# Patient Record
Sex: FEMALE | Race: WHITE | Employment: UNEMPLOYED | URBAN - METROPOLITAN AREA
[De-identification: names, ages, dates, MRNs, and addresses within clinical notes are randomized per-mention and may not be internally consistent; named-entity substitution may affect disease eponyms.]

---

## 2016-01-22 LAB — CREATININE, EXTERNAL: 0.5

## 2016-11-15 LAB — HBA1C MFR BLD HPLC: 5.7 %

## 2017-08-16 LAB
CREATININE, EXTERNAL: 0.8
LDL-C, EXTERNAL: 104.4

## 2018-01-08 ENCOUNTER — OFFICE VISIT (OUTPATIENT)
Dept: FAMILY MEDICINE CLINIC | Age: 58
End: 2018-01-08

## 2018-01-08 VITALS
WEIGHT: 263.8 LBS | HEART RATE: 61 BPM | OXYGEN SATURATION: 96 % | TEMPERATURE: 97.5 F | SYSTOLIC BLOOD PRESSURE: 122 MMHG | BODY MASS INDEX: 42.4 KG/M2 | HEIGHT: 66 IN | RESPIRATION RATE: 20 BRPM | DIASTOLIC BLOOD PRESSURE: 55 MMHG

## 2018-01-08 DIAGNOSIS — E16.2 LOW BLOOD SUGAR: ICD-10-CM

## 2018-01-08 DIAGNOSIS — Z12.39 SCREENING FOR BREAST CANCER: ICD-10-CM

## 2018-01-08 DIAGNOSIS — Z23 ENCOUNTER FOR IMMUNIZATION: ICD-10-CM

## 2018-01-08 DIAGNOSIS — Z00.00 WELL WOMAN EXAM (NO GYNECOLOGICAL EXAM): Primary | ICD-10-CM

## 2018-01-08 DIAGNOSIS — G89.29 CHRONIC NECK PAIN: ICD-10-CM

## 2018-01-08 DIAGNOSIS — Z95.0 PACEMAKER: ICD-10-CM

## 2018-01-08 DIAGNOSIS — I50.9 CHRONIC CONGESTIVE HEART FAILURE, UNSPECIFIED CONGESTIVE HEART FAILURE TYPE: ICD-10-CM

## 2018-01-08 DIAGNOSIS — M54.2 CHRONIC NECK PAIN: ICD-10-CM

## 2018-01-08 DIAGNOSIS — Z00.00 LABORATORY EXAM ORDERED AS PART OF ROUTINE GENERAL MEDICAL EXAMINATION: ICD-10-CM

## 2018-01-08 DIAGNOSIS — J02.9 SORE THROAT: ICD-10-CM

## 2018-01-08 PROBLEM — I25.10 CAD (CORONARY ARTERY DISEASE): Status: ACTIVE | Noted: 2018-01-08

## 2018-01-08 PROBLEM — I10 ESSENTIAL HYPERTENSION: Status: ACTIVE | Noted: 2018-01-08

## 2018-01-08 PROBLEM — F99 PSYCHIATRIC DISORDER: Status: ACTIVE | Noted: 2018-01-08

## 2018-01-08 RX ORDER — HYDROCODONE BITARTRATE AND ACETAMINOPHEN 7.5; 325 MG/1; MG/1
1 TABLET ORAL
Qty: 15 TAB | Refills: 0 | Status: SHIPPED | OUTPATIENT
Start: 2018-01-08 | End: 2018-01-30 | Stop reason: SDUPTHER

## 2018-01-08 NOTE — PATIENT INSTRUCTIONS
1) Healthy Weight  Body Mass Index is a noninvasive way to screen for weight and body fat. This is a mathematical calculation based on your height and weight. A healthy BMI is between 20% -24.9%. Your BMI is 39 %. There is a relationship between high BMI and various healthy problems, such as osteoarthritis, muscle pain, increased risk of cancer, diabetes, heart disease, stroke, hypertension, high cholesterol, sleep apnea, breathing problems, depression, which is why a healthy BMI is so important. In terms of weight loss, a 5-10% reduction in body weight over 3-6 months is a reasonable goal.  There would likely be improvements in risk for disease such as diabetes and heart disease, with this amount of weight loss. In order to lose weight, try reducing your daily intake of calories by decreasing portion size of food and increase exercise to help reduce weight. Eat 3-5 small meals per day instead of 3 large meals. Choose lean meats for protein source which include chicken, pork, and turkey. The recommended serving size for protein is a 2-3 oz serving (the size of your fist), and 1-1.5 oz of carbohydrate per meal (about 1 cup). Increase servings of fruits and vegetables. Limit processed carbohydrates, (i.e. most breads, crackers, pasta, chips, rice, breaded or battered food, etc). If you choose to eat carbohydrates, whole wheat, (instead of white) is a healthier option for bread, rice, and crackers. Avoid fried foods. Limit sugar and do not drink alcohol, juice, sodas or sweet teas. Drink plenty of water (at least 64 oz/day). Daily exercise will also help with weight loss and overall health. A minimum of 150 minutes a week of moderate exercise is recommended (30 minutes per day). Make exercise a routine part of your day - for example, park in spaces far away from [facility], take stairs instead of elevator, if sitting for long periods, get up from chair and walk every hour.     Recruit a friend or relative to exercise with you and keep you on schedule. It is much easier to exercise with a divina who will make sure you work out each day! [employer] is a eight week mixed martial arts (MMA) based workout. It has 5 main workout DVDs, each one is 45 minutes consisting of a 10 minute warm-up, five 5 minute workouts and a 6 minute stretching/cool down. It is easy to follow, with options to modify each move and you only need hand weights and some space to do the work out. Meal planning smart phone applications like G[employer]! can help plan healthy meals. Get 7-8 hours of sleep each night. You may wish to consider seeing the nutritionist at [facility] ([phone]), [facility] ([phone]) or [facility] ([phone]). For reliable dietary information, go to www. [email or web address].    Free smart phone application to help manage weight loss: MyFitnessPal = tracks food intake, exercise and weight. Daily nutritional summary. Meal        2) An upper respiratory infection (URI) is an infection of the throat and nose. It is also known as \"the common cold\". 90% of these infections are caused by a virus. Viral infections do not respond to antibiotic treatment, only bacteria are killed by antibiotics. Therefore, supportive treatment is recommended to help with symptoms. Symptoms usually subside after 7-10 days (or longer if you smoke). If symptoms worsen or persist after 14 days, or if you have fever over 101.3, fever for 5 days or more, wheezing, or shortness of breath, please contact the office. To prevent URIs, wash hands frequently (epecially before and after eating - use hand  if you are in a public place.) Eat a healthy diet, get regular exercise and sufficient sleep. Reduce your exposure to cigarette smoke. If you need to cough or sneeze, use the crook of your arm to cover your mouth.       Supportive Care    1) Alternate 400mg Ibuprofen and 650mg Tylenol every 4 hours as needed for sinus pain and discomfort. Make sure to take Ibuprofen with food as it can cause stomach upset. Do not exceed 3000 mg Tylenol per day. 2) Take a daily antihistamine to reduce symptoms such as sneezing, runny nose and itchy eyes. You can buy these over the counter (OTC) (no prescription needed) such as Claritin, Allegra or Zyrtec. Take this daily as it takes 3-4 weeks for the full therapeutic effect to take place. Follow directions on package. If symptoms of sneezing, coughing and runny nose are severe, you can try taking Benadryl at night before bed. However, Benadryl can cause drowsiness, so please use caution. Elderly people should not use Benadryl due to side effects and increase risk of falling. 3) OTC Robitussin or Delsym for cough relief. Follow directions on package. Do not exceed maximum dose. May cause drowsiness. If you have high blood pressure or kidney problems, avoid cough medicines that contain decongestants -- such as pseudoephedrine, ephedrine, phenylephrine, naphazoline and oxymetazoline. 4) Use an OTC decongestant nasal spray such as Flonase, Nasonex, or Rhinocort. These contain a steroid and will help reduce congestion. You can spray 2x in each nostril two times a day. Use the opposite hand of the nostril you are spraying and look down at your toes when you administer the nasal spray. This ensures the spray is applied to the nasal tissue properly. If you use Afrin for nasal congestion, DO NOT use for more than 5 days (due to rebound congestion)     Saline nasal spray can be used daily and will help restore moisture to dry nasal passages, wash away allergens and can help prevent inflammation of the mucous membranes. 5) Mucinex (guaifenesin) helps thin mucus and may make it easier to clear it from head, throat and lungs. 6) Increase your fluid consumption, especially water.  Eat a well-balanced diet and avoid dairy and sugar as these can increase or thicken congestion. 7) Warm salt water gargle can help alleviate sore throat (1 teaspoon in 8 oz warm water)    8) Honey is a natural cough suppressor - can take a teaspoon of honey for cough or mix with hot tea. Local honey can help inoculate against local pollen that causes allergic reactions. (It has to be local honey from our area. You can usually find local honey at farmers' markets.)     9) Humidify air, especially in bedroom at night, as it may help with nasal and throat dryness. Breathing in steam from a shower may help loosen mucus and soothe an irritated throat. 10) Get plenty of rest!    11) Emer[employer] C or [place] - vitamin supplements containing high doses of vitamin C, Vitamin B12 and B6 that can is marketed to help prevent or stop colds (although this has not been confirmed by scientific research)     12) If you were given an antibiotic, throw away your old toothbrush and invest in a new one after you have been on the antibiotic for 24 hours. You can get reinfected by using a contaminated one! If symptoms persist for more than 10 days or if you have a fever above 102, please call the office. Complications of URI include an infection of the skin around the eye and other infections. Watch for signs of fever, chills, body aches or any areas of red, warm, swollen and tender skin. Call immediately if you notice these signs. 3) Referrals to Dr. Swetha Nino and Dr. Sanjiv Sanchez  Please make an appt to see cardiology this month, as you have sx of dizziness, fatigue and palpitations    4) Labs  The following blood work was ordered today. Once the tests are completed, you will receive a letter, email or phone call with the results. If you have not heard from us within 10-14 days, please call the office for the results.     Complete Blood Count (CBC) helps evaluate your overall health, including hemoglobin and red blood cells that carry oxygen, white blood cells that fight infection and platelets that help with clotting. Complete Metabolic Panel (CMP) assesses electrolytes, kidney and liver function.  (A Basic Metabolic Panel (BMP) does not include liver function.)  Electrolytes include sodium, potassium, calcium, and chloride. These are necessary for cell function and an imbalance can cause serious problems. BUN and creatinine are markers of kidney function. ALT and AST are markers of liver function. Hemoglobin A1c is a 3 month average of your blood sugar and used as a marker of your diabetes control. A normal value is less than 5.7%. Total Cholesterol is the total number of cholesterol particles in your blood, which includes triglycerides, HDL and LDL. A small amount of cholesterol is needed for the body's cells, hormones, and metabolism. Goal is less than 200. Triglycerides are the short term fats in your blood which are used for energy. Goal is less than 150. High Density Lipids (HDL) is the \"good\" cholesterol in your blood. HDL helps remove bad cholesterol from your blood. Goal is more than 40. Low Density Lipids (LDL) is the \"bad\" cholesterol in your blood. LDL can stick to your arteries, raising the risk for heart attack and stroke. Goal is less than 150    Urinalysis - this is a test of your urine to check your overall health. It is recommended as a part of a routine check up and screens for a variety of disorders, such as urinary tract infections, kidney disease and diabetes. Urine Analysis for Microalbumin/creatinine ratio is a marker of the amount of protein in your urine. Certain health conditions, such as diabetes and hypertension, can injury kidney function.  A normal value is less than 30.      4) SHINGRIX is a vaccine indicated for prevention of herpes zoster (shingles) in adults aged 48 years and older and is the preferred vaccine for preventing shingles and related complications    The Center for Disease Control and Prevention recommended Shingrix for the following:  - healthy adults aged 48 years and older to prevent shingles and related complications  - adults who previously received the current shingles vaccine (Zostavax®) to prevent shingles and related complications    Two doses (0.5 mL each) are needed for full efficacy. The vaccines are administered intramuscularly, with the second one administered 2-6 months after the initial vaccine. 5) Will give you a prescription for Norco #15 to carry you over to appt with Dr. Lucero Butts. Cannot refill this as you need to have a controlled substance agreement with your PCP, Dr. Lucero Butts, to get chronic opioid prescriptions. 6) Prevnar 13 today. Pneumovax 23 in a year      Vaccine Information Statement     Pneumococcal Conjugate Vaccine (PCV13): What You Need to Know    Many Vaccine Information Statements are available in Irish and other languages. See www.immunize.org/vis. Hojas de información Sobre Vacunas están disponibles en español y en muchos otros idiomas. Visite www.immunize.org/vis. 1. Why get vaccinated? Vaccination can protect both children and adults from pneumococcal disease. Pneumococcal disease is caused by bacteria that can spread from person to person through close contact. It can cause ear infections, and it can also lead to more serious infections of the:   Lungs (pneumonia),   Blood (bacteremia), and   Covering of the brain and spinal cord (meningitis). Pneumococcal pneumonia is most common among adults. Pneumococcal meningitis can cause deafness and brain damage, and it kills about 1 child in 10 who get it. Anyone can get pneumococcal disease, but children under 3years of age and adults 72 years and older, people with certain medical conditions, and cigarette smokers are at the highest risk.      Before there was a vaccine, the [facility] saw:   more than 700 cases of meningitis,   about 13,000 blood infections,   about 5 million ear infections, and   about 200 deaths  in children under 5 each year from pneumococcal disease. Since vaccine became available, severe pneumococcal disease in these children has fallen by 88%. About 18,000 older adults die of pneumococcal disease each year in the [place]. Treatment of pneumococcal infections with penicillin and other drugs is not as effective as it used to be, because some strains of the disease have become resistant to these drugs. This makes prevention of the disease, through vaccination, even more important. 2. PCV13 vaccine    Pneumococcal conjugate vaccine (called PCV13) protects against 13 types of pneumococcal bacteria. PCV13 is routinely given to children at 2, 4, 6, and 1515 months of age. It is also recommended for children and adults 3to 59years of age with certain health conditions, and for all adults 72years of age and older. Your doctor can give you details. 3. Some people should not get this vaccine    Anyone who has ever had a life-threatening allergic reaction to a dose of this vaccine, to an earlier pneumococcal vaccine called PCV7, or to any vaccine containing diphtheria toxoid (for example, DTaP), should not get PCV13. Anyone with a severe allergy to any component of PCV13 should not get the vaccine. Tell your doctor if the person being vaccinated has any severe allergies. If the person scheduled for vaccination is not feeling well, your healthcare provider might decide to reschedule the shot on another day. 4. Risks of a vaccine reaction    With any medicine, including vaccines, there is a chance of reactions. These are usually mild and go away on their own, but serious reactions are also possible. Problems reported following PCV13 varied by age and dose in the series.  The most common problems reported among children were:    About half became drowsy after the shot, had a temporary loss of appetite, or had redness or tenderness where the shot was given.   About 1 out of 3 had swelling where the shot was given.  About 1 out of 3 had a mild fever, and about 1 in 20 had a fever over 102.2°F.   Up to about 8 out of 10 became fussy or irritable. Adults have reported pain, redness, and swelling where the shot was given; also mild fever, fatigue, headache, chills, or muscle pain. [place]  children who get PCV13 along with inactivated flu vaccine at the same time may be at increased risk for seizures caused by fever. Ask your doctor for more information. Problems that could happen after any vaccine:     People sometimes faint after a medical procedure, including vaccination. Sitting or lying down for about 15 minutes can help prevent fainting, and injuries caused by a fall. Tell your doctor if you feel dizzy, or have vision changes or ringing in the ears.  Some older children and adults get severe pain in the shoulder and have difficulty moving the arm where a shot was given. This happens very rarely.  Any medication can cause a severe allergic reaction. Such reactions from a vaccine are very rare, estimated at about 1 in a million doses, and would happen within a few minutes to a few hours after the vaccination. As with any medicine, there is a very small chance of a vaccine causing a serious injury or death. The safety of vaccines is always being monitored. For more information, visit: www.cdc.gov/vaccinesafety/     5. What if there is a serious reaction? What should I look for?  Look for anything that concerns you, such as signs of a severe allergic reaction, very high fever, or unusual behavior. Signs of a severe allergic reaction can include hives, swelling of the face and throat, difficulty breathing, a fast heartbeat, dizziness, and weakness - usually within a few minutes to a few hours after the vaccination. What should I do?      If you think it is a severe allergic reaction or other emergency that cant wait, call 9-1-1 or get the person to the nearest hospital. Otherwise, call your doctor. Reactions should be reported to the Vaccine Adverse Event Reporting System (VAERS). Your doctor should file this report, or you can do it yourself through the VAERS web site at www.vaers. [facility].gov, or by calling 6-955.880.9208. VAERS does not give medical advice. 6. The National Vaccine Injury Compensation Program    The [facility] Vaccine Injury Compensation Program (VICP) is a federal program that was created to compensate people who may have been injured by certain vaccines. Persons who believe they may have been injured by a vaccine can learn about the program and about filing a claim by calling 5-839.128.5971 or visiting the [employer] website at www.[facility].gov/vaccinecompensation. There is a time limit to file a claim for compensation. 7. How can I learn more?  Ask your healthcare provider. He or she can give you the vaccine package insert or suggest other sources of information.  Call your local or state health department.  Contact the Centers for Disease Control and Prevention (CDC):  - Call 8-939.826.6170 (1-800-CDC-INFO) or  - Visit CDCs website at www.cdc.gov/vaccines    Vaccine Information Statement   PCV13 Vaccine   11/5/2015   41 [address] [id number]    Department of Health and Human Services  Centers for Disease Control and Prevention    Office Use Only    KEE Mota   Medrol dose pack - check your sugars as pred will spike BS  Medrol Dose Pack  Oral: Tapered-dosage schedule (eg, dose-pack containing 21 x 4 mg tablets):    Day 1: 24 mg on day 1 administered as 8 mg (2 tablets) before breakfast, 4 mg (1 tablet) after lunch, 4 mg (1 tablet) after supper, and 8 mg (2 tablets) at bedtime OR 24 mg (6 tablets) as a single dose or divided into 2 or 3 doses upon initiation (regardless of time of day)    Day 2: 20 mg on day 2 administered as 4 mg (1 tablet) before breakfast, 4 mg (1 tablet) after lunch, 4 mg (1 tablet) after supper, and 8 mg (2 tablets) at bedtime    Day 3: 16 mg on day 3 administered as 4 mg (1 tablet) before breakfast, 4 mg (1 tablet) after lunch, 4 mg (1 tablet) after supper, and 4 mg (1 tablet) at bedtime    Day 4: 12 mg on day 4 administered as 4 mg (1 tablet) before breakfast, 4 mg (1 tablet) after lunch, and 4 mg (1 tablet) at bedtime    Day 5: 8 mg on day 5 administered as 4 mg (1 tablet) before breakfast and 4 mg (1 tablet) at bedtime    Day 6: 4 mg on day 6 administered as 4 mg (1 tablet) before breakfast    Apply cream daily to ankle - only thin coat and no more than 7 days as it will thin skin

## 2018-01-08 NOTE — PROGRESS NOTES
S: Enrike Gibson is a 61 y.o. female who presents for CPE    Assessment/Plan:    1. Well woman exam (no gynecological exam)  -discussed healthy diet and increasing daily exercise but only after consult with cards re: sx  -labs today: CBC, CMP, urinalysis, A1c, lipid  -Prevnar 13 today; declined flu  -referral for mammogram  -pt had colonoscopy 2010; (dad w/hx of colon cancer); pt to find out when recall and if overdue, will put in referral, otherwise due 2020    2. CHF, packemaker, HTN  -pt experieincing SOB, fatigue and LE edema  -advised to make appt with cardiology now and not wait until scheduled appt in March  -referral to Dr. Tala Coon for cardiology and Dr. Alisia Manzano for pacemaker check    3. Chronic neck pain  -pt on chronic pain meds; explained she can only have Controlled Substance agreement with PCP who will be sole prescriber of controlled substances; pt verbalizes understanding, but will be short 3 days of pills between end of script and seeing Dr. Cristel Carlton. - refilled norco #15 to allow pt to establish with Dr. Cristel Carlton    4.  Sore Throat  -strep = negative  -instructions for supportive therapy given; advised if still sx by 1/15/18, advise and will call in abx     RTC to see Dr. Cristel Carlton     HPI:  Pacemaker and needs referral to get checked - Dr. Hu Manriquez - needs battery replaced   Dr. Alisia Manzano - monitors warfarin and INR - checked q2wks with nurses and sees doc 1/month - appt in another week    Scratchy throat - 3 days  +HA, congestion, sinus pressure - discussed supportive therapy    2 weeks ago - Dropped some pans on her heat and broke tooth, bruised shoulder and bump on head      BS will drop - checked on 's meter and it was 45;  has a dog that will detect low blood sugar     Will get dizzy occasionally   Will gain weight - 8 lbs in one day - usually carries in abdomen, not ankles  Dx with HF 2012 - took off 27lbs fluid     Depression - meds: zoloft 100mg + remeron 15mg   Daughter killed in 2016 - had a seizure and crashed; has 4 kids and wasn't allowed to see them d/t daughter in midst of   when she was killed  Grandkids live in [place] with father    Social history:   Nutrition: (son has celiac so no wheat in household)  Breakfast: carbmaster yogurt, granola, coffee; [employer] cheerios, scrambled eggs  Lunch: skips  Dinner: early around 4:30 -5p - chicken, beef, pork, rice, potatoe, veggie, salad  Drinks: diet gingerale, water 6-8oz/day  Physical: treadmill, exercise bike - hasn't down exercise due to fatigue  Social: live with razia and son (24yo) and daughter-in-law  Occupation: none  Tobacco: none  Drug: none  Alcohol: none  Sexually Active: yes, no issues with sex, has had full hysterectomy     No LMP recorded. Patient is not currently having periods (Reason: Menopause). Hysterectomy 2011    Review of Systems:  - Constitutional Symptoms: no fevers/chills  - Eyes: no blurry vision or double vision; has cardiomyopathy   - Cardiovascular: no chest pain + palpitations - been in [facility]  - Respiratory: no cough,  +shortness of breath  - Gastrointestinal: no dysphagia or abdominal pain  - Musculoskeletal: no joint pains or weakness  - Integumentary: no rashes  - : no vaginal discharge, itching, dyspareunia, or recent changes in cycle. - Neurological: no numbness, tingling, or headaches  - Psychiatric: + depression or anxiety    PHQ over the last two weeks 1/8/2018   Little interest or pleasure in doing things Not at all   Feeling down, depressed or hopeless Not at all   Total Score PHQ 2 0       I reviewed the following:  Past Medical History:   Diagnosis Date    CAD (coronary artery disease)     Heart failure ([facility] [address]     Hypertension     Ill-defined condition     chronic pain    Psychiatric disorder     depression       Current Outpatient Prescriptions   Medication Sig Dispense Refill    sacubitril-valsartan (ENTRESTO) 49 mg/51 mg tablet Take 1 Tab by mouth two (2) times a day.       HYDROcodone-acetaminophen (NORCO) 7.5-325 mg per tablet Take 1 Tab by mouth every eight (8) hours as needed for Pain. Max Daily Amount: 3 Tabs. 15 Tab 0    pravastatin (PRAVACHOL) 80 mg tablet Take 80 mg by mouth nightly.  sertraline (ZOLOFT) 100 mg tablet Take 100 mg by mouth daily.  methocarbamol (ROBAXIN) 500 mg tablet Take  by mouth three (3) times daily.  potassium chloride SR (KLOR-CON 10) 10 mEq tablet Take 20 mEq by mouth.  raNITIdine hcl 150 mg capsule Take 150 mg by mouth two (2) times a day.  spironolactone (ALDACTONE) 25 mg tablet Take 25 mg by mouth daily.  mirtazapine (REMERON) 15 mg tablet Take 15 mg by mouth nightly.  carvedilol (COREG) 3.125 mg tablet Take 3.125 mg by mouth two (2) times daily (with meals).  furosemide (LASIX) 40 mg tablet Take 40 mg by mouth two (2) times a day.  zolpidem (AMBIEN) 10 mg tablet Take 10 mg by mouth nightly as needed for Sleep. Allergies   Allergen Reactions    Neosporin [Neomycin-Bacitracin-Polymyxin] Rash        Health Maintenance:  Mammogram: 2010   PAP: 2010 - no cervix  DEXA: never  Colonoscopy: 2010 - will find out recall (Dad had colon cancer)   Eye exam: annually  ACP: has will bring them to next visit    Tdap: unknown  Flu: declines   Shingles: discussed  Pneumo 23: discussed  Prevnar 13: today    O: VS:   Visit Vitals    /55 (BP 1 Location: Right arm, BP Patient Position: Sitting)    Pulse 61    Temp 97.5 °F (36.4 °C) (Oral)    Resp 20    Ht 5' 6\" (1.676 m)    Wt 263 lb 12.8 oz (119.7 kg)    SpO2 96%    BMI 42.58 kg/m2       GENERAL: Leandro Jolley is in no acute distress. Non-toxic. Well nourished. Well developed. Appropriately groomed. HEAD:  Normocephalic. Atraumatic. Non tender sinuses x 4. EYE: PERRLA. EOMs intact. Sclera anicteric without injection. No drainage or discharge. EARS: Hearing intact bilaterally. External ear canals normal without evidence of blood or swelling. Bilateral TM's intact, pearly grey with landmarks visible. No erythema or effusion. NOSE: Patent. Nasal turbinates pink. No erythema. No discharge. MOUTH: mucous membranes pink and moist. Posterior pharynx normal with cobblestone appearance. Erythema, no white exudate or obstruction. Tonsils +3  NECK: supple. Midline trachea. No carotid bruits noted bilaterally. RESP: Breath sounds are symmetrical bilaterally. Unlabored without SOB. Speaking in full sentences. Clear to auscultation bilaterally anteriorly and posteriorly. No wheezes. No rales or rhonchi. CV: normal rate. Regular rhythm. S1, S2 audible. No murmur noted. No rubs, clicks or gallops noted. ABDOMEN: Limited by body hiatus. Soft and nondistended. No tenderness on palpation. No rebound, rigidity or guarding. Bowel sounds normal x 4 quadrants. BACK: No visible deformities or curvature. Pain on palpation of the right lower back. No CVA tenderness. : deferred  NEURO:  awake, alert and oriented to person, place, and time and event. Cranial nerves II through XII intact. Clear speech. Muscle strength is +5/5 x 4 extremities. Sensation is intact to light touch bilaterally. Steady gait. MUSC:  Intact x 4 extremities. Full ROM x 4 extremities. No pain with movement. HEME/LYMPH: peripheral pulses palpable 2+ x 4 extremities. No cervical adenopathy noted. Bilateral lower extremity nonpitting edema is noted; right = +1, left = +2    SKIN: Skin is warm and dry. Turgor is normal. No petechiae, no purpura, no rash. No cyanosis. PSYCH: appropriate behavior, dress and thought processes. Good eye contact. Clear and coherent speech. Pt became tearful when talking about  daughter  ______________________________________________________________________  Patient education was done. Advised on nutrition, physical activity, weight management, tobacco, alcohol and safety.   Counseling included discussion of diagnosis, differentials, treatment options, prescribed treatment, warning signs and follow up. Medication risks/benefits,interactions and alternatives discussed with patient.      Patient verbalized understanding and agreed to plan of care. Patient was given an after visit summary which included current diagnoses, medications and vital signs.     Follow up with Dr. Omari Gil, new PCP

## 2018-01-08 NOTE — MR AVS SNAPSHOT
Visit Information Date & Time Provider Department Dept. Phone Encounter #  
 1/8/2018  1:20 PM Nilo Caicedo NP [facility] [place] [facility] [phone] [id number] Your Appointments 1/30/2018 10:20 AM  
New Patient with Stefani Crandall. Rebecca Adrian 28 ([place]) Appt Note: NP/Est Pcp  
 [address] 
[address] [facility] [address]  
[phone]  
  
   
 [address] [facility] [address] [facility] [address] [facility] Upcoming Health Maintenance Date Due Hepatitis C Screening 1960 DTaP/Tdap/Td series (1 - Tdap) 2/20/1980 PAP AKA CERVICAL CYTOLOGY 2/20/1980 BREAST CANCER SCRN MAMMOGRAM 2/20/2009 FOBT Q 1 YEAR AGE 50-75 2/20/2009 Influenza Age 5 to Adult 8/1/2017 Allergies as of 1/8/2018  Review Complete On: 1/8/2018 By: Nilo Caicedo NP Severity Noted Reaction Type Reactions Neosporin [Neomycin-bacitracin-polymyxin]  12/31/2016    Rash Current Immunizations  Never Reviewed Name Date Pneumococcal Conjugate (PCV-13)  Incomplete Not reviewed this visit You Were Diagnosed With   
  
 Codes Comments Well woman exam (no gynecological exam)    -  Primary ICD-10-CM: Z00.00 ICD-9-CM: V70.0 Laboratory exam ordered as part of routine general medical examination     ICD-10-CM: Z00.00 ICD-9-CM: V72.62 Pacemaker     ICD-10-CM: Z95.0 ICD-9-CM: V45.01 Chronic congestive heart failure, unspecified congestive heart failure type ([facility] [address]     ICD-10-CM: I50.9 ICD-9-CM: 428.0 Encounter for immunization     ICD-10-CM: C32 ICD-9-CM: V03.89 Low blood sugar     ICD-10-CM: E16.2 ICD-9-CM: 451. 2 Chronic neck pain     ICD-10-CM: M54.2, G89.29 ICD-9-CM: 723.1, 338.29 Vitals BP Pulse Temp Resp Height(growth percentile) Weight(growth percentile)  122/55 (BP 1 Location: Right arm, BP Patient Position: Sitting) 61 97.5 °F (36.4 °C) (Oral) 20 5' 6\" (1.676 m) 263 lb 12.8 oz (119.7 kg) SpO2 BMI OB Status Smoking Status 96% 42.58 kg/m2 Menopause Never Smoker BMI and BSA Data Body Mass Index Body Surface Area 42.58 kg/m 2 2.36 m 2 Preferred Pharmacy Pharmacy Name Phone [address] [place] [address] [phone] Your Updated Medication List  
  
   
This list is accurate as of: 1/8/18  3:00 PM.  Always use your most recent med list.  
  
  
  
  
 carvedilol 3.125 mg tablet Commonly known as:  Delfino Si Take 3.125 mg by mouth two (2) times daily (with meals). ENTRESTO 49-51 mg Tab tablet Generic drug:  sacubitril-valsartan Take 1 Tab by mouth two (2) times a day. furosemide 40 mg tablet Commonly known as:  LASIX Take 40 mg by mouth two (2) times a day. HYDROcodone-acetaminophen 7.5-325 mg per tablet Commonly known as:  Sunni Gut Take 1 Tab by mouth every eight (8) hours as needed for Pain. Max Daily Amount: 3 Tabs. losartan 100 mg tablet Commonly known as:  COZAAR Take 100 mg by mouth daily. methocarbamol 500 mg tablet Commonly known as:  ROBAXIN Take  by mouth three (3) times daily. mirtazapine 15 mg tablet Commonly known as:  Crawford Ruder Take 15 mg by mouth nightly. potassium chloride SR 10 mEq tablet Commonly known as:  KLOR-CON 10 Take 20 mEq by mouth.  
  
 pravastatin 80 mg tablet Commonly known as:  PRAVACHOL Take 80 mg by mouth nightly. raNITIdine hcl 150 mg capsule Take 150 mg by mouth two (2) times a day. SAVAYSA 60 mg Tab tablet Generic drug:  edoxaban Take 60 mg by mouth daily. sertraline 100 mg tablet Commonly known as:  ZOLOFT Take 100 mg by mouth daily. spironolactone 25 mg tablet Commonly known as:  ALDACTONE Take 25 mg by mouth daily. torsemide 20 mg tablet Commonly known as:  DEMADEX Take 20 mg by mouth daily. zolpidem 10 mg tablet Commonly known as:  AMBIEN Take 10 mg by mouth nightly as needed for Sleep. Prescriptions Printed Refills HYDROcodone-acetaminophen (NORCO) 7.5-325 mg per tablet 0 Sig: Take 1 Tab by mouth every eight (8) hours as needed for Pain. Max Daily Amount: 3 Tabs. Class: Print Route: Oral  
  
We Performed the Following CBC WITH AUTOMATED DIFF [17768 CPT(R)] HEMOGLOBIN A1C WITH EAG [42859 CPT(R)] LIPID PANEL [67671 CPT(R)] METABOLIC PANEL, COMPREHENSIVE [48212 CPT(R)] MICROALBUMIN, UR, RAND W/ MICROALBUMIN/CREA RATIO [id number] CPT(R)] PNEUMOCOCCAL CONJ VACCINE 13 VALENT IM [id number] CPT(R)] [place] IMMUNIZ ADMIN,1 SINGLE/COMB VAC/TOXOID [id number] CPT(R)] REFERRAL TO CARDIOLOGY [SVT06 Custom] REFERRAL TO CARDIOLOGY [THI72 Custom] UA/M W/RFLX CULTURE, ROUTINE [[id number] Custom] Referral Information Referral ID Referred By Referred To  
  
 [id number] [facility] [address] [place], [address] Visits Status Start Date End Date 1 New Request 1/8/18 1/8/19 If your referral has a status of pending review or denied, additional information will be sent to support the outcome of this decision. Referral ID Referred By Referred To  
 [id number]  
   [place] [address] [place], [address] Visits Status Start Date End Date 1 New Request 1/8/18 1/8/19 If your referral has a status of pending review or denied, additional information will be sent to support the outcome of this decision. Patient Instructions 1) Healthy Weight Body Mass Index is a noninvasive way to screen for weight and body fat. This is a mathematical calculation based on your height and weight. A healthy BMI is between 20% -24.9%. Your BMI is 39 %.   There is a relationship between high BMI and various healthy problems, such as osteoarthritis, muscle pain, increased risk of cancer, diabetes, heart disease, stroke, hypertension, high cholesterol, sleep apnea, breathing problems, depression, which is why a healthy BMI is so important. In terms of weight loss, a 5-10% reduction in body weight over 3-6 months is a reasonable goal.  There would likely be improvements in risk for disease such as diabetes and heart disease, with this amount of weight loss. In order to lose weight, try reducing your daily intake of calories by decreasing portion size of food and increase exercise to help reduce weight. Eat 3-5 small meals per day instead of 3 large meals. Choose lean meats for protein source which include chicken, pork, and turkey. The recommended serving size for protein is a 2-3 oz serving (the size of your fist), and 1-1.5 oz of carbohydrate per meal (about 1 cup). Increase servings of fruits and vegetables. Limit processed carbohydrates, (i.e. most breads, crackers, pasta, chips, rice, breaded or battered food, etc). If you choose to eat carbohydrates, whole wheat, (instead of white) is a healthier option for bread, rice, and crackers. Avoid fried foods. Limit sugar and do not drink alcohol, juice, sodas or sweet teas. Drink plenty of water (at least 64 oz/day). Daily exercise will also help with weight loss and overall health. A minimum of 150 minutes a week of moderate exercise is recommended (30 minutes per day). Make exercise a routine part of your day - for example, park in spaces far away from [facility], take stairs instead of elevator, if sitting for long periods, get up from chair and walk every hour. Recruit a friend or relative to exercise with you and keep you on schedule. It is much easier to exercise with a divina who will make sure you work out each day! [employer] is a eight week mixed martial arts (MMA) based workout.   It has 5 main workout DVDs, each one is 45 minutes consisting of a 10 minute warm-up, five 5 minute workouts and a 6 minute stretching/cool down. It is easy to follow, with options to modify each move and you only need hand weights and some space to do the work out. Meal planning smart phone applications like invino can help plan healthy meals. Get 7-8 hours of sleep each night. You may wish to consider seeing the nutritionist at [facility] ([phone]), [facility] ([phone]) or [facility] ([phone]). For reliable dietary information, go to www. EATRIGHT.org. 
 
Free smart phone application to help manage weight loss: MyFitnessPal = tracks food intake, exercise and weight. Daily nutritional summary. Meal  2) An upper respiratory infection (URI) is an infection of the throat and nose. It is also known as \"the common cold\". 90% of these infections are caused by a virus. Viral infections do not respond to antibiotic treatment, only bacteria are killed by antibiotics. Therefore, supportive treatment is recommended to help with symptoms. Symptoms usually subside after 7-10 days (or longer if you smoke). If symptoms worsen or persist after 14 days, or if you have fever over 101.3, fever for 5 days or more, wheezing, or shortness of breath, please contact the office. To prevent URIs, wash hands frequently (epecially before and after eating - use hand  if you are in a public place.) Eat a healthy diet, get regular exercise and sufficient sleep. Reduce your exposure to cigarette smoke. If you need to cough or sneeze, use the crook of your arm to cover your mouth. Supportive Care 1) Alternate 400mg Ibuprofen and 650mg Tylenol every 4 hours as needed for sinus pain and discomfort. Make sure to take Ibuprofen with food as it can cause stomach upset. Do not exceed 3000 mg Tylenol per day. 2) Take a daily antihistamine to reduce symptoms such as sneezing, runny nose and itchy eyes. You can buy these over the counter (OTC) (no prescription needed) such as Claritin, Allegra or Zyrtec. Take this daily as it takes 3-4 weeks for the full therapeutic effect to take place. Follow directions on package. If symptoms of sneezing, coughing and runny nose are severe, you can try taking Benadryl at night before bed. However, Benadryl can cause drowsiness, so please use caution. Elderly people should not use Benadryl due to side effects and increase risk of falling. 3) OTC Robitussin or Delsym for cough relief. Follow directions on package. Do not exceed maximum dose. May cause drowsiness. If you have high blood pressure or kidney problems, avoid cough medicines that contain decongestants  such as pseudoephedrine, ephedrine, phenylephrine, naphazoline and oxymetazoline. 4) Use an OTC decongestant nasal spray such as Flonase, Nasonex, or Rhinocort. These contain a steroid and will help reduce congestion. You can spray 2x in each nostril two times a day. Use the opposite hand of the nostril you are spraying and look down at your toes when you administer the nasal spray. This ensures the spray is applied to the nasal tissue properly. If you use Afrin for nasal congestion, DO NOT use for more than 5 days (due to rebound congestion) Saline nasal spray can be used daily and will help restore moisture to dry nasal passages, wash away allergens and can help prevent inflammation of the mucous membranes. 5) Mucinex (guaifenesin) helps thin mucus and may make it easier to clear it from head, throat and lungs. 6) Increase your fluid consumption, especially water. Eat a well-balanced diet and avoid dairy and sugar as these can increase or thicken congestion. 7) Warm salt water gargle can help alleviate sore throat (1 teaspoon in 8 oz warm water) 8) Honey is a natural cough suppressor - can take a teaspoon of honey for cough or mix with hot tea. Local honey can help inoculate against local pollen that causes allergic reactions. (It has to be local honey from our area. You can usually find local honey at farmers' markets.) 9) Humidify air, especially in bedroom at night, as it may help with nasal and throat dryness. Breathing in steam from a shower may help loosen mucus and soothe an irritated throat. 10) Get plenty of rest! 11) Emergen C or [place] - vitamin supplements containing high doses of vitamin C, Vitamin B12 and B6 that can is marketed to help prevent or stop colds (although this has not been confirmed by scientific research) 12) If you were given an antibiotic, throw away your old toothbrush and invest in a new one after you have been on the antibiotic for 24 hours. You can get reinfected by using a contaminated one! If symptoms persist for more than 10 days or if you have a fever above 102, please call the office. Complications of URI include an infection of the skin around the eye and other infections. Watch for signs of fever, chills, body aches or any areas of red, warm, swollen and tender skin. Call immediately if you notice these signs. 3) referrals to Dr. Nickolas Miller and Dr. Juares Escobar Please make an appt to see cardiology this month, as you have sx of dizziness, fatigue and palpitations 4) Labs The following blood work was ordered today. Once the tests are completed, you will receive a letter, email or phone call with the results. If you have not heard from us within 10-14 days, please call the office for the results. Complete Blood Count (CBC) helps evaluate your overall health, including hemoglobin and red blood cells that carry oxygen, white blood cells that fight infection and platelets that help with clotting.    
 
Complete Metabolic Panel (CMP) assesses electrolytes, kidney and liver function.  (A Basic Metabolic Panel (BMP) does not include liver function.) Electrolytes include sodium, potassium, calcium, and chloride. These are necessary for cell function and an imbalance can cause serious problems. BUN and creatinine are markers of kidney function. ALT and AST are markers of liver function. Hemoglobin A1c is a 3 month average of your blood sugar and used as a marker of your diabetes control. A normal value is less than 5.7%. Total Cholesterol is the total number of cholesterol particles in your blood, which includes triglycerides, HDL and LDL. A small amount of cholesterol is needed for the body's cells, hormones, and metabolism. Goal is less than 200. Triglycerides are the short term fats in your blood which are used for energy. Goal is less than 150. High Density Lipids (HDL) is the \"good\" cholesterol in your blood. HDL helps remove bad cholesterol from your blood. Goal is more than 40. Low Density Lipids (LDL) is the \"bad\" cholesterol in your blood. LDL can stick to your arteries, raising the risk for heart attack and stroke. Goal is less than 150 Urinalysis - this is a test of your urine to check your overall health. It is recommended as a part of a routine check up and screens for a variety of disorders, such as urinary tract infections, kidney disease and diabetes. Urine Analysis for Microalbumin/creatinine ratio is a marker of the amount of protein in your urine. Certain health conditions, such as diabetes and hypertension, can injury kidney function. A normal value is less than 30.   
 
4) SHINGRIX is a vaccine indicated for prevention of herpes zoster (shingles) in adults aged 48 years and older and is the preferred vaccine for preventing shingles and related complications The Center for Disease Control and Prevention recommended Shingrix for the following: 
- healthy adults aged 48 years and older to prevent shingles and related complications - adults who previously received the current shingles vaccine (Zostavax®) to prevent shingles and related complications Two doses (0.5 mL each) are needed for full efficacy. The vaccines are administered intramuscularly, with the second one administered 2-6 months after the initial vaccine. 5) Will give you a prescription for Norco #15 to carry you over to appt with Dr. Christiane Forrest. Cannot refill this as you need to have aq controlled substance agreement with your PCP, Dr. Christiane Forrest to get chronic opioid prescriptions. 6) Prevnar 13 today. Pneumovax 23 in a year Vaccine Information Statement Pneumococcal Conjugate Vaccine (PCV13): What You Need to Know Many Vaccine Information Statements are available in Citizen of [place] and other languages. See www.immunize.org/vis. Hojas de información Sobre Vacunas están disponibles en español y en muchos otros idiomas. Visite www.immunize.org/vis. 1. Why get vaccinated? Vaccination can protect both children and adults from pneumococcal disease. Pneumococcal disease is caused by bacteria that can spread from person to person through close contact. It can cause ear infections, and it can also lead to more serious infections of the: 
 Lungs (pneumonia),  Blood (bacteremia), and 
 Covering of the brain and spinal cord (meningitis). Pneumococcal pneumonia is most common among adults. Pneumococcal meningitis can cause deafness and brain damage, and it kills about 1 child in 10 who get it. Anyone can get pneumococcal disease, but children under 3years of age and adults 72 years and older, people with certain medical conditions, and cigarette smokers are at the highest risk. Before there was a vaccine, the [facility] saw: 
 more than 700 cases of meningitis, 
 about 13,000 blood infections, 
 about 5 million ear infections, and 
 about 200 deaths 
in children under 5 each year from pneumococcal disease.  Since vaccine became available, severe pneumococcal disease in these children has fallen by 88%. About 18,000 older adults die of pneumococcal disease each year in the [place]. Treatment of pneumococcal infections with penicillin and other drugs is not as effective as it used to be, because some strains of the disease have become resistant to these drugs. This makes prevention of the disease, through vaccination, even more important. 2. PCV13 vaccine Pneumococcal conjugate vaccine (called PCV13) protects against 13 types of pneumococcal bacteria. PCV13 is routinely given to children at 2, 4, 6, and 1515 months of age. It is also recommended for children and adults 3to 59years of age with certain health conditions, and for all adults 72years of age and older. Your doctor can give you details. 3. Some people should not get this vaccine Anyone who has ever had a life-threatening allergic reaction to a dose of this vaccine, to an earlier pneumococcal vaccine called PCV7, or to any vaccine containing diphtheria toxoid (for example, DTaP), should not get PCV13. Anyone with a severe allergy to any component of PCV13 should not get the vaccine. Tell your doctor if the person being vaccinated has any severe allergies. If the person scheduled for vaccination is not feeling well, your healthcare provider might decide to reschedule the shot on another day. 4. Risks of a vaccine reaction With any medicine, including vaccines, there is a chance of reactions. These are usually mild and go away on their own, but serious reactions are also possible. Problems reported following PCV13 varied by age and dose in the series. The most common problems reported among children were:  About half became drowsy after the shot, had a temporary loss of appetite, or had redness or tenderness where the shot was given.  About 1 out of 3 had swelling where the shot was given.  About 1 out of 3 had a mild fever, and about 1 in 20 had a fever over 102.2°F. 
 Up to about 8 out of 10 became fussy or irritable. Adults have reported pain, redness, and swelling where the shot was given; also mild fever, fatigue, headache, chills, or muscle pain. Juliann Blanco children who get PCV13 along with inactivated flu vaccine at the same time may be at increased risk for seizures caused by fever. Ask your doctor for more information. Problems that could happen after any vaccine:  People sometimes faint after a medical procedure, including vaccination. Sitting or lying down for about 15 minutes can help prevent fainting, and injuries caused by a fall. Tell your doctor if you feel dizzy, or have vision changes or ringing in the ears.  Some older children and adults get severe pain in the shoulder and have difficulty moving the arm where a shot was given. This happens very rarely.  Any medication can cause a severe allergic reaction. Such reactions from a vaccine are very rare, estimated at about 1 in a million doses, and would happen within a few minutes to a few hours after the vaccination. As with any medicine, there is a very small chance of a vaccine causing a serious injury or death. The safety of vaccines is always being monitored. For more information, visit: www.cdc.gov/vaccinesafety/  
 
5. What if there is a serious reaction? What should I look for?  Look for anything that concerns you, such as signs of a severe allergic reaction, very high fever, or unusual behavior. Signs of a severe allergic reaction can include hives, swelling of the face and throat, difficulty breathing, a fast heartbeat, dizziness, and weakness  usually within a few minutes to a few hours after the vaccination. What should I do?  
 
 If you think it is a severe allergic reaction or other emergency that cant wait, call 9-1-1 or get the person to the nearest hospital. Otherwise, call your doctor. Reactions should be reported to the Vaccine Adverse Event Reporting System (VAERS). Your doctor should file this report, or you can do it yourself through the VAERS web site at www.vaers. hhs.gov, or by calling 3-701.250.8944. VAERS does not give medical advice. 6. The National Vaccine Injury Compensation Program 
 
The [facility] Vaccine Injury Compensation Program (VICP) is a federal program that was created to compensate people who may have been injured by certain vaccines. Persons who believe they may have been injured by a vaccine can learn about the program and about filing a claim by calling 9-698.633.4594 or visiting the 1900 [employer] website at www.[facility].gov/vaccinecompensation. There is a time limit to file a claim for compensation. 7. How can I learn more?  Ask your healthcare provider. He or she can give you the vaccine package insert or suggest other sources of information.  Call your local or state health department.  Contact the Centers for Disease Control and Prevention (CDC): 
- Call 0-149.463.7859 (1-800-CDC-INFO) or 
- Visit CDCs website at www.cdc.gov/vaccines Vaccine Information Statement PCV13 Vaccine 11/5/2015  
[address] DINOL[address]a ArriagaAide Rikki [id number] [facility] [place] Centers for Disease Control and Prevention Office Use Only Jess Narayanan Medrol dose pack - check your sugars as pred will spike BS Medrol Dose Pack Oral: Tapered-dosage schedule (eg, dose-pack containing 21 x 4 mg tablets): 
 
Day 1: 24 mg on day 1 administered as 8 mg (2 tablets) before breakfast, 4 mg (1 tablet) after lunch, 4 mg (1 tablet) after supper, and 8 mg (2 tablets) at bedtime OR 24 mg (6 tablets) as a single dose or divided into 2 or 3 doses upon initiation (regardless of time of day) Day 2: 20 mg on day 2 administered as 4 mg (1 tablet) before breakfast, 4 mg (1 tablet) after lunch, 4 mg (1 tablet) after supper, and 8 mg (2 tablets) at bedtime Day 3: 16 mg on day 3 administered as 4 mg (1 tablet) before breakfast, 4 mg (1 tablet) after lunch, 4 mg (1 tablet) after supper, and 4 mg (1 tablet) at bedtime Day 4: 12 mg on day 4 administered as 4 mg (1 tablet) before breakfast, 4 mg (1 tablet) after lunch, and 4 mg (1 tablet) at bedtime Day 5: 8 mg on day 5 administered as 4 mg (1 tablet) before breakfast and 4 mg (1 tablet) at bedtime Day 6: 4 mg on day 6 administered as 4 mg (1 tablet) before breakfast 
 
Apply cream daily to ankle - only thin coat and no more than 7 days as it will thin skin Introducing hospitals & [facility] SERVICES! [facility] introduces [employer]ginx patient portal. Now you can access parts of your medical record, email your doctor's office, and request medication refills online. 1. In your internet browser, go to [email or web address] 2. Click on the First Time User? Click Here link in the Sign In box. You will see the New Member Sign Up page. 3. Enter your Nginx Access Code exactly as it appears below. You will not need to use this code after youve completed the sign-up process. If you do not sign up before the expiration date, you must request a new code. · Nginx Access Code: [email or web address] Expires: 4/8/2018  2:45 PM 
 
4. Enter the last four digits of your Social Security Number (xxxx) and Date of Birth (mm/dd/yyyy) as indicated and click Submit. You will be taken to the next sign-up page. 5. Create a [employer] Begunt ID. This will be your Nginx login ID and cannot be changed, so think of one that is secure and easy to remember. 6. Create a Nginx password. You can change your password at any time. 7. Enter your Password Reset Question and Answer. This can be used at a later time if you forget your password. 8. Enter your e-mail address. You will receive e-mail notification when new information is available in [address] 9. Click Sign Up. You can now view and download portions of your medical record. 10. Click the Download Summary menu link to download a portable copy of your medical information. If you have questions, please visit the Frequently Asked Questions section of the [employer] website. Remember, [employer] is NOT to be used for urgent needs. For medical emergencies, dial 911. Now available from your iPhone and Android! Please provide this summary of care documentation to your next provider. Your primary care clinician is listed as Yenny Lucero. If you have any questions after today's visit, please call [phone].

## 2018-01-08 NOTE — Clinical Note
I gave her #15 norco bc she would run out 3 days before seeing you. Told her it was a one time deal and she would have controlled substance agreement with you.

## 2018-01-09 NOTE — ACP (ADVANCE CARE PLANNING)
Patient states she has already executed an [address] [place] [address]. Patient will bring copy of ACP at next office visit so it can be scanned into system.

## 2018-01-15 ENCOUNTER — HOSPITAL ENCOUNTER (OUTPATIENT)
Dept: MAMMOGRAPHY | Age: 58
Discharge: HOME | End: 2018-01-15
Attending: NURSE PRACTITIONER
Payer: MEDICARE

## 2018-01-15 DIAGNOSIS — Z12.39 SCREENING FOR BREAST CANCER: ICD-10-CM

## 2018-01-15 PROCEDURE — 77067 SCR MAMMO BI INCL CAD: CPT

## 2018-01-19 LAB
ALBUMIN SERPL-MCNC: 4.2 G/DL (ref 3.5–5.5)
ALBUMIN/CREAT UR: 5.7 MG/G CREAT (ref 0–30)
ALBUMIN/GLOB SERPL: 1.3 {RATIO} (ref 1.2–2.2)
ALP SERPL-CCNC: 85 IU/L (ref 39–117)
ALT SERPL-CCNC: 13 IU/L (ref 0–32)
APPEARANCE UR: CLEAR
AST SERPL-CCNC: 19 IU/L (ref 0–40)
BACTERIA #/AREA URNS HPF: NORMAL /[HPF]
BASOPHILS # BLD AUTO: 0 X10E3/UL (ref 0–0.2)
BASOPHILS NFR BLD AUTO: 0 %
BILIRUB SERPL-MCNC: 0.3 MG/DL (ref 0–1.2)
BILIRUB UR QL STRIP: NEGATIVE
BUN SERPL-MCNC: 18 MG/DL (ref 6–24)
BUN/CREAT SERPL: 20 (ref 9–23)
CALCIUM SERPL-MCNC: 9.5 MG/DL (ref 8.7–10.2)
CASTS URNS QL MICRO: NORMAL /LPF
CHLORIDE SERPL-SCNC: 101 MMOL/L (ref 96–106)
CHOLEST SERPL-MCNC: 179 MG/DL (ref 100–199)
CO2 SERPL-SCNC: 26 MMOL/L (ref 18–29)
COLOR UR: YELLOW
CREAT SERPL-MCNC: 0.92 MG/DL (ref 0.57–1)
CREAT UR-MCNC: 102.2 MG/DL
EOSINOPHIL # BLD AUTO: 0.2 X10E3/UL (ref 0–0.4)
EOSINOPHIL NFR BLD AUTO: 3 %
EPI CELLS #/AREA URNS HPF: NORMAL /HPF
ERYTHROCYTE [DISTWIDTH] IN BLOOD BY AUTOMATED COUNT: 13.8 % (ref 12.3–15.4)
EST. AVERAGE GLUCOSE BLD GHB EST-MCNC: 137 MG/DL
GLOBULIN SER CALC-MCNC: 3.2 G/DL (ref 1.5–4.5)
GLUCOSE SERPL-MCNC: 132 MG/DL (ref 65–99)
GLUCOSE UR QL: NEGATIVE
HBA1C MFR BLD: 6.4 % (ref 4.8–5.6)
HCT VFR BLD AUTO: 36 % (ref 34–46.6)
HDLC SERPL-MCNC: 42 MG/DL
HGB BLD-MCNC: 11.7 G/DL (ref 11.1–15.9)
HGB UR QL STRIP: ABNORMAL
IMM GRANULOCYTES # BLD: 0 X10E3/UL (ref 0–0.1)
IMM GRANULOCYTES NFR BLD: 0 %
INTERPRETATION, 910389: NORMAL
KETONES UR QL STRIP: NEGATIVE
LDLC SERPL CALC-MCNC: 101 MG/DL (ref 0–99)
LEUKOCYTE ESTERASE UR QL STRIP: NEGATIVE
LYMPHOCYTES # BLD AUTO: 1.9 X10E3/UL (ref 0.7–3.1)
LYMPHOCYTES NFR BLD AUTO: 30 %
MCH RBC QN AUTO: 29.3 PG (ref 26.6–33)
MCHC RBC AUTO-ENTMCNC: 32.5 G/DL (ref 31.5–35.7)
MCV RBC AUTO: 90 FL (ref 79–97)
MICRO URNS: ABNORMAL
MICROALBUMIN UR-MCNC: 5.8 UG/ML
MONOCYTES # BLD AUTO: 0.5 X10E3/UL (ref 0.1–0.9)
MONOCYTES NFR BLD AUTO: 7 %
NEUTROPHILS # BLD AUTO: 3.8 X10E3/UL (ref 1.4–7)
NEUTROPHILS NFR BLD AUTO: 60 %
NITRITE UR QL STRIP: NEGATIVE
PH UR STRIP: 6.5 [PH] (ref 5–7.5)
PLATELET # BLD AUTO: 202 X10E3/UL (ref 150–379)
POTASSIUM SERPL-SCNC: 4.6 MMOL/L (ref 3.5–5.2)
PROT SERPL-MCNC: 7.4 G/DL (ref 6–8.5)
PROT UR QL STRIP: NEGATIVE
RBC # BLD AUTO: 4 X10E6/UL (ref 3.77–5.28)
RBC #/AREA URNS HPF: NORMAL /HPF
SODIUM SERPL-SCNC: 142 MMOL/L (ref 134–144)
SP GR UR: 1.02 (ref 1–1.03)
TRIGL SERPL-MCNC: 179 MG/DL (ref 0–149)
URINALYSIS REFLEX, 377202: ABNORMAL
UROBILINOGEN UR STRIP-MCNC: 0.2 MG/DL (ref 0.2–1)
VLDLC SERPL CALC-MCNC: 36 MG/DL (ref 5–40)
WBC # BLD AUTO: 6.3 X10E3/UL (ref 3.4–10.8)
WBC #/AREA URNS HPF: NORMAL /HPF

## 2018-01-30 ENCOUNTER — OFFICE VISIT (OUTPATIENT)
Dept: FAMILY MEDICINE CLINIC | Age: 58
End: 2018-01-30

## 2018-01-30 VITALS
DIASTOLIC BLOOD PRESSURE: 55 MMHG | OXYGEN SATURATION: 95 % | HEART RATE: 64 BPM | RESPIRATION RATE: 18 BRPM | HEIGHT: 66 IN | SYSTOLIC BLOOD PRESSURE: 121 MMHG | TEMPERATURE: 98.5 F | WEIGHT: 265.5 LBS | BODY MASS INDEX: 42.67 KG/M2

## 2018-01-30 DIAGNOSIS — R10.9 FLANK PAIN: Primary | ICD-10-CM

## 2018-01-30 DIAGNOSIS — Z79.01 CHRONIC ANTICOAGULATION: ICD-10-CM

## 2018-01-30 DIAGNOSIS — Z95.0 PACEMAKER: ICD-10-CM

## 2018-01-30 DIAGNOSIS — M51.34 DDD (DEGENERATIVE DISC DISEASE), THORACIC: ICD-10-CM

## 2018-01-30 DIAGNOSIS — Z87.42 HISTORY OF ABNORMAL CERVICAL PAP SMEAR: ICD-10-CM

## 2018-01-30 DIAGNOSIS — M50.30 DDD (DEGENERATIVE DISC DISEASE), CERVICAL: ICD-10-CM

## 2018-01-30 DIAGNOSIS — I10 ESSENTIAL HYPERTENSION: ICD-10-CM

## 2018-01-30 DIAGNOSIS — I50.9 CHRONIC CONGESTIVE HEART FAILURE, UNSPECIFIED CONGESTIVE HEART FAILURE TYPE: ICD-10-CM

## 2018-01-30 DIAGNOSIS — I48.91 ATRIAL FIBRILLATION, UNSPECIFIED TYPE (HCC): ICD-10-CM

## 2018-01-30 DIAGNOSIS — G47.00 INSOMNIA, UNSPECIFIED TYPE: ICD-10-CM

## 2018-01-30 DIAGNOSIS — F33.9 RECURRENT MAJOR DEPRESSIVE DISORDER, REMISSION STATUS UNSPECIFIED (HCC): ICD-10-CM

## 2018-01-30 DIAGNOSIS — Z79.891 LONG TERM PRESCRIPTION OPIATE USE: ICD-10-CM

## 2018-01-30 PROBLEM — E66.01 OBESITY, MORBID (HCC): Status: ACTIVE | Noted: 2018-01-30

## 2018-01-30 PROBLEM — F99 PSYCHIATRIC DISORDER: Status: RESOLVED | Noted: 2018-01-08 | Resolved: 2018-01-30

## 2018-01-30 LAB
BILIRUB UR QL STRIP: NEGATIVE
GLUCOSE UR-MCNC: NEGATIVE MG/DL
KETONES P FAST UR STRIP-MCNC: NEGATIVE MG/DL
PH UR STRIP: 6 [PH] (ref 4.6–8)
PROT UR QL STRIP: NEGATIVE
SP GR UR STRIP: 1.01 (ref 1–1.03)
UA UROBILINOGEN AMB POC: NORMAL (ref 0.2–1)
URINALYSIS CLARITY POC: CLEAR
URINALYSIS COLOR POC: NORMAL
URINE BLOOD POC: NORMAL
URINE LEUKOCYTES POC: NORMAL
URINE NITRITES POC: NEGATIVE

## 2018-01-30 RX ORDER — WARFARIN 4 MG/1
4 TABLET ORAL DAILY
COMMUNITY
End: 2019-01-01

## 2018-01-30 RX ORDER — HYDROCODONE BITARTRATE AND ACETAMINOPHEN 7.5; 325 MG/1; MG/1
1 TABLET ORAL
Qty: 90 TAB | Refills: 0 | Status: ON HOLD | OUTPATIENT
Start: 2018-03-06 | End: 2018-03-29

## 2018-01-30 RX ORDER — ZOLPIDEM TARTRATE 10 MG/1
5 TABLET ORAL
Qty: 1 TAB | Refills: 0
Start: 2018-01-30 | End: 2018-02-19 | Stop reason: ALTCHOICE

## 2018-01-30 RX ORDER — FUROSEMIDE 80 MG/1
80 TABLET ORAL 2 TIMES DAILY
Status: ON HOLD | COMMUNITY
End: 2019-01-01 | Stop reason: SDUPTHER

## 2018-01-30 RX ORDER — HYDROCODONE BITARTRATE AND ACETAMINOPHEN 7.5; 325 MG/1; MG/1
1 TABLET ORAL
Qty: 90 TAB | Refills: 0 | Status: SHIPPED | OUTPATIENT
Start: 2018-02-04 | End: 2018-04-23 | Stop reason: SDUPTHER

## 2018-01-30 RX ORDER — POTASSIUM CHLORIDE 20 MEQ/1
20 TABLET, EXTENDED RELEASE ORAL DAILY
COMMUNITY

## 2018-01-30 RX ORDER — HYDROCODONE BITARTRATE AND ACETAMINOPHEN 7.5; 325 MG/1; MG/1
1 TABLET ORAL
Qty: 90 TAB | Refills: 0 | Status: ON HOLD | OUTPATIENT
Start: 2018-04-05 | End: 2018-03-29

## 2018-01-30 NOTE — PROGRESS NOTES
Chief Complaint   Patient presents with    Establish Care    Side Pain     rightside  and on for 2 months     Sherita Mota  Identified pt with two pt identifiers(name and ). Chief Complaint   Patient presents with    Establish Care    Side Pain     rightside  and on for 2 months       1. Have you been to the ER, urgent care clinic since your last visit? No  Hospitalized since your last visit? NO    2. Have you seen or consulted any other health care providers outside of the [address] [place] [address] since your last visit? Include any pap smears or colon screening. NO    Today's provider has been notified of reason for visit, vitals and flowsheets obtained on patients. Patient received paperwork for advance directive during previous visit but has not completed at this time     Reviewed record In preparation for visit, huddled with provider and have obtained necessary documentation      Health Maintenance Due   Topic    Hepatitis C Screening     Pneumococcal 19-64 Medium Risk (1 of 1 - PPSV23)    DTaP/Tdap/Td series (1 - Tdap)    PAP AKA CERVICAL CYTOLOGY     FOBT Q 1 YEAR AGE 50-75        Wt Readings from Last 3 Encounters:   18 265 lb 8 oz (120.4 kg)   18 263 lb 12.8 oz (119.7 kg)   16 243 lb 11.2 oz (110.5 kg)     Temp Readings from Last 3 Encounters:   18 98.5 °F (36.9 °C) (Oral)   18 97.5 °F (36.4 °C) (Oral)   16 98.8 °F (37.1 °C)     BP Readings from Last 3 Encounters:   18 121/55   18 122/55   16 190/81     Pulse Readings from Last 3 Encounters:   18 64   18 61   16 72     Vitals:    18 1006   BP: 121/55   Pulse: 64   Resp: 18   Temp: 98.5 °F (36.9 °C)   TempSrc: Oral   SpO2: 95%   Weight: 265 lb 8 oz (120.4 kg)   Height: 5' 6\" (1.676 m)   PainSc:   5         Learning Assessment:  :     No flowsheet data found.     Depression Screening:  :     PHQ over the last two weeks 2018   Little interest or pleasure in doing things Not at all   Feeling down, depressed or hopeless Not at all   Total Score PHQ 2 0       Fall Risk Assessment:  :     No flowsheet data found. Abuse Screening:  :     No flowsheet data found. ADL Screening:  :     No flowsheet data found. Medication reconciliation up to date and corrected with patient at this time.

## 2018-01-30 NOTE — PATIENT INSTRUCTIONS
Roberta Mate TODAY, please go to:   Sign treatment aggreement   Release of records     CHECK OUT     If you received a referral, Show the       Please schedule the following appointments at [facility]:  · Chronic pain follow up with Dr. Karla Figueroa in late April 2018      Today's Plan:    - will wait for culture results  - you will likely need CT scan  - if symptoms worsen, seek medical attention again     Remember to bring your pill bottles and remaining pills for any controlled substances to your appointments with Dr. Karla Figueroa. If you do not bring the bottle, you may not have your medication refilled. This is the clinic policy. Narcotics: Potential side effects of narcotic medication include but are not limited to suppression of respiratory drive, constipation, sleep apnea, and endocrine dysfunction, light-headedness, sedation, GI upset, euphoria, dysphoria, constipation, urinary retention, hepatotoxicity, impacts operating certain machinery or engaging in certain activities or employment, and potential to develop tolerance, dependence, addiction and death. MED/day between 100mg and 120 mg has 9 fold increases in overdose risk with 12% being fatal.  At this dose you will be given Naloxone which is a medication that can be used in preventing overdose deaths. This medication is to be used as directed. Increasing dose of opioid medications may not improve function and response to medical treatment due to possible induced abnormal pain sensitivity including hyperalgesia and allodynia. Remember to bring your pill bottles and remaining pills for any controlled substances to your appointments with Dr. Karla Figueroa. If you do not bring the bottle, you may not have your medication refilled. This is the clinic policy.      Hypnotics / Sedatives:  Potential side effects from hypnotic or sedative medication include but are not limited to complex sleep-related behaviors such as sleep-driving, headache, GI upset, dry mouth, anaphylaxis, angioedema, ataxia, paradoxical excitement, blurred vision, abnormal behavior, amnesia, unpleasant taste, lethargy, and somnolence. May potentiate CNS depression with alcohol or medications that also cause CNS depression. Impacts operating certain machinery or engaging in certain activities or employment, and potential to develop tolerance, dependence, addiction and death.

## 2018-01-30 NOTE — PROGRESS NOTES
Roshni Gant [address] [facility] Visit   01/30/2018  Patient ID: John Todd is a 61 y.o. female. Assessment/Plan:    Diagnoses and all orders for this visit:    1. Flank pain  uti vs stone. F/u culture. If culture negative, get CT. Reviewed return precautions  -     URINALYSIS W/ RFLX MICROSCOPIC  -     CULTURE, URINE  -     AMB POC URINALYSIS DIP STICK AUTO W/O MICRO    2. Atrial fibrillation, unspecified type ([address]  3. Chronic anticoagulation  INR monitored by Dr. Corrie Kulkarni. Has pacemaker    4. Essential hypertension  Well controlled     5. Chronic congestive heart failure, unspecified congestive heart failure type ([address]  Continue with Dr. Author Grady. euvolemic on exam today     6. Pacemaker  mx by cardiology    7. Recurrent major depressive disorder, remission status unspecified (HCC)  Continue sertraline    8. Insomnia, unspecified type  No new script given today. Sig updated to reflect recommended 5mg dose. -     zolpidem (AMBIEN) 10 mg tablet; Take 0.5 Tabs by mouth nightly as needed for Sleep. Max Daily Amount: 5 mg. 9. DDD (degenerative disc disease), thoracic  10. DDD (degenerative disc disease), cervical  Pain is  adequately controlled with current plan  · Pain is located in and due to:  · Neck and Thoracic back d/t DDD  · Patient's plan currently includes:  · Norco, tylenol, robaxin, heat  · Functional improvements that justify chronic opioid medications: yes  · Past Ineffective Therapies: nsaids, tylenol alone  · Next consider: spine specialists for injections    · Treatment agreement on file: Completed today   ·  appropriate and last checked on: 1/30/2018  · Urine Drug Screen: due - order placed. · Aberrant behaviors: None   · Pill count is consistent with her prescription: n/a  · Concomitant use of a benzodiazepine: no  · MME/day: 22.5    · Prior records: have been requested  PHQ -9 done: known depression  · Risk assessment done?  Date 1/30/2018       -     COMPLIANCE DRUG SCREEN/PRESCRIPTION MONITORING  -     HYDROcodone-acetaminophen (NORCO) 7.5-325 mg per tablet; Take 1 Tab by mouth every eight (8) hours as needed for Pain. Max Daily Amount: 3 Tabs. -     HYDROcodone-acetaminophen (NORCO) 7.5-325 mg per tablet; Take 1 Tab by mouth every eight (8) hours as needed for Pain. Max Daily Amount: 3 Tabs. -     HYDROcodone-acetaminophen (NORCO) 7.5-325 mg per tablet; Take 1 Tab by mouth every eight (8) hours as needed for Pain. Max Daily Amount: 3 Tabs. -     COMPLIANCE DRUG SCREEN/PRESCRIPTION MONITORING  -     HYDROcodone-acetaminophen (NORCO) 7.5-325 mg per tablet; Take 1 Tab by mouth every eight (8) hours as needed for Pain. Max Daily Amount: 3 Tabs. -     HYDROcodone-acetaminophen (NORCO) 7.5-325 mg per tablet; Take 1 Tab by mouth every eight (8) hours as needed for Pain. Max Daily Amount: 3 Tabs. -     HYDROcodone-acetaminophen (NORCO) 7.5-325 mg per tablet; Take 1 Tab by mouth every eight (8) hours as needed for Pain. Max Daily Amount: 3 Tabs. 11. Long term prescription opiate use  -     COMPLIANCE DRUG SCREEN/PRESCRIPTION MONITORING    12. History of abnormal cervical Pap smear  Request prior records to clarify unclear history. Counselled pt on Patient health concerns and plans. Patient was offered a choice/choices in the treatment plan today. Reviewed return precautions as appropriate. Patient expresses understanding of the plan and agrees with recommendations. See patient instructions for more. Next visit: will be due for another utox      Patient Instructions   . TODAY, please go to:   Sign treatment aggreement   Release of records     CHECK OUT     If you received a referral, Show the       Please schedule the following appointments at [address] [place] [address]:  · Chronic pain follow up with Dr. Omari Gil in late April 2018      Today's Plan:    - will wait for culture results  - you will likely need CT scan  - if symptoms worsen, seek medical attention again Remember to bring your pill bottles and remaining pills for any controlled substances to your appointments with Dr. Dameon Chau. If you do not bring the bottle, you may not have your medication refilled. This is the clinic policy. Narcotics: Potential side effects of narcotic medication include but are not limited to suppression of respiratory drive, constipation, sleep apnea, and endocrine dysfunction, light-headedness, sedation, GI upset, euphoria, dysphoria, constipation, urinary retention, hepatotoxicity, impacts operating certain machinery or engaging in certain activities or employment, and potential to develop tolerance, dependence, addiction and death. MED/day between 100mg and 120 mg has 9 fold increases in overdose risk with 12% being fatal.  At this dose you will be given Naloxone which is a medication that can be used in preventing overdose deaths. This medication is to be used as directed. Increasing dose of opioid medications may not improve function and response to medical treatment due to possible induced abnormal pain sensitivity including hyperalgesia and allodynia. Remember to bring your pill bottles and remaining pills for any controlled substances to your appointments with Dr. Dameon Chau. If you do not bring the bottle, you may not have your medication refilled. This is the clinic policy. Hypnotics / Sedatives:  Potential side effects from hypnotic or sedative medication include but are not limited to complex sleep-related behaviors such as sleep-driving, headache, GI upset, dry mouth, anaphylaxis, angioedema, ataxia, paradoxical excitement, blurred vision, abnormal behavior, amnesia, unpleasant taste, lethargy, and somnolence. May potentiate CNS depression with alcohol or medications that also cause CNS depression. Impacts operating certain machinery or engaging in certain activities or employment, and potential to develop tolerance, dependence, addiction and death. Subjective:   HPI:  Gaudencio Moore is a 61 y.o. female being seen for:   Chief Complaint   Patient presents with    Establish Care    Side Pain     rightside  and on for 2 months       Establish Care  Past medical history, surgical history, social history, family history, medications, allergies reviewed and updated. Prior PCP: NAE Weaver, then had interim care with Dr. Markell Logan, [facility]    · ?history of cervical cancer, unclear history (see below)  Used to take lunesta and that worked. Changed to [employer] because of formulary coverage. Has been on 10mg ambien now for a few years now  See chronic pain below     Flank pain  ·  right side, intermittent  · X2 months  · Radiates around side and into groin  · Feels like bad cramp  _____________________     Chronic pain  · Pain located in: thoracic back, neck, left shoulder  · Started: around 2008  · Character: hard to stand straight because of pain, constant, ebbs and flows  · Better with: norco, reclining in bed (can't lay flat), sitting, rotating shoulders to \"crack my back\"  · Worse with: prolonged sitting or standing, lifting anything, vaccuming, trying to move box  · Other symptoms: sometimes numbness and tingling around radial wrists (not sure if wrist or back related)  · Functional impairment: if not taking medication. . can't walk far, can't prepare meals, can't do laundry (still hard with medication), can't bend over, can't sleep, can't eat (pain causes nausea when severe), can't do stationary bike at all (can do some when taking medication), can't care for animals, harder to bathe herself, hard to get dressed    History of pain treatment:  · Taking Benzodiazepine or other high risk medication: takes medication for sleep, currently ambien   · Currently taking:   · norco 7/5/325mg TID prn, usually takes all doses, sometimes only needs two.    · Occasionally additional tylenol instead of third norco  · A topical OTC cream to help  · Robaxin  · Heat   · Medications previously tried and did not work:   · Tylenol alone  · Ibuprofen 800mg 3-4 times a day  · Aleve  · norco with 5mg hydrocodone about 5-7 years ago  · Side effects:   · Denies constipation, itching, or nausea  · Does need to eat with it to keep stomach calm  · somedays norco can make her tired if she has not slept well or is very fatigued  · Non-medication treatments tried, that did not work? · n/a  · Has not tried: lyrica, gabapentin, effexor, cymbalta, other narcotics. Has not seen specialist, that she can recall   · Prior imaging- Xrays  · Other providers:   · Prior PCPs  · Testing for disability      Personal or family history of psychiatric, addiction, or substance abuse: only personal h/o depression and step brother with issues after [place]        Review of Systems   Constitutional: Negative for fever. Gastrointestinal: Negative for nausea and vomiting. Genitourinary: Positive for flank pain and urgency (X a few weeks, but not sure if b/c diuretic or not). Negative for dysuria, frequency and hematuria. Otherwise as noted in HPI  ? Objective:     Visit Vitals    /55 (BP 1 Location: Right arm, BP Patient Position: Sitting)    Pulse 64    Temp 98.5 °F (36.9 °C) (Oral)    Resp 18    Ht 5' 6\" (1.676 m)    Wt 265 lb 8 oz (120.4 kg)    SpO2 95%    BMI 42.85 kg/m2     Wt Readings from Last 3 Encounters:   01/30/18 265 lb 8 oz (120.4 kg)   01/08/18 263 lb 12.8 oz (119.7 kg)   12/31/16 243 lb 11.2 oz (110.5 kg)     BP Readings from Last 3 Encounters:   01/30/18 121/55   01/08/18 122/55   12/31/16 190/81     PHQ over the last two weeks 1/30/2018   Little interest or pleasure in doing things Not at all   Feeling down, depressed or hopeless Not at all   Total Score PHQ 2 0         Physical Exam   Constitutional: She appears well-developed and well-nourished. No distress. Cardiovascular: Normal rate, regular rhythm and normal heart sounds.   Exam reveals no gallop and no friction rub. No murmur heard. Pulmonary/Chest: Effort normal. No accessory muscle usage. No respiratory distress. She has no decreased breath sounds. She has no wheezes. She has no rhonchi. She has no rales. Abdominal: Soft. Normal appearance and bowel sounds are normal. She exhibits no distension, no fluid wave and no mass. There is no hepatosplenomegaly. There is tenderness (right abdominal ttp ). There is no rigidity, no rebound and no guarding. No CVAT   Musculoskeletal: She exhibits no edema. Midline C spine ttp>T spine ttp. Neurological: She is alert. Nl strength, sensation and DTR in b/l UE   Psychiatric: She has a normal mood and affect. Her behavior is normal.       Allergies   Allergen Reactions    Augmentin [Amoxicillin-Pot Clavulanate] Nausea and Vomiting    Neosporin [Neomycin-Bacitracin-Polymyxin] Rash     Prior to Admission medications    Medication Sig Start Date End Date Taking? Authorizing Provider   furosemide (LASIX) 80 mg tablet Take 80 mg by mouth two (2) times a day. Yes Historical Provider   potassium chloride (K-DUR, KLOR-CON) 20 mEq tablet Take 20 mEq by mouth daily. Yes Historical Provider   warfarin (COUMADIN) 4 mg tablet Take 4 mg by mouth daily. Yes Historical Provider   HYDROcodone-acetaminophen (NORCO) 7.5-325 mg per tablet Take 1 Tab by mouth every eight (8) hours as needed for Pain. Max Daily Amount: 3 Tabs. 2/4/18  Yes Lennox Goel MD   zolpidem (AMBIEN) 10 mg tablet Take 0.5 Tabs by mouth nightly as needed for Sleep. Max Daily Amount: 5 mg. 1/30/18  Yes Olive Cheng. Stephen Goel MD   HYDROcodone-acetaminophen [facility]) 7.5-325 mg per tablet Take 1 Tab by mouth every eight (8) hours as needed for Pain. Max Daily Amount: 3 Tabs. 3/6/18  Yes Lennox Goel MD   HYDROcodone-acetaminophen [facility]) 7.5-325 mg per tablet Take 1 Tab by mouth every eight (8) hours as needed for Pain. Max Daily Amount: 3 Tabs. 4/5/18  Yes Lennox Goel MD sacubitril-valsartan (ENTRESTO) 49 mg/51 mg tablet Take 1 Tab by mouth two (2) times a day. Yes Historical Provider   pravastatin (PRAVACHOL) 80 mg tablet Take 80 mg by mouth nightly. Yes Peggy Lindsay MD   sertraline (ZOLOFT) 100 mg tablet Take 100 mg by mouth daily. Yes Peggy Lindsay MD   methocarbamol (ROBAXIN) 500 mg tablet Take  by mouth three (3) times daily. Yes Peggy Lindsay MD   raNITIdine hcl 150 mg capsule Take 150 mg by mouth two (2) times a day. Yes Peggy Lindsay MD   spironolactone (ALDACTONE) 25 mg tablet Take 25 mg by mouth daily. Yes Peggy Lindsay MD   mirtazapine (REMERON) 15 mg tablet Take 15 mg by mouth nightly. Yes Peggy Lindsay MD   carvedilol (COREG) 3.125 mg tablet Take 3.125 mg by mouth two (2) times daily (with meals). Yes Peggy Lindsay MD     Patient Active Problem List   Diagnosis Code    CHF (congestive heart failure) ([facility]) I50.9    CAD (coronary artery disease) I25.10    Essential hypertension I10    Obesity, morbid ([facility] E66.01    Pacemaker Z95.0    Atrial fibrillation ([facility] I48.91    Depression F32.9    Insomnia G47.00    Chronic anticoagulation Z79.01    DDD (degenerative disc disease), cervical M50.30    DDD (degenerative disc disease), thoracic M51.34    Long term prescription opiate use Z79.891       . 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 Lives with , son, dtr in law.         dtr  in [address]         Past Medical History:   Diagnosis Date    Atrial fibrillation ([address]     Dr. Miguel Coffey    CAD (coronary artery disease)     Chronic anticoagulation     was on NOAC before. ?had clot on noac. now on warfarin. Dr. Trudy Velazquez monitors INR    DDD (degenerative disc disease), cervical 2018    DDD (degenerative disc disease), thoracic 2018    Depression     no h/o hospitalization    Essential hypertension 2018    Heart failure ([address]     Insomnia     Kidney infarction ([address] 2017    d/t clot per pt. recalls being on blood thinner.  Lymphedema of left arm     developed after infection of pacemaker site    Obesity, morbid ([address] 2018    Pacemaker     and defib. Dr. Alexandro Mejia Stomach flu 2015    hospital admit X2       Past Surgical History:   Procedure Laterality Date    HX BREAST BIOPSY Right 2010    Benign    HX CHOLECYSTECTOMY  1984    HX HEART CATHETERIZATION  2006    d/t abnl stress test, per pat, test was normal    HX HEART CATHETERIZATION  ~,     per pt normal.     HX HYSTERECTOMY      d/t heavy bleeding. ?endometriosis. +fibroid. per pt, no cancer.  HX PACEMAKER  , ,     first  (infected), replacement  (lead not placed correctly), 2nd replacement - pacemaker, defibrillator       OB History      Para Term  AB Living    2 2 2   1    SAB TAB Ectopic Molar Multiple Live Births         2        Obstetric Comments    S/p hyst for benign reasons per pt. H/o abnl pap: per pt, yes- recalls having cryosurgery for \"cancerous condition\" then another doctor said she did not. This was around .      [facility] [address] [place], [place] told her she had cancer            Family History   Problem Relation Age of Onset    Breast Cancer Maternal Aunt      late 35s    Uterine Cancer Maternal Aunt     Heart Disease Mother      sudden cardiac arrest    Heart Disease Father     Coronary Artery Disease Father     Colon Cancer Father      76s    Diabetes Father     COPD Sister    [facility] Cancer Brother 43     pancreatic    Uterine Cancer Maternal Grandmother     Other Maternal Grandfather      mva    Heart Disease Paternal Grandmother     Heart Disease Paternal Grandfather     Heart Disease Other     Diabetes Other     Other Sister      sudden cardiac arrest    Heart Attack Sister     No Known Problems Sister     Heart Disease Brother     Kidney Disease Brother     Other Brother      mva    No Known Problems Brother     Heart Attack Paternal Uncle 36    Other Paternal Aunt      aneurysm    Celiac Disease Son     Seizures Daughter     Other Daughter 22     MVA

## 2018-01-30 NOTE — PROGRESS NOTES
Pt was told to bring in her bottles for refills of Hydrocodone- Acetaminophen 7.5/325mg. Patient verbalized understanding. No questions voiced.

## 2018-01-30 NOTE — MR AVS SNAPSHOT
[address] [place] [address] [place] 
[address] [place] [id number] 
[phone] Patient: Ritika Vaughan MRN: [id number]:[id number] Visit Information Date & Time Provider Department Dept. Phone Encounter #  
 1/30/2018 10:20 AM [facility] AT [facility], MD [employer] [phone] [id number] Upcoming Health Maintenance Date Due Hepatitis C Screening 1960 Pneumococcal 19-64 Medium Risk (1 of 1 - PPSV23) 2/20/1978 DTaP/Tdap/Td series (1 - Tdap) 2/20/1980 PAP AKA CERVICAL CYTOLOGY 2/20/1980 FOBT Q 1 YEAR AGE 50-75 2/20/2009 BREAST CANCER SCRN MAMMOGRAM 1/15/2020 Allergies as of 1/30/2018  Review Complete On: 1/30/2018 By: Kunal Rubio. [facility], MD  
  
 Severity Noted Reaction Type Reactions Augmentin [Amoxicillin-pot Clavulanate]  01/30/2018    Nausea and Vomiting Neosporin [Neomycin-bacitracin-polymyxin]  12/31/2016    Rash Current Immunizations  Reviewed on 1/8/2018 Name Date Pneumococcal Conjugate (PCV-13) 1/8/2018 Not reviewed this visit You Were Diagnosed With   
  
 Codes Comments Flank pain    -  Primary ICD-10-CM: R10.9 ICD-9-CM: 789.09 Atrial fibrillation, unspecified type ([facility]     ICD-10-CM: I48.91 
ICD-9-CM: 427.31 Chronic anticoagulation     ICD-10-CM: Z79.01 
ICD-9-CM: V58.61 Essential hypertension     ICD-10-CM: I10 
ICD-9-CM: 401.9 Chronic congestive heart failure, unspecified congestive heart failure type ([facility]     ICD-10-CM: I50.9 ICD-9-CM: 428.0 Pacemaker     ICD-10-CM: Z95.0 ICD-9-CM: V45.01 Recurrent major depressive disorder, remission status unspecified ([facility]     ICD-10-CM: F33.9 ICD-9-CM: 296.30 Insomnia, unspecified type     ICD-10-CM: G47.00 ICD-9-CM: 780.52   
 DDD (degenerative disc disease), thoracic     ICD-10-CM: M51.34 
ICD-9-CM: 722.51   
 DDD (degenerative disc disease), cervical     ICD-10-CM: M50.30 ICD-9-CM: 722.4 Long term prescription opiate use     ICD-10-CM: G32.953 ICD-9-CM: V58.69 Vitals BP Pulse Temp Resp Height(growth percentile) Weight(growth percentile) 121/55 (BP 1 Location: Right arm, BP Patient Position: Sitting) 64 98.5 °F (36.9 °C) (Oral) 18 5' 6\" (1.676 m) 265 lb 8 oz (120.4 kg) SpO2 BMI OB Status Smoking Status 95% 42.85 kg/m2 Hysterectomy Never Smoker BMI and BSA Data Body Mass Index Body Surface Area  
 42.85 kg/m 2 2.37 m 2 Preferred Pharmacy Pharmacy Name Phone [address] [phone] Your Updated Medication List  
  
   
This list is accurate as of: 1/30/18 11:40 AM.  Always use your most recent med list.  
  
  
  
  
 carvedilol 3.125 mg tablet Commonly known as:  Bita Aguilar Take 3.125 mg by mouth two (2) times daily (with meals). ENTRESTO 49-51 mg Tab tablet Generic drug:  sacubitril-valsartan Take 1 Tab by mouth two (2) times a day. * HYDROcodone-acetaminophen 7.5-325 mg per tablet Commonly known as:  Dyann Nando Take 1 Tab by mouth every eight (8) hours as needed for Pain. Max Daily Amount: 3 Tabs. Start taking on:  2/4/2018  
  
 * HYDROcodone-acetaminophen 7.5-325 mg per tablet Commonly known as:  Dyann Nando Take 1 Tab by mouth every eight (8) hours as needed for Pain. Max Daily Amount: 3 Tabs. Start taking on:  3/6/2018  
  
 * HYDROcodone-acetaminophen 7.5-325 mg per tablet Commonly known as:  Dyann Alma Take 1 Tab by mouth every eight (8) hours as needed for Pain. Max Daily Amount: 3 Tabs. Start taking on:  4/5/2018 LASIX 80 mg tablet Generic drug:  furosemide Take 80 mg by mouth two (2) times a day. methocarbamol 500 mg tablet Commonly known as:  ROBAXIN Take  by mouth three (3) times daily. mirtazapine 15 mg tablet Commonly known as:  Hazeline [employer] Take 15 mg by mouth nightly. potassium chloride 20 mEq tablet Commonly known as:  K-DUR, KLOR-CON Take 20 mEq by mouth daily. pravastatin 80 mg tablet Commonly known as:  PRAVACHOL Take 80 mg by mouth nightly. raNITIdine hcl 150 mg capsule Take 150 mg by mouth two (2) times a day. sertraline 100 mg tablet Commonly known as:  ZOLOFT Take 100 mg by mouth daily. spironolactone 25 mg tablet Commonly known as:  ALDACTONE Take 25 mg by mouth daily. warfarin 4 mg tablet Commonly known as:  COUMADIN Take 4 mg by mouth daily. zolpidem 10 mg tablet Commonly known as:  AMBIEN Take 0.5 Tabs by mouth nightly as needed for Sleep. Max Daily Amount: 5 mg.  
  
 * Notice: This list has 3 medication(s) that are the same as other medications prescribed for you. Read the directions carefully, and ask your doctor or other care provider to review them with you. Prescriptions Printed Refills HYDROcodone-acetaminophen (NORCO) 7.5-325 mg per tablet 0 Starting on: 2/4/2018 Sig: Take 1 Tab by mouth every eight (8) hours as needed for Pain. Max Daily Amount: 3 Tabs. Class: Print Route: Oral  
 HYDROcodone-acetaminophen (NORCO) 7.5-325 mg per tablet 0 Starting on: 3/6/2018 Sig: Take 1 Tab by mouth every eight (8) hours as needed for Pain. Max Daily Amount: 3 Tabs. Class: Print Route: Oral  
 HYDROcodone-acetaminophen (NORCO) 7.5-325 mg per tablet 0 Starting on: 4/5/2018 Sig: Take 1 Tab by mouth every eight (8) hours as needed for Pain. Max Daily Amount: 3 Tabs. Class: Print Route: Oral  
  
We Performed the Following AMB POC URINALYSIS DIP STICK AUTO W/O MICRO [73648 CPT(R)] [address] MONITORING [[id number] Custom] CULTURE, URINE [id number] CPT(R)] URINALYSIS W/ RFLX MICROSCOPIC [88702 CPT(R)] Patient Instructions Muna Yoo TODAY, please go to: 
 Sign treatment aggreement Release of records  CHECK OUT  
 If you received a referral, Show the  Please schedule the following appointments at [facility] OUT: 
· Chronic pain follow up with Dr. Goran Michael in late April 2018 Today's Plan: 
 
- will wait for culture results 
- you will likely need CT scan 
- if symptoms worsen, seek medical attention again Remember to bring your pill bottles and remaining pills for any controlled substances to your appointments with Dr. Goran Michael. If you do not bring the bottle, you may not have your medication refilled. This is the clinic policy. Narcotics: Potential side effects of narcotic medication include but are not limited to suppression of respiratory drive, constipation, sleep apnea, and endocrine dysfunction, light-headedness, sedation, GI upset, euphoria, dysphoria, constipation, urinary retention, hepatotoxicity, impacts operating certain machinery or engaging in certain activities or employment, and potential to develop tolerance, dependence, addiction and death. MED/day between 100mg and 120 mg has 9 fold increases in overdose risk with 12% being fatal.  At this dose you will be given Naloxone which is a medication that can be used in preventing overdose deaths. This medication is to be used as directed. Increasing dose of opioid medications may not improve function and response to medical treatment due to possible induced abnormal pain sensitivity including hyperalgesia and allodynia. Remember to bring your pill bottles and remaining pills for any controlled substances to your appointments with Dr. Goran Michael. If you do not bring the bottle, you may not have your medication refilled. This is the clinic policy.   
 
Hypnotics / Sedatives:  Potential side effects from hypnotic or sedative medication include but are not limited to complex sleep-related behaviors such as sleep-driving, headache, GI upset, dry mouth, anaphylaxis, angioedema, ataxia, paradoxical excitement, blurred vision, abnormal behavior, amnesia, unpleasant taste, lethargy, and somnolence. May potentiate CNS depression with alcohol or medications that also cause CNS depression. Impacts operating certain machinery or engaging in certain activities or employment, and potential to develop tolerance, dependence, addiction and death. Introducing [facility]! Dear Michelle Ambrosio: Thank you for requesting a [employer] account. Our records indicate that you already have an active [employer] account. You can access your account anytime at [email or web address] Did you know that you can access your hospital and ER discharge instructions at any time in Xunlei? You can also review all of your test results from your hospital stay or ER visit. Additional Information If you have questions, please visit the Frequently Asked Questions section of the Xun[employer] website at [email or web address] Remember, [employer]lei is NOT to be used for urgent needs. For medical emergencies, dial 911. Now available from your iPhone and Android! Please provide this summary of care documentation to your next provider. Your primary care clinician is listed as Rodrigo Sales. If you have any questions after today's visit, please call [phone].

## 2018-01-31 LAB
APPEARANCE UR: CLEAR
BACTERIA #/AREA URNS HPF: NORMAL /[HPF]
BACTERIA UR CULT: NORMAL
BILIRUB UR QL STRIP: NEGATIVE
CASTS URNS QL MICRO: NORMAL /LPF
COLOR UR: YELLOW
EPI CELLS #/AREA URNS HPF: NORMAL /HPF
GLUCOSE UR QL: NEGATIVE
HGB UR QL STRIP: NEGATIVE
KETONES UR QL STRIP: NEGATIVE
LEUKOCYTE ESTERASE UR QL STRIP: ABNORMAL
MICRO URNS: ABNORMAL
NITRITE UR QL STRIP: NEGATIVE
PH UR STRIP: 6 [PH] (ref 5–7.5)
PROT UR QL STRIP: NEGATIVE
RBC #/AREA URNS HPF: NORMAL /HPF
SP GR UR: 1.01 (ref 1–1.03)
UROBILINOGEN UR STRIP-MCNC: 0.2 MG/DL (ref 0.2–1)
WBC #/AREA URNS HPF: NORMAL /HPF

## 2018-02-03 LAB — DRUGS UR: NORMAL

## 2018-02-08 ENCOUNTER — HOSPITAL ENCOUNTER (OUTPATIENT)
Dept: CT IMAGING | Age: 58
Discharge: HOME | End: 2018-02-08
Payer: MEDICARE

## 2018-02-08 DIAGNOSIS — R10.9 FLANK PAIN: ICD-10-CM

## 2018-02-08 PROCEDURE — 74176 CT ABD & PELVIS W/O CONTRAST: CPT

## 2018-02-16 PROBLEM — R91.1 INCIDENTAL PULMONARY NODULE: Status: ACTIVE | Noted: 2018-02-16

## 2018-02-19 ENCOUNTER — OFFICE VISIT (OUTPATIENT)
Dept: FAMILY MEDICINE CLINIC | Age: 58
End: 2018-02-19

## 2018-02-19 VITALS
BODY MASS INDEX: 41.62 KG/M2 | SYSTOLIC BLOOD PRESSURE: 106 MMHG | TEMPERATURE: 97.3 F | HEART RATE: 64 BPM | WEIGHT: 259 LBS | RESPIRATION RATE: 18 BRPM | OXYGEN SATURATION: 94 % | HEIGHT: 66 IN | DIASTOLIC BLOOD PRESSURE: 46 MMHG

## 2018-02-19 DIAGNOSIS — R31.29 MICROSCOPIC HEMATURIA: ICD-10-CM

## 2018-02-19 DIAGNOSIS — R10.30 LOWER ABDOMINAL PAIN: Primary | ICD-10-CM

## 2018-02-19 DIAGNOSIS — M51.34 DDD (DEGENERATIVE DISC DISEASE), THORACIC: ICD-10-CM

## 2018-02-19 DIAGNOSIS — Z79.891 LONG TERM PRESCRIPTION OPIATE USE: ICD-10-CM

## 2018-02-19 DIAGNOSIS — M50.30 DDD (DEGENERATIVE DISC DISEASE), CERVICAL: ICD-10-CM

## 2018-02-19 DIAGNOSIS — G47.00 INSOMNIA, UNSPECIFIED TYPE: ICD-10-CM

## 2018-02-19 DIAGNOSIS — R91.1 INCIDENTAL PULMONARY NODULE: ICD-10-CM

## 2018-02-19 LAB
BILIRUB UR QL STRIP: NEGATIVE
GLUCOSE UR-MCNC: NEGATIVE MG/DL
KETONES P FAST UR STRIP-MCNC: NEGATIVE MG/DL
PH UR STRIP: 7 [PH] (ref 4.6–8)
PROT UR QL STRIP: NEGATIVE
SP GR UR STRIP: 1.01 (ref 1–1.03)
UA UROBILINOGEN AMB POC: NORMAL (ref 0.2–1)
URINALYSIS CLARITY POC: CLEAR
URINALYSIS COLOR POC: YELLOW
URINE BLOOD POC: NORMAL
URINE LEUKOCYTES POC: NEGATIVE
URINE NITRITES POC: NEGATIVE

## 2018-02-19 RX ORDER — METHOCARBAMOL 500 MG/1
500 TABLET, FILM COATED ORAL 3 TIMES DAILY
Qty: 90 TAB | Refills: 1 | Status: SHIPPED | OUTPATIENT
Start: 2018-02-19 | End: 2018-04-12 | Stop reason: SDUPTHER

## 2018-02-19 RX ORDER — ZOLPIDEM TARTRATE 10 MG/1
5 TABLET ORAL
Qty: 1 TAB | Refills: 0 | Status: CANCELLED | OUTPATIENT
Start: 2018-02-19

## 2018-02-19 RX ORDER — ESZOPICLONE 3 MG/1
3 TABLET, FILM COATED ORAL
Qty: 30 TAB | Refills: 5 | Status: SHIPPED | OUTPATIENT
Start: 2018-02-19

## 2018-02-19 NOTE — PROGRESS NOTES
Billy Gains [address] [place] [address] Visit   02/19/2018  Patient ID: Adolfo Fraser is a 61 y.o. female. Assessment/Plan:    Diagnoses and all orders for this visit:    1. Lower abdominal pain  Pain is pelvic, flank. Ddx: abdominal wall strain vs  less likely hip related. No UTI or stone on recent imaging  -     REFERRAL TO UROLOGY    2. Insomnia, unspecified type  Change medication. If a PA is needed, will temporarily trial trazodone.   -     eszopiclone (LUNESTA) 3 mg tablet; Take 1 Tab by mouth nightly as needed for Sleep. Max Daily Amount: 3 mg.    3. Incidental pulmonary nodule- 2mm RLL- repeat CT due in Feb 2019  Remote 5-10 py h/o smoking    4. Microscopic hematuria  -     REFERRAL TO UROLOGY    5. DDD (degenerative disc disease), thoracic  -     methocarbamol (ROBAXIN) 500 mg tablet; Take 1 Tab by mouth three (3) times daily. 6. DDD (degenerative disc disease), cervical  -     methocarbamol (ROBAXIN) 500 mg tablet; Take 1 Tab by mouth three (3) times daily.  reviewed and appropriate today. Utox today. Counselled pt on Patient health concerns and plans. Patient was offered a choice/choices in the treatment plan today. Reviewed return precautions as appropriate. Patient expresses understanding of the plan and agrees with recommendations. See patient instructions for more. Patient Instructions   . TODAY, please go to:   CHECK OUT     If you received a referral, Show the       Please schedule the following appointments at [address] [place] [address]:  · Chronic pain follow up with Dr. Darleen Saini in late April 2018, bring pill bottles      Today's Plan:    - see urology  - change to lunesta    Remember to bring your pill bottles and remaining pills for any controlled substances to your appointments with Dr. Darleen Saini. If you do not bring the bottle, you may not have your medication refilled. This is the clinic policy.      Narcotics: Potential side effects of narcotic medication include but are not limited to suppression of respiratory drive, constipation, sleep apnea, and endocrine dysfunction, light-headedness, sedation, GI upset, euphoria, dysphoria, constipation, urinary retention, hepatotoxicity, impacts operating certain machinery or engaging in certain activities or employment, and potential to develop tolerance, dependence, addiction and death. MED/day between 100mg and 120 mg has 9 fold increases in overdose risk with 12% being fatal.  At this dose you will be given Naloxone which is a medication that can be used in preventing overdose deaths. This medication is to be used as directed. Increasing dose of opioid medications may not improve function and response to medical treatment due to possible induced abnormal pain sensitivity including hyperalgesia and allodynia. Remember to bring your pill bottles and remaining pills for any controlled substances to your appointments with Dr. Ana Laureano. If you do not bring the bottle, you may not have your medication refilled. This is the clinic policy. Hypnotics / Sedatives:  Potential side effects from hypnotic or sedative medication include but are not limited to complex sleep-related behaviors such as sleep-driving, headache, GI upset, dry mouth, anaphylaxis, angioedema, ataxia, paradoxical excitement, blurred vision, abnormal behavior, amnesia, unpleasant taste, lethargy, and somnolence. May potentiate CNS depression with alcohol or medications that also cause CNS depression. Impacts operating certain machinery or engaging in certain activities or employment, and potential to develop tolerance, dependence, addiction and death. Subjective:   HPI:  Aurora Agudelo is a 61 y.o. female being seen for:   Chief Complaint   Patient presents with    Abdominal Pain     F/U     Medication Refill       · Has been taking ambien 10mg.    · 5mg in the past was not helpful  · Has also used lunesta in the past with success, liked lunesta   · On remeron for mood. · Needs refill of robaxin and sleep medicine today    · CT showed incidental pulm nodule 2mm  RLL   · H/o smoking about 5 years. 16-22yo. · Flank pain is intermittent. , lower right side, into groin. Palpation sometimes helps. Denies bowel problem. Review of Systems  Otherwise as noted in HPI  ? Objective:     Visit Vitals    /46 (BP 1 Location: Right arm, BP Patient Position: Sitting)    Pulse 64    Temp 97.3 °F (36.3 °C) (Oral)    Resp 18    Ht 5' 6\" (1.676 m)    Wt 259 lb (117.5 kg)    SpO2 94%    BMI 41.8 kg/m2     Wt Readings from Last 3 Encounters:   02/19/18 259 lb (117.5 kg)   01/30/18 265 lb 8 oz (120.4 kg)   01/08/18 263 lb 12.8 oz (119.7 kg)     BP Readings from Last 3 Encounters:   02/19/18 106/46   01/30/18 121/55   01/08/18 122/55     PHQ over the last two weeks 1/30/2018   Little interest or pleasure in doing things Not at all   Feeling down, depressed or hopeless Not at all   Total Score PHQ 2 0         Physical Exam   Constitutional: She appears well-developed and well-nourished. No distress. Pulmonary/Chest: Effort normal. No respiratory distress. Neurological: She is alert. Psychiatric: She has a normal mood and affect. Her behavior is normal.   .Right Hip Exam     Tenderness   None    Range of Motion   Normal right hip ROM          Allergies   Allergen Reactions    Augmentin [Amoxicillin-Pot Clavulanate] Nausea and Vomiting    Neosporin [Neomycin-Bacitracin-Polymyxin] Rash     Prior to Admission medications    Medication Sig Start Date End Date Taking? Authorizing Provider   methocarbamol (ROBAXIN) 500 mg tablet Take 1 Tab by mouth three (3) times daily. 2/19/18  Yes Carolyn Figueroa MD   eszopiclone (LUNESTA) 3 mg tablet Take 1 Tab by mouth nightly as needed for Sleep. Max Daily Amount: 3 mg. 2/19/18  Yes Bette Colbert. Karla Figueroa MD   furosemide (LASIX) 80 mg tablet Take 80 mg by mouth two (2) times a day.    Yes Historical Provider   potassium chloride (K-DUR, KLOR-CON) 20 mEq tablet Take 20 mEq by mouth daily. Yes Historical Provider   warfarin (COUMADIN) 4 mg tablet Take 4 mg by mouth daily. Yes Historical Provider   HYDROcodone-acetaminophen (NORCO) 7.5-325 mg per tablet Take 1 Tab by mouth every eight (8) hours as needed for Pain. Max Daily Amount: 3 Tabs. 2/4/18  Yes Neyda Hernandez MD   HYDROcodone-acetaminophen [facility]) 7.5-325 mg per tablet Take 1 Tab by mouth every eight (8) hours as needed for Pain. Max Daily Amount: 3 Tabs. 3/6/18  Yes Neyda Hernandez MD   HYDROcodone-acetaminophen [facility]) 7.5-325 mg per tablet Take 1 Tab by mouth every eight (8) hours as needed for Pain. Max Daily Amount: 3 Tabs. 4/5/18  Yes Neyda Hernandez MD   sacubitril-valsartan (ENTRESTO) 49 mg/51 mg tablet Take 1 Tab by mouth two (2) times a day. Yes Historical Provider   pravastatin (PRAVACHOL) 80 mg tablet Take 80 mg by mouth nightly. Yes Pgegy Lindsay MD   sertraline (ZOLOFT) 100 mg tablet Take 100 mg by mouth daily. Yes Peggy Lindsay MD   raNITIdine hcl 150 mg capsule Take 150 mg by mouth two (2) times a day. Yes Peggy Lindsay MD   spironolactone (ALDACTONE) 25 mg tablet Take 25 mg by mouth daily. Yes Peggy Lindsay MD   mirtazapine (REMERON) 15 mg tablet Take 15 mg by mouth nightly. Yes Peggy Lindsay MD   carvedilol (COREG) 3.125 mg tablet Take 3.125 mg by mouth two (2) times daily (with meals).    Yes Peggy Lindsay MD     Patient Active Problem List   Diagnosis Code    CHF (congestive heart failure) (HCC) I50.9    CAD (coronary artery disease) I25.10    Essential hypertension I10    Obesity, morbid ([facility] E66.01    Pacemaker Z95.0    Atrial fibrillation ([facility] I48.91    Depression F32.9    Insomnia G47.00    Chronic anticoagulation Z79.01    DDD (degenerative disc disease), cervical M50.30    DDD (degenerative disc disease), thoracic M51.34    Long term prescription opiate use Z79.891    Incidental pulmonary nodule- 2mm RLL- repeat CT due in Feb 2019 R91.1

## 2018-02-19 NOTE — MR AVS SNAPSHOT
[facility] 
 
 
 [address] 
[address] [facility] [address] 
[phone] Patient: Loly Quiñones MRN: [id number]:[id number] Visit Information Date & Time Provider Department Dept. Phone Encounter #  
 2/19/2018  2:00 PM Ritesh Carlson. Arsen Olvera MD [facility] [phone] [id number] Your Appointments 4/23/2018  1:20 PM  
ROUTINE CARE with Rebecca Mack 28 [facility] Appt Note: chronic pain f/u  
 [address] 
[address] [place] [address]  
[phone]  
  
   
 [address] [facility] [address] [facility] Upcoming Health Maintenance Date Due Hepatitis C Screening 1960 Pneumococcal 19-64 Medium Risk (1 of 1 - PPSV23) 2/20/1978 DTaP/Tdap/Td series (1 - Tdap) 2/20/1980 PAP AKA CERVICAL CYTOLOGY 2/20/1980 FOBT Q 1 YEAR AGE 50-75 2/20/2009 BREAST CANCER SCRN MAMMOGRAM 1/15/2020 Allergies as of 2/19/2018  Review Complete On: 2/19/2018 By: Hafsa Katz LPN Severity Noted Reaction Type Reactions Augmentin [Amoxicillin-pot Clavulanate]  01/30/2018    Nausea and Vomiting Neosporin [Neomycin-bacitracin-polymyxin]  12/31/2016    Rash Current Immunizations  Reviewed on 1/8/2018 Name Date Pneumococcal Conjugate (PCV-13) 1/8/2018 Not reviewed this visit You Were Diagnosed With   
  
 Codes Comments Lower abdominal pain    -  Primary ICD-10-CM: R10.30 ICD-9-CM: 789.09 Insomnia, unspecified type     ICD-10-CM: G47.00 ICD-9-CM: 780.52 Incidental pulmonary nodule     ICD-10-CM: R91.1 ICD-9-CM: 793.11 Microscopic hematuria     ICD-10-CM: R31.29 ICD-9-CM: 599.72   
 DDD (degenerative disc disease), thoracic     ICD-10-CM: M51.34 
ICD-9-CM: 722.51   
 DDD (degenerative disc disease), cervical     ICD-10-CM: M50.30 ICD-9-CM: 722.4 Vitals BP Pulse Temp Resp Height(growth percentile) Weight(growth percentile) 106/46 (BP 1 Location: Right arm, BP Patient Position: Sitting) 64 97.3 °F (36.3 °C) (Oral) 18 5' 6\" (1.676 m) 259 lb (117.5 kg) SpO2 BMI OB Status Smoking Status 94% 41.8 kg/m2 Hysterectomy Never Smoker BMI and BSA Data Body Mass Index Body Surface Area  
 41.8 kg/m 2 2.34 m 2 Preferred Pharmacy Pharmacy Name Phone [address] [place] [address] [phone] Your Updated Medication List  
  
   
This list is accurate as of: 2/19/18  3:00 PM.  Always use your most recent med list.  
  
  
  
  
 carvedilol 3.125 mg tablet Commonly known as:  Megan Valdez Take 3.125 mg by mouth two (2) times daily (with meals). ENTRESTO 49-51 mg Tab tablet Generic drug:  sacubitril-valsartan Take 1 Tab by mouth two (2) times a day. eszopiclone 3 mg tablet Commonly known as:  Andres Walter Take 1 Tab by mouth nightly as needed for Sleep. Max Daily Amount: 3 mg.  
  
 * HYDROcodone-acetaminophen 7.5-325 mg per tablet Commonly known as:  Clemetine Davina Take 1 Tab by mouth every eight (8) hours as needed for Pain. Max Daily Amount: 3 Tabs. * HYDROcodone-acetaminophen 7.5-325 mg per tablet Commonly known as:  Clemetine Davina Take 1 Tab by mouth every eight (8) hours as needed for Pain. Max Daily Amount: 3 Tabs. Start taking on:  3/6/2018  
  
 * HYDROcodone-acetaminophen 7.5-325 mg per tablet Commonly known as:  Clemetine Blanchester Take 1 Tab by mouth every eight (8) hours as needed for Pain. Max Daily Amount: 3 Tabs. Start taking on:  4/5/2018 LASIX 80 mg tablet Generic drug:  furosemide Take 80 mg by mouth two (2) times a day. methocarbamol 500 mg tablet Commonly known as:  ROBAXIN Take 1 Tab by mouth three (3) times daily. mirtazapine 15 mg tablet Commonly known as:  Philip Drown Take 15 mg by mouth nightly. potassium chloride 20 mEq tablet Commonly known as:  K-DUR, KLOR-CON Take 20 mEq by mouth daily. pravastatin 80 mg tablet Commonly known as:  PRAVACHOL Take 80 mg by mouth nightly. raNITIdine hcl 150 mg capsule Take 150 mg by mouth two (2) times a day. sertraline 100 mg tablet Commonly known as:  ZOLOFT Take 100 mg by mouth daily. spironolactone 25 mg tablet Commonly known as:  ALDACTONE Take 25 mg by mouth daily. warfarin 4 mg tablet Commonly known as:  COUMADIN Take 4 mg by mouth daily. * Notice: This list has 3 medication(s) that are the same as other medications prescribed for you. Read the directions carefully, and ask your doctor or other care provider to review them with you. Prescriptions Printed Refills  
 eszopiclone (LUNESTA) 3 mg tablet 5 Sig: Take 1 Tab by mouth nightly as needed for Sleep. Max Daily Amount: 3 mg. Class: Print Route: Oral  
  
Prescriptions Sent to Pharmacy Refills  
 methocarbamol (ROBAXIN) 500 mg tablet 1 Sig: Take 1 Tab by mouth three (3) times daily. Class: Normal  
 Pharmacy: [facility] [address], [place] Ph #: [phone] Route: Oral  
  
We Performed the Following REFERRAL TO UROLOGY [UCS095 Custom] Comments:  
 Please evaluate patient for microscopic hematuria and right lower abdominal pain Referral Information Referral ID Referred By Referred To  
  
 [id number] MD Trista   
   [address] [place] [address], [address] [place] [address] Pkwy Phone: [phone] Fax: [phone] Visits Status Start Date End Date 1 New Request 2/19/18 2/19/19 If your referral has a status of pending review or denied, additional information will be sent to support the outcome of this decision. Patient Instructions Elda Joseph TODAY, please go to: CHECK OUT If you received a referral, Show the  Please schedule the following appointments at [facility] OUT: 
· Chronic pain follow up with Dr. Eun Faye in late April 2018, bring pill bottles Today's Plan: 
 
- see urology 
- change to [facility] Remember to bring your pill bottles and remaining pills for any controlled substances to your appointments with Dr. Eun Faye. If you do not bring the bottle, you may not have your medication refilled. This is the clinic policy. Narcotics: Potential side effects of narcotic medication include but are not limited to suppression of respiratory drive, constipation, sleep apnea, and endocrine dysfunction, light-headedness, sedation, GI upset, euphoria, dysphoria, constipation, urinary retention, hepatotoxicity, impacts operating certain machinery or engaging in certain activities or employment, and potential to develop tolerance, dependence, addiction and death. MED/day between 100mg and 120 mg has 9 fold increases in overdose risk with 12% being fatal.  At this dose you will be given Naloxone which is a medication that can be used in preventing overdose deaths. This medication is to be used as directed. Increasing dose of opioid medications may not improve function and response to medical treatment due to possible induced abnormal pain sensitivity including hyperalgesia and allodynia. Remember to bring your pill bottles and remaining pills for any controlled substances to your appointments with Dr. Eun Faye. If you do not bring the bottle, you may not have your medication refilled. This is the clinic policy. Hypnotics / Sedatives:  Potential side effects from hypnotic or sedative medication include but are not limited to complex sleep-related behaviors such as sleep-driving, headache, GI upset, dry mouth, anaphylaxis, angioedema, ataxia, paradoxical excitement, blurred vision, abnormal behavior, amnesia, unpleasant taste, lethargy, and somnolence.   May potentiate CNS depression with alcohol or medications that also cause CNS depression. Impacts operating certain machinery or engaging in certain activities or employment, and potential to develop tolerance, dependence, addiction and death. Introducing [facility]! Dear Real Search: Thank you for requesting a [employer] account. Our records indicate that you already have an active Cybera account. You can access your account anytime at [email or web address] Did you know that you can access your hospital and ER discharge instructions at any time in Cybera? You can also review all of your test results from your hospital stay or ER visit. Additional Information If you have questions, please visit the Frequently Asked Questions section of the Cybera website at [email or web address] Remember, Cybera is NOT to be used for urgent needs. For medical emergencies, dial 911. Now available from your iPhone and Android! Please provide this summary of care documentation to your next provider. Your primary care clinician is listed as Dakota Marquis. If you have any questions after today's visit, please call [phone].

## 2018-02-19 NOTE — PROGRESS NOTES
Chief Complaint   Patient presents with    Abdominal Pain     F/U     Medication Refill     1. Have you been to the ER, urgent care clinic since your last visit? Hospitalized since your last visit? No     2. Have you seen or consulted any other health care providers outside of the [address] [place] [address] since your last visit? Include any pap smears or colon screening. No     The patient was counseled on the dangers of tobacco use, and was advised never to start . Reviewed strategies to maximize success, including never to start. Sherita Mota  Identified pt with two pt identifiers(name and ). Chief Complaint   Patient presents with    Abdominal Pain     F/U     Medication Refill       1. Have you been to the ER, urgent care clinic since your last visit? Hospitalized since your last visit? NO    2. Have you seen or consulted any other health care providers outside of the [address] [place] [address] since your last visit? Include any pap smears or colon screening. NO    Today's provider has been notified of reason for visit, vitals and flowsheets obtained on patients. Patient received paperwork for advance directive during previous visit but has not completed at this time     Reviewed record In preparation for visit, huddled with provider and have obtained necessary documentation      Health Maintenance Due   Topic    Hepatitis C Screening Discussed with patient today and advised to follow up.  Pneumococcal 19-64 Medium Risk (1 of 1 - PPSV23) Discussed with patient today and advised to follow up.  DTaP/Tdap/Td series (1 - Tdap) Discussed with patient today and advised to follow up.  PAP AKA CERVICAL CYTOLOGY Discussed with patient today and advised to follow up.  FOBT Q 1 YEAR AGE 50-75 Discussed with patient today and advised to follow up.           Wt Readings from Last 3 Encounters:   18 265 lb 8 oz (120.4 kg)   18 263 lb 12.8 oz (119.7 kg)   16 243 lb 11.2 oz (110.5 kg)     Temp Readings from Last 3 Encounters:   18 98.5 °F (36.9 °C) (Oral)   18 97.5 °F (36.4 °C) (Oral)   16 98.8 °F (37.1 °C)     BP Readings from Last 3 Encounters:   18 121/55   18 122/55   16 190/81     Pulse Readings from Last 3 Encounters:   18 64   18 61   16 72     There were no vitals filed for this visit. Learning Assessment:  :     No flowsheet data found. Depression Screening:  :     PHQ over the last two weeks 2018   Little interest or pleasure in doing things Not at all   Feeling down, depressed or hopeless Not at all   Total Score PHQ 2 0       Fall Risk Assessment:  :     No flowsheet data found. Abuse Screening:  :     No flowsheet data found. ADL Screening:  :     No flowsheet data found. ACP is not on file, advised to return. Medication reconciliation up to date and corrected with patient at this time. [employer]  Nurse Note for Controlled Substance Refill  Chief Complaint   Patient presents with    Abdominal Pain     F/U     Medication Refill      Patient requests refill of: Ambien     There were no vitals taken for this visit.     · Diagnosis: Insomnia   · Last drug screen date: 2018  · Urine provided today: N/A   · Treatment agreement/Informed Consent date: 2018  ·  reviewed by provider: 2018   · MME per day:   · Provider for today's encounter : Dr. Lora Mckeon     · Serina Saldivar Date: 2018  Medication name: Ambien   Pill strength: 10 mg   How medication is supposed to be taken: Take one tablet po once daily at bedtime as needed for insomnia   How medication is being taken: every night   Date prescription filled: 2018  Prescribing Provider: Dr. Maria Fernanda Leal   # of pills prescribed: 30  # of pills remainin   # pills taking per day: every night   Last dose of medication taken, per patient: 2018  Pain scale and location of pain: 0  Counted in front of patient. Bottle with contents returned to patient. [facility]  reviewed by provider. Discussed pill count with provider. Per provider, refill medication granted. Follow up as advised. Pt was made aware of provider's decision, and to return AS DIRECTED  for an office visit, if one is not already scheduled at this time. Controlled Substance Refill sheet signed by patient and provider. Provided with naloxone prescription, if taking benzodiazepine, prior overdose, substance abuse, or >= 120 MME per day. Continuation of treatment with controlled substance is justified by patient's functional improvements. Patient will benefit from continued prescribing. Diagnoses and all orders for this visit:    1. Insomnia, unspecified type  -     zolpidem (AMBIEN) 10 mg tablet; Take 0.5 Tabs by mouth nightly as needed for Sleep. Max Daily Amount: 5 mg. Other orders  -     methocarbamol (ROBAXIN) 500 mg tablet; Take 1 Tab by mouth three (3) times daily. Future Appointments  Date Time Provider Mariel Becerra   4/23/2018 1:20 PM Eri Guzman.  Lucero Butts MD [facility]

## 2018-02-19 NOTE — PATIENT INSTRUCTIONS
[place] TODAY, please go to:   CHECK OUT     If you received a referral, Show the       Please schedule the following appointments at [address] [place] [address]:  · Chronic pain follow up with Dr. Alberto Suh in late April 2018, bring pill bottles      Today's Plan:    - see urology  - change to lunesta    Remember to bring your pill bottles and remaining pills for any controlled substances to your appointments with Dr. Alberto Suh. If you do not bring the bottle, you may not have your medication refilled. This is the clinic policy. Narcotics: Potential side effects of narcotic medication include but are not limited to suppression of respiratory drive, constipation, sleep apnea, and endocrine dysfunction, light-headedness, sedation, GI upset, euphoria, dysphoria, constipation, urinary retention, hepatotoxicity, impacts operating certain machinery or engaging in certain activities or employment, and potential to develop tolerance, dependence, addiction and death. MED/day between 100mg and 120 mg has 9 fold increases in overdose risk with 12% being fatal.  At this dose you will be given Naloxone which is a medication that can be used in preventing overdose deaths. This medication is to be used as directed. Increasing dose of opioid medications may not improve function and response to medical treatment due to possible induced abnormal pain sensitivity including hyperalgesia and allodynia. Remember to bring your pill bottles and remaining pills for any controlled substances to your appointments with Dr. Alberto Suh. If you do not bring the bottle, you may not have your medication refilled. This is the clinic policy.      Hypnotics / Sedatives:  Potential side effects from hypnotic or sedative medication include but are not limited to complex sleep-related behaviors such as sleep-driving, headache, GI upset, dry mouth, anaphylaxis, angioedema, ataxia, paradoxical excitement, blurred vision, abnormal behavior, amnesia, unpleasant taste, lethargy, and somnolence. May potentiate CNS depression with alcohol or medications that also cause CNS depression. Impacts operating certain machinery or engaging in certain activities or employment, and potential to develop tolerance, dependence, addiction and death.

## 2018-02-26 LAB — DRUGS UR: NORMAL

## 2018-03-29 ENCOUNTER — ANESTHESIA (OUTPATIENT)
Dept: CARDIAC CATH/INVASIVE PROCEDURES | Age: 58
End: 2018-03-29
Payer: MEDICARE

## 2018-03-29 ENCOUNTER — HOSPITAL ENCOUNTER (OUTPATIENT)
Dept: NON INVASIVE DIAGNOSTICS | Age: 58
Discharge: HOME | End: 2018-03-29
Attending: INTERNAL MEDICINE | Admitting: INTERNAL MEDICINE
Payer: MEDICARE

## 2018-03-29 ENCOUNTER — ANESTHESIA EVENT (OUTPATIENT)
Dept: CARDIAC CATH/INVASIVE PROCEDURES | Age: 58
End: 2018-03-29
Payer: MEDICARE

## 2018-03-29 VITALS
OXYGEN SATURATION: 94 % | DIASTOLIC BLOOD PRESSURE: 104 MMHG | WEIGHT: 260 LBS | RESPIRATION RATE: 16 BRPM | BODY MASS INDEX: 43.32 KG/M2 | HEART RATE: 67 BPM | HEIGHT: 65 IN | TEMPERATURE: 98.4 F | SYSTOLIC BLOOD PRESSURE: 138 MMHG

## 2018-03-29 LAB — INR BLD: 1.6 (ref 0.9–1.2)

## 2018-03-29 PROCEDURE — 85610 PROTHROMBIN TIME: CPT

## 2018-03-29 PROCEDURE — 77030010507 HC ADH SKN DERMBND J&J -B

## 2018-03-29 PROCEDURE — 77030031139 HC SUT VCRL2 J&J -A

## 2018-03-29 PROCEDURE — 74011250636 HC RX REV CODE- 250/636: Performed by: INTERNAL MEDICINE

## 2018-03-29 PROCEDURE — 77030018729 HC ELECTRD DEFIB PAD CARD -B

## 2018-03-29 PROCEDURE — C1882 AICD, OTHER THAN SING/DUAL: HCPCS

## 2018-03-29 PROCEDURE — 77030002996 HC SUT SLK J&J -A

## 2018-03-29 PROCEDURE — 77030011640 HC PAD GRND REM COVD -A

## 2018-03-29 PROCEDURE — 77030030806 HC PTCH ENSIT NAVX STJU -G

## 2018-03-29 PROCEDURE — 74011250636 HC RX REV CODE- 250/636

## 2018-03-29 PROCEDURE — 74011000250 HC RX REV CODE- 250: Performed by: INTERNAL MEDICINE

## 2018-03-29 PROCEDURE — 77030028698 HC BLD TISS PLSM MEDT -D

## 2018-03-29 PROCEDURE — 76060000033 HC ANESTHESIA 1 TO 1.5 HR

## 2018-03-29 PROCEDURE — 33264 RMVL & RPLCMT DFB GEN MLT LD: CPT

## 2018-03-29 PROCEDURE — 77030018836 HC SOL IRR NACL ICUM -A

## 2018-03-29 RX ORDER — FENTANYL CITRATE 50 UG/ML
INJECTION, SOLUTION INTRAMUSCULAR; INTRAVENOUS
Status: COMPLETED
Start: 2018-03-29 | End: 2018-03-29

## 2018-03-29 RX ORDER — HEPARIN SODIUM 200 [USP'U]/100ML
500 INJECTION, SOLUTION INTRAVENOUS ONCE
Status: COMPLETED | OUTPATIENT
Start: 2018-03-29 | End: 2018-03-29

## 2018-03-29 RX ORDER — LIDOCAINE HYDROCHLORIDE AND EPINEPHRINE 10; 10 MG/ML; UG/ML
INJECTION, SOLUTION INFILTRATION; PERINEURAL
Status: DISCONTINUED
Start: 2018-03-29 | End: 2018-03-29 | Stop reason: ALTCHOICE

## 2018-03-29 RX ORDER — SODIUM CHLORIDE 9 MG/ML
INJECTION, SOLUTION INTRAVENOUS
Status: DISCONTINUED | OUTPATIENT
Start: 2018-03-29 | End: 2018-03-29 | Stop reason: HOSPADM

## 2018-03-29 RX ORDER — HEPARIN SODIUM 200 [USP'U]/100ML
INJECTION, SOLUTION INTRAVENOUS
Status: DISCONTINUED
Start: 2018-03-29 | End: 2018-03-29 | Stop reason: ALTCHOICE

## 2018-03-29 RX ORDER — PROPOFOL 10 MG/ML
INJECTION, EMULSION INTRAVENOUS
Status: DISCONTINUED | OUTPATIENT
Start: 2018-03-29 | End: 2018-03-29 | Stop reason: HOSPADM

## 2018-03-29 RX ORDER — MIDAZOLAM HYDROCHLORIDE 1 MG/ML
INJECTION, SOLUTION INTRAMUSCULAR; INTRAVENOUS AS NEEDED
Status: DISCONTINUED | OUTPATIENT
Start: 2018-03-29 | End: 2018-03-29 | Stop reason: HOSPADM

## 2018-03-29 RX ORDER — MIDAZOLAM HYDROCHLORIDE 1 MG/ML
INJECTION, SOLUTION INTRAMUSCULAR; INTRAVENOUS
Status: COMPLETED
Start: 2018-03-29 | End: 2018-03-29

## 2018-03-29 RX ORDER — FENTANYL CITRATE 50 UG/ML
12.5-5 INJECTION, SOLUTION INTRAMUSCULAR; INTRAVENOUS
Status: DISCONTINUED | OUTPATIENT
Start: 2018-03-29 | End: 2018-03-29 | Stop reason: ALTCHOICE

## 2018-03-29 RX ORDER — FENTANYL CITRATE 50 UG/ML
INJECTION, SOLUTION INTRAMUSCULAR; INTRAVENOUS AS NEEDED
Status: DISCONTINUED | OUTPATIENT
Start: 2018-03-29 | End: 2018-03-29 | Stop reason: HOSPADM

## 2018-03-29 RX ORDER — VANCOMYCIN HYDROCHLORIDE 1 G/20ML
INJECTION, POWDER, LYOPHILIZED, FOR SOLUTION INTRAVENOUS
Status: DISCONTINUED
Start: 2018-03-29 | End: 2018-03-29 | Stop reason: ALTCHOICE

## 2018-03-29 RX ORDER — SODIUM CHLORIDE 900 MG/100ML
INJECTION INTRAVENOUS
Status: DISCONTINUED
Start: 2018-03-29 | End: 2018-03-29 | Stop reason: ALTCHOICE

## 2018-03-29 RX ORDER — ONDANSETRON 2 MG/ML
INJECTION INTRAMUSCULAR; INTRAVENOUS AS NEEDED
Status: DISCONTINUED | OUTPATIENT
Start: 2018-03-29 | End: 2018-03-29 | Stop reason: HOSPADM

## 2018-03-29 RX ORDER — LIDOCAINE HYDROCHLORIDE AND EPINEPHRINE 10; 10 MG/ML; UG/ML
1-40 INJECTION, SOLUTION INFILTRATION; PERINEURAL
Status: DISCONTINUED | OUTPATIENT
Start: 2018-03-29 | End: 2018-03-29 | Stop reason: ALTCHOICE

## 2018-03-29 RX ORDER — MIDAZOLAM HYDROCHLORIDE 1 MG/ML
1-5 INJECTION, SOLUTION INTRAMUSCULAR; INTRAVENOUS
Status: DISCONTINUED | OUTPATIENT
Start: 2018-03-29 | End: 2018-03-29 | Stop reason: ALTCHOICE

## 2018-03-29 RX ORDER — CEPHALEXIN 500 MG/1
500 CAPSULE ORAL 3 TIMES DAILY
Qty: 15 CAP | Refills: 0 | Status: SHIPPED | OUTPATIENT
Start: 2018-03-29 | End: 2018-04-03

## 2018-03-29 RX ADMIN — VANCOMYCIN HYDROCHLORIDE 1000 MG: 1 INJECTION, POWDER, LYOPHILIZED, FOR SOLUTION INTRAVENOUS at 12:20

## 2018-03-29 RX ADMIN — SODIUM CHLORIDE: 9 INJECTION, SOLUTION INTRAVENOUS at 12:19

## 2018-03-29 RX ADMIN — LIDOCAINE HYDROCHLORIDE AND EPINEPHRINE 20 ML: 10; 10 INJECTION, SOLUTION INFILTRATION; PERINEURAL at 12:48

## 2018-03-29 RX ADMIN — PROPOFOL 75 MCG/KG/MIN: 10 INJECTION, EMULSION INTRAVENOUS at 12:25

## 2018-03-29 RX ADMIN — ONDANSETRON 4 MG: 2 INJECTION INTRAMUSCULAR; INTRAVENOUS at 13:17

## 2018-03-29 RX ADMIN — FENTANYL CITRATE 50 MCG: 50 INJECTION, SOLUTION INTRAMUSCULAR; INTRAVENOUS at 12:42

## 2018-03-29 RX ADMIN — VANCOMYCIN HYDROCHLORIDE 1000 MG: 1 INJECTION, POWDER, LYOPHILIZED, FOR SOLUTION INTRAVENOUS at 13:11

## 2018-03-29 RX ADMIN — FENTANYL CITRATE 50 MCG: 50 INJECTION, SOLUTION INTRAMUSCULAR; INTRAVENOUS at 12:25

## 2018-03-29 RX ADMIN — HEPARIN SODIUM 1000 UNITS: 200 INJECTION, SOLUTION INTRAVENOUS at 12:40

## 2018-03-29 RX ADMIN — MIDAZOLAM HYDROCHLORIDE 2 MG: 1 INJECTION, SOLUTION INTRAMUSCULAR; INTRAVENOUS at 12:22

## 2018-03-29 NOTE — DISCHARGE INSTRUCTIONS
DISCHARGE INSTRUCTIONS FOR PATIENTS WITH ICD'S    You had a Medtronic biventricular pacemaker-defibrillator changed out due to battery depletion with Dr. Francisco J Gonzalez on 3/29/2018. Your medication list will not be changed longterm. Remember to take a \"double dose\" of warfarin which would be 8 mg tonight 3/29 and again 8 mg on 3/30. This is since you've been off since 3/27. As of 4/1, do your normal warfarin dosing thereafter. The new prescription for cephalexin is a 5 day course and then will stop. 1. Remember to call for an appointment in 2-4 weeks [phone] to check healing and implant programming with Dr. Karson Sims nurse, Edwina Huizar. 2. Medic Alert Bracelets are available from your pharmacist to wear at all times if you choose to wear one. 3. Carry your ID card for your ICD with you at all times. This card will be given to you in the hospital or mailed to you. 4. The ICD will bulge slightly under your skin. The bulge will decrease in size over the next few weeks. Please notify the doctor's office if you notice any of the following around your ICD site:   A.  A bruise that does not go away. B.  Soreness or yellow, green, or brown drainage from the site. C. Any swelling from the site. D. If you have a fever of 100 degrees or higher that lasts for a few days. INCISION CARE       1.  Leave the skin glue over your site until it dissolves on its own, usually in a few weeks. 2.  You may shower after 3 days as long as your incision isnt submerged or directly sprayed upon until well healed. 3.  For comfort, wear loose fitting clothing. 4.  Report any signs of infection, fever, pain, swelling, redness, oozing, or heat at site especially if these symptoms increase after the first 3 to 4 days. ACTIVITY PRECAUTIONS     1. Avoid rough contact with the implant site. 2. No driving for 7 days.   3. Avoid lifting your arm over your head, carrying anything on the affected side, or lifting over 10 pounds for 30 days. For the first 2 days only bend your arm at the elbow. 4. Any extreme activity such as golf, weight lifting or exercise biking should be restricted for 60 days. 5. Do not carry objects by holding them against your implant site. 6.  No shooting rifles or any type of gun with the affected shoulder permanently. 7.  Welding and chainsaws are prohibited. SPECIAL PRECAUTIONS     1. You should avoid all strong magnetic fields, such as arc welding, large transformers, large motors. Some ICD devices will beep if it detects a strong magnet. If this occurs, move out of the area. 2.  You may not have an MRI which uses a strong magnet to take pictures unless both a cardiologist and radiologist give the [place]. 3.  Treatments or surgery that requires diathermy or electrocautery should be discussed with your doctor before scheduled. 4.  Avoid radio frequency transmitters, including radar. 5.  Advise dentist or other medical personnel you see that you have an ICD. 6.  Cell phones and microwave oven use is okay. 7.  If you plan to move or take a trip to a new area, the doctor's office will give you a name of a doctor to contact for any problems. SPECIAL INSTRUCTIONS ON SHOCKS     1. Notify your doctor for any of the following:       A. Anytime a shock is received in a 24 hour period. An office visit is not usually required for a single shock. B.  Two or more shocks in a row. If you do not feel well, call the [facility] [employer], otherwise call your doctor. This may require an office visit. C. Two or more shocks spaced apart by several hours. This may require an office visit. 2.  Keep a record of events. Include date, time, symptoms and activity at that time. ANTIBIOTIC THERAPY    During the first 8 weeks after your ICD insertion, you may need antibiotics before any dental work or certain tests or operations.   Let the dentist or doctor who is caring for you know that you have had an implanted device. You will receive a prescription for a prophylactic antibiotic called cephalexin for 5 days after your implant. This was sent electronically to in [place].     Signed:  Nikkie Escobedo MD

## 2018-03-29 NOTE — PROGRESS NOTES
Cardiac Cath Lab Recovery Arrival Note:      Radha Suarez arrived to Cardiac Cath Lab, Recovery Area. Staff introduced to patient. Patient identifiers verified with NAME and DATE OF BIRTH. Procedure verified with patient. Consent forms reviewed and signed by patient or authorized representative and verified. Allergies verified. Patient and family oriented to department. Patient and family informed of procedure and plan of care. Questions answered with review. Patient prepped for procedure, per orders from physician, prior to arrival.    Patient on cardiac monitor, non-invasive blood pressure, SPO2 monitor. On RA. Patient is A&Ox 4. Patient reports no complaints. Patient in stretcher, in low position, with side rails up, call bell within reach, patient instructed to call if assistance as needed. Patient prep in: [address], [place] [address].       Prep by: AB

## 2018-03-29 NOTE — IP AVS SNAPSHOT
[address] [place] [address] 83. 
[phone] Patient: Sav Hatch MRN: [id number] About your hospitalization You were admitted on:  March 29, 2018 You last received care in the:  [facility] CARDIAC CATH LAB You were discharged on:  March 29, 2018 Why you were hospitalized Your primary diagnosis was:  Not on File Follow-up Information Follow up With Details Comments Contact Info Slade Renteria, [address] [facility] [address] 
[phone] Your Scheduled Appointments Monday April 23, 2018  1:20 PM EDT ROUTINE CARE with Nita Rodrigues. Rebecca Ferrara 28 ([address]) [address] [facility] [address]  
[phone] Discharge Orders None A check neil indicates which time of day the medication should be taken. My Medications START taking these medications Instructions Each Dose to Equal  
 Morning Noon Evening Bedtime  
 cephALEXin 500 mg capsule Commonly known as:  Zenaida Merida Your last dose was: Your next dose is: Take 1 Cap by mouth three (3) times daily for 5 days. 500 mg CONTINUE taking these medications Instructions Each Dose to Equal  
 Morning Noon Evening Bedtime  
 carvedilol 3.125 mg tablet Commonly known as:  [place] Members Your last dose was: Your next dose is: Take 3.125 mg by mouth two (2) times daily (with meals). 3.125 mg ENTRESTO 49-51 mg Tab tablet Generic drug:  sacubitril-valsartan Your last dose was: Your next dose is: Take 1 Tab by mouth two (2) times a day. 1 Tab  
    
   
   
   
  
 eszopiclone 3 mg tablet Commonly known as:  Anali Spicer Your last dose was: Your next dose is: Take 1 Tab by mouth nightly as needed for Sleep. Max Daily Amount: 3 mg.  
 3 mg HYDROcodone-acetaminophen 7.5-325 mg per tablet Commonly known as:  Adolm Claros Your last dose was: Your next dose is: Take 1 Tab by mouth every eight (8) hours as needed for Pain. Max Daily Amount: 3 Tabs. 1 Tab LASIX 80 mg tablet Generic drug:  furosemide Your last dose was: Your next dose is: Take 80 mg by mouth two (2) times a day. 80 mg  
    
   
   
   
  
 methocarbamol 500 mg tablet Commonly known as:  ROBAXIN Your last dose was: Your next dose is: Take 1 Tab by mouth three (3) times daily. 500 mg  
    
   
   
   
  
 mirtazapine 15 mg tablet Commonly known as:  Jasmyn Mile Your last dose was: Your next dose is: Take 15 mg by mouth nightly. 15 mg  
    
   
   
   
  
 potassium chloride 20 mEq tablet Commonly known as:  K-DUR, KLOR-CON Your last dose was: Your next dose is: Take 20 mEq by mouth daily. 20 mEq  
    
   
   
   
  
 pravastatin 80 mg tablet Commonly known as:  PRAVACHOL Your last dose was: Your next dose is: Take 80 mg by mouth nightly. 80 mg  
    
   
   
   
  
 raNITIdine hcl 150 mg capsule Your last dose was: Your next dose is: Take 150 mg by mouth two (2) times a day. 150 mg  
    
   
   
   
  
 sertraline 100 mg tablet Commonly known as:  ZOLOFT Your last dose was: Your next dose is: Take 100 mg by mouth daily. 100 mg  
    
   
   
   
  
 spironolactone 25 mg tablet Commonly known as:  ALDACTONE Your last dose was: Your next dose is: Take 25 mg by mouth daily. 25 mg  
    
   
   
   
  
 warfarin 4 mg tablet Commonly known as:  COUMADIN Your last dose was: Your next dose is: Take 4 mg by mouth daily. 4 mg Where to Get Your Medications These medications were sent to [address] [place] [address]  [address], [address] [facility] Phone:  [phone]  
  cephALEXin 500 mg capsule Opioid Education Prescription Opioids: What You Need to Know: 
 
Prescription opioids can be used to help relieve moderate-to-severe pain and are often prescribed following a surgery or injury, or for certain health conditions. These medications can be an important part of treatment but also come with serious risks. Opioids are strong pain medicines. Examples include hydrocodone, oxycodone, fentanyl, and morphine. Heroin is an example of an illegal opioid. It is important to work with your health care provider to make sure you are getting the safest, most effective care. WHAT ARE THE RISKS AND SIDE EFFECTS OF OPIOID USE? Prescription opioids carry serious risks of addiction and overdose, especially with prolonged use. An opioid overdose, often marked by slow breathing, can cause sudden death. The use of prescription opioids can have a number of side effects as well, even when taken as directed. · Tolerance-meaning you might need to take more of a medication for the same pain relief · Physical dependence-meaning you have symptoms of withdrawal when the medication is stopped. Withdrawal symptoms can include nausea, sweating, chills, diarrhea, stomach cramps, and muscle aches. Withdrawal can last up to several weeks, depending on which drug you took and how long you took it. · Increased sensitivity to pain · Constipation · Nausea, vomiting, and dry mouth · Sleepiness and dizziness · Confusion · Depression · Low levels of testosterone that can result in lower sex drive, energy, and strength · Itching and sweating RISKS ARE GREATER WITH:      
 · History of drug misuse, substance use disorder, or overdose · Mental health conditions (such as depression or anxiety) · Sleep apnea · Older age (72 years or older) · Pregnancy Avoid alcohol while taking prescription opioids. Also, unless specifically advised by your health care provider, medications to avoid include: · Benzodiazepines (such as Xanax or Valium) · Muscle relaxants (such as Soma or Flexeril) · Hypnotics (such as Ambien or Lunesta) · Other prescription opioids KNOW YOUR OPTIONS Talk to your health care provider about ways to manage your pain that don't involve prescription opioids. Some of these options may actually work better and have fewer risks and side effects. Options may include: 
· Pain relievers such as acetaminophen, ibuprofen, and naproxen · Some medications that are also used for depression or seizures · Physical therapy and exercise · Counseling to help patients learn how to cope better with triggers of pain and stress. · Application of heat or cold compress · Massage therapy · Relaxation techniques Be Informed Make sure you know the name of your medication, how much and how often to take it, and its potential risks & side effects. IF YOU ARE PRESCRIBED OPIOIDS FOR PAIN: 
· Never take opioids in greater amounts or more often than prescribed. Remember the goal is not to be pain-free but to manage your pain at a tolerable level. · Follow up with your primary care provider to: · Work together to create a plan on how to manage your pain. · Talk about ways to help manage your pain that don't involve prescription opioids. · Talk about any and all concerns and side effects. · Help prevent misuse and abuse. · Never sell or share prescription opioids · Help prevent misuse and abuse. · Store prescription opioids in a secure place and out of reach of others (this may include visitors, children, friends, and family). · Safely dispose of unused/unwanted prescription opioids: Find your community drug take-back program or your pharmacy mail-back program, or flush them down the toilet, following guidance from the Food and Drug Administration (www.fda.gov/Drugs/ResourcesForYou). · Visit www.cdc.gov/drugoverdose to learn about the risks of opioid abuse and overdose. · If you believe you may be struggling with addiction, tell your health care provider and ask for guidance or call [address] [place] [address] at 3-537-120-XHZU. Discharge Instructions DISCHARGE INSTRUCTIONS FOR PATIENTS WITH ICD'S You had a Medtronic biventricular pacemaker-defibrillator changed out due to battery depletion with Dr. Jaycob Queen on 3/29/2018. Your medication list will not be changed longterm. Remember to take a \"double dose\" of warfarin which would be 8 mg tonight 3/29 and again 8 mg on 3/30. This is since you've been off since 3/27. As of 4/1, do your normal warfarin dosing thereafter. The new prescription for cephalexin is a 5 day course and then will stop. 1. Remember to call for an appointment in 2-4 weeks [phone] to check healing and implant programming with Dr. Carloz Michael nurse, Yony Kay. 2. Medic Alert Bracelets are available from your pharmacist to wear at all times if you choose to wear one. 3. Carry your ID card for your ICD with you at all times. This card will be given to you in the hospital or mailed to you. 4. The ICD will bulge slightly under your skin. The bulge will decrease in size over the next few weeks. Please notify the doctor's office if you notice any of the following around your ICD site: A.  A bruise that does not go away. B.  Soreness or yellow, green, or brown drainage from the site. C. Any swelling from the site. D. If you have a fever of 100 degrees or higher that lasts for a few days.  
 
INCISION CARE    
 
 1.  Leave the skin glue over your site until it dissolves on its own, usually in a few weeks. 2.  You may shower after 3 days as long as your incision isnt submerged or directly sprayed upon until well healed. 3.  For comfort, wear loose fitting clothing. 4.  Report any signs of infection, fever, pain, swelling, redness, oozing, or heat at site especially if these symptoms increase after the first 3 to 4 days. ACTIVITY PRECAUTIONS 1. Avoid rough contact with the implant site. 2. No driving for 7 days. 3. Avoid lifting your arm over your head, carrying anything on the affected side, or lifting over 10 pounds for 30 days. For the first 2 days only bend your arm at the elbow. 4. Any extreme activity such as golf, weight lifting or exercise biking should be restricted for 60 days. 5. Do not carry objects by holding them against your implant site. 6.  No shooting rifles or any type of gun with the affected shoulder permanently. 7.  Welding and chainsaws are prohibited. SPECIAL PRECAUTIONS 1. You should avoid all strong magnetic fields, such as arc welding, large transformers, large motors. Some ICD devices will beep if it detects a strong magnet. If this occurs, move out of the area. 2.  You may not have an MRI which uses a strong magnet to take pictures unless both a cardiologist and radiologist give the [place]. 3.  Treatments or surgery that requires diathermy or electrocautery should be discussed with your doctor before scheduled. 4.  Avoid radio frequency transmitters, including radar. 5.  Advise dentist or other medical personnel you see that you have an ICD. 6.  Cell phones and microwave oven use is okay. 7.  If you plan to move or take a trip to a new area, the doctor's office will give you a name of a doctor to contact for any problems. SPECIAL INSTRUCTIONS ON SHOCKS 1. Notify your doctor for any of the following: A.  Anytime a shock is received in a 24 hour period. An office visit is not usually required for a single shock. B.  Two or more shocks in a row. If you do not feel well, call the [facility], otherwise call your doctor. This may require an office visit. C. Two or more shocks spaced apart by several hours. This may require an office visit. 2.  Keep a record of events. Include date, time, symptoms and activity at that time. ANTIBIOTIC THERAPY During the first 8 weeks after your ICD insertion, you may need antibiotics before any dental work or certain tests or operations. Let the dentist or doctor who is caring for you know that you have had an implanted device. You will receive a prescription for a prophylactic antibiotic called cephalexin for 5 days after your implant. This was sent electronically to in [place]. Signed: 
Trey Valera MD 
 
 
 
  
  
  
Introducing [facility]! Dear Nick Branch: Thank you for requesting a [employer] account. Our records indicate that you already have an active [employer] account. You can access your account anytime at https://[employer]. [employer] Me/[employer] Effective Data Did you know that you can access your hospital and ER discharge instructions at any time in [employer]? You can also review all of your test results from your hospital stay or ER visit. Additional Information If you have questions, please visit the Frequently Asked Questions section of the [employer] website at https://[employer] Me/[employer] Effective [employer]/. Remember, [employer] is NOT to be used for urgent needs. For medical emergencies, dial 911. Now available from your iPhone and Android! Introducing Denny Camara As a [facility] patient, I wanted to make you aware of our electronic visit tool called Denny Camara. [facility] 24/7 allows you to connect within minutes with a medical provider 24 hours a day, seven days a week via a mobile device or tablet or logging into a secure website from your computer. You can access [employer] from anywhere in the [place]. A virtual visit might be right for you when you have a simple condition and feel like you just dont want to get out of bed, or cant get away from work for an appointment, when your regular [place] [facility] provider is not available (evenings, weekends or holidays), or when youre out of town and need minor care. Electronic visits cost only $49 and if the [facility] 24/7 provider determines a prescription is needed to treat your condition, one can be electronically transmitted to a nearby pharmacy*. Please take a moment to enroll today if you have not already done so. The enrollment process is free and takes just a few minutes. To enroll, please download the [facility] 24/7 laila to your tablet or phone, or visit [email or web address] to enroll on your computer. And, as an [address] [place] [address] patient with a [employer] account, the results of your visits will be scanned into your electronic medical record and your primary care provider will be able to view the scanned results. We urge you to continue to see your regular [facility] provider for your ongoing medical care. And while your primary care provider may not be the one available when you seek a [employer] virtual visit, the peace of mind you get from getting a real diagnosis real time can be priceless. For more information on [employer], view our Frequently Asked Questions (FAQs) at [email or web address]. Sincerely, 
 
Gricelda Curiel MD 
Chief Medical Officer [address] Clemencia Cevallos *:  certain medications cannot be prescribed via [place] [employer] Providers Seen During Your Hospitalization Provider Specialty Primary office phone Deandra Allen MD Cardiology [phone] Your Primary Care Physician (PCP) Primary Care Physician Office Phone Office Fax [place] [phone] You are allergic to the following Allergen Reactions Augmentin (Amoxicillin-Pot Clavulanate) Nausea and Vomiting Neosporin (Neomycin-Bacitracin-Polymyxin) Rash Recent Documentation Height Weight BMI OB Status Smoking Status 1.651 m 117.9 kg 43.27 kg/m2 Hysterectomy Never Smoker Emergency Contacts Name Discharge Info Relation Home Work Mobile [address] CAREGIVER [3] Spouse [3] [phone] Patient Belongings The following personal items are in your possession at time of discharge: 
  Dental Appliances: None         Home Medications: None   Jewelry: None  Clothing: Footwear, Pants, Shirt, Undergarments    Other Valuables: None Please provide this summary of care documentation to your next provider. Signatures-by signing, you are acknowledging that this After Visit Summary has been reviewed with you and you have received a copy. Patient Signature:  ____________________________________________________________ Date:  ____________________________________________________________  
  
Escobar Mealing Provider Signature:  ____________________________________________________________ Date:  ____________________________________________________________

## 2018-03-29 NOTE — PROGRESS NOTES
TRANSFER - IN REPORT:    Verbal report received from NP,DIGNA on Sherita Mota  being received from EP LAB for routine progression of care. Report consisted of patients Situation, Background, Assessment and Recommendations(SBAR). Information from the following report(s) SBAR, Kardex, Procedure Summary and MAR was reviewed with the receiving clinician. Opportunity for questions and clarification was provided. Assessment completed upon patients arrival to [address] [place] [address] and care assumed. Cardiac Cath Lab Recovery Arrival Note:    Carmine Webber arrived to [facility] recovery area. Patient procedure= ICD gen change without incident, no complication reported. Patient on cardiac monitor, non-invasive blood pressure, SPO2 monitor. On O2 @ 2 lpm via  NC.  IV  of vancomycin on pump at 125 ml/hr. Patient status doing well without problems. Patient is A&Ox 3. Patient reports no complaints. PROCEDURE SITE CHECK:    Procedure site:without any bleeding and no hematoma, no pain/discomfort reported at procedure site. No change in patient status. Continue to monitor patient and status. Ice pack to site.  NO SLING needed

## 2018-03-29 NOTE — PROGRESS NOTES
PROCEDURE SITE CHECK:    Procedure Site check: No bleeding, no hematoma, no pain/discomfort reported. No change in patient status. Continue to monitor patient and status.     PO Fluids provided

## 2018-03-29 NOTE — PROGRESS NOTES
Ambulate in [place] with pt to BR; gait steady. Pt denies c/o. Right anterior chest wall dressing dry and intact. DC instructions reviewed with pt and family; all verbalize understanding. SL dc'd without difficulty. Pt dc'd to home with family via car.

## 2018-03-29 NOTE — PROGRESS NOTES
EP/ End of Procedure/ TRANSFER - OUT REPORT:    Verbal report given to Bubba Celeste, Electrophysiology  on [facility] for routine progression of care       Report consisted of patients Situation, Background, Assessment and   Recommendations(SBAR). Information from the following report(s) SBAR, Kardex, Procedure Summary and MAR was reviewed with the receiving nurse. Opportunity for questions and clarification was provided.

## 2018-03-29 NOTE — H&P
[facility]  ACUTE CARE HISTORY AND PHYSICAL    Camilla Avila  MR#: [id number]  : 1960  ACCOUNT #: [de-identified]   DATE OF SERVICE: 2018    CHIEF COMPLAINT:  BiV ICD battery depletion. HISTORY:  This is a 76-year-old female with a history of a chronic nonischemic cardiomyopathy, chronic systolic congestive heart failure class II, and a remotely placed right-sided biventricular pacemaker defibrillator. The device is now with battery depletion. She is pacemaker dependent. She is here today for generator change. She has been taking a guideline-directed and optimized medical regimen for heart failure for over a year. The potential benefits, risks and alternatives to a generator change were discussed with her and there was a shared decision making tool involved in care. This was also provided to her in written form. All questions were answered for her. ALLERGIES:  MULTIPLE. MEDICATIONS:  Please see the list.  Last took warfarin 3 days ago. Her INR was 2.0 on 2018. PAST MEDICAL HISTORY:  As above. FAMILY HISTORY:  Noncontributory. SOCIAL HISTORY:  Noncontributory. REVIEW OF SYSTEMS:  Noncontributory. PHYSICAL EXAMINATION:  VITAL SIGNS:  Temperature 98.3 Fahrenheit, blood pressure 127/48, pulse 70, respirations 15, oxygen saturation 94% on room air. Weight 117.9 kilograms. GENERAL:  Nondiaphoretic, not in acute distress. CHEST:  The left chest has an incision with some volume loss below that. This is the site of the remote extraction. The right chest has two scars, one diagonal and one horizontal.  The device pocket sits below the horizontal incision. NEUROLOGIC:  Awake, appropriate, grossly nonfocal.    IMPRESSION:  1. Chronic atrial fibrillation. 2.  Complete heart block with pacemaker dependency. 3.  Medtronic biventricular pacemaker defibrillator, now with battery depletion. 4.  Chronic nonischemic dilated cardiomyopathy.   Her last ejection fraction was 45% on medical therapy and with biventricular pacing. 5.  Arthritis. 6.  History of left renal infarct in 2017.  7.  Remote ICD pocket infection, the system was extracted and then reimplanted. RECOMMENDATIONS:    1. Given the potential benefits, risks and alternatives, she agrees to proceed with Medtronic biventricular pacemaker defibrillator generator change. All questions were answered. 2.  Will resume anticoagulation today. MD EDUARDO Caraballo / Yaneth  D: 03/29/2018 14:56     T: 03/29/2018 15:47  JOB #: [id number]  CC: Isac Mariee MD  CC: Pal Cano III, DO

## 2018-03-29 NOTE — ANESTHESIA PREPROCEDURE EVALUATION
Anesthetic History   No history of anesthetic complications            Review of Systems / Medical History  Patient summary reviewed, nursing notes reviewed and pertinent labs reviewed    Pulmonary  Within defined limits                 Neuro/Psych         Psychiatric history     Cardiovascular    Hypertension      CHF  Dysrhythmias   Pacemaker and CAD    Exercise tolerance: <4 METS  Comments: EF last fall was 45% (up from the 20's per the pt)   GI/Hepatic/Renal  Within defined limits              Endo/Other        Morbid obesity and arthritis     Other Findings              Physical Exam    Airway  Mallampati: III  TM Distance: 4 - 6 cm  Neck ROM: short neck   Mouth opening: Diminished (comment)     Cardiovascular    Rhythm: regular  Rate: normal         Dental    Dentition: Caps/crowns     Pulmonary  Breath sounds clear to auscultation               Abdominal         Other Findings            Anesthetic Plan    ASA: 4  Anesthesia type: MAC            Anesthetic plan and risks discussed with: Patient and Family

## 2018-03-29 NOTE — PROCEDURES
[address]  [phone]    Patient ID:  Patient: Louise Mustafa  MRN: [id number]  Age: 60 y.o.  : 1960  Gender: female  Study Date: 3/29/2018    History: This is a female with a chronic nonischemic cardiomyopathy EF now 45% with biventricular pacing and optimized guideline-directed medical therapy > 1 year, chronic systolic congestive heart failure class 2, and complete heart block with pacemaker-dependency. She is here for a BIV-ICD generator change due to battery depletion. Procedures Performed:  1. Replace/Upgrade Existing ICD pulse generator (97296)  3. ICD testing, initial (81225-66)    The patient was brought to the EP lab in a postabsorptive state after informed consent had been previously obtained. Continuous electrocardiographic and hemodynamic monitoring was performed. Sedation was performed by the anesthesiologist who was in constant attendance throughout the procedure and by the anesthesiologist during ICD testing. Using 1% lidocaine with epinephrine, the RIGHT chest site was anesthetized. Using blunt dissection and electrocautery with the Plasma Blade, the pocket was dissected and ultimately the old ICD generator delivered from the pocket. I did exclude the scar at the skin in a shallow crescent excision, using the scalpel as well as Plasma Blade (on cut). After the leads were disconnected from the generator, they were tested and performance verified. The new pulse generator was connected to the leads and placed in the pocket after hemostasis was confirmed. Vigorous irrigation with antibiotic solution was performed. The pocket was closed using a running 2-0 Vicryl layer x1, followed by a more superficial layer of running 4-0 Vicryl in a subcuticular fashion to close the skin. Dermabond was applied. ICD testing was performed before pocket closure.   Using an induction protocol, ventricular fibrillation was successfully sensed and a single 15J biphasic shock terminated ventricular fibrillation, 78 ohm shock impedance. The pacing was inhibited to confirm that atrial fibrillation was still present and no organized atrial activity was seen. No complications. Preoperative Diagnosis: As above. Postoperative Diagnosis: As above. Procedure:  As above. Surgeon(s) and Role:  Nikkie Escobedo MD - Primary   Anesthesia:   MAC. Estimated Blood Loss:  <5 cc. Specimens: * No specimens in log *   Findings:  As below. Complications:  None. X-ray time:  0.1 minute      SETTINGS of new generator:  [employer] MRI CRT-D SureScan [id number], [id number]  RA port plugged  RV -, 0.5, 532  LV -, 1.5, 532  VVIR     2 tachycardia zones:   bpm,  bpm.    Explanted device:  Medtronic [id number], [id number], implanted 8/7/2012      Recommendations:  After successful left chest BIV-ICD generator change, routine follow-up in 2-4 weeks for wound and device check.     Signed:  Nikkie Escobedo MD

## 2018-03-29 NOTE — PROGRESS NOTES
PROCEDURE SITE CHECK:    Procedure Site check: No bleeding, no hematoma, no pain/discomfort reported. No change in patient status. Continue to monitor patient and status. No change in status    Family with patient.     Dr Sushil Cortez discussed status and plan of care with patient and family

## 2018-03-29 NOTE — PROGRESS NOTES
Patient is doing OK. Discussed warfarin doses of 8 mg tonight and 8 mg tomorrow night, then back to usual dosing.

## 2018-03-29 NOTE — ANESTHESIA POSTPROCEDURE EVALUATION
Post-Anesthesia Evaluation and Assessment    Patient: Radha Suarez MRN: [id number]  SSN: [id number]    YOB: 1960  Age: 60 y.o. Sex: female       Cardiovascular Function/Vital Signs  Visit Vitals    /70    Pulse 66    Temp 36.7 °C (98 °F)    Resp 15    Ht 5' 5\" (1.651 m)    Wt 117.9 kg (260 lb)    SpO2 98%    BMI 43.27 kg/m2       Patient is status post MAC anesthesia for * No procedures listed *. Nausea/Vomiting: None    Postoperative hydration reviewed and adequate. Pain:  Pain Scale 1: Numeric (0 - 10) (03/29/18 0908)  Pain Intensity 1: 4 (03/29/18 0908)   Managed    Neurological Status: At baseline    Mental Status and Level of Consciousness: Arousable    Pulmonary Status:   O2 Device: CO2 nasal cannula (03/29/18 1329)   Adequate oxygenation and airway patent    Complications related to anesthesia: None    Post-anesthesia assessment completed.  No concerns    Signed By: Tavon Monroe MD     March 29, 2018

## 2018-04-11 DIAGNOSIS — M51.34 DDD (DEGENERATIVE DISC DISEASE), THORACIC: ICD-10-CM

## 2018-04-11 DIAGNOSIS — M50.30 DDD (DEGENERATIVE DISC DISEASE), CERVICAL: ICD-10-CM

## 2018-04-12 RX ORDER — METHOCARBAMOL 500 MG/1
TABLET, FILM COATED ORAL
Qty: 90 TAB | Refills: 1 | Status: SHIPPED | COMMUNITY
Start: 2018-04-12 | End: 2018-06-12 | Stop reason: SDUPTHER

## 2018-04-12 NOTE — TELEPHONE ENCOUNTER
PCP: Julia Land. Gwen Preciado MD    Last appt: 2/19/2018  Future Appointments  Date Time Provider Mariel Becerra   4/23/2018 1:20 PM Julia Land.  Gwen Preciado MD [facility]       Requested Prescriptions     Pending Prescriptions Disp Refills    methocarbamol (ROBAXIN) 500 mg tablet [Pharmacy Med Name: METHOCARBAM 500MG   TAB] 90 Tab 1     Sig: TAKE 1 TABLET BY MOUTH THREE TIMES DAILY     Lab Results   Component Value Date/Time    Sodium 142 01/18/2018 09:04 AM    Potassium 4.6 01/18/2018 09:04 AM    Chloride 101 01/18/2018 09:04 AM    CO2 26 01/18/2018 09:04 AM    Glucose 132 (H) 01/18/2018 09:04 AM    BUN 18 01/18/2018 09:04 AM    Creatinine 0.92 01/18/2018 09:04 AM    BUN/Creatinine ratio 20 01/18/2018 09:04 AM    GFR est AA 79 01/18/2018 09:04 AM    GFR est non-AA 69 01/18/2018 09:04 AM    Calcium 9.5 01/18/2018 09:04 AM     Lab Results   Component Value Date/Time    Hemoglobin A1c 6.4 (H) 01/18/2018 09:04 AM     Lab Results   Component Value Date/Time    Cholesterol, total 179 01/18/2018 09:04 AM    HDL Cholesterol 42 01/18/2018 09:04 AM    LDL, calculated 101 (H) 01/18/2018 09:04 AM    VLDL, calculated 36 01/18/2018 09:04 AM    Triglyceride 179 (H) 01/18/2018 09:04 AM     No results found for: TSH, TSH2, TSH3, TSHP, TSHEXT

## 2018-04-23 ENCOUNTER — OFFICE VISIT (OUTPATIENT)
Dept: FAMILY MEDICINE CLINIC | Age: 58
End: 2018-04-23

## 2018-04-23 VITALS
DIASTOLIC BLOOD PRESSURE: 54 MMHG | SYSTOLIC BLOOD PRESSURE: 124 MMHG | TEMPERATURE: 98 F | HEART RATE: 60 BPM | WEIGHT: 269 LBS | HEIGHT: 65 IN | OXYGEN SATURATION: 94 % | RESPIRATION RATE: 18 BRPM | BODY MASS INDEX: 44.82 KG/M2

## 2018-04-23 DIAGNOSIS — I25.10 CORONARY ARTERY DISEASE WITHOUT ANGINA PECTORIS, UNSPECIFIED VESSEL OR LESION TYPE, UNSPECIFIED WHETHER NATIVE OR TRANSPLANTED HEART: ICD-10-CM

## 2018-04-23 DIAGNOSIS — M51.34 DDD (DEGENERATIVE DISC DISEASE), THORACIC: ICD-10-CM

## 2018-04-23 DIAGNOSIS — I10 ESSENTIAL HYPERTENSION: ICD-10-CM

## 2018-04-23 DIAGNOSIS — I50.9 CONGESTIVE HEART FAILURE, UNSPECIFIED HF CHRONICITY, UNSPECIFIED HEART FAILURE TYPE (HCC): ICD-10-CM

## 2018-04-23 DIAGNOSIS — M50.30 DDD (DEGENERATIVE DISC DISEASE), CERVICAL: ICD-10-CM

## 2018-04-23 DIAGNOSIS — M54.5 CHRONIC MIDLINE LOW BACK PAIN, WITH SCIATICA PRESENCE UNSPECIFIED: Primary | ICD-10-CM

## 2018-04-23 DIAGNOSIS — Z95.0 PACEMAKER: ICD-10-CM

## 2018-04-23 DIAGNOSIS — I48.91 ATRIAL FIBRILLATION, UNSPECIFIED TYPE (HCC): ICD-10-CM

## 2018-04-23 DIAGNOSIS — G89.29 CHRONIC MIDLINE LOW BACK PAIN, WITH SCIATICA PRESENCE UNSPECIFIED: Primary | ICD-10-CM

## 2018-04-23 RX ORDER — HYDROCODONE BITARTRATE AND ACETAMINOPHEN 7.5; 325 MG/1; MG/1
1 TABLET ORAL
Qty: 90 TAB | Refills: 0 | Status: SHIPPED | OUTPATIENT
Start: 2018-06-24 | End: 2019-01-01

## 2018-04-23 RX ORDER — HYDROCODONE BITARTRATE AND ACETAMINOPHEN 7.5; 325 MG/1; MG/1
TABLET ORAL
Qty: 90 TAB | Refills: 0 | Status: SHIPPED | OUTPATIENT
Start: 2018-04-23 | End: 2018-06-24

## 2018-04-23 RX ORDER — HYDROCODONE BITARTRATE AND ACETAMINOPHEN 7.5; 325 MG/1; MG/1
TABLET ORAL
Qty: 90 TAB | Refills: 0 | Status: SHIPPED | OUTPATIENT
Start: 2018-05-25

## 2018-04-23 RX ORDER — HYDROCODONE BITARTRATE AND ACETAMINOPHEN 7.5; 325 MG/1; MG/1
1 TABLET ORAL
Qty: 90 TAB | Refills: 0 | Status: SHIPPED | OUTPATIENT
Start: 2018-04-25 | End: 2018-04-23 | Stop reason: SDUPTHER

## 2018-04-23 NOTE — PATIENT INSTRUCTIONS
TODAY, please go to:     CHECK OUT - If you received a referral, Show the       Please schedule the following appointments at [address] [place] [address]:  · Lab visit, fasting, the week before our visit. · Medicare wellness visit with Dr. Marino Berry in 6 weeks     Today's Plan:    Start cardiac rehab  Continue medications  Use topical medication more and heat.

## 2018-04-23 NOTE — PROGRESS NOTES
Sherita Mota  Identified pt with two pt identifiers(name and ). Chief Complaint   Patient presents with    Medication Refill     Rm : 8 hydrocodone (Norco)    Follow-up     3mo check up       1. Have you been to the ER, urgent care clinic since your last visit? Hospitalized since your last visit? No    2. Have you seen or consulted any other health care providers outside of the [address] [place] [address] since your last visit? Include any pap smears or colon screening. No    Today's provider has been notified of reason for visit, vitals and flowsheets obtained on patients. Patient has AVW in place and   Reviewed record In preparation for visit, huddled with provider and have obtained necessary documentation      Health Maintenance Due   Topic    Hepatitis C Screening     Pneumococcal 19-64 Medium Risk (1 of 1 - PPSV23)    DTaP/Tdap/Td series (1 - Tdap)    PAP AKA CERVICAL CYTOLOGY     FOBT Q 1 YEAR AGE 50-75     MEDICARE YEARLY EXAM        Wt Readings from Last 3 Encounters:   18 269 lb (122 kg)   18 260 lb (117.9 kg)   18 259 lb (117.5 kg)     Temp Readings from Last 3 Encounters:   18 98 °F (36.7 °C) (Oral)   18 98.4 °F (36.9 °C)   18 97.3 °F (36.3 °C) (Oral)     BP Readings from Last 3 Encounters:   18 124/54   18 (!) 138/104   18 106/46     Pulse Readings from Last 3 Encounters:   18 60   18 67   18 64     Vitals:    18 1321   BP: 124/54   Pulse: 60   Resp: 18   Temp: 98 °F (36.7 °C)   TempSrc: Oral   SpO2: 94%   Weight: 269 lb (122 kg)   Height: 5' 5\" (1.651 m)   PainSc:   0 - No pain         Learning Assessment:  :     No flowsheet data found.     Depression Screening:  :     PHQ over the last two weeks 2018   Little interest or pleasure in doing things Not at all   Feeling down, depressed or hopeless Not at all   Total Score PHQ 2 0           ADL Screening:  :     ADL Assessment 2018   Feeding yourself No Help Needed   Getting from bed to chair No Help Needed   Getting dressed No Help Needed   Bathing or showering No Help Needed   Walk across the room (includes cane/walker) No Help Needed   Using the telphone No Help Needed   Taking your medications No Help Needed   Preparing meals No Help Needed   Managing money (expenses/bills) No Help Needed   Moderately strenuous housework (laundry) No Help Needed   Shopping for personal items (toiletries/medicines) No Help Needed   Shopping for groceries No Help Needed   Driving No Help Needed   Climbing a flight of stairs No Help Needed   Getting to places beyond walking distances No Help Needed     Medication reconciliation up to date and corrected with patient at this time. Patient did not bring her px bottle.   · Diagnosis: degenerative disc disease  · Last drug screen date: 02/20/18  · Urine provided today: None provided  · Treatment agreement/Informed Consent date: N/A: will sign next visit  ·  reviewed by provider: 04/23/2018   · MME per day: N/A  · Provider for today's encounter : Dr. Criss Bradley

## 2018-04-23 NOTE — PROGRESS NOTES
[address] [place] [address] Visit   04/23/2018  Patient ID: James Colbert is a 60 y.o. female. Assessment/Plan:    Diagnoses and all orders for this visit:    1. Chronic midline low back pain, with sciatica presence unspecified  -     REFERRAL TO CARDIAC REHAB [phone] Call [facility]    2. DDD (degenerative disc disease), thoracic  -     HYDROcodone-acetaminophen (NORCO) 7.5-325 mg per tablet; 1 Tab, EVERY 8 HOURS AS NEEDED FOR PAIN  -     HYDROcodone-acetaminophen (NORCO) 7.5-325 mg per tablet; Take 1 Tab by mouth every eight (8) hours as needed for Pain. Max Daily Amount: 3 Tabs. -     REFERRAL TO CARDIAC REHAB [phone] Call [facility]  -     HYDROcodone-acetaminophen (Johnson Teddy) 7.5-325 mg per tablet; Take 1 Tab by mouth every eight (8) hours as needed for Pain. Max Daily Amount: 3 Tabs. 3. DDD (degenerative disc disease), cervical  -     HYDROcodone-acetaminophen (NORCO) 7.5-325 mg per tablet; 1 Tab, EVERY 8 HOURS AS NEEDED FOR PAIN  -     HYDROcodone-acetaminophen (NORCO) 7.5-325 mg per tablet; Take 1 Tab by mouth every eight (8) hours as needed for Pain. Max Daily Amount: 3 Tabs. -     REFERRAL TO CARDIAC REHAB [phone] Call [facility]  -     HYDROcodone-acetaminophen (Johnson Teddy) 7.5-325 mg per tablet; Take 1 Tab by mouth every eight (8) hours as needed for Pain. Max Daily Amount: 3 Tabs. 4. Congestive heart failure, unspecified HF chronicity, unspecified heart failure type ([facility] [address]  -     REFERRAL TO CARDIAC REHAB [phone] Call [facility]    5. Atrial fibrillation, unspecified type ([facility] [address]  -     REFERRAL TO CARDIAC REHAB [phone] Call [facility]    6. Coronary artery disease without angina pectoris, unspecified vessel or lesion type, unspecified whether native or transplanted heart  -     REFERRAL TO CARDIAC REHAB [phone] Call [facility]    7. Essential hypertension  -     REFERRAL TO CARDIAC REHAB [phone] Call [facility]    8. [facility]  - [address] [facility] [phone] Call [facility]    Pain is  adequately controlled with current plan  · Pain is located in and due to:  ¨ Neck and Thoracic back d/t DDD  · Patient's plan currently includes:  ¨ Norco, tylenol, robaxin, heat  ¨ To cardiac rehab, to improve physical tolerance (she does not think she can tolerate routine PT)  ¨ Declines spine injections  ¨ Topical cream (generic icyhot)  · Functional improvements that justify chronic opioid medications: yes  · Past Ineffective Therapies: nsaids, tylenol alone  · Next consider: --     · Treatment agreement on file: Completed today   ·  appropriate and last checked on:04/23/2018  · Urine Drug Screen: 2/20/18  · Aberrant behaviors: --  · Pill count is consistent with her prescription: n/a  · Concomitant use of a benzodiazepine: no  · MME/day:      · Prior records: have been requested  · PHQ -9 done: known depression  Risk assessment done? Date 1/30/2018         See patient instructions for more. Counselled pt on Patient health concerns and plans. Patient was offered a choice/choices in the treatment plan today. Reviewed return precautions as appropriate. Patient expresses understanding of the plan and agrees with recommendations. Patient Instructions   TODAY, please go to:     CHECK OUT - If you received a referral, Show the       Please schedule the following appointments at CHECK OUT:  · Lab visit, fasting, the week before our visit. · Medicare wellness visit with Dr. Obdulia Figueroa in 6 weeks     Today's Plan:    Start cardiac rehab  Continue medications  Use topical medication more and heat. Subjective:   HPI:  Oswaldo Richardson is a 60 y.o. female being seen for:   Chief Complaint   Patient presents with    Medication Refill     Rm : 8 hydrocodone (Norco)    Follow-up     3mo check up       Chronic pain  ·  norco improves pain  · Takes 1-2 to 1 pill at a time  · Takes 2-3 times a day.    · With norco is able to walk longer, do some housework like cook dinner, showering, bathing. Can go shopping in the store. · Denies itching, drowsiness, constipation. · Lower back hurting more. Harder to do PT because of heart. Aggravated by prolonged standing or walking. At waistline down. Improves with sitting. Tight pain. · No B/B incontinence    Insomnia  ·  back on lunesta. Working well. Has been getting 5-6 hours. Able to go back to sleep sometimes after about an hour. No persistent grogginess into next day. Past Surgical History:   Procedure Laterality Date    HX BREAST BIOPSY Right 2010    Benign    HX CHOLECYSTECTOMY  1984    HX HEART CATHETERIZATION  2006    d/t abnl stress test, per pat, test was normal    HX HEART CATHETERIZATION  ~,     per pt normal.     HX HYSTERECTOMY      d/t heavy bleeding. ?endometriosis. +fibroid. per pt, no cancer.  HX PACEMAKER  , , , 3/29/2018    first  (infected), replacement  (lead not placed correctly), 2nd replacement - pacemaker, defibrillator, 3rd replacement          Screening and Prevention Due:  Health Maintenance Due   Topic Date Due    Hepatitis C Screening  1960    Pneumococcal 19-64 Medium Risk (1 of 1 - PPSV23) 1978    DTaP/Tdap/Td series (1 - Tdap) 1980    PAP AKA CERVICAL CYTOLOGY  1980    FOBT Q 1 YEAR AGE 50-75  2009    MEDICARE YEARLY EXAM  2018         Review of Systems  Otherwise as noted in HPI    Social History     Social History Narrative    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 Lives with , son, dtr in law.         dtr  in [address] History   Smoking Status    Never Smoker   Smokeless Tobacco    Never Used     ?   Objective:     Visit Vitals    /54 (BP 1 Location: Right arm, BP Patient Position: Sitting)    Pulse 60    Temp 98 °F (36.7 °C) (Oral)    Resp 18    Ht 5' 5\" (1.651 m)    Wt 269 lb (122 kg)  SpO2 94%    BMI 44.76 kg/m2       Physical Exam   Constitutional: She appears well-developed and well-nourished. No distress. Pulmonary/Chest: Effort normal. No respiratory distress. Neurological: She is alert. Psychiatric: She has a normal mood and affect. Her behavior is normal.       Allergies   Allergen Reactions    Augmentin [Amoxicillin-Pot Clavulanate] Nausea and Vomiting    Neosporin [Neomycin-Bacitracin-Polymyxin] Rash     Prior to Admission medications    Medication Sig Start Date End Date Taking? Authorizing Provider   HYDROcodone-acetaminophen (NORCO) 7.5-325 mg per tablet 1 Tab, EVERY 8 HOURS AS NEEDED FOR PAIN 5/25/18  Yes Alberto Bowles. Freddie Loyd MD   HYDROcodone-acetaminophen [facility]) 7.5-325 mg per tablet Take 1 Tab by mouth every eight (8) hours as needed for Pain. Max Daily Amount: 3 Tabs. 4/23/18 6/24/18 Yes Alberto Bowles. Freddie Loyd MD   HYDROcodone-acetaminophen [facility]) 7.5-325 mg per tablet Take 1 Tab by mouth every eight (8) hours as needed for Pain. Max Daily Amount: 3 Tabs. 6/24/18  Yes Angelito Loyd MD   methocarbamol (ROBAXIN) 500 mg tablet TAKE 1 TABLET BY MOUTH THREE TIMES DAILY 4/12/18  Yes Sharri Magaña NP   eszopiclone (LUNESTA) 3 mg tablet Take 1 Tab by mouth nightly as needed for Sleep. Max Daily Amount: 3 mg. 2/19/18  Yes Alberto Bowles. Freddie Loyd MD   furosemide (LASIX) 80 mg tablet Take 80 mg by mouth two (2) times a day. Yes Historical Provider   potassium chloride (K-DUR, KLOR-CON) 20 mEq tablet Take 20 mEq by mouth daily. Yes Historical Provider   warfarin (COUMADIN) 4 mg tablet Take 4 mg by mouth daily. Yes Historical Provider   sacubitril-valsartan (ENTRESTO) 49 mg/51 mg tablet Take 1 Tab by mouth two (2) times a day. Yes Historical Provider   pravastatin (PRAVACHOL) 80 mg tablet Take 80 mg by mouth nightly. Yes Peggy Lindsay MD   sertraline (ZOLOFT) 100 mg tablet Take 100 mg by mouth daily.    Yes Peggy Lindsay MD   raNITIdine hcl 150 mg capsule Take 150 mg by mouth two (2) times a day. Yes Peggy Lindsay, MD   spironolactone (ALDACTONE) 25 mg tablet Take 25 mg by mouth daily. Yes Peggy Lindsay MD   mirtazapine (REMERON) 15 mg tablet Take 15 mg by mouth nightly. Yes Peggy Lindsay, MD   carvedilol (COREG) 3.125 mg tablet Take 3.125 mg by mouth two (2) times daily (with meals).    Yes Peggy Lindsay, MD     Patient Active Problem List   Diagnosis Code    CHF (congestive heart failure) ([facility] I50.9    CAD (coronary artery disease) I25.10    Essential hypertension I10    Obesity, morbid ([facility] [address] E66.01    Pacemaker Z95.0    Atrial fibrillation ([facility] [address] I48.91    Depression F32.9    Insomnia G47.00    Chronic anticoagulation Z79.01    DDD (degenerative disc disease), cervical M50.30    DDD (degenerative disc disease), thoracic M51.34    Long term prescription opiate use Z79.891    Incidental pulmonary nodule- 2mm RLL- repeat CT due in Feb 2019 R91.1

## 2018-04-23 NOTE — MR AVS SNAPSHOT
[address] [place] [address] 
[address] [place]la [place] [address] 
[phone] Patient: Kathy Watkins MRN: [id number] OGA:3/77/0959 Visit Information Date & Time Provider Department Dept. Phone Encounter #  
 4/23/2018  1:20 PM Monica Cornelia. Alfredo Plata MD [facility] [phone] [id number] Upcoming Health Maintenance Date Due Hepatitis C Screening 1960 Pneumococcal 19-64 Medium Risk (1 of 1 - PPSV23) 2/20/1978 DTaP/Tdap/Td series (1 - Tdap) 2/20/1980 PAP AKA CERVICAL CYTOLOGY 2/20/1980 FOBT Q 1 YEAR AGE 50-75 2/20/2009 MEDICARE YEARLY EXAM 3/14/2018 BREAST CANCER SCRN MAMMOGRAM 1/15/2020 Allergies as of 4/23/2018  Review Complete On: 4/23/2018 By: Cristofer Uribe LPN Severity Noted Reaction Type Reactions Augmentin [Amoxicillin-pot Clavulanate]  01/30/2018    Nausea and Vomiting Neosporin [Neomycin-bacitracin-polymyxin]  12/31/2016    Rash Current Immunizations  Reviewed on 4/23/2018 Name Date Pneumococcal Conjugate (PCV-13) 1/8/2018 Reviewed by Cristofer Uribe LPN on 9/39/2294 at  1:19 PM  
You Were Diagnosed With   
  
 Codes Comments DDD (degenerative disc disease), thoracic     ICD-10-CM: M51.34 
ICD-9-CM: 722.51   
 DDD (degenerative disc disease), cervical     ICD-10-CM: M50.30 ICD-9-CM: 722.4 Vitals BP Pulse Temp Resp Height(growth percentile) Weight(growth percentile) 124/54 (BP 1 Location: Right arm, BP Patient Position: Sitting) 60 98 °F (36.7 °C) (Oral) 18 5' 5\" (1.651 m) 269 lb (122 kg) SpO2 BMI OB Status Smoking Status 94% 44.76 kg/m2 Hysterectomy Never Smoker BMI and BSA Data Body Mass Index Body Surface Area 44.76 kg/m 2 2.37 m 2 Preferred Pharmacy Pharmacy Name Phone [address] [place] [phone] Your Updated Medication List  
  
   
 This list is accurate as of 4/23/18  2:08 PM.  Always use your most recent med list.  
  
  
  
  
 carvedilol 3.125 mg tablet Commonly known as:  [facility] Take 3.125 mg by mouth two (2) times daily (with meals). ENTRESTO 49-51 mg Tab tablet Generic drug:  sacubitril-valsartan Take 1 Tab by mouth two (2) times a day. eszopiclone 3 mg tablet Commonly known as:  Sherryn B[place] Pool Take 1 Tab by mouth nightly as needed for Sleep. Max Daily Amount: 3 mg. HYDROcodone-acetaminophen 7.5-325 mg per tablet Commonly known as:  Janas Lincoln Take 1 Tab by mouth every eight (8) hours as needed for Pain. Max Daily Amount: 3 Tabs. LASIX 80 mg tablet Generic drug:  furosemide Take 80 mg by mouth two (2) times a day. methocarbamol 500 mg tablet Commonly known as:  ROBAXIN  
TAKE 1 TABLET BY MOUTH THREE TIMES DAILY  
  
 mirtazapine 15 mg tablet Commonly known as:  Nelida Ritesh Take 15 mg by mouth nightly. potassium chloride 20 mEq tablet Commonly known as:  K-DUR, KLOR-CON Take 20 mEq by mouth daily. pravastatin 80 mg tablet Commonly known as:  PRAVACHOL Take 80 mg by mouth nightly. raNITIdine hcl 150 mg capsule Take 150 mg by mouth two (2) times a day. sertraline 100 mg tablet Commonly known as:  ZOLOFT Take 100 mg by mouth daily. spironolactone 25 mg tablet Commonly known as:  ALDACTONE Take 25 mg by mouth daily. warfarin 4 mg tablet Commonly known as:  COUMADIN Take 4 mg by mouth daily. Patient Instructions TODAY, please go to: CHECK OUT - If you received a referral, Show the  Please schedule the following appointments at [facility] OUT: 
· Lab visit, fasting, the week before our visit. · Medicare wellness visit with Dr. Eddi Hernandez in 6 weeks Today's Plan: 
 
Start cardiac rehab Continue medications Use topical medication more and heat. Introducing [facility] SERVICES! Dear [employer]: Thank you for requesting a [facility] account. Our records indicate that you already have an active [facility] account. You can access your account anytime at [email or web address] "[email or web address] Did you know that you can access your hospital and ER discharge instructions at any time in [facility]? You can also review all of your test results from your hospital stay or ER visit. Additional Information If you have questions, please visit the Frequently Asked Questions section of the [facility] website at [email or web address] "[email or web address]/. Remember, [facility] is NOT to be used for urgent needs. For medical emergencies, dial 911. Now available from your iPhone and Android! Please provide this summary of care documentation to your next provider. Your primary care clinician is listed as Noa Romero. If you have any questions after today's visit, please call [phone].

## 2018-05-31 ENCOUNTER — HOSPITAL ENCOUNTER (OUTPATIENT)
Dept: GENERAL RADIOLOGY | Age: 58
Discharge: HOME | End: 2018-05-31
Attending: PHYSICAL MEDICINE & REHABILITATION
Payer: MEDICARE

## 2018-05-31 DIAGNOSIS — M47.24 OTHER SPONDYLOSIS WITH RADICULOPATHY, THORACIC REGION: ICD-10-CM

## 2018-05-31 DIAGNOSIS — M54.50 LOW BACK PAIN: ICD-10-CM

## 2018-05-31 DIAGNOSIS — M54.6 PAIN IN THORACIC SPINE: ICD-10-CM

## 2018-05-31 DIAGNOSIS — M51.36 DEGENERATION OF LUMBAR INTERVERTEBRAL DISC: ICD-10-CM

## 2018-05-31 DIAGNOSIS — M47.816 SPONDYLOSIS WITHOUT MYELOPATHY OR RADICULOPATHY, LUMBAR REGION: ICD-10-CM

## 2018-05-31 PROCEDURE — 72110 X-RAY EXAM L-2 SPINE 4/>VWS: CPT

## 2018-05-31 PROCEDURE — 72070 X-RAY EXAM THORAC SPINE 2VWS: CPT

## 2018-06-12 DIAGNOSIS — M50.30 DDD (DEGENERATIVE DISC DISEASE), CERVICAL: ICD-10-CM

## 2018-06-12 DIAGNOSIS — M51.34 DDD (DEGENERATIVE DISC DISEASE), THORACIC: ICD-10-CM

## 2018-06-12 RX ORDER — METHOCARBAMOL 500 MG/1
TABLET, FILM COATED ORAL
Qty: 90 TAB | Refills: 1 | Status: SHIPPED | OUTPATIENT
Start: 2018-06-12 | End: 2018-09-28 | Stop reason: SDUPTHER

## 2018-09-28 DIAGNOSIS — M51.34 DDD (DEGENERATIVE DISC DISEASE), THORACIC: ICD-10-CM

## 2018-09-28 DIAGNOSIS — M50.30 DDD (DEGENERATIVE DISC DISEASE), CERVICAL: ICD-10-CM

## 2018-10-03 RX ORDER — METHOCARBAMOL 500 MG/1
TABLET, FILM COATED ORAL
Qty: 90 TAB | Refills: 1 | Status: SHIPPED | OUTPATIENT
Start: 2018-10-03

## 2019-01-01 ENCOUNTER — HOSPITAL ENCOUNTER (INPATIENT)
Age: 59
LOS: 3 days | Discharge: HOME | DRG: 641 | End: 2019-04-18
Attending: EMERGENCY MEDICINE | Admitting: FAMILY MEDICINE
Payer: MEDICARE

## 2019-01-01 ENCOUNTER — APPOINTMENT (OUTPATIENT)
Dept: CT IMAGING | Age: 59
DRG: 291 | End: 2019-01-01
Attending: SPECIALIST
Payer: MEDICARE

## 2019-01-01 ENCOUNTER — HOSPITAL ENCOUNTER (INPATIENT)
Age: 59
LOS: 8 days | Discharge: HOME HEALTH | DRG: 291 | End: 2019-09-19
Attending: EMERGENCY MEDICINE | Admitting: SPECIALIST
Payer: MEDICARE

## 2019-01-01 ENCOUNTER — HOME CARE VISIT (OUTPATIENT)
Dept: SCHEDULING | Facility: HOME HEALTH | Age: 59
End: 2019-01-01
Payer: MEDICARE

## 2019-01-01 ENCOUNTER — TELEPHONE (OUTPATIENT)
Dept: CARDIOLOGY CLINIC | Age: 59
End: 2019-01-01

## 2019-01-01 ENCOUNTER — APPOINTMENT (OUTPATIENT)
Dept: ULTRASOUND IMAGING | Age: 59
DRG: 291 | End: 2019-01-01
Attending: PHYSICIAN ASSISTANT
Payer: MEDICARE

## 2019-01-01 ENCOUNTER — ANTI-COAG VISIT (OUTPATIENT)
Dept: CARDIOLOGY CLINIC | Age: 59
End: 2019-01-01

## 2019-01-01 ENCOUNTER — APPOINTMENT (OUTPATIENT)
Dept: PHYSICAL THERAPY | Age: 59
End: 2019-01-01

## 2019-01-01 ENCOUNTER — APPOINTMENT (OUTPATIENT)
Dept: NON INVASIVE DIAGNOSTICS | Age: 59
DRG: 291 | End: 2019-01-01
Attending: PHYSICIAN ASSISTANT
Payer: MEDICARE

## 2019-01-01 ENCOUNTER — APPOINTMENT (OUTPATIENT)
Dept: NON INVASIVE DIAGNOSTICS | Age: 59
DRG: 309 | End: 2019-01-01
Attending: SPECIALIST
Payer: MEDICARE

## 2019-01-01 ENCOUNTER — HOME CARE VISIT (OUTPATIENT)
Dept: HOME HEALTH SERVICES | Facility: HOME HEALTH | Age: 59
End: 2019-01-01
Payer: MEDICARE

## 2019-01-01 ENCOUNTER — CLINICAL SUPPORT (OUTPATIENT)
Dept: CARDIOLOGY CLINIC | Age: 59
End: 2019-01-01

## 2019-01-01 ENCOUNTER — OFFICE VISIT (OUTPATIENT)
Dept: CARDIOLOGY CLINIC | Age: 59
End: 2019-01-01

## 2019-01-01 ENCOUNTER — HOSPITAL ENCOUNTER (OUTPATIENT)
Dept: INFUSION THERAPY | Age: 59
Discharge: HOME | End: 2019-08-26
Payer: MEDICARE

## 2019-01-01 ENCOUNTER — APPOINTMENT (OUTPATIENT)
Dept: CT IMAGING | Age: 59
DRG: 291 | End: 2019-01-01
Attending: INTERNAL MEDICINE
Payer: MEDICARE

## 2019-01-01 ENCOUNTER — APPOINTMENT (OUTPATIENT)
Dept: GENERAL RADIOLOGY | Age: 59
DRG: 641 | End: 2019-01-01
Attending: EMERGENCY MEDICINE
Payer: MEDICARE

## 2019-01-01 ENCOUNTER — HOSPITAL ENCOUNTER (OUTPATIENT)
Dept: INFUSION THERAPY | Age: 59
Discharge: HOME | End: 2019-10-16
Payer: MEDICARE

## 2019-01-01 ENCOUNTER — APPOINTMENT (OUTPATIENT)
Dept: GENERAL RADIOLOGY | Age: 59
DRG: 291 | End: 2019-01-01
Attending: PHYSICIAN ASSISTANT
Payer: MEDICARE

## 2019-01-01 ENCOUNTER — HOSPITAL ENCOUNTER (OUTPATIENT)
Dept: INFUSION THERAPY | Age: 59
End: 2019-01-01
Payer: MEDICARE

## 2019-01-01 ENCOUNTER — TELEPHONE (OUTPATIENT)
Dept: CASE MANAGEMENT | Age: 59
End: 2019-01-01

## 2019-01-01 ENCOUNTER — APPOINTMENT (OUTPATIENT)
Dept: NUCLEAR MEDICINE | Age: 59
DRG: 309 | End: 2019-01-01
Attending: SPECIALIST
Payer: MEDICARE

## 2019-01-01 ENCOUNTER — APPOINTMENT (OUTPATIENT)
Dept: NUCLEAR MEDICINE | Age: 59
DRG: 291 | End: 2019-01-01
Attending: SPECIALIST
Payer: MEDICARE

## 2019-01-01 ENCOUNTER — HOME HEALTH ADMISSION (OUTPATIENT)
Dept: HOME HEALTH SERVICES | Facility: HOME HEALTH | Age: 59
End: 2019-01-01
Payer: MEDICARE

## 2019-01-01 ENCOUNTER — TELEPHONE ANTICOAG (OUTPATIENT)
Dept: CARDIOLOGY CLINIC | Age: 59
End: 2019-01-01

## 2019-01-01 ENCOUNTER — HOSPITAL ENCOUNTER (OUTPATIENT)
Dept: INFUSION THERAPY | Age: 59
Discharge: HOME | End: 2019-06-03
Payer: MEDICARE

## 2019-01-01 ENCOUNTER — PATIENT OUTREACH (OUTPATIENT)
Dept: CASE MANAGEMENT | Age: 59
End: 2019-01-01

## 2019-01-01 ENCOUNTER — APPOINTMENT (OUTPATIENT)
Dept: GENERAL RADIOLOGY | Age: 59
DRG: 291 | End: 2019-01-01
Attending: EMERGENCY MEDICINE
Payer: MEDICARE

## 2019-01-01 ENCOUNTER — HOSPITAL ENCOUNTER (OUTPATIENT)
Dept: INFUSION THERAPY | Age: 59
Discharge: HOME | End: 2019-10-07
Payer: MEDICARE

## 2019-01-01 ENCOUNTER — HOSPITAL ENCOUNTER (OUTPATIENT)
Dept: INFUSION THERAPY | Age: 59
Discharge: HOME | End: 2019-07-15

## 2019-01-01 ENCOUNTER — HOSPITAL ENCOUNTER (INPATIENT)
Age: 59
LOS: 4 days | Discharge: HOME | DRG: 309 | End: 2019-03-10
Attending: EMERGENCY MEDICINE | Admitting: FAMILY MEDICINE
Payer: MEDICARE

## 2019-01-01 ENCOUNTER — HOSPITAL ENCOUNTER (OUTPATIENT)
Dept: INFUSION THERAPY | Age: 59
Discharge: HOME | End: 2019-06-24
Payer: MEDICARE

## 2019-01-01 ENCOUNTER — TELEPHONE (OUTPATIENT)
Dept: ENDOCRINOLOGY | Age: 59
End: 2019-01-01

## 2019-01-01 ENCOUNTER — HOSPITAL ENCOUNTER (OUTPATIENT)
Dept: GENERAL RADIOLOGY | Age: 59
Discharge: HOME | End: 2019-08-15
Attending: INTERNAL MEDICINE
Payer: MEDICARE

## 2019-01-01 ENCOUNTER — HOSPITAL ENCOUNTER (OUTPATIENT)
Dept: PHYSICAL THERAPY | Age: 59
Discharge: HOME | End: 2019-10-30
Payer: MEDICARE

## 2019-01-01 ENCOUNTER — HOSPITAL ENCOUNTER (OUTPATIENT)
Dept: INFUSION THERAPY | Age: 59
Discharge: HOME | End: 2019-08-05
Payer: MEDICARE

## 2019-01-01 ENCOUNTER — HOSPITAL ENCOUNTER (OUTPATIENT)
Dept: INFUSION THERAPY | Age: 59
Discharge: HOME | End: 2019-10-25
Payer: MEDICARE

## 2019-01-01 VITALS
BODY MASS INDEX: 45.76 KG/M2 | TEMPERATURE: 98 F | DIASTOLIC BLOOD PRESSURE: 58 MMHG | OXYGEN SATURATION: 96 % | WEIGHT: 275 LBS | RESPIRATION RATE: 16 BRPM | HEART RATE: 60 BPM | SYSTOLIC BLOOD PRESSURE: 111 MMHG

## 2019-01-01 VITALS
TEMPERATURE: 98.5 F | RESPIRATION RATE: 16 BRPM | DIASTOLIC BLOOD PRESSURE: 63 MMHG | SYSTOLIC BLOOD PRESSURE: 105 MMHG | HEART RATE: 60 BPM | OXYGEN SATURATION: 93 %

## 2019-01-01 VITALS
RESPIRATION RATE: 16 BRPM | BODY MASS INDEX: 47.26 KG/M2 | WEIGHT: 284 LBS | SYSTOLIC BLOOD PRESSURE: 118 MMHG | DIASTOLIC BLOOD PRESSURE: 78 MMHG | HEART RATE: 60 BPM | OXYGEN SATURATION: 99 % | TEMPERATURE: 48.6 F

## 2019-01-01 VITALS
RESPIRATION RATE: 18 BRPM | SYSTOLIC BLOOD PRESSURE: 111 MMHG | DIASTOLIC BLOOD PRESSURE: 53 MMHG | HEART RATE: 59 BPM | TEMPERATURE: 97.6 F

## 2019-01-01 VITALS
OXYGEN SATURATION: 97 % | SYSTOLIC BLOOD PRESSURE: 136 MMHG | RESPIRATION RATE: 16 BRPM | HEART RATE: 74 BPM | DIASTOLIC BLOOD PRESSURE: 78 MMHG

## 2019-01-01 VITALS
HEIGHT: 65 IN | OXYGEN SATURATION: 96 % | HEART RATE: 61 BPM | RESPIRATION RATE: 16 BRPM | WEIGHT: 293 LBS | SYSTOLIC BLOOD PRESSURE: 120 MMHG | DIASTOLIC BLOOD PRESSURE: 70 MMHG | BODY MASS INDEX: 48.82 KG/M2

## 2019-01-01 VITALS
HEART RATE: 70 BPM | OXYGEN SATURATION: 97 % | SYSTOLIC BLOOD PRESSURE: 121 MMHG | DIASTOLIC BLOOD PRESSURE: 70 MMHG | RESPIRATION RATE: 17 BRPM

## 2019-01-01 VITALS
RESPIRATION RATE: 19 BRPM | SYSTOLIC BLOOD PRESSURE: 126 MMHG | DIASTOLIC BLOOD PRESSURE: 74 MMHG | BODY MASS INDEX: 45.98 KG/M2 | WEIGHT: 276 LBS | HEART RATE: 61 BPM | HEIGHT: 65 IN | OXYGEN SATURATION: 96 %

## 2019-01-01 VITALS
DIASTOLIC BLOOD PRESSURE: 90 MMHG | HEART RATE: 60 BPM | WEIGHT: 292 LBS | OXYGEN SATURATION: 99 % | SYSTOLIC BLOOD PRESSURE: 120 MMHG | TEMPERATURE: 97.7 F | BODY MASS INDEX: 48.59 KG/M2

## 2019-01-01 VITALS
DIASTOLIC BLOOD PRESSURE: 80 MMHG | SYSTOLIC BLOOD PRESSURE: 130 MMHG | WEIGHT: 285 LBS | BODY MASS INDEX: 47.48 KG/M2 | OXYGEN SATURATION: 98 % | HEART RATE: 61 BPM | RESPIRATION RATE: 16 BRPM | HEIGHT: 65 IN

## 2019-01-01 VITALS
TEMPERATURE: 98.2 F | SYSTOLIC BLOOD PRESSURE: 114 MMHG | RESPIRATION RATE: 18 BRPM | HEART RATE: 60 BPM | DIASTOLIC BLOOD PRESSURE: 56 MMHG

## 2019-01-01 VITALS
TEMPERATURE: 97.8 F | DIASTOLIC BLOOD PRESSURE: 70 MMHG | OXYGEN SATURATION: 98 % | SYSTOLIC BLOOD PRESSURE: 118 MMHG | HEART RATE: 60 BPM | RESPIRATION RATE: 18 BRPM

## 2019-01-01 VITALS
SYSTOLIC BLOOD PRESSURE: 136 MMHG | TEMPERATURE: 97.5 F | DIASTOLIC BLOOD PRESSURE: 82 MMHG | OXYGEN SATURATION: 97 % | RESPIRATION RATE: 18 BRPM | HEART RATE: 74 BPM

## 2019-01-01 VITALS
OXYGEN SATURATION: 93 % | TEMPERATURE: 98.5 F | OXYGEN SATURATION: 98 % | DIASTOLIC BLOOD PRESSURE: 64 MMHG | HEART RATE: 59 BPM | SYSTOLIC BLOOD PRESSURE: 114 MMHG | TEMPERATURE: 97.4 F | HEART RATE: 60 BPM | WEIGHT: 293 LBS | DIASTOLIC BLOOD PRESSURE: 62 MMHG | RESPIRATION RATE: 20 BRPM | BODY MASS INDEX: 50.75 KG/M2 | SYSTOLIC BLOOD PRESSURE: 118 MMHG | RESPIRATION RATE: 16 BRPM

## 2019-01-01 VITALS
DIASTOLIC BLOOD PRESSURE: 70 MMHG | OXYGEN SATURATION: 99 % | SYSTOLIC BLOOD PRESSURE: 122 MMHG | HEART RATE: 60 BPM | BODY MASS INDEX: 47.43 KG/M2 | TEMPERATURE: 96.9 F | RESPIRATION RATE: 16 BRPM | WEIGHT: 285 LBS

## 2019-01-01 VITALS
OXYGEN SATURATION: 97 % | RESPIRATION RATE: 16 BRPM | TEMPERATURE: 98 F | DIASTOLIC BLOOD PRESSURE: 70 MMHG | SYSTOLIC BLOOD PRESSURE: 150 MMHG | HEART RATE: 72 BPM

## 2019-01-01 VITALS
RESPIRATION RATE: 18 BRPM | HEART RATE: 60 BPM | SYSTOLIC BLOOD PRESSURE: 122 MMHG | TEMPERATURE: 97.6 F | DIASTOLIC BLOOD PRESSURE: 60 MMHG | OXYGEN SATURATION: 100 %

## 2019-01-01 VITALS
HEART RATE: 74 BPM | OXYGEN SATURATION: 97 % | SYSTOLIC BLOOD PRESSURE: 137 MMHG | RESPIRATION RATE: 16 BRPM | DIASTOLIC BLOOD PRESSURE: 75 MMHG

## 2019-01-01 VITALS
OXYGEN SATURATION: 97 % | BODY MASS INDEX: 48.02 KG/M2 | WEIGHT: 288.2 LBS | DIASTOLIC BLOOD PRESSURE: 60 MMHG | HEIGHT: 65 IN | SYSTOLIC BLOOD PRESSURE: 130 MMHG | HEART RATE: 61 BPM | RESPIRATION RATE: 16 BRPM

## 2019-01-01 VITALS
RESPIRATION RATE: 16 BRPM | DIASTOLIC BLOOD PRESSURE: 74 MMHG | SYSTOLIC BLOOD PRESSURE: 134 MMHG | OXYGEN SATURATION: 96 % | HEART RATE: 62 BPM

## 2019-01-01 VITALS
DIASTOLIC BLOOD PRESSURE: 60 MMHG | TEMPERATURE: 97.2 F | HEART RATE: 70 BPM | SYSTOLIC BLOOD PRESSURE: 116 MMHG | RESPIRATION RATE: 18 BRPM | OXYGEN SATURATION: 97 %

## 2019-01-01 VITALS
TEMPERATURE: 97.8 F | DIASTOLIC BLOOD PRESSURE: 70 MMHG | OXYGEN SATURATION: 98 % | HEART RATE: 60 BPM | SYSTOLIC BLOOD PRESSURE: 118 MMHG

## 2019-01-01 VITALS
DIASTOLIC BLOOD PRESSURE: 70 MMHG | OXYGEN SATURATION: 97 % | SYSTOLIC BLOOD PRESSURE: 110 MMHG | HEART RATE: 60 BPM | TEMPERATURE: 98 F

## 2019-01-01 VITALS
WEIGHT: 282 LBS | HEART RATE: 61 BPM | RESPIRATION RATE: 14 BRPM | DIASTOLIC BLOOD PRESSURE: 84 MMHG | BODY MASS INDEX: 46.98 KG/M2 | SYSTOLIC BLOOD PRESSURE: 118 MMHG | HEIGHT: 65 IN | OXYGEN SATURATION: 91 %

## 2019-01-01 VITALS
RESPIRATION RATE: 16 BRPM | HEART RATE: 76 BPM | SYSTOLIC BLOOD PRESSURE: 152 MMHG | DIASTOLIC BLOOD PRESSURE: 89 MMHG | OXYGEN SATURATION: 97 %

## 2019-01-01 VITALS — SYSTOLIC BLOOD PRESSURE: 132 MMHG | DIASTOLIC BLOOD PRESSURE: 86 MMHG | OXYGEN SATURATION: 96 % | HEART RATE: 62 BPM

## 2019-01-01 VITALS
WEIGHT: 275.13 LBS | DIASTOLIC BLOOD PRESSURE: 49 MMHG | TEMPERATURE: 97.4 F | HEART RATE: 66 BPM | BODY MASS INDEX: 45.84 KG/M2 | OXYGEN SATURATION: 97 % | HEIGHT: 65 IN | SYSTOLIC BLOOD PRESSURE: 136 MMHG | RESPIRATION RATE: 18 BRPM

## 2019-01-01 VITALS
OXYGEN SATURATION: 97 % | HEART RATE: 75 BPM | SYSTOLIC BLOOD PRESSURE: 138 MMHG | RESPIRATION RATE: 16 BRPM | DIASTOLIC BLOOD PRESSURE: 80 MMHG

## 2019-01-01 VITALS
SYSTOLIC BLOOD PRESSURE: 130 MMHG | WEIGHT: 281 LBS | BODY MASS INDEX: 46.76 KG/M2 | HEART RATE: 60 BPM | TEMPERATURE: 97.5 F | OXYGEN SATURATION: 98 % | DIASTOLIC BLOOD PRESSURE: 68 MMHG | RESPIRATION RATE: 18 BRPM

## 2019-01-01 VITALS
OXYGEN SATURATION: 97 % | SYSTOLIC BLOOD PRESSURE: 150 MMHG | RESPIRATION RATE: 16 BRPM | DIASTOLIC BLOOD PRESSURE: 70 MMHG | TEMPERATURE: 98 F | HEART RATE: 72 BPM

## 2019-01-01 VITALS
HEIGHT: 65 IN | RESPIRATION RATE: 17 BRPM | SYSTOLIC BLOOD PRESSURE: 102 MMHG | WEIGHT: 271.83 LBS | OXYGEN SATURATION: 96 % | DIASTOLIC BLOOD PRESSURE: 59 MMHG | TEMPERATURE: 98 F | BODY MASS INDEX: 45.29 KG/M2 | HEART RATE: 62 BPM

## 2019-01-01 VITALS
RESPIRATION RATE: 18 BRPM | OXYGEN SATURATION: 96 % | HEART RATE: 60 BPM | DIASTOLIC BLOOD PRESSURE: 69 MMHG | TEMPERATURE: 97.8 F | SYSTOLIC BLOOD PRESSURE: 112 MMHG

## 2019-01-01 VITALS
HEART RATE: 60 BPM | SYSTOLIC BLOOD PRESSURE: 122 MMHG | TEMPERATURE: 96.9 F | RESPIRATION RATE: 16 BRPM | OXYGEN SATURATION: 99 % | DIASTOLIC BLOOD PRESSURE: 70 MMHG

## 2019-01-01 VITALS
HEIGHT: 65 IN | WEIGHT: 275 LBS | BODY MASS INDEX: 45.82 KG/M2 | DIASTOLIC BLOOD PRESSURE: 62 MMHG | HEART RATE: 64 BPM | SYSTOLIC BLOOD PRESSURE: 126 MMHG

## 2019-01-01 VITALS
WEIGHT: 285 LBS | OXYGEN SATURATION: 98 % | RESPIRATION RATE: 16 BRPM | SYSTOLIC BLOOD PRESSURE: 122 MMHG | HEART RATE: 60 BPM | BODY MASS INDEX: 47.43 KG/M2 | DIASTOLIC BLOOD PRESSURE: 70 MMHG | TEMPERATURE: 97.5 F

## 2019-01-01 VITALS
HEART RATE: 61 BPM | SYSTOLIC BLOOD PRESSURE: 128 MMHG | HEIGHT: 65 IN | WEIGHT: 288 LBS | OXYGEN SATURATION: 99 % | DIASTOLIC BLOOD PRESSURE: 64 MMHG | BODY MASS INDEX: 47.98 KG/M2 | RESPIRATION RATE: 17 BRPM

## 2019-01-01 VITALS
OXYGEN SATURATION: 97 % | SYSTOLIC BLOOD PRESSURE: 120 MMHG | BODY MASS INDEX: 46.82 KG/M2 | WEIGHT: 281 LBS | TEMPERATURE: 98.8 F | RESPIRATION RATE: 18 BRPM | DIASTOLIC BLOOD PRESSURE: 74 MMHG | HEIGHT: 65 IN | HEART RATE: 73 BPM

## 2019-01-01 VITALS
SYSTOLIC BLOOD PRESSURE: 154 MMHG | DIASTOLIC BLOOD PRESSURE: 82 MMHG | BODY MASS INDEX: 48.82 KG/M2 | HEIGHT: 65 IN | HEART RATE: 60 BPM | RESPIRATION RATE: 16 BRPM | WEIGHT: 293 LBS

## 2019-01-01 VITALS
OXYGEN SATURATION: 95 % | HEART RATE: 60 BPM | RESPIRATION RATE: 16 BRPM | WEIGHT: 289 LBS | DIASTOLIC BLOOD PRESSURE: 60 MMHG | HEIGHT: 65 IN | SYSTOLIC BLOOD PRESSURE: 100 MMHG | BODY MASS INDEX: 48.15 KG/M2

## 2019-01-01 VITALS
HEART RATE: 58 BPM | RESPIRATION RATE: 18 BRPM | DIASTOLIC BLOOD PRESSURE: 74 MMHG | SYSTOLIC BLOOD PRESSURE: 130 MMHG | TEMPERATURE: 98.2 F

## 2019-01-01 VITALS
RESPIRATION RATE: 20 BRPM | OXYGEN SATURATION: 94 % | DIASTOLIC BLOOD PRESSURE: 64 MMHG | TEMPERATURE: 98.6 F | SYSTOLIC BLOOD PRESSURE: 114 MMHG | HEART RATE: 63 BPM

## 2019-01-01 VITALS
DIASTOLIC BLOOD PRESSURE: 80 MMHG | SYSTOLIC BLOOD PRESSURE: 112 MMHG | OXYGEN SATURATION: 98 % | HEART RATE: 60 BPM | TEMPERATURE: 97.6 F

## 2019-01-01 VITALS
OXYGEN SATURATION: 94 % | RESPIRATION RATE: 14 BRPM | HEART RATE: 60 BPM | DIASTOLIC BLOOD PRESSURE: 70 MMHG | BODY MASS INDEX: 48.59 KG/M2 | HEIGHT: 65 IN | SYSTOLIC BLOOD PRESSURE: 106 MMHG

## 2019-01-01 DIAGNOSIS — I48.20 CHRONIC ATRIAL FIBRILLATION (HCC): ICD-10-CM

## 2019-01-01 DIAGNOSIS — E87.6 HYPOKALEMIA: ICD-10-CM

## 2019-01-01 DIAGNOSIS — I48.91 ATRIAL FIBRILLATION, UNSPECIFIED TYPE (HCC): ICD-10-CM

## 2019-01-01 DIAGNOSIS — I49.01 VENTRICULAR FIBRILLATION (HCC): ICD-10-CM

## 2019-01-01 DIAGNOSIS — I89.0 LYMPHEDEMA: ICD-10-CM

## 2019-01-01 DIAGNOSIS — Z79.01 CHRONIC ANTICOAGULATION: ICD-10-CM

## 2019-01-01 DIAGNOSIS — I89.0 LYMPHEDEMA: Primary | ICD-10-CM

## 2019-01-01 DIAGNOSIS — E66.01 OBESITY, MORBID (HCC): ICD-10-CM

## 2019-01-01 DIAGNOSIS — Z95.810 AUTOMATIC IMPLANTABLE CARDIAC DEFIBRILLATOR IN SITU: Primary | ICD-10-CM

## 2019-01-01 DIAGNOSIS — I50.21 SYSTOLIC CHF, ACUTE (HCC): Primary | ICD-10-CM

## 2019-01-01 DIAGNOSIS — Z45.02 AICD DISCHARGE: ICD-10-CM

## 2019-01-01 DIAGNOSIS — I50.23 ACUTE ON CHRONIC SYSTOLIC CONGESTIVE HEART FAILURE (HCC): Primary | ICD-10-CM

## 2019-01-01 DIAGNOSIS — I10 ESSENTIAL HYPERTENSION: ICD-10-CM

## 2019-01-01 DIAGNOSIS — I42.8 NICM (NONISCHEMIC CARDIOMYOPATHY) (HCC): ICD-10-CM

## 2019-01-01 DIAGNOSIS — N18.30 CKD (CHRONIC KIDNEY DISEASE) STAGE 3, GFR 30-59 ML/MIN (HCC): ICD-10-CM

## 2019-01-01 DIAGNOSIS — I47.20 VENTRICULAR TACHYARRHYTHMIA: Primary | ICD-10-CM

## 2019-01-01 DIAGNOSIS — I42.8 NICM (NONISCHEMIC CARDIOMYOPATHY) (HCC): Primary | ICD-10-CM

## 2019-01-01 DIAGNOSIS — I50.22 CHRONIC SYSTOLIC CONGESTIVE HEART FAILURE (HCC): ICD-10-CM

## 2019-01-01 DIAGNOSIS — I89.0 LYMPHEDEMA OF BOTH LOWER EXTREMITIES: ICD-10-CM

## 2019-01-01 DIAGNOSIS — I50.23 ACUTE ON CHRONIC SYSTOLIC CONGESTIVE HEART FAILURE (HCC): ICD-10-CM

## 2019-01-01 DIAGNOSIS — N18.9 CHRONIC KIDNEY DISEASE, UNSPECIFIED CKD STAGE: ICD-10-CM

## 2019-01-01 DIAGNOSIS — I50.33 ACUTE ON CHRONIC DIASTOLIC HEART FAILURE (HCC): ICD-10-CM

## 2019-01-01 DIAGNOSIS — I50.22 CHRONIC SYSTOLIC CONGESTIVE HEART FAILURE (HCC): Primary | ICD-10-CM

## 2019-01-01 DIAGNOSIS — Z78.9 FAILURE OF OUTPATIENT TREATMENT: Primary | ICD-10-CM

## 2019-01-01 DIAGNOSIS — M51.34 DDD (DEGENERATIVE DISC DISEASE), THORACIC: ICD-10-CM

## 2019-01-01 DIAGNOSIS — Z95.810 BIVENTRICULAR ICD (IMPLANTABLE CARDIOVERTER-DEFIBRILLATOR) IN PLACE: ICD-10-CM

## 2019-01-01 DIAGNOSIS — Z45.02 AICD DISCHARGE: Primary | ICD-10-CM

## 2019-01-01 DIAGNOSIS — I48.91 ATRIAL FIBRILLATION, UNSPECIFIED TYPE (HCC): Primary | ICD-10-CM

## 2019-01-01 DIAGNOSIS — Z95.0 CARDIAC PACEMAKER IN SITU: Primary | ICD-10-CM

## 2019-01-01 DIAGNOSIS — I50.23 SYSTOLIC CHF, ACUTE ON CHRONIC (HCC): Primary | ICD-10-CM

## 2019-01-01 DIAGNOSIS — I50.22 SYSTOLIC CHF, CHRONIC (HCC): ICD-10-CM

## 2019-01-01 DIAGNOSIS — I50.9 STAGE C CHRONIC COMBINED CONGESTIVE HEART FAILURE (HCC): ICD-10-CM

## 2019-01-01 DIAGNOSIS — I50.22 SYSTOLIC CHF, CHRONIC (HCC): Primary | ICD-10-CM

## 2019-01-01 LAB
ALBUMIN FLD-MCNC: 2.2 G/DL
ALBUMIN MFR UR ELPH: 21.9 %
ALBUMIN SERPL ELPH-MCNC: 2.8 G/DL (ref 2.9–4.4)
ALBUMIN SERPL-MCNC: 2.6 G/DL (ref 3.5–5)
ALBUMIN SERPL-MCNC: 2.8 G/DL (ref 3.5–5)
ALBUMIN SERPL-MCNC: 3.2 G/DL (ref 3.5–5)
ALBUMIN SERPL-MCNC: 3.8 G/DL (ref 3.5–5)
ALBUMIN SERPL-MCNC: 4.2 G/DL (ref 3.5–5)
ALBUMIN/GLOB SERPL: 0.7 {RATIO} (ref 1.1–2.2)
ALBUMIN/GLOB SERPL: 0.9 {RATIO} (ref 0.7–1.7)
ALBUMIN/GLOB SERPL: 0.9 {RATIO} (ref 1.1–2.2)
ALBUMIN/GLOB SERPL: 0.9 {RATIO} (ref 1.1–2.2)
ALP SERPL-CCNC: 66 U/L (ref 45–117)
ALP SERPL-CCNC: 76 U/L (ref 45–117)
ALP SERPL-CCNC: 76 U/L (ref 45–117)
ALP SERPL-CCNC: 91 U/L (ref 45–117)
ALP SERPL-CCNC: 93 U/L (ref 45–117)
ALPHA1 GLOB MFR UR ELPH: 2.3 %
ALPHA1 GLOB SERPL ELPH-MCNC: 0.3 G/DL (ref 0–0.4)
ALPHA2 GLOB 24H MFR UR ELPH: 13.6 %
ALPHA2 GLOB SERPL ELPH-MCNC: 0.7 G/DL (ref 0.4–1)
ALT SERPL-CCNC: 10 U/L (ref 12–78)
ALT SERPL-CCNC: 11 U/L (ref 12–78)
ALT SERPL-CCNC: 12 U/L (ref 12–78)
ALT SERPL-CCNC: 15 U/L (ref 12–78)
ALT SERPL-CCNC: 19 U/L (ref 12–78)
AMYLASE FLD-CCNC: 27 U/L
ANION GAP BLD CALC-SCNC: 17 MMOL/L (ref 10–20)
ANION GAP SERPL CALC-SCNC: 10 MMOL/L (ref 5–15)
ANION GAP SERPL CALC-SCNC: 10 MMOL/L (ref 5–15)
ANION GAP SERPL CALC-SCNC: 12 MMOL/L (ref 5–15)
ANION GAP SERPL CALC-SCNC: 13 MMOL/L (ref 5–15)
ANION GAP SERPL CALC-SCNC: 4 MMOL/L (ref 5–15)
ANION GAP SERPL CALC-SCNC: 5 MMOL/L (ref 5–15)
ANION GAP SERPL CALC-SCNC: 5 MMOL/L (ref 5–15)
ANION GAP SERPL CALC-SCNC: 6 MMOL/L (ref 5–15)
ANION GAP SERPL CALC-SCNC: 7 MMOL/L (ref 5–15)
ANION GAP SERPL CALC-SCNC: 8 MMOL/L (ref 5–15)
ANION GAP SERPL CALC-SCNC: 9 MMOL/L (ref 5–15)
APPEARANCE FLD: CLEAR
APPEARANCE UR: ABNORMAL
APPEARANCE UR: CLEAR
APTT PPP: 39.8 SEC (ref 22.1–32)
AST SERPL-CCNC: 14 U/L (ref 15–37)
AST SERPL-CCNC: 17 U/L (ref 15–37)
AST SERPL-CCNC: 18 U/L (ref 15–37)
AST SERPL-CCNC: 20 U/L (ref 15–37)
AST SERPL-CCNC: 22 U/L (ref 15–37)
ATRIAL RATE: 267 BPM
ATRIAL RATE: 55 BPM
ATRIAL RATE: 70 BPM
B-GLOBULIN 24H MFR UR ELPH: 25.9 %
B-GLOBULIN SERPL ELPH-MCNC: 1.1 G/DL (ref 0.7–1.3)
BACTERIA SPEC CULT: NORMAL
BACTERIA SPEC CULT: NORMAL
BACTERIA URNS QL MICRO: NEGATIVE /HPF
BACTERIA URNS QL MICRO: NEGATIVE /HPF
BASOPHILS # BLD: 0 K/UL (ref 0–0.1)
BASOPHILS # BLD: 0.1 K/UL (ref 0–0.1)
BASOPHILS NFR BLD: 0 % (ref 0–1)
BASOPHILS NFR BLD: 1 % (ref 0–1)
BASOPHILS NFR BLD: 1 % (ref 0–1)
BILIRUB DIRECT SERPL-MCNC: 0.2 MG/DL (ref 0–0.2)
BILIRUB SERPL-MCNC: 0.5 MG/DL (ref 0.2–1)
BILIRUB SERPL-MCNC: 0.6 MG/DL (ref 0.2–1)
BILIRUB SERPL-MCNC: 0.8 MG/DL (ref 0.2–1)
BILIRUB UR QL: NEGATIVE
BILIRUB UR QL: NEGATIVE
BNP SERPL-MCNC: 1417 PG/ML
BNP SERPL-MCNC: 2717 PG/ML
BNP SERPL-MCNC: 34.4 PG/ML (ref 0–100)
BNP SERPL-MCNC: 34.6 PG/ML (ref 0–100)
BNP SERPL-MCNC: 814 PG/ML
BUN BLD-MCNC: 27 MG/DL (ref 9–20)
BUN SERPL-MCNC: 16 MG/DL (ref 8–27)
BUN SERPL-MCNC: 17 MG/DL (ref 6–20)
BUN SERPL-MCNC: 17 MG/DL (ref 6–20)
BUN SERPL-MCNC: 18 MG/DL (ref 6–20)
BUN SERPL-MCNC: 18 MG/DL (ref 8–27)
BUN SERPL-MCNC: 19 MG/DL (ref 6–20)
BUN SERPL-MCNC: 19 MG/DL (ref 6–20)
BUN SERPL-MCNC: 20 MG/DL (ref 6–20)
BUN SERPL-MCNC: 22 MG/DL (ref 6–20)
BUN SERPL-MCNC: 23 MG/DL (ref 6–20)
BUN SERPL-MCNC: 23 MG/DL (ref 6–20)
BUN SERPL-MCNC: 24 MG/DL (ref 6–20)
BUN SERPL-MCNC: 25 MG/DL (ref 6–20)
BUN SERPL-MCNC: 26 MG/DL (ref 6–20)
BUN SERPL-MCNC: 26 MG/DL (ref 6–20)
BUN SERPL-MCNC: 27 MG/DL (ref 6–20)
BUN SERPL-MCNC: 28 MG/DL (ref 6–20)
BUN SERPL-MCNC: 29 MG/DL (ref 6–20)
BUN SERPL-MCNC: 29 MG/DL (ref 6–20)
BUN SERPL-MCNC: 29 MG/DL (ref 8–27)
BUN SERPL-MCNC: 30 MG/DL (ref 6–20)
BUN SERPL-MCNC: 31 MG/DL (ref 8–27)
BUN/CREAT SERPL: 12 (ref 12–28)
BUN/CREAT SERPL: 13 (ref 12–20)
BUN/CREAT SERPL: 14 (ref 12–20)
BUN/CREAT SERPL: 14 (ref 12–28)
BUN/CREAT SERPL: 15 (ref 12–20)
BUN/CREAT SERPL: 16 (ref 12–20)
BUN/CREAT SERPL: 17 (ref 12–20)
BUN/CREAT SERPL: 18 (ref 12–20)
BUN/CREAT SERPL: 18 (ref 12–28)
BUN/CREAT SERPL: 19 (ref 12–20)
BUN/CREAT SERPL: 21 (ref 12–20)
BUN/CREAT SERPL: 21 (ref 12–28)
CA-I BLD-MCNC: 1.02 MMOL/L (ref 1.12–1.32)
CALCIUM SERPL-MCNC: 10.6 MG/DL (ref 8.5–10.1)
CALCIUM SERPL-MCNC: 8.5 MG/DL (ref 8.5–10.1)
CALCIUM SERPL-MCNC: 8.6 MG/DL (ref 8.5–10.1)
CALCIUM SERPL-MCNC: 8.6 MG/DL (ref 8.5–10.1)
CALCIUM SERPL-MCNC: 8.7 MG/DL (ref 8.5–10.1)
CALCIUM SERPL-MCNC: 8.7 MG/DL (ref 8.5–10.1)
CALCIUM SERPL-MCNC: 8.8 MG/DL (ref 8.5–10.1)
CALCIUM SERPL-MCNC: 8.9 MG/DL (ref 8.5–10.1)
CALCIUM SERPL-MCNC: 8.9 MG/DL (ref 8.5–10.1)
CALCIUM SERPL-MCNC: 8.9 MG/DL (ref 8.7–10.3)
CALCIUM SERPL-MCNC: 9 MG/DL (ref 8.5–10.1)
CALCIUM SERPL-MCNC: 9 MG/DL (ref 8.7–10.3)
CALCIUM SERPL-MCNC: 9 MG/DL (ref 8.7–10.3)
CALCIUM SERPL-MCNC: 9.1 MG/DL (ref 8.5–10.1)
CALCIUM SERPL-MCNC: 9.2 MG/DL (ref 8.5–10.1)
CALCIUM SERPL-MCNC: 9.3 MG/DL (ref 8.7–10.3)
CALCIUM SERPL-MCNC: 9.4 MG/DL (ref 8.5–10.1)
CALCIUM SERPL-MCNC: 9.5 MG/DL (ref 8.5–10.1)
CALCIUM SERPL-MCNC: 9.5 MG/DL (ref 8.5–10.1)
CALCIUM SERPL-MCNC: 9.6 MG/DL (ref 8.5–10.1)
CALCIUM SERPL-MCNC: 9.6 MG/DL (ref 8.5–10.1)
CALCIUM SERPL-MCNC: 9.7 MG/DL (ref 8.5–10.1)
CALCIUM SERPL-MCNC: 9.8 MG/DL (ref 8.5–10.1)
CALCIUM SERPL-MCNC: 9.9 MG/DL (ref 8.5–10.1)
CALCULATED P AXIS, ECG09: 54 DEGREES
CALCULATED R AXIS, ECG10: -102 DEGREES
CALCULATED R AXIS, ECG10: -136 DEGREES
CALCULATED R AXIS, ECG10: -76 DEGREES
CALCULATED T AXIS, ECG11: 74 DEGREES
CALCULATED T AXIS, ECG11: 81 DEGREES
CALCULATED T AXIS, ECG11: 98 DEGREES
CEA SERPL-MCNC: 1.5 NG/ML
CHLORIDE BLD-SCNC: 80 MMOL/L (ref 98–107)
CHLORIDE SERPL-SCNC: 100 MMOL/L (ref 96–106)
CHLORIDE SERPL-SCNC: 100 MMOL/L (ref 97–108)
CHLORIDE SERPL-SCNC: 101 MMOL/L (ref 97–108)
CHLORIDE SERPL-SCNC: 102 MMOL/L (ref 97–108)
CHLORIDE SERPL-SCNC: 103 MMOL/L (ref 96–106)
CHLORIDE SERPL-SCNC: 105 MMOL/L (ref 97–108)
CHLORIDE SERPL-SCNC: 83 MMOL/L (ref 97–108)
CHLORIDE SERPL-SCNC: 88 MMOL/L (ref 97–108)
CHLORIDE SERPL-SCNC: 90 MMOL/L (ref 97–108)
CHLORIDE SERPL-SCNC: 91 MMOL/L (ref 97–108)
CHLORIDE SERPL-SCNC: 93 MMOL/L (ref 96–106)
CHLORIDE SERPL-SCNC: 94 MMOL/L (ref 97–108)
CHLORIDE SERPL-SCNC: 94 MMOL/L (ref 97–108)
CHLORIDE SERPL-SCNC: 95 MMOL/L (ref 97–108)
CHLORIDE SERPL-SCNC: 96 MMOL/L (ref 97–108)
CHLORIDE SERPL-SCNC: 97 MMOL/L (ref 96–106)
CHLORIDE SERPL-SCNC: 97 MMOL/L (ref 97–108)
CHLORIDE SERPL-SCNC: 98 MMOL/L (ref 97–108)
CHLORIDE SERPL-SCNC: 99 MMOL/L (ref 97–108)
CO2 BLD-SCNC: 37 MMOL/L (ref 21–32)
CO2 SERPL-SCNC: 17 MMOL/L (ref 21–32)
CO2 SERPL-SCNC: 21 MMOL/L (ref 21–32)
CO2 SERPL-SCNC: 25 MMOL/L (ref 21–32)
CO2 SERPL-SCNC: 26 MMOL/L (ref 20–29)
CO2 SERPL-SCNC: 26 MMOL/L (ref 20–29)
CO2 SERPL-SCNC: 26 MMOL/L (ref 21–32)
CO2 SERPL-SCNC: 27 MMOL/L (ref 20–29)
CO2 SERPL-SCNC: 27 MMOL/L (ref 21–32)
CO2 SERPL-SCNC: 27 MMOL/L (ref 21–32)
CO2 SERPL-SCNC: 28 MMOL/L (ref 20–29)
CO2 SERPL-SCNC: 28 MMOL/L (ref 21–32)
CO2 SERPL-SCNC: 28 MMOL/L (ref 21–32)
CO2 SERPL-SCNC: 29 MMOL/L (ref 21–32)
CO2 SERPL-SCNC: 30 MMOL/L (ref 21–32)
CO2 SERPL-SCNC: 31 MMOL/L (ref 21–32)
CO2 SERPL-SCNC: 32 MMOL/L (ref 21–32)
CO2 SERPL-SCNC: 33 MMOL/L (ref 21–32)
CO2 SERPL-SCNC: 33 MMOL/L (ref 21–32)
CO2 SERPL-SCNC: 34 MMOL/L (ref 21–32)
CO2 SERPL-SCNC: 34 MMOL/L (ref 21–32)
CO2 SERPL-SCNC: 35 MMOL/L (ref 21–32)
COLOR FLD: YELLOW
COLOR UR: ABNORMAL
COLOR UR: NORMAL
COMMENT, HOLDF: NORMAL
COPPER SERPL-MCNC: 112 UG/DL (ref 72–166)
CREAT BLD-MCNC: 1.6 MG/DL (ref 0.6–1.3)
CREAT SERPL-MCNC: 1.13 MG/DL (ref 0.57–1)
CREAT SERPL-MCNC: 1.15 MG/DL (ref 0.55–1.02)
CREAT SERPL-MCNC: 1.2 MG/DL (ref 0.55–1.02)
CREAT SERPL-MCNC: 1.21 MG/DL (ref 0.55–1.02)
CREAT SERPL-MCNC: 1.28 MG/DL (ref 0.55–1.02)
CREAT SERPL-MCNC: 1.31 MG/DL (ref 0.55–1.02)
CREAT SERPL-MCNC: 1.33 MG/DL (ref 0.55–1.02)
CREAT SERPL-MCNC: 1.33 MG/DL (ref 0.55–1.02)
CREAT SERPL-MCNC: 1.35 MG/DL (ref 0.55–1.02)
CREAT SERPL-MCNC: 1.38 MG/DL (ref 0.55–1.02)
CREAT SERPL-MCNC: 1.4 MG/DL (ref 0.55–1.02)
CREAT SERPL-MCNC: 1.4 MG/DL (ref 0.55–1.02)
CREAT SERPL-MCNC: 1.41 MG/DL (ref 0.55–1.02)
CREAT SERPL-MCNC: 1.42 MG/DL (ref 0.55–1.02)
CREAT SERPL-MCNC: 1.43 MG/DL (ref 0.55–1.02)
CREAT SERPL-MCNC: 1.47 MG/DL (ref 0.55–1.02)
CREAT SERPL-MCNC: 1.48 MG/DL (ref 0.55–1.02)
CREAT SERPL-MCNC: 1.48 MG/DL (ref 0.55–1.02)
CREAT SERPL-MCNC: 1.48 MG/DL (ref 0.57–1)
CREAT SERPL-MCNC: 1.5 MG/DL (ref 0.57–1)
CREAT SERPL-MCNC: 1.57 MG/DL (ref 0.55–1.02)
CREAT SERPL-MCNC: 1.63 MG/DL (ref 0.57–1)
CREAT SERPL-MCNC: 1.64 MG/DL (ref 0.55–1.02)
CREAT SERPL-MCNC: 1.66 MG/DL (ref 0.55–1.02)
CREAT SERPL-MCNC: 1.68 MG/DL (ref 0.55–1.02)
CREAT SERPL-MCNC: 1.7 MG/DL (ref 0.55–1.02)
CREAT SERPL-MCNC: 1.71 MG/DL (ref 0.55–1.02)
CREAT SERPL-MCNC: 1.72 MG/DL (ref 0.55–1.02)
CREAT SERPL-MCNC: 1.73 MG/DL (ref 0.55–1.02)
CREAT SERPL-MCNC: 1.74 MG/DL (ref 0.55–1.02)
CREAT SERPL-MCNC: 1.9 MG/DL (ref 0.55–1.02)
DIAGNOSIS, 93000: NORMAL
DIFFERENTIAL METHOD BLD: ABNORMAL
DIFFERENTIAL METHOD BLD: NORMAL
ECHO AO ROOT DIAM: 3.36 CM
ECHO AV PEAK GRADIENT: 7.9 MMHG
ECHO AV PEAK VELOCITY: 140.52 CM/S
ECHO LA AREA 4C: 33.1 CM2
ECHO LA MAJOR AXIS: 5.14 CM
ECHO LA TO AORTIC ROOT RATIO: 1.53
ECHO LA VOL 2C: 119.2 ML (ref 22–52)
ECHO LA VOL 4C: 118.71 ML (ref 22–52)
ECHO LA VOL BP: 133.06 ML (ref 22–52)
ECHO LA VOL/BSA BIPLANE: 59.97 ML/M2 (ref 16–28)
ECHO LA VOLUME INDEX A2C: 53.73 ML/M2 (ref 16–28)
ECHO LA VOLUME INDEX A4C: 53.5 ML/M2 (ref 16–28)
ECHO LV E' LATERAL VELOCITY: 7.97 CM/S
ECHO LV E' SEPTAL VELOCITY: 8.45 CM/S
ECHO LV EDV A2C: 142.2 ML
ECHO LV EDV A4C: 123.1 ML
ECHO LV EDV BP: 136.8 ML (ref 56–104)
ECHO LV EDV INDEX A4C: 51.5 ML/M2
ECHO LV EDV INDEX BP: 57.2 ML/M2
ECHO LV EDV NDEX A2C: 59.4 ML/M2
ECHO LV EJECTION FRACTION A2C: 53 %
ECHO LV EJECTION FRACTION A4C: 39 %
ECHO LV EJECTION FRACTION BIPLANE: 48.5 % (ref 55–100)
ECHO LV ESV A2C: 66.8 ML
ECHO LV ESV A4C: 75.1 ML
ECHO LV ESV BP: 70.5 ML (ref 19–49)
ECHO LV ESV INDEX A2C: 27.9 ML/M2
ECHO LV ESV INDEX A4C: 31.4 ML/M2
ECHO LV ESV INDEX BP: 29.5 ML/M2
ECHO LV INTERNAL DIMENSION DIASTOLIC: 5.75 CM (ref 3.9–5.3)
ECHO LV INTERNAL DIMENSION SYSTOLIC: 4.83 CM
ECHO LV IVSD: 0.89 CM (ref 0.6–0.9)
ECHO LV MASS 2D: 242 G (ref 67–162)
ECHO LV MASS INDEX 2D: 109.1 G/M2 (ref 43–95)
ECHO LV POSTERIOR WALL DIASTOLIC: 0.95 CM (ref 0.6–0.9)
ECHO MV A VELOCITY: 26.74 CM/S
ECHO MV AREA PHT: 3.2 CM2
ECHO MV E DECELERATION TIME (DT): 239 MS
ECHO MV E VELOCITY: 118.95 CM/S
ECHO MV E/A RATIO: 4.45
ECHO MV E/E' LATERAL: 14.92
ECHO MV E/E' RATIO (AVERAGED): 14.5
ECHO MV E/E' SEPTAL: 14.08
ECHO MV PRESSURE HALF TIME (PHT): 69.3 MS
ECHO MV REGURGITANT RADIUS PISA: 0.48 CM
ECHO PV MAX VELOCITY: 79.08 CM/S
ECHO PV PEAK GRADIENT: 2.5 MMHG
ECHO RV INTERNAL DIMENSION: 4.73 CM
ECHO RV TAPSE: 1.66 CM (ref 1.5–2)
ECHO TV REGURGITANT MAX VELOCITY: 269.14 CM/S
ECHO TV REGURGITANT PEAK GRADIENT: 29 MMHG
EOSINOPHIL # BLD: 0.1 K/UL (ref 0–0.4)
EOSINOPHIL # BLD: 0.1 K/UL (ref 0–0.4)
EOSINOPHIL # BLD: 0.2 K/UL (ref 0–0.4)
EOSINOPHIL # BLD: 0.2 K/UL (ref 0–0.4)
EOSINOPHIL # BLD: 0.3 K/UL (ref 0–0.4)
EOSINOPHIL NFR BLD: 1 % (ref 0–7)
EOSINOPHIL NFR BLD: 2 % (ref 0–7)
EOSINOPHIL NFR BLD: 3 % (ref 0–7)
EPITH CASTS URNS QL MICRO: ABNORMAL /LPF
EPITH CASTS URNS QL MICRO: NORMAL /LPF
ERYTHROCYTE [DISTWIDTH] IN BLOOD BY AUTOMATED COUNT: 13.1 % (ref 11.5–14.5)
ERYTHROCYTE [DISTWIDTH] IN BLOOD BY AUTOMATED COUNT: 13.3 % (ref 11.5–14.5)
ERYTHROCYTE [DISTWIDTH] IN BLOOD BY AUTOMATED COUNT: 14.1 % (ref 11.5–14.5)
ERYTHROCYTE [DISTWIDTH] IN BLOOD BY AUTOMATED COUNT: 14.2 % (ref 11.5–14.5)
ERYTHROCYTE [DISTWIDTH] IN BLOOD BY AUTOMATED COUNT: 15.8 % (ref 11.5–14.5)
GAMMA GLOB 24H MFR UR ELPH: 36.4 %
GAMMA GLOB SERPL ELPH-MCNC: 1.1 G/DL (ref 0.4–1.8)
GLOBULIN SER CALC-MCNC: 3.2 G/DL (ref 2.2–3.9)
GLOBULIN SER CALC-MCNC: 4 G/DL (ref 2–4)
GLOBULIN SER CALC-MCNC: 4 G/DL (ref 2–4)
GLOBULIN SER CALC-MCNC: 4.4 G/DL (ref 2–4)
GLOBULIN SER CALC-MCNC: 4.6 G/DL (ref 2–4)
GLOBULIN SER CALC-MCNC: 4.6 G/DL (ref 2–4)
GLUCOSE BLD-MCNC: 181 MG/DL (ref 65–100)
GLUCOSE FLD-MCNC: 112 MG/DL
GLUCOSE SERPL-MCNC: 101 MG/DL (ref 65–100)
GLUCOSE SERPL-MCNC: 102 MG/DL (ref 65–100)
GLUCOSE SERPL-MCNC: 105 MG/DL (ref 65–100)
GLUCOSE SERPL-MCNC: 106 MG/DL (ref 65–100)
GLUCOSE SERPL-MCNC: 107 MG/DL (ref 65–100)
GLUCOSE SERPL-MCNC: 107 MG/DL (ref 65–100)
GLUCOSE SERPL-MCNC: 107 MG/DL (ref 65–99)
GLUCOSE SERPL-MCNC: 108 MG/DL (ref 65–100)
GLUCOSE SERPL-MCNC: 110 MG/DL (ref 65–100)
GLUCOSE SERPL-MCNC: 110 MG/DL (ref 65–100)
GLUCOSE SERPL-MCNC: 112 MG/DL (ref 65–100)
GLUCOSE SERPL-MCNC: 114 MG/DL (ref 65–100)
GLUCOSE SERPL-MCNC: 116 MG/DL (ref 65–100)
GLUCOSE SERPL-MCNC: 119 MG/DL (ref 65–100)
GLUCOSE SERPL-MCNC: 120 MG/DL (ref 65–100)
GLUCOSE SERPL-MCNC: 126 MG/DL (ref 65–99)
GLUCOSE SERPL-MCNC: 129 MG/DL (ref 65–100)
GLUCOSE SERPL-MCNC: 131 MG/DL (ref 65–100)
GLUCOSE SERPL-MCNC: 131 MG/DL (ref 65–100)
GLUCOSE SERPL-MCNC: 132 MG/DL (ref 65–100)
GLUCOSE SERPL-MCNC: 135 MG/DL (ref 65–100)
GLUCOSE SERPL-MCNC: 140 MG/DL (ref 65–100)
GLUCOSE SERPL-MCNC: 143 MG/DL (ref 65–100)
GLUCOSE SERPL-MCNC: 174 MG/DL (ref 65–100)
GLUCOSE SERPL-MCNC: 175 MG/DL (ref 65–100)
GLUCOSE SERPL-MCNC: 178 MG/DL (ref 65–100)
GLUCOSE SERPL-MCNC: 182 MG/DL (ref 65–100)
GLUCOSE SERPL-MCNC: 287 MG/DL (ref 65–100)
GLUCOSE SERPL-MCNC: 86 MG/DL (ref 65–100)
GLUCOSE SERPL-MCNC: 98 MG/DL (ref 65–99)
GLUCOSE SERPL-MCNC: 99 MG/DL (ref 65–99)
GLUCOSE UR STRIP.AUTO-MCNC: NEGATIVE MG/DL
GLUCOSE UR STRIP.AUTO-MCNC: NEGATIVE MG/DL
GRAM STN SPEC: NORMAL
GRAM STN SPEC: NORMAL
HCT VFR BLD AUTO: 34.5 % (ref 35–47)
HCT VFR BLD AUTO: 34.8 % (ref 35–47)
HCT VFR BLD AUTO: 35 % (ref 35–47)
HCT VFR BLD AUTO: 36.1 % (ref 35–47)
HCT VFR BLD AUTO: 36.9 % (ref 35–47)
HCT VFR BLD CALC: 36 % (ref 35–47)
HGB BLD-MCNC: 10.2 G/DL (ref 11.5–16)
HGB BLD-MCNC: 10.9 G/DL (ref 11.5–16)
HGB BLD-MCNC: 11.2 G/DL (ref 11.5–16)
HGB BLD-MCNC: 11.2 G/DL (ref 11.5–16)
HGB BLD-MCNC: 11.4 G/DL (ref 11.5–16)
HGB BLD-MCNC: 11.9 G/DL (ref 11.5–16)
HGB UR QL STRIP: ABNORMAL
HGB UR QL STRIP: NEGATIVE
HYALINE CASTS URNS QL MICRO: NORMAL /LPF (ref 0–5)
IMM GRANULOCYTES # BLD AUTO: 0 K/UL (ref 0–0.04)
IMM GRANULOCYTES # BLD AUTO: 0.1 K/UL (ref 0–0.04)
IMM GRANULOCYTES NFR BLD AUTO: 0 % (ref 0–0.5)
IMM GRANULOCYTES NFR BLD AUTO: 0 % (ref 0–0.5)
IMM GRANULOCYTES NFR BLD AUTO: 1 % (ref 0–0.5)
INR BLD: 1.9
INR BLD: 2.1 (ref 0.9–1.1)
INR BLD: 2.2
INR BLD: 2.2 (ref 0.9–1.1)
INR BLD: 2.6
INR BLD: 2.7
INR BLD: 2.8
INR BLD: 3.6
INR BLD: 3.8
INR BLD: 3.8
INR BLD: 3.9
INR BLD: 4.5
INR BLD: 5
INR BLD: 5.1 (ref 0.9–1.2)
INR PPP: 1.6 (ref 0.9–1.1)
INR PPP: 1.6 (ref 0.9–1.1)
INR PPP: 1.7 (ref 0.9–1.1)
INR PPP: 1.8 (ref 0.9–1.1)
INR PPP: 1.9 (ref 0.9–1.1)
INR PPP: 1.9 (ref 0.9–1.1)
INR PPP: 2 (ref 0.9–1.1)
INR PPP: 2.1 (ref 0.9–1.1)
INR PPP: 2.1 (ref 0.9–1.1)
INR PPP: 2.3 (ref 0.9–1.1)
INR PPP: 2.4 (ref 0.9–1.1)
INR PPP: 2.5 (ref 0.9–1.1)
INR PPP: 2.5 (ref 0.9–1.1)
INR PPP: 2.6 (ref 0.9–1.1)
INR PPP: 2.6 (ref 0.9–1.1)
INR PPP: 2.7 (ref 0.9–1.1)
INR PPP: 2.8 (ref 0.9–1.1)
INR PPP: 3.4 (ref 0.9–1.1)
INR PPP: 4.1 (ref 0.9–1.1)
INR, EXTERNAL: 2.1
INR, EXTERNAL: 2.2
INTERPRETATION: NORMAL
IRON SATN MFR SERPL: 13 % (ref 20–50)
IRON SERPL-MCNC: 31 UG/DL (ref 35–150)
KETONES UR QL STRIP.AUTO: NEGATIVE MG/DL
KETONES UR QL STRIP.AUTO: NEGATIVE MG/DL
LDH FLD L TO P-CCNC: 75 U/L
LEUKOCYTE ESTERASE UR QL STRIP.AUTO: ABNORMAL
LEUKOCYTE ESTERASE UR QL STRIP.AUTO: NEGATIVE
LYMPHOCYTES # BLD: 1.6 K/UL (ref 0.8–3.5)
LYMPHOCYTES # BLD: 1.7 K/UL (ref 0.8–3.5)
LYMPHOCYTES # BLD: 1.8 K/UL (ref 0.8–3.5)
LYMPHOCYTES # BLD: 2 K/UL (ref 0.8–3.5)
LYMPHOCYTES # BLD: 2.1 K/UL (ref 0.8–3.5)
LYMPHOCYTES NFR BLD: 17 % (ref 12–49)
LYMPHOCYTES NFR BLD: 20 % (ref 12–49)
LYMPHOCYTES NFR BLD: 22 % (ref 12–49)
LYMPHOCYTES NFR BLD: 22 % (ref 12–49)
LYMPHOCYTES NFR BLD: 24 % (ref 12–49)
LYMPHOCYTES NFR FLD: 44 %
M PROTEIN MFR UR ELPH: NORMAL %
M PROTEIN SERPL ELPH-MCNC: 0.3 G/DL
MAGNESIUM SERPL-MCNC: 2 MG/DL (ref 1.6–2.3)
MAGNESIUM SERPL-MCNC: 2 MG/DL (ref 1.6–2.4)
MAGNESIUM SERPL-MCNC: 2.1 MG/DL (ref 1.6–2.4)
MAGNESIUM SERPL-MCNC: 2.2 MG/DL (ref 1.6–2.4)
MAGNESIUM SERPL-MCNC: 2.2 MG/DL (ref 1.6–2.4)
MAGNESIUM SERPL-MCNC: 2.3 MG/DL (ref 1.6–2.4)
MAGNESIUM SERPL-MCNC: 2.4 MG/DL (ref 1.6–2.4)
MAGNESIUM SERPL-MCNC: 2.4 MG/DL (ref 1.6–2.4)
MAGNESIUM SERPL-MCNC: 2.5 MG/DL (ref 1.6–2.4)
MCH RBC QN AUTO: 29.1 PG (ref 26–34)
MCH RBC QN AUTO: 29.7 PG (ref 26–34)
MCH RBC QN AUTO: 29.7 PG (ref 26–34)
MCH RBC QN AUTO: 30 PG (ref 26–34)
MCH RBC QN AUTO: 30.1 PG (ref 26–34)
MCHC RBC AUTO-ENTMCNC: 31.3 G/DL (ref 30–36.5)
MCHC RBC AUTO-ENTMCNC: 31.6 G/DL (ref 30–36.5)
MCHC RBC AUTO-ENTMCNC: 32 G/DL (ref 30–36.5)
MCHC RBC AUTO-ENTMCNC: 32.2 G/DL (ref 30–36.5)
MCHC RBC AUTO-ENTMCNC: 32.5 G/DL (ref 30–36.5)
MCV RBC AUTO: 91.5 FL (ref 80–99)
MCV RBC AUTO: 92.8 FL (ref 80–99)
MCV RBC AUTO: 93.4 FL (ref 80–99)
MCV RBC AUTO: 93.8 FL (ref 80–99)
MCV RBC AUTO: 94 FL (ref 80–99)
MONOCYTES # BLD: 0.6 K/UL (ref 0–1)
MONOCYTES # BLD: 0.7 K/UL (ref 0–1)
MONOCYTES # BLD: 0.8 K/UL (ref 0–1)
MONOCYTES NFR BLD: 10 % (ref 5–13)
MONOCYTES NFR BLD: 7 % (ref 5–13)
MONOCYTES NFR BLD: 8 % (ref 5–13)
MONOCYTES NFR BLD: 9 % (ref 5–13)
MONOCYTES NFR BLD: 9 % (ref 5–13)
MONOS+MACROS NFR FLD: 16 %
NEUTROPHILS NFR FLD: 40 %
NEUTS SEG # BLD: 5.6 K/UL (ref 1.8–8)
NEUTS SEG # BLD: 5.6 K/UL (ref 1.8–8)
NEUTS SEG # BLD: 5.7 K/UL (ref 1.8–8)
NEUTS SEG # BLD: 6.4 K/UL (ref 1.8–8)
NEUTS SEG # BLD: 6.7 K/UL (ref 1.8–8)
NEUTS SEG NFR BLD: 65 % (ref 32–75)
NEUTS SEG NFR BLD: 66 % (ref 32–75)
NEUTS SEG NFR BLD: 66 % (ref 32–75)
NEUTS SEG NFR BLD: 69 % (ref 32–75)
NEUTS SEG NFR BLD: 71 % (ref 32–75)
NITRITE UR QL STRIP.AUTO: NEGATIVE
NITRITE UR QL STRIP.AUTO: NEGATIVE
NRBC # BLD: 0 K/UL (ref 0–0.01)
NRBC BLD-RTO: 0 PER 100 WBC
NUC CELL # FLD: 205 /CU MM
PH UR STRIP: 5 [PH] (ref 5–8)
PH UR STRIP: 7.5 [PH] (ref 5–8)
PHOSPHATE SERPL-MCNC: 2.6 MG/DL (ref 2.6–4.7)
PHOSPHATE SERPL-MCNC: 3.3 MG/DL (ref 2.6–4.7)
PLATELET # BLD AUTO: 163 K/UL (ref 150–400)
PLATELET # BLD AUTO: 191 K/UL (ref 150–400)
PLATELET # BLD AUTO: 195 K/UL (ref 150–400)
PLATELET # BLD AUTO: 212 K/UL (ref 150–400)
PLATELET # BLD AUTO: 231 K/UL (ref 150–400)
PLEASE NOTE:, 133800: NORMAL
PMV BLD AUTO: 10 FL (ref 8.9–12.9)
PMV BLD AUTO: 9.3 FL (ref 8.9–12.9)
PMV BLD AUTO: 9.7 FL (ref 8.9–12.9)
PMV BLD AUTO: 9.8 FL (ref 8.9–12.9)
PMV BLD AUTO: 9.9 FL (ref 8.9–12.9)
POTASSIUM BLD-SCNC: 2.2 MMOL/L (ref 3.5–5.1)
POTASSIUM SERPL-SCNC: 2.2 MMOL/L (ref 3.5–5.1)
POTASSIUM SERPL-SCNC: 2.4 MMOL/L (ref 3.5–5.1)
POTASSIUM SERPL-SCNC: 2.7 MMOL/L (ref 3.5–5.1)
POTASSIUM SERPL-SCNC: 2.8 MMOL/L (ref 3.5–5.1)
POTASSIUM SERPL-SCNC: 3.1 MMOL/L (ref 3.5–5.1)
POTASSIUM SERPL-SCNC: 3.3 MMOL/L (ref 3.5–5.1)
POTASSIUM SERPL-SCNC: 3.4 MMOL/L (ref 3.5–5.2)
POTASSIUM SERPL-SCNC: 3.5 MMOL/L (ref 3.5–5.1)
POTASSIUM SERPL-SCNC: 3.6 MMOL/L (ref 3.5–5.1)
POTASSIUM SERPL-SCNC: 3.8 MMOL/L (ref 3.5–5.1)
POTASSIUM SERPL-SCNC: 3.8 MMOL/L (ref 3.5–5.1)
POTASSIUM SERPL-SCNC: 3.8 MMOL/L (ref 3.5–5.2)
POTASSIUM SERPL-SCNC: 3.9 MMOL/L (ref 3.5–5.1)
POTASSIUM SERPL-SCNC: 3.9 MMOL/L (ref 3.5–5.1)
POTASSIUM SERPL-SCNC: 4 MMOL/L (ref 3.5–5.1)
POTASSIUM SERPL-SCNC: 4 MMOL/L (ref 3.5–5.1)
POTASSIUM SERPL-SCNC: 4.1 MMOL/L (ref 3.5–5.1)
POTASSIUM SERPL-SCNC: 4.2 MMOL/L (ref 3.5–5.1)
POTASSIUM SERPL-SCNC: 4.2 MMOL/L (ref 3.5–5.1)
POTASSIUM SERPL-SCNC: 4.3 MMOL/L (ref 3.5–5.1)
POTASSIUM SERPL-SCNC: 4.4 MMOL/L (ref 3.5–5.1)
POTASSIUM SERPL-SCNC: 4.5 MMOL/L (ref 3.5–5.2)
POTASSIUM SERPL-SCNC: 4.6 MMOL/L (ref 3.5–5.1)
POTASSIUM SERPL-SCNC: 4.6 MMOL/L (ref 3.5–5.1)
POTASSIUM SERPL-SCNC: 4.7 MMOL/L (ref 3.5–5.1)
POTASSIUM SERPL-SCNC: 4.8 MMOL/L (ref 3.5–5.1)
POTASSIUM SERPL-SCNC: 4.8 MMOL/L (ref 3.5–5.2)
PROT FLD-MCNC: 4.2 G/DL
PROT SERPL-MCNC: 6 G/DL (ref 6–8.5)
PROT SERPL-MCNC: 6.6 G/DL (ref 6.4–8.2)
PROT SERPL-MCNC: 6.8 G/DL (ref 6.4–8.2)
PROT SERPL-MCNC: 7.8 G/DL (ref 6.4–8.2)
PROT SERPL-MCNC: 8.2 G/DL (ref 6.4–8.2)
PROT SERPL-MCNC: 8.8 G/DL (ref 6.4–8.2)
PROT UR STRIP-MCNC: NEGATIVE MG/DL
PROT UR STRIP-MCNC: NEGATIVE MG/DL
PROT UR-MCNC: 8.9 MG/DL
PROTHROMBIN TIME: 15.6 SEC (ref 9–11.1)
PROTHROMBIN TIME: 16.1 SEC (ref 9–11.1)
PROTHROMBIN TIME: 16.4 SEC (ref 9–11.1)
PROTHROMBIN TIME: 17.6 SEC (ref 9–11.1)
PROTHROMBIN TIME: 18.8 SEC (ref 9–11.1)
PROTHROMBIN TIME: 18.9 SEC (ref 9–11.1)
PROTHROMBIN TIME: 19.7 SEC (ref 9–11.1)
PROTHROMBIN TIME: 20.6 SEC (ref 9–11.1)
PROTHROMBIN TIME: 20.9 SEC (ref 9–11.1)
PROTHROMBIN TIME: 21.9 SEC (ref 9–11.1)
PROTHROMBIN TIME: 22.8 SEC (ref 9–11.1)
PROTHROMBIN TIME: 23.8 SEC (ref 9–11.1)
PROTHROMBIN TIME: 24.4 SEC (ref 9–11.1)
PROTHROMBIN TIME: 25.1 SEC (ref 9–11.1)
PROTHROMBIN TIME: 25.5 SEC (ref 9–11.1)
PROTHROMBIN TIME: 26.3 SEC (ref 9–11.1)
PROTHROMBIN TIME: 27.1 SEC (ref 9–11.1)
PROTHROMBIN TIME: 32.3 SEC (ref 9–11.1)
PROTHROMBIN TIME: 38.2 SEC (ref 9–11.1)
PT POC: 24.7 SECONDS (ref 11.8–14.9)
PT POC: 26.3 SECONDS (ref 11.8–14.9)
PT POC: NORMAL
PT POC: NORMAL SECONDS
Q-T INTERVAL, ECG07: 444 MS
Q-T INTERVAL, ECG07: 498 MS
Q-T INTERVAL, ECG07: 502 MS
QRS DURATION, ECG06: 150 MS
QRS DURATION, ECG06: 206 MS
QRS DURATION, ECG06: 218 MS
QTC CALCULATION (BEZET), ECG08: 492 MS
QTC CALCULATION (BEZET), ECG08: 498 MS
QTC CALCULATION (BEZET), ECG08: 509 MS
RBC # BLD AUTO: 3.73 M/UL (ref 3.8–5.2)
RBC # BLD AUTO: 3.75 M/UL (ref 3.8–5.2)
RBC # BLD AUTO: 3.77 M/UL (ref 3.8–5.2)
RBC # BLD AUTO: 3.84 M/UL (ref 3.8–5.2)
RBC # BLD AUTO: 3.95 M/UL (ref 3.8–5.2)
RBC # FLD: >100 /CU MM
RBC #/AREA URNS HPF: ABNORMAL /HPF (ref 0–5)
RBC #/AREA URNS HPF: NORMAL /HPF (ref 0–5)
SAMPLES BEING HELD,HOLD: NORMAL
SERVICE CMNT-IMP: ABNORMAL
SERVICE CMNT-IMP: NORMAL
SERVICE CMNT-IMP: NORMAL
SODIUM BLD-SCNC: 132 MMOL/L (ref 136–145)
SODIUM SERPL-SCNC: 127 MMOL/L (ref 136–145)
SODIUM SERPL-SCNC: 128 MMOL/L (ref 136–145)
SODIUM SERPL-SCNC: 131 MMOL/L (ref 136–145)
SODIUM SERPL-SCNC: 131 MMOL/L (ref 136–145)
SODIUM SERPL-SCNC: 132 MMOL/L (ref 136–145)
SODIUM SERPL-SCNC: 133 MMOL/L (ref 136–145)
SODIUM SERPL-SCNC: 134 MMOL/L (ref 136–145)
SODIUM SERPL-SCNC: 135 MMOL/L (ref 136–145)
SODIUM SERPL-SCNC: 136 MMOL/L (ref 134–144)
SODIUM SERPL-SCNC: 136 MMOL/L (ref 136–145)
SODIUM SERPL-SCNC: 137 MMOL/L (ref 134–144)
SODIUM SERPL-SCNC: 137 MMOL/L (ref 136–145)
SODIUM SERPL-SCNC: 138 MMOL/L (ref 136–145)
SODIUM SERPL-SCNC: 138 MMOL/L (ref 136–145)
SODIUM SERPL-SCNC: 141 MMOL/L (ref 134–144)
SODIUM SERPL-SCNC: 145 MMOL/L (ref 134–144)
SP GR UR REFRACTOMETRY: 1.01 (ref 1–1.03)
SP GR UR REFRACTOMETRY: <1.005 (ref 1–1.03)
SPECIMEN SOURCE FLD: ABNORMAL
SPECIMEN SOURCE FLD: NORMAL
STRESS BASELINE HR: 60 BPM
STRESS ESTIMATED WORKLOAD: 1 METS
STRESS EXERCISE DUR MIN: NORMAL
STRESS PEAK DIAS BP: 83 MMHG
STRESS PEAK SYS BP: 171 MMHG
STRESS PERCENT HR ACHIEVED: 47 %
STRESS POST PEAK HR: 64 BPM
STRESS RATE PRESSURE PRODUCT: NORMAL BPM*MMHG
STRESS TARGET HR: 136 BPM
STRESS TARGET HR: 160 BPM
T3FREE SERPL-MCNC: 1.4 PG/ML (ref 2.2–4)
T4 FREE SERPL-MCNC: 0.9 NG/DL (ref 0.8–1.5)
T4 FREE SERPL-MCNC: 1.3 NG/DL (ref 0.8–1.5)
THERAPEUTIC RANGE,PTTT: ABNORMAL SECS (ref 58–77)
TIBC SERPL-MCNC: 241 UG/DL (ref 250–450)
TROPONIN I SERPL-MCNC: 0.05 NG/ML
TROPONIN I SERPL-MCNC: <0.05 NG/ML
TROPONIN I SERPL-MCNC: <0.05 NG/ML
TSH SERPL DL<=0.05 MIU/L-ACNC: 4.8 UIU/ML (ref 0.36–3.74)
TSH SERPL DL<=0.05 MIU/L-ACNC: 41.2 UIU/ML (ref 0.36–3.74)
TSH SERPL DL<=0.05 MIU/L-ACNC: 45.2 UIU/ML (ref 0.36–3.74)
UA: UC IF INDICATED,UAUC: NORMAL
UROBILINOGEN UR QL STRIP.AUTO: 0.2 EU/DL (ref 0.2–1)
UROBILINOGEN UR QL STRIP.AUTO: 0.2 EU/DL (ref 0.2–1)
VALID INTERNAL CONTROL?: YES
VENTRICULAR RATE, ECG03: 60 BPM
VENTRICULAR RATE, ECG03: 62 BPM
VENTRICULAR RATE, ECG03: 74 BPM
WBC # BLD AUTO: 8.4 K/UL (ref 3.6–11)
WBC # BLD AUTO: 8.5 K/UL (ref 3.6–11)
WBC # BLD AUTO: 8.6 K/UL (ref 3.6–11)
WBC # BLD AUTO: 9.3 K/UL (ref 3.6–11)
WBC # BLD AUTO: 9.5 K/UL (ref 3.6–11)
WBC URNS QL MICRO: ABNORMAL /HPF (ref 0–4)
WBC URNS QL MICRO: NORMAL /HPF (ref 0–4)

## 2019-01-01 PROCEDURE — G0157 HHC PT ASSISTANT EA 15: HCPCS

## 2019-01-01 PROCEDURE — 85025 COMPLETE CBC W/AUTO DIFF WBC: CPT

## 2019-01-01 PROCEDURE — 3331090001 HH PPS REVENUE CREDIT

## 2019-01-01 PROCEDURE — 74011000250 HC RX REV CODE- 250: Performed by: PHYSICIAN ASSISTANT

## 2019-01-01 PROCEDURE — 74011250637 HC RX REV CODE- 250/637: Performed by: PHYSICIAN ASSISTANT

## 2019-01-01 PROCEDURE — 3331090002 HH PPS REVENUE DEBIT

## 2019-01-01 PROCEDURE — 65660000000 HC RM CCU STEPDOWN

## 2019-01-01 PROCEDURE — 77030005208 HC CATH HLDR GLWC -A

## 2019-01-01 PROCEDURE — G0299 HHS/HOSPICE OF RN EA 15 MIN: HCPCS

## 2019-01-01 PROCEDURE — 400013 HH SOC

## 2019-01-01 PROCEDURE — 83735 ASSAY OF MAGNESIUM: CPT

## 2019-01-01 PROCEDURE — 84439 ASSAY OF FREE THYROXINE: CPT

## 2019-01-01 PROCEDURE — 74011250637 HC RX REV CODE- 250/637: Performed by: SPECIALIST

## 2019-01-01 PROCEDURE — 82042 OTHER SOURCE ALBUMIN QUAN EA: CPT

## 2019-01-01 PROCEDURE — 77030038269 HC DRN EXT URIN PURWCK BARD -A

## 2019-01-01 PROCEDURE — 36415 COLL VENOUS BLD VENIPUNCTURE: CPT

## 2019-01-01 PROCEDURE — 85610 PROTHROMBIN TIME: CPT

## 2019-01-01 PROCEDURE — 74011000250 HC RX REV CODE- 250: Performed by: SPECIALIST

## 2019-01-01 PROCEDURE — 96375 TX/PRO/DX INJ NEW DRUG ADDON: CPT

## 2019-01-01 PROCEDURE — 77010033678 HC OXYGEN DAILY

## 2019-01-01 PROCEDURE — 0W9G30Z DRAINAGE OF PERITONEAL CAVITY WITH DRAINAGE DEVICE, PERCUTANEOUS APPROACH: ICD-10-PCS | Performed by: RADIOLOGY

## 2019-01-01 PROCEDURE — 74176 CT ABD & PELVIS W/O CONTRAST: CPT

## 2019-01-01 PROCEDURE — 49083 ABD PARACENTESIS W/IMAGING: CPT

## 2019-01-01 PROCEDURE — 74011250637 HC RX REV CODE- 250/637: Performed by: FAMILY MEDICINE

## 2019-01-01 PROCEDURE — 99218 HC RM OBSERVATION: CPT

## 2019-01-01 PROCEDURE — G0151 HHCP-SERV OF PT,EA 15 MIN: HCPCS

## 2019-01-01 PROCEDURE — 97535 SELF CARE MNGMENT TRAINING: CPT | Performed by: OCCUPATIONAL THERAPIST

## 2019-01-01 PROCEDURE — 74011250636 HC RX REV CODE- 250/636: Performed by: INTERNAL MEDICINE

## 2019-01-01 PROCEDURE — 83540 ASSAY OF IRON: CPT

## 2019-01-01 PROCEDURE — G0300 HHS/HOSPICE OF LPN EA 15 MIN: HCPCS

## 2019-01-01 PROCEDURE — 74011250636 HC RX REV CODE- 250/636: Performed by: PHYSICIAN ASSISTANT

## 2019-01-01 PROCEDURE — 65660000001 HC RM ICU INTERMED STEPDOWN

## 2019-01-01 PROCEDURE — 96376 TX/PRO/DX INJ SAME DRUG ADON: CPT

## 2019-01-01 PROCEDURE — 51798 US URINE CAPACITY MEASURE: CPT

## 2019-01-01 PROCEDURE — 83615 LACTATE (LD) (LDH) ENZYME: CPT

## 2019-01-01 PROCEDURE — 94760 N-INVAS EAR/PLS OXIMETRY 1: CPT

## 2019-01-01 PROCEDURE — 99285 EMERGENCY DEPT VISIT HI MDM: CPT

## 2019-01-01 PROCEDURE — 80048 BASIC METABOLIC PNL TOTAL CA: CPT

## 2019-01-01 PROCEDURE — 74011250637 HC RX REV CODE- 250/637: Performed by: NURSE PRACTITIONER

## 2019-01-01 PROCEDURE — 96374 THER/PROPH/DIAG INJ IV PUSH: CPT

## 2019-01-01 PROCEDURE — 74011250637 HC RX REV CODE- 250/637: Performed by: INTERNAL MEDICINE

## 2019-01-01 PROCEDURE — 93306 TTE W/DOPPLER COMPLETE: CPT

## 2019-01-01 PROCEDURE — 71250 CT THORAX DX C-: CPT

## 2019-01-01 PROCEDURE — 84481 FREE ASSAY (FT-3): CPT

## 2019-01-01 PROCEDURE — 74011250636 HC RX REV CODE- 250/636: Performed by: SPECIALIST

## 2019-01-01 PROCEDURE — 80076 HEPATIC FUNCTION PANEL: CPT

## 2019-01-01 PROCEDURE — 89050 BODY FLUID CELL COUNT: CPT

## 2019-01-01 PROCEDURE — 77030010545

## 2019-01-01 PROCEDURE — 88305 TISSUE EXAM BY PATHOLOGIST: CPT

## 2019-01-01 PROCEDURE — 83880 ASSAY OF NATRIURETIC PEPTIDE: CPT

## 2019-01-01 PROCEDURE — 71046 X-RAY EXAM CHEST 2 VIEWS: CPT

## 2019-01-01 PROCEDURE — 82150 ASSAY OF AMYLASE: CPT

## 2019-01-01 PROCEDURE — 97530 THERAPEUTIC ACTIVITIES: CPT

## 2019-01-01 PROCEDURE — 93005 ELECTROCARDIOGRAM TRACING: CPT

## 2019-01-01 PROCEDURE — 84100 ASSAY OF PHOSPHORUS: CPT

## 2019-01-01 PROCEDURE — 81001 URINALYSIS AUTO W/SCOPE: CPT

## 2019-01-01 PROCEDURE — 77030003666 HC NDL SPINAL BD -A

## 2019-01-01 PROCEDURE — 71045 X-RAY EXAM CHEST 1 VIEW: CPT

## 2019-01-01 PROCEDURE — 94761 N-INVAS EAR/PLS OXIMETRY MLT: CPT

## 2019-01-01 PROCEDURE — 74011250637 HC RX REV CODE- 250/637: Performed by: EMERGENCY MEDICINE

## 2019-01-01 PROCEDURE — 80053 COMPREHEN METABOLIC PANEL: CPT

## 2019-01-01 PROCEDURE — 74011000258 HC RX REV CODE- 258: Performed by: SPECIALIST

## 2019-01-01 PROCEDURE — 87075 CULTR BACTERIA EXCEPT BLOOD: CPT

## 2019-01-01 PROCEDURE — 96365 THER/PROPH/DIAG IV INF INIT: CPT

## 2019-01-01 PROCEDURE — 3331090003 HH PPS REVENUE ADJ

## 2019-01-01 PROCEDURE — 85730 THROMBOPLASTIN TIME PARTIAL: CPT

## 2019-01-01 PROCEDURE — 97161 PT EVAL LOW COMPLEX 20 MIN: CPT

## 2019-01-01 PROCEDURE — 88112 CYTOPATH CELL ENHANCE TECH: CPT

## 2019-01-01 PROCEDURE — 96368 THER/DIAG CONCURRENT INF: CPT

## 2019-01-01 PROCEDURE — 77030011256 HC DRSG MEPILEX <16IN NO BORD MOLN -A

## 2019-01-01 PROCEDURE — C8923 2D TTE W OR W/O FOL W/CON,CO: HCPCS

## 2019-01-01 PROCEDURE — 85018 HEMOGLOBIN: CPT

## 2019-01-01 PROCEDURE — 84157 ASSAY OF PROTEIN OTHER: CPT

## 2019-01-01 PROCEDURE — 84484 ASSAY OF TROPONIN QUANT: CPT

## 2019-01-01 PROCEDURE — 97165 OT EVAL LOW COMPLEX 30 MIN: CPT | Performed by: OCCUPATIONAL THERAPIST

## 2019-01-01 PROCEDURE — 78452 HT MUSCLE IMAGE SPECT MULT: CPT

## 2019-01-01 PROCEDURE — 80047 BASIC METABLC PNL IONIZED CA: CPT

## 2019-01-01 PROCEDURE — 74011250636 HC RX REV CODE- 250/636: Performed by: EMERGENCY MEDICINE

## 2019-01-01 PROCEDURE — 93017 CV STRESS TEST TRACING ONLY: CPT

## 2019-01-01 PROCEDURE — 84443 ASSAY THYROID STIM HORMONE: CPT

## 2019-01-01 PROCEDURE — 97116 GAIT TRAINING THERAPY: CPT

## 2019-01-01 PROCEDURE — 77030018870 HC TY PARCNT BD -B

## 2019-01-01 PROCEDURE — 74011250636 HC RX REV CODE- 250/636: Performed by: FAMILY MEDICINE

## 2019-01-01 PROCEDURE — 84165 PROTEIN E-PHORESIS SERUM: CPT

## 2019-01-01 PROCEDURE — 87015 SPECIMEN INFECT AGNT CONCNTJ: CPT

## 2019-01-01 PROCEDURE — 77030040830 HC CATH URETH FOL MDII -A

## 2019-01-01 PROCEDURE — 87205 SMEAR GRAM STAIN: CPT

## 2019-01-01 PROCEDURE — 82525 ASSAY OF COPPER: CPT

## 2019-01-01 PROCEDURE — 97163 PT EVAL HIGH COMPLEX 45 MIN: CPT

## 2019-01-01 PROCEDURE — 82945 GLUCOSE OTHER FLUID: CPT

## 2019-01-01 PROCEDURE — 82378 CARCINOEMBRYONIC ANTIGEN: CPT

## 2019-01-01 PROCEDURE — 74011000258 HC RX REV CODE- 258: Performed by: EMERGENCY MEDICINE

## 2019-01-01 PROCEDURE — 76770 US EXAM ABDO BACK WALL COMP: CPT

## 2019-01-01 PROCEDURE — A9560 TC99M LABELED RBC: HCPCS

## 2019-01-01 PROCEDURE — 84156 ASSAY OF PROTEIN URINE: CPT

## 2019-01-01 RX ORDER — POTASSIUM CHLORIDE 14.9 MG/ML
10 INJECTION INTRAVENOUS
Status: COMPLETED | OUTPATIENT
Start: 2019-01-01 | End: 2019-01-01

## 2019-01-01 RX ORDER — MICONAZOLE NITRATE 2 %
POWDER (GRAM) TOPICAL EVERY 12 HOURS
Status: DISCONTINUED | OUTPATIENT
Start: 2019-01-01 | End: 2019-01-01 | Stop reason: HOSPADM

## 2019-01-01 RX ORDER — FACIAL-BODY WIPES
10 EACH TOPICAL DAILY PRN
Status: DISCONTINUED | OUTPATIENT
Start: 2019-01-01 | End: 2019-01-01 | Stop reason: HOSPADM

## 2019-01-01 RX ORDER — AMIODARONE HYDROCHLORIDE 200 MG/1
200 TABLET ORAL DAILY
Qty: 90 TAB | Refills: 3 | Status: ON HOLD | OUTPATIENT
Start: 2019-01-01 | End: 2019-01-01 | Stop reason: SDUPTHER

## 2019-01-01 RX ORDER — ACETAMINOPHEN 325 MG/1
650 TABLET ORAL
Status: DISCONTINUED | OUTPATIENT
Start: 2019-01-01 | End: 2019-01-01 | Stop reason: HOSPADM

## 2019-01-01 RX ORDER — WARFARIN 1 MG/1
1.5 TABLET ORAL ONCE
Status: DISCONTINUED | OUTPATIENT
Start: 2019-01-01 | End: 2019-01-01 | Stop reason: HOSPADM

## 2019-01-01 RX ORDER — SODIUM CHLORIDE 0.9 % (FLUSH) 0.9 %
5-40 SYRINGE (ML) INJECTION EVERY 8 HOURS
Status: DISCONTINUED | OUTPATIENT
Start: 2019-01-01 | End: 2019-01-01 | Stop reason: HOSPADM

## 2019-01-01 RX ORDER — WARFARIN 1 MG/1
3 TABLET ORAL ONCE
Status: COMPLETED | OUTPATIENT
Start: 2019-01-01 | End: 2019-01-01

## 2019-01-01 RX ORDER — MILRINONE LACTATE 0.2 MG/ML
0.2 INJECTION, SOLUTION INTRAVENOUS CONTINUOUS
Status: DISCONTINUED | OUTPATIENT
Start: 2019-01-01 | End: 2019-01-01

## 2019-01-01 RX ORDER — BUMETANIDE 0.25 MG/ML
1 INJECTION INTRAMUSCULAR; INTRAVENOUS ONCE
Status: COMPLETED | OUTPATIENT
Start: 2019-01-01 | End: 2019-01-01

## 2019-01-01 RX ORDER — FUROSEMIDE 40 MG/1
TABLET ORAL
Qty: 90 TAB | Refills: 3 | Status: SHIPPED | OUTPATIENT
Start: 2019-01-01

## 2019-01-01 RX ORDER — BUMETANIDE 0.25 MG/ML
2 INJECTION INTRAMUSCULAR; INTRAVENOUS EVERY 8 HOURS
Status: DISCONTINUED | OUTPATIENT
Start: 2019-01-01 | End: 2019-01-01

## 2019-01-01 RX ORDER — FUROSEMIDE 40 MG/1
120 TABLET ORAL DAILY
Qty: 30 TAB | Refills: 0 | Status: SHIPPED | OUTPATIENT
Start: 2019-01-01 | End: 2019-01-01 | Stop reason: SDUPTHER

## 2019-01-01 RX ORDER — SPIRONOLACTONE 25 MG/1
50 TABLET ORAL DAILY
Status: DISCONTINUED | OUTPATIENT
Start: 2019-01-01 | End: 2019-01-01 | Stop reason: HOSPADM

## 2019-01-01 RX ORDER — SODIUM CHLORIDE 0.9 % (FLUSH) 0.9 %
5-10 SYRINGE (ML) INJECTION AS NEEDED
Status: DISCONTINUED | OUTPATIENT
Start: 2019-01-01 | End: 2019-01-01 | Stop reason: HOSPADM

## 2019-01-01 RX ORDER — LISINOPRIL 5 MG/1
5 TABLET ORAL DAILY
Status: DISCONTINUED | OUTPATIENT
Start: 2019-01-01 | End: 2019-01-01 | Stop reason: HOSPADM

## 2019-01-01 RX ORDER — LISINOPRIL 5 MG/1
5 TABLET ORAL DAILY
Qty: 30 TAB | Refills: 0 | Status: SHIPPED | OUTPATIENT
Start: 2019-01-01 | End: 2019-01-01

## 2019-01-01 RX ORDER — PRAVASTATIN SODIUM 40 MG/1
80 TABLET ORAL
Status: DISCONTINUED | OUTPATIENT
Start: 2019-01-01 | End: 2019-01-01 | Stop reason: HOSPADM

## 2019-01-01 RX ORDER — LEVOTHYROXINE SODIUM 75 UG/1
75 TABLET ORAL
Status: DISCONTINUED | OUTPATIENT
Start: 2019-01-01 | End: 2019-01-01

## 2019-01-01 RX ORDER — MORPHINE SULFATE 2 MG/ML
2 INJECTION, SOLUTION INTRAMUSCULAR; INTRAVENOUS
Status: COMPLETED | OUTPATIENT
Start: 2019-01-01 | End: 2019-01-01

## 2019-01-01 RX ORDER — MEXILETINE HYDROCHLORIDE 150 MG/1
300 CAPSULE ORAL EVERY 8 HOURS
Qty: 90 CAP | Refills: 0 | Status: SHIPPED | OUTPATIENT
Start: 2019-01-01 | End: 2019-01-01

## 2019-01-01 RX ORDER — SERTRALINE HYDROCHLORIDE 50 MG/1
100 TABLET, FILM COATED ORAL DAILY
Status: DISCONTINUED | OUTPATIENT
Start: 2019-01-01 | End: 2019-01-01 | Stop reason: HOSPADM

## 2019-01-01 RX ORDER — LIDOCAINE HYDROCHLORIDE 10 MG/ML
5 INJECTION, SOLUTION EPIDURAL; INFILTRATION; INTRACAUDAL; PERINEURAL
Status: COMPLETED | OUTPATIENT
Start: 2019-01-01 | End: 2019-01-01

## 2019-01-01 RX ORDER — POTASSIUM CHLORIDE 750 MG/1
40 TABLET, FILM COATED, EXTENDED RELEASE ORAL 2 TIMES DAILY
Status: COMPLETED | OUTPATIENT
Start: 2019-01-01 | End: 2019-01-01

## 2019-01-01 RX ORDER — SODIUM CHLORIDE 0.9 % (FLUSH) 0.9 %
5-40 SYRINGE (ML) INJECTION AS NEEDED
Status: DISCONTINUED | OUTPATIENT
Start: 2019-01-01 | End: 2019-01-01 | Stop reason: HOSPADM

## 2019-01-01 RX ORDER — MAGNESIUM SULFATE 1 G/100ML
1 INJECTION INTRAVENOUS ONCE
Status: DISCONTINUED | OUTPATIENT
Start: 2019-01-01 | End: 2019-01-01

## 2019-01-01 RX ORDER — AMIODARONE HYDROCHLORIDE 200 MG/1
400 TABLET ORAL 2 TIMES DAILY
Status: DISCONTINUED | OUTPATIENT
Start: 2019-01-01 | End: 2019-01-01 | Stop reason: HOSPADM

## 2019-01-01 RX ORDER — WARFARIN 3 MG/1
3 TABLET ORAL DAILY
Qty: 90 TAB | Refills: 0 | Status: SHIPPED | OUTPATIENT
Start: 2019-01-01 | End: 2019-01-01 | Stop reason: SDUPTHER

## 2019-01-01 RX ORDER — BALSAM PERU/CASTOR OIL
OINTMENT (GRAM) TOPICAL EVERY 8 HOURS
Status: DISCONTINUED | OUTPATIENT
Start: 2019-01-01 | End: 2019-01-01 | Stop reason: HOSPADM

## 2019-01-01 RX ORDER — WARFARIN 2 MG/1
4 TABLET ORAL ONCE
Status: COMPLETED | OUTPATIENT
Start: 2019-01-01 | End: 2019-01-01

## 2019-01-01 RX ORDER — AMIODARONE HYDROCHLORIDE 200 MG/1
200 TABLET ORAL
Qty: 90 TAB | Refills: 3 | Status: SHIPPED | OUTPATIENT
Start: 2019-01-01

## 2019-01-01 RX ORDER — SODIUM CHLORIDE 0.9 % (FLUSH) 0.9 %
20 SYRINGE (ML) INJECTION
Status: COMPLETED | OUTPATIENT
Start: 2019-01-01 | End: 2019-01-01

## 2019-01-01 RX ORDER — ZOLPIDEM TARTRATE 5 MG/1
5 TABLET ORAL
Status: DISCONTINUED | OUTPATIENT
Start: 2019-01-01 | End: 2019-01-01 | Stop reason: HOSPADM

## 2019-01-01 RX ORDER — POTASSIUM CHLORIDE 7.45 MG/ML
10 INJECTION INTRAVENOUS ONCE
Status: COMPLETED | OUTPATIENT
Start: 2019-01-01 | End: 2019-01-01

## 2019-01-01 RX ORDER — FUROSEMIDE 80 MG/1
80 TABLET ORAL DAILY
Qty: 30 TAB | Refills: 1 | Status: SHIPPED | OUTPATIENT
Start: 2019-01-01 | End: 2019-01-01

## 2019-01-01 RX ORDER — WARFARIN 4 MG/1
4 TABLET ORAL
COMMUNITY
End: 2019-01-01 | Stop reason: SDUPTHER

## 2019-01-01 RX ORDER — WARFARIN 3 MG/1
1.5 TABLET ORAL
COMMUNITY

## 2019-01-01 RX ORDER — BUMETANIDE 0.25 MG/ML
1 INJECTION INTRAMUSCULAR; INTRAVENOUS ONCE
Status: DISCONTINUED | OUTPATIENT
Start: 2019-01-01 | End: 2019-01-01

## 2019-01-01 RX ORDER — CARVEDILOL 3.12 MG/1
3.12 TABLET ORAL 2 TIMES DAILY WITH MEALS
Status: DISCONTINUED | OUTPATIENT
Start: 2019-01-01 | End: 2019-01-01

## 2019-01-01 RX ORDER — AMIODARONE HYDROCHLORIDE 200 MG/1
200 TABLET ORAL 2 TIMES DAILY
Qty: 28 TAB | Refills: 0 | Status: ON HOLD | OUTPATIENT
Start: 2019-01-01 | End: 2019-01-01

## 2019-01-01 RX ORDER — MEXILETINE HYDROCHLORIDE 150 MG/1
150 CAPSULE ORAL EVERY 8 HOURS
Status: DISCONTINUED | OUTPATIENT
Start: 2019-01-01 | End: 2019-01-01 | Stop reason: HOSPADM

## 2019-01-01 RX ORDER — CARVEDILOL 3.12 MG/1
3.12 TABLET ORAL 2 TIMES DAILY WITH MEALS
Status: DISCONTINUED | OUTPATIENT
Start: 2019-01-01 | End: 2019-01-01 | Stop reason: HOSPADM

## 2019-01-01 RX ORDER — FAMOTIDINE 20 MG/1
20 TABLET, FILM COATED ORAL
Status: DISCONTINUED | OUTPATIENT
Start: 2019-01-01 | End: 2019-01-01

## 2019-01-01 RX ORDER — POTASSIUM CHLORIDE 7.45 MG/ML
10 INJECTION INTRAVENOUS
Status: COMPLETED | OUTPATIENT
Start: 2019-01-01 | End: 2019-01-01

## 2019-01-01 RX ORDER — FAMOTIDINE 20 MG/1
20 TABLET, FILM COATED ORAL 2 TIMES DAILY
Status: DISCONTINUED | OUTPATIENT
Start: 2019-01-01 | End: 2019-01-01 | Stop reason: HOSPADM

## 2019-01-01 RX ORDER — AMIODARONE HYDROCHLORIDE 200 MG/1
100 TABLET ORAL DAILY
Status: DISCONTINUED | OUTPATIENT
Start: 2019-01-01 | End: 2019-01-01

## 2019-01-01 RX ORDER — LISINOPRIL 10 MG/1
10 TABLET ORAL DAILY
Qty: 90 TAB | Refills: 2 | Status: SHIPPED | OUTPATIENT
Start: 2019-01-01 | End: 2019-01-01

## 2019-01-01 RX ORDER — WARFARIN 2.5 MG/1
2.5 TABLET ORAL ONCE
Status: COMPLETED | OUTPATIENT
Start: 2019-01-01 | End: 2019-01-01

## 2019-01-01 RX ORDER — ONDANSETRON 2 MG/ML
8 INJECTION INTRAMUSCULAR; INTRAVENOUS
Status: COMPLETED | OUTPATIENT
Start: 2019-01-01 | End: 2019-01-01

## 2019-01-01 RX ORDER — OXYCODONE HYDROCHLORIDE 5 MG/1
5 TABLET ORAL
Status: DISCONTINUED | OUTPATIENT
Start: 2019-01-01 | End: 2019-01-01 | Stop reason: HOSPADM

## 2019-01-01 RX ORDER — SPIRONOLACTONE 50 MG/1
50 TABLET, FILM COATED ORAL DAILY
Qty: 90 TAB | Refills: 2 | Status: SHIPPED | OUTPATIENT
Start: 2019-01-01

## 2019-01-01 RX ORDER — POTASSIUM CHLORIDE 750 MG/1
40 TABLET, FILM COATED, EXTENDED RELEASE ORAL 3 TIMES DAILY
Status: DISCONTINUED | OUTPATIENT
Start: 2019-01-01 | End: 2019-01-01

## 2019-01-01 RX ORDER — AMIODARONE HYDROCHLORIDE 200 MG/1
200 TABLET ORAL 2 TIMES DAILY
Qty: 28 TAB | Refills: 0 | Status: SHIPPED | OUTPATIENT
Start: 2019-01-01 | End: 2019-01-01

## 2019-01-01 RX ORDER — LEVOTHYROXINE SODIUM 75 UG/1
75 TABLET ORAL
Qty: 30 TAB | Refills: 5 | Status: SHIPPED | OUTPATIENT
Start: 2019-01-01 | End: 2019-01-01

## 2019-01-01 RX ORDER — LISINOPRIL 10 MG/1
10 TABLET ORAL DAILY
Qty: 30 TAB | Refills: 5 | Status: SHIPPED | OUTPATIENT
Start: 2019-01-01 | End: 2019-01-01 | Stop reason: SDUPTHER

## 2019-01-01 RX ORDER — BUMETANIDE 1 MG/1
3 TABLET ORAL DAILY
Qty: 270 TAB | Refills: 1 | Status: SHIPPED | OUTPATIENT
Start: 2019-01-01 | End: 2019-01-01 | Stop reason: SDUPTHER

## 2019-01-01 RX ORDER — FUROSEMIDE 10 MG/ML
80 INJECTION INTRAMUSCULAR; INTRAVENOUS
Status: COMPLETED | OUTPATIENT
Start: 2019-01-01 | End: 2019-01-01

## 2019-01-01 RX ORDER — METHOCARBAMOL 500 MG/1
500 TABLET, FILM COATED ORAL 3 TIMES DAILY
Status: DISCONTINUED | OUTPATIENT
Start: 2019-01-01 | End: 2019-01-01 | Stop reason: HOSPADM

## 2019-01-01 RX ORDER — WARFARIN 4 MG/1
4 TABLET ORAL
Status: COMPLETED | OUTPATIENT
Start: 2019-01-01 | End: 2019-01-01

## 2019-01-01 RX ORDER — BUMETANIDE 0.25 MG/ML
1 INJECTION INTRAMUSCULAR; INTRAVENOUS EVERY 8 HOURS
Status: DISCONTINUED | OUTPATIENT
Start: 2019-01-01 | End: 2019-01-01

## 2019-01-01 RX ORDER — MORPHINE SULFATE 2 MG/ML
4 INJECTION, SOLUTION INTRAMUSCULAR; INTRAVENOUS
Status: COMPLETED | OUTPATIENT
Start: 2019-01-01 | End: 2019-01-01

## 2019-01-01 RX ORDER — FUROSEMIDE 80 MG/1
80 TABLET ORAL 2 TIMES DAILY
Status: ON HOLD | COMMUNITY
End: 2019-01-01 | Stop reason: SDUPTHER

## 2019-01-01 RX ORDER — POTASSIUM CHLORIDE 750 MG/1
40 TABLET, FILM COATED, EXTENDED RELEASE ORAL 2 TIMES DAILY
Status: DISCONTINUED | OUTPATIENT
Start: 2019-01-01 | End: 2019-01-01

## 2019-01-01 RX ORDER — POTASSIUM CHLORIDE 7.45 MG/ML
10 INJECTION INTRAVENOUS
Status: DISPENSED | OUTPATIENT
Start: 2019-01-01 | End: 2019-01-01

## 2019-01-01 RX ORDER — METOLAZONE 5 MG/1
5 TABLET ORAL DAILY
Qty: 30 TAB | Refills: 2 | Status: ON HOLD | OUTPATIENT
Start: 2019-01-01 | End: 2019-01-01 | Stop reason: ALTCHOICE

## 2019-01-01 RX ORDER — BUMETANIDE 1 MG/1
3 TABLET ORAL DAILY
Qty: 270 TAB | Refills: 2 | Status: SHIPPED | OUTPATIENT
Start: 2019-01-01 | End: 2019-01-01

## 2019-01-01 RX ORDER — FUROSEMIDE 40 MG/1
40 TABLET ORAL 2 TIMES DAILY
Status: DISCONTINUED | OUTPATIENT
Start: 2019-01-01 | End: 2019-01-01 | Stop reason: HOSPADM

## 2019-01-01 RX ORDER — MEXILETINE HYDROCHLORIDE 150 MG/1
300 CAPSULE ORAL EVERY 8 HOURS
Status: DISCONTINUED | OUTPATIENT
Start: 2019-01-01 | End: 2019-01-01 | Stop reason: HOSPADM

## 2019-01-01 RX ORDER — POTASSIUM CHLORIDE 750 MG/1
40 TABLET, FILM COATED, EXTENDED RELEASE ORAL DAILY
Status: DISCONTINUED | OUTPATIENT
Start: 2019-01-01 | End: 2019-01-01 | Stop reason: HOSPADM

## 2019-01-01 RX ORDER — WARFARIN 3 MG/1
3 TABLET ORAL DAILY
Qty: 60 TAB | Refills: 0 | Status: SHIPPED | OUTPATIENT
Start: 2019-01-01 | End: 2019-01-01 | Stop reason: SDUPTHER

## 2019-01-01 RX ORDER — AMIODARONE HYDROCHLORIDE 200 MG/1
200 TABLET ORAL DAILY
Qty: 90 TAB | Refills: 3 | Status: SHIPPED | OUTPATIENT
Start: 2019-01-01 | End: 2019-01-01 | Stop reason: SDUPTHER

## 2019-01-01 RX ORDER — MEXILETINE HYDROCHLORIDE 150 MG/1
150 CAPSULE ORAL EVERY 8 HOURS
Qty: 90 CAP | Refills: 0 | Status: SHIPPED | OUTPATIENT
Start: 2019-01-01 | End: 2019-01-01 | Stop reason: SINTOL

## 2019-01-01 RX ORDER — WARFARIN 4 MG/1
4 TABLET ORAL ONCE
Status: DISCONTINUED | OUTPATIENT
Start: 2019-01-01 | End: 2019-01-01 | Stop reason: HOSPADM

## 2019-01-01 RX ORDER — PROCHLORPERAZINE MALEATE 5 MG
5 TABLET ORAL
Qty: 30 TAB | Refills: 3 | Status: SHIPPED | OUTPATIENT
Start: 2019-01-01 | End: 2019-01-01

## 2019-01-01 RX ORDER — CARVEDILOL 6.25 MG/1
6.25 TABLET ORAL 2 TIMES DAILY WITH MEALS
Status: DISCONTINUED | OUTPATIENT
Start: 2019-01-01 | End: 2019-01-01

## 2019-01-01 RX ORDER — POTASSIUM CHLORIDE 750 MG/1
40 TABLET, FILM COATED, EXTENDED RELEASE ORAL
Status: COMPLETED | OUTPATIENT
Start: 2019-01-01 | End: 2019-01-01

## 2019-01-01 RX ORDER — WARFARIN 3 MG/1
3 TABLET ORAL DAILY
COMMUNITY
End: 2019-01-01 | Stop reason: SDUPTHER

## 2019-01-01 RX ORDER — AMIODARONE HYDROCHLORIDE 200 MG/1
200 TABLET ORAL 2 TIMES DAILY
Status: DISCONTINUED | OUTPATIENT
Start: 2019-01-01 | End: 2019-01-01 | Stop reason: HOSPADM

## 2019-01-01 RX ORDER — AMIODARONE HYDROCHLORIDE 200 MG/1
200 TABLET ORAL DAILY
Status: DISCONTINUED | OUTPATIENT
Start: 2019-01-01 | End: 2019-01-01

## 2019-01-01 RX ORDER — LEVOTHYROXINE SODIUM 150 UG/1
150 TABLET ORAL
Qty: 30 TAB | Refills: 1 | Status: SHIPPED | OUTPATIENT
Start: 2019-01-01

## 2019-01-01 RX ORDER — AMIODARONE HYDROCHLORIDE 200 MG/1
400 TABLET ORAL 2 TIMES DAILY
Qty: 60 TAB | Refills: 1 | Status: SHIPPED | OUTPATIENT
Start: 2019-01-01 | End: 2019-01-01

## 2019-01-01 RX ORDER — POTASSIUM CHLORIDE 750 MG/1
20 TABLET, FILM COATED, EXTENDED RELEASE ORAL
Status: COMPLETED | OUTPATIENT
Start: 2019-01-01 | End: 2019-01-01

## 2019-01-01 RX ORDER — WARFARIN 2 MG/1
4 TABLET ORAL ONCE
Status: DISCONTINUED | OUTPATIENT
Start: 2019-01-01 | End: 2019-01-01 | Stop reason: HOSPADM

## 2019-01-01 RX ORDER — LEVOTHYROXINE SODIUM 100 UG/1
100 TABLET ORAL
Status: DISCONTINUED | OUTPATIENT
Start: 2019-01-01 | End: 2019-01-01

## 2019-01-01 RX ORDER — ONDANSETRON 2 MG/ML
4 INJECTION INTRAMUSCULAR; INTRAVENOUS
Status: DISCONTINUED | OUTPATIENT
Start: 2019-01-01 | End: 2019-01-01 | Stop reason: HOSPADM

## 2019-01-01 RX ORDER — AMIODARONE HYDROCHLORIDE 400 MG/1
400 TABLET ORAL 2 TIMES DAILY
Qty: 60 TAB | Refills: 1 | Status: SHIPPED | OUTPATIENT
Start: 2019-01-01 | End: 2019-01-01

## 2019-01-01 RX ORDER — ADHESIVE BANDAGE
30 BANDAGE TOPICAL DAILY PRN
Status: DISCONTINUED | OUTPATIENT
Start: 2019-01-01 | End: 2019-01-01 | Stop reason: HOSPADM

## 2019-01-01 RX ORDER — HYDROCODONE BITARTRATE AND ACETAMINOPHEN 7.5; 325 MG/1; MG/1
1 TABLET ORAL
Status: DISCONTINUED | OUTPATIENT
Start: 2019-01-01 | End: 2019-01-01 | Stop reason: HOSPADM

## 2019-01-01 RX ORDER — LEVOTHYROXINE SODIUM 150 UG/1
150 TABLET ORAL
Status: DISCONTINUED | OUTPATIENT
Start: 2019-01-01 | End: 2019-01-01 | Stop reason: HOSPADM

## 2019-01-01 RX ORDER — FUROSEMIDE 40 MG/1
40 TABLET ORAL DAILY
Status: DISCONTINUED | OUTPATIENT
Start: 2019-01-01 | End: 2019-01-01 | Stop reason: HOSPADM

## 2019-01-01 RX ORDER — BUMETANIDE 0.25 MG/ML
1 INJECTION INTRAMUSCULAR; INTRAVENOUS 2 TIMES DAILY
Status: DISCONTINUED | OUTPATIENT
Start: 2019-01-01 | End: 2019-01-01

## 2019-01-01 RX ORDER — BUMETANIDE 0.25 MG/ML
1 INJECTION INTRAMUSCULAR; INTRAVENOUS ONCE
Status: DISCONTINUED | OUTPATIENT
Start: 2019-01-01 | End: 2019-01-01 | Stop reason: HOSPADM

## 2019-01-01 RX ORDER — WARFARIN 1 MG/1
1 TABLET ORAL ONCE
Status: ACTIVE | OUTPATIENT
Start: 2019-01-01 | End: 2019-01-01

## 2019-01-01 RX ORDER — WARFARIN 3 MG/1
3 TABLET ORAL
COMMUNITY

## 2019-01-01 RX ORDER — FUROSEMIDE 40 MG/1
120 TABLET ORAL DAILY
Qty: 90 TAB | Refills: 3 | Status: SHIPPED | OUTPATIENT
Start: 2019-01-01 | End: 2019-01-01

## 2019-01-01 RX ORDER — FUROSEMIDE 10 MG/ML
40 INJECTION INTRAMUSCULAR; INTRAVENOUS EVERY 8 HOURS
Status: DISCONTINUED | OUTPATIENT
Start: 2019-01-01 | End: 2019-01-01

## 2019-01-01 RX ORDER — SPIRONOLACTONE 50 MG/1
50 TABLET, FILM COATED ORAL DAILY
Qty: 90 TAB | Refills: 3 | Status: SHIPPED | OUTPATIENT
Start: 2019-01-01 | End: 2019-01-01 | Stop reason: SDUPTHER

## 2019-01-01 RX ORDER — WARFARIN 3 MG/1
TABLET ORAL
Qty: 60 TAB | Refills: 0 | Status: SHIPPED | OUTPATIENT
Start: 2019-01-01

## 2019-01-01 RX ORDER — FUROSEMIDE 80 MG/1
40 TABLET ORAL DAILY
Qty: 30 TAB | Refills: 0 | Status: SHIPPED | OUTPATIENT
Start: 2019-01-01 | End: 2019-01-01 | Stop reason: SDUPTHER

## 2019-01-01 RX ORDER — LEVOTHYROXINE SODIUM 75 UG/1
TABLET ORAL
COMMUNITY
End: 2019-01-01 | Stop reason: SDUPTHER

## 2019-01-01 RX ORDER — SERTRALINE HYDROCHLORIDE 100 MG/1
100 TABLET, FILM COATED ORAL DAILY
COMMUNITY

## 2019-01-01 RX ORDER — SPIRONOLACTONE 25 MG/1
25 TABLET ORAL DAILY
Status: DISCONTINUED | OUTPATIENT
Start: 2019-01-01 | End: 2019-01-01 | Stop reason: HOSPADM

## 2019-01-01 RX ADMIN — HYDROCODONE BITARTRATE AND ACETAMINOPHEN 1 TABLET: 7.5; 325 TABLET ORAL at 18:58

## 2019-01-01 RX ADMIN — AMIODARONE HYDROCHLORIDE 100 MG: 200 TABLET ORAL at 09:15

## 2019-01-01 RX ADMIN — MEXILETINE HYDROCHLORIDE 300 MG: 150 CAPSULE ORAL at 23:01

## 2019-01-01 RX ADMIN — Medication 10 ML: at 22:04

## 2019-01-01 RX ADMIN — POTASSIUM CHLORIDE 40 MEQ: 750 TABLET, FILM COATED, EXTENDED RELEASE ORAL at 08:27

## 2019-01-01 RX ADMIN — FAMOTIDINE 20 MG: 20 TABLET ORAL at 08:16

## 2019-01-01 RX ADMIN — MICONAZOLE NITRATE 2 % TOPICAL POWDER: at 22:13

## 2019-01-01 RX ADMIN — Medication 10 ML: at 21:14

## 2019-01-01 RX ADMIN — Medication 10 ML: at 05:28

## 2019-01-01 RX ADMIN — BUMETANIDE 2 MG: 0.25 INJECTION INTRAMUSCULAR; INTRAVENOUS at 06:45

## 2019-01-01 RX ADMIN — MEXILETINE HYDROCHLORIDE 300 MG: 150 CAPSULE ORAL at 23:38

## 2019-01-01 RX ADMIN — FUROSEMIDE 40 MG: 10 INJECTION, SOLUTION INTRAMUSCULAR; INTRAVENOUS at 13:03

## 2019-01-01 RX ADMIN — METHOCARBAMOL TABLETS 500 MG: 500 TABLET, COATED ORAL at 21:13

## 2019-01-01 RX ADMIN — Medication 10 ML: at 17:12

## 2019-01-01 RX ADMIN — SACUBITRIL AND VALSARTAN 1 TABLET: 49; 51 TABLET, FILM COATED ORAL at 12:48

## 2019-01-01 RX ADMIN — CARVEDILOL 3.12 MG: 3.12 TABLET, FILM COATED ORAL at 16:16

## 2019-01-01 RX ADMIN — Medication 10 ML: at 21:37

## 2019-01-01 RX ADMIN — POTASSIUM CHLORIDE 40 MEQ: 750 TABLET, FILM COATED, EXTENDED RELEASE ORAL at 08:41

## 2019-01-01 RX ADMIN — SPIRONOLACTONE 50 MG: 25 TABLET ORAL at 08:29

## 2019-01-01 RX ADMIN — ACETAMINOPHEN 650 MG: 325 TABLET, FILM COATED ORAL at 05:24

## 2019-01-01 RX ADMIN — POTASSIUM CHLORIDE 40 MEQ: 750 TABLET, EXTENDED RELEASE ORAL at 21:20

## 2019-01-01 RX ADMIN — Medication 10 ML: at 21:06

## 2019-01-01 RX ADMIN — POTASSIUM CHLORIDE 20 MEQ: 750 TABLET, EXTENDED RELEASE ORAL at 03:34

## 2019-01-01 RX ADMIN — CARVEDILOL 3.12 MG: 3.12 TABLET, FILM COATED ORAL at 10:02

## 2019-01-01 RX ADMIN — POTASSIUM CHLORIDE 40 MEQ: 750 TABLET, EXTENDED RELEASE ORAL at 20:34

## 2019-01-01 RX ADMIN — LEVOTHYROXINE SODIUM 100 MCG: 100 TABLET ORAL at 07:30

## 2019-01-01 RX ADMIN — AMIODARONE HYDROCHLORIDE 200 MG: 200 TABLET ORAL at 09:07

## 2019-01-01 RX ADMIN — CARVEDILOL 3.12 MG: 3.12 TABLET, FILM COATED ORAL at 08:09

## 2019-01-01 RX ADMIN — AMIODARONE HYDROCHLORIDE 100 MG: 200 TABLET ORAL at 08:09

## 2019-01-01 RX ADMIN — WARFARIN SODIUM 2.5 MG: 2.5 TABLET ORAL at 17:49

## 2019-01-01 RX ADMIN — Medication 10 ML: at 17:17

## 2019-01-01 RX ADMIN — BUMETANIDE 1 MG: 0.25 INJECTION INTRAMUSCULAR; INTRAVENOUS at 14:58

## 2019-01-01 RX ADMIN — FAMOTIDINE 20 MG: 20 TABLET ORAL at 17:03

## 2019-01-01 RX ADMIN — POTASSIUM CHLORIDE 10 MEQ: 10 INJECTION, SOLUTION INTRAVENOUS at 04:26

## 2019-01-01 RX ADMIN — FAMOTIDINE 20 MG: 20 TABLET ORAL at 21:06

## 2019-01-01 RX ADMIN — Medication 10 ML: at 14:00

## 2019-01-01 RX ADMIN — POTASSIUM CHLORIDE 40 MEQ: 750 TABLET, FILM COATED, EXTENDED RELEASE ORAL at 09:15

## 2019-01-01 RX ADMIN — MILRINONE LACTATE IN DEXTROSE 0.2 MCG/KG/MIN: 200 INJECTION, SOLUTION INTRAVENOUS at 17:01

## 2019-01-01 RX ADMIN — METHOCARBAMOL TABLETS 500 MG: 500 TABLET, COATED ORAL at 08:41

## 2019-01-01 RX ADMIN — SPIRONOLACTONE 25 MG: 25 TABLET ORAL at 12:48

## 2019-01-01 RX ADMIN — SPIRONOLACTONE 50 MG: 25 TABLET ORAL at 08:09

## 2019-01-01 RX ADMIN — Medication 10 ML: at 05:53

## 2019-01-01 RX ADMIN — Medication 10 ML: at 15:22

## 2019-01-01 RX ADMIN — MICONAZOLE NITRATE 2 % TOPICAL POWDER: at 20:59

## 2019-01-01 RX ADMIN — HYDROCODONE BITARTRATE AND ACETAMINOPHEN 1 TABLET: 7.5; 325 TABLET ORAL at 04:28

## 2019-01-01 RX ADMIN — HYDROCODONE BITARTRATE AND ACETAMINOPHEN 1 TABLET: 7.5; 325 TABLET ORAL at 21:13

## 2019-01-01 RX ADMIN — MICONAZOLE NITRATE 2 % TOPICAL POWDER: at 22:47

## 2019-01-01 RX ADMIN — HYDROCODONE BITARTRATE AND ACETAMINOPHEN 1 TABLET: 7.5; 325 TABLET ORAL at 19:15

## 2019-01-01 RX ADMIN — HYDROCODONE BITARTRATE AND ACETAMINOPHEN 1 TABLET: 7.5; 325 TABLET ORAL at 11:40

## 2019-01-01 RX ADMIN — CASTOR OIL AND BALSAM, PERU: 788; 87 OINTMENT TOPICAL at 16:38

## 2019-01-01 RX ADMIN — HYDROCODONE BITARTRATE AND ACETAMINOPHEN 1 TABLET: 7.5; 325 TABLET ORAL at 10:40

## 2019-01-01 RX ADMIN — POTASSIUM CHLORIDE 40 MEQ: 750 TABLET, FILM COATED, EXTENDED RELEASE ORAL at 10:02

## 2019-01-01 RX ADMIN — MILRINONE LACTATE IN DEXTROSE 0.2 MCG/KG/MIN: 200 INJECTION, SOLUTION INTRAVENOUS at 16:30

## 2019-01-01 RX ADMIN — MICONAZOLE NITRATE 2 % TOPICAL POWDER: at 09:19

## 2019-01-01 RX ADMIN — AMIODARONE HYDROCHLORIDE 0.5 MG/MIN: 50 INJECTION, SOLUTION INTRAVENOUS at 02:59

## 2019-01-01 RX ADMIN — METHOCARBAMOL TABLETS 500 MG: 500 TABLET, COATED ORAL at 15:23

## 2019-01-01 RX ADMIN — Medication 10 ML: at 07:12

## 2019-01-01 RX ADMIN — Medication 10 ML: at 13:09

## 2019-01-01 RX ADMIN — Medication 10 ML: at 21:20

## 2019-01-01 RX ADMIN — METHOCARBAMOL TABLETS 500 MG: 500 TABLET, COATED ORAL at 09:25

## 2019-01-01 RX ADMIN — WARFARIN SODIUM 4 MG: 2 TABLET ORAL at 17:43

## 2019-01-01 RX ADMIN — METHOCARBAMOL TABLETS 500 MG: 500 TABLET, COATED ORAL at 16:16

## 2019-01-01 RX ADMIN — FUROSEMIDE 80 MG: 10 INJECTION, SOLUTION INTRAVENOUS at 12:32

## 2019-01-01 RX ADMIN — POTASSIUM CHLORIDE 10 MEQ: 10 INJECTION, SOLUTION INTRAVENOUS at 05:52

## 2019-01-01 RX ADMIN — PRAVASTATIN SODIUM 80 MG: 40 TABLET ORAL at 21:05

## 2019-01-01 RX ADMIN — CASTOR OIL AND BALSAM, PERU: 788; 87 OINTMENT TOPICAL at 20:55

## 2019-01-01 RX ADMIN — Medication 10 ML: at 16:37

## 2019-01-01 RX ADMIN — AMIODARONE HYDROCHLORIDE 0.5 MG/MIN: 50 INJECTION, SOLUTION INTRAVENOUS at 11:46

## 2019-01-01 RX ADMIN — FAMOTIDINE 20 MG: 20 TABLET ORAL at 16:41

## 2019-01-01 RX ADMIN — METHOCARBAMOL TABLETS 500 MG: 500 TABLET, COATED ORAL at 15:25

## 2019-01-01 RX ADMIN — BUMETANIDE 2 MG: 0.25 INJECTION INTRAMUSCULAR; INTRAVENOUS at 07:12

## 2019-01-01 RX ADMIN — SPIRONOLACTONE 25 MG: 25 TABLET ORAL at 12:55

## 2019-01-01 RX ADMIN — SACUBITRIL AND VALSARTAN 1 TABLET: 24; 26 TABLET, FILM COATED ORAL at 11:34

## 2019-01-01 RX ADMIN — MEXILETINE HYDROCHLORIDE 150 MG: 150 CAPSULE ORAL at 23:19

## 2019-01-01 RX ADMIN — WARFARIN SODIUM 3 MG: 1 TABLET ORAL at 16:41

## 2019-01-01 RX ADMIN — MICONAZOLE NITRATE 2 % TOPICAL POWDER: at 22:21

## 2019-01-01 RX ADMIN — CARVEDILOL 3.12 MG: 3.12 TABLET, FILM COATED ORAL at 17:41

## 2019-01-01 RX ADMIN — METHOCARBAMOL TABLETS 500 MG: 500 TABLET, COATED ORAL at 08:27

## 2019-01-01 RX ADMIN — HYDROCODONE BITARTRATE AND ACETAMINOPHEN 1 TABLET: 7.5; 325 TABLET ORAL at 04:41

## 2019-01-01 RX ADMIN — CASTOR OIL AND BALSAM, PERU: 788; 87 OINTMENT TOPICAL at 21:33

## 2019-01-01 RX ADMIN — Medication 10 ML: at 21:23

## 2019-01-01 RX ADMIN — POTASSIUM CHLORIDE 40 MEQ: 750 TABLET, EXTENDED RELEASE ORAL at 09:06

## 2019-01-01 RX ADMIN — Medication 10 ML: at 12:58

## 2019-01-01 RX ADMIN — Medication 10 ML: at 21:34

## 2019-01-01 RX ADMIN — Medication 10 ML: at 06:00

## 2019-01-01 RX ADMIN — MICONAZOLE NITRATE 2 % TOPICAL POWDER: at 09:25

## 2019-01-01 RX ADMIN — METHOCARBAMOL TABLETS 500 MG: 500 TABLET, COATED ORAL at 22:20

## 2019-01-01 RX ADMIN — MEXILETINE HYDROCHLORIDE 150 MG: 150 CAPSULE ORAL at 16:06

## 2019-01-01 RX ADMIN — MICONAZOLE NITRATE 2 % TOPICAL POWDER: at 21:14

## 2019-01-01 RX ADMIN — AMIODARONE HYDROCHLORIDE 200 MG: 200 TABLET ORAL at 08:27

## 2019-01-01 RX ADMIN — CASTOR OIL AND BALSAM, PERU: 788; 87 OINTMENT TOPICAL at 13:04

## 2019-01-01 RX ADMIN — MILRINONE LACTATE IN DEXTROSE 0.2 MCG/KG/MIN: 200 INJECTION, SOLUTION INTRAVENOUS at 08:26

## 2019-01-01 RX ADMIN — FUROSEMIDE 40 MG: 10 INJECTION, SOLUTION INTRAMUSCULAR; INTRAVENOUS at 21:19

## 2019-01-01 RX ADMIN — Medication 10 ML: at 15:54

## 2019-01-01 RX ADMIN — OXYCODONE HYDROCHLORIDE 5 MG: 5 TABLET ORAL at 08:16

## 2019-01-01 RX ADMIN — SACUBITRIL AND VALSARTAN 1 TABLET: 49; 51 TABLET, FILM COATED ORAL at 17:41

## 2019-01-01 RX ADMIN — FAMOTIDINE 20 MG: 20 TABLET ORAL at 10:02

## 2019-01-01 RX ADMIN — MICONAZOLE NITRATE 2 % TOPICAL POWDER: at 08:19

## 2019-01-01 RX ADMIN — ACETAMINOPHEN 650 MG: 325 TABLET, FILM COATED ORAL at 06:00

## 2019-01-01 RX ADMIN — PRAVASTATIN SODIUM 80 MG: 40 TABLET ORAL at 21:34

## 2019-01-01 RX ADMIN — HYDROCODONE BITARTRATE AND ACETAMINOPHEN 1 TABLET: 7.5; 325 TABLET ORAL at 22:47

## 2019-01-01 RX ADMIN — PRAVASTATIN SODIUM 80 MG: 40 TABLET ORAL at 21:20

## 2019-01-01 RX ADMIN — AMIODARONE HYDROCHLORIDE 200 MG: 200 TABLET ORAL at 17:41

## 2019-01-01 RX ADMIN — CARVEDILOL 3.12 MG: 3.12 TABLET, FILM COATED ORAL at 09:51

## 2019-01-01 RX ADMIN — CASTOR OIL AND BALSAM, PERU: 788; 87 OINTMENT TOPICAL at 14:00

## 2019-01-01 RX ADMIN — MILRINONE LACTATE IN DEXTROSE 0.2 MCG/KG/MIN: 200 INJECTION, SOLUTION INTRAVENOUS at 23:42

## 2019-01-01 RX ADMIN — Medication 10 ML: at 21:53

## 2019-01-01 RX ADMIN — POTASSIUM CHLORIDE 40 MEQ: 750 TABLET, FILM COATED, EXTENDED RELEASE ORAL at 12:24

## 2019-01-01 RX ADMIN — MAGNESIUM SULFATE HEPTAHYDRATE 1 G: 1 INJECTION, SOLUTION INTRAVENOUS at 03:54

## 2019-01-01 RX ADMIN — BUMETANIDE 1 MG: 0.25 INJECTION INTRAMUSCULAR; INTRAVENOUS at 15:18

## 2019-01-01 RX ADMIN — LEVOTHYROXINE SODIUM 150 MCG: 150 TABLET ORAL at 07:10

## 2019-01-01 RX ADMIN — AMIODARONE HYDROCHLORIDE 400 MG: 200 TABLET ORAL at 09:22

## 2019-01-01 RX ADMIN — SPIRONOLACTONE 50 MG: 25 TABLET ORAL at 08:16

## 2019-01-01 RX ADMIN — HYDROCODONE BITARTRATE AND ACETAMINOPHEN 1 TABLET: 7.5; 325 TABLET ORAL at 07:51

## 2019-01-01 RX ADMIN — MEXILETINE HYDROCHLORIDE 300 MG: 150 CAPSULE ORAL at 02:58

## 2019-01-01 RX ADMIN — HYDROCODONE BITARTRATE AND ACETAMINOPHEN 1 TABLET: 7.5; 325 TABLET ORAL at 00:54

## 2019-01-01 RX ADMIN — Medication 10 ML: at 08:07

## 2019-01-01 RX ADMIN — Medication 10 ML: at 16:00

## 2019-01-01 RX ADMIN — MEXILETINE HYDROCHLORIDE 300 MG: 150 CAPSULE ORAL at 17:41

## 2019-01-01 RX ADMIN — BUMETANIDE 2 MG: 0.25 INJECTION INTRAMUSCULAR; INTRAVENOUS at 14:59

## 2019-01-01 RX ADMIN — POTASSIUM CHLORIDE 10 MEQ: 200 INJECTION, SOLUTION INTRAVENOUS at 09:29

## 2019-01-01 RX ADMIN — SERTRALINE HYDROCHLORIDE 100 MG: 50 TABLET ORAL at 10:02

## 2019-01-01 RX ADMIN — PRAVASTATIN SODIUM 80 MG: 40 TABLET ORAL at 21:13

## 2019-01-01 RX ADMIN — POTASSIUM CHLORIDE 40 MEQ: 750 TABLET, FILM COATED, EXTENDED RELEASE ORAL at 08:16

## 2019-01-01 RX ADMIN — SACUBITRIL AND VALSARTAN 1 TABLET: 49; 51 TABLET, FILM COATED ORAL at 07:30

## 2019-01-01 RX ADMIN — MICONAZOLE NITRATE 2 % TOPICAL POWDER: at 08:29

## 2019-01-01 RX ADMIN — FAMOTIDINE 20 MG: 20 TABLET ORAL at 21:34

## 2019-01-01 RX ADMIN — FUROSEMIDE 40 MG: 40 TABLET ORAL at 08:42

## 2019-01-01 RX ADMIN — MEXILETINE HYDROCHLORIDE 150 MG: 150 CAPSULE ORAL at 09:06

## 2019-01-01 RX ADMIN — ACETAMINOPHEN 650 MG: 325 TABLET, FILM COATED ORAL at 05:13

## 2019-01-01 RX ADMIN — AMIODARONE HYDROCHLORIDE 200 MG: 200 TABLET ORAL at 08:16

## 2019-01-01 RX ADMIN — METHOCARBAMOL TABLETS 500 MG: 500 TABLET, COATED ORAL at 09:16

## 2019-01-01 RX ADMIN — MICONAZOLE NITRATE 2 % TOPICAL POWDER: at 13:50

## 2019-01-01 RX ADMIN — AMIODARONE HYDROCHLORIDE 400 MG: 200 TABLET ORAL at 12:49

## 2019-01-01 RX ADMIN — POTASSIUM CHLORIDE 10 MEQ: 10 INJECTION, SOLUTION INTRAVENOUS at 21:47

## 2019-01-01 RX ADMIN — METHOCARBAMOL TABLETS 500 MG: 500 TABLET, COATED ORAL at 21:46

## 2019-01-01 RX ADMIN — METHOCARBAMOL TABLETS 500 MG: 500 TABLET, COATED ORAL at 16:37

## 2019-01-01 RX ADMIN — CASTOR OIL AND BALSAM, PERU: 788; 87 OINTMENT TOPICAL at 08:13

## 2019-01-01 RX ADMIN — MILRINONE LACTATE IN DEXTROSE 0.2 MCG/KG/MIN: 200 INJECTION, SOLUTION INTRAVENOUS at 04:26

## 2019-01-01 RX ADMIN — MILRINONE LACTATE IN DEXTROSE 0.2 MCG/KG/MIN: 200 INJECTION, SOLUTION INTRAVENOUS at 19:49

## 2019-01-01 RX ADMIN — FAMOTIDINE 20 MG: 20 TABLET ORAL at 17:16

## 2019-01-01 RX ADMIN — SERTRALINE HYDROCHLORIDE 100 MG: 50 TABLET ORAL at 08:16

## 2019-01-01 RX ADMIN — METHOCARBAMOL TABLETS 500 MG: 500 TABLET, COATED ORAL at 21:05

## 2019-01-01 RX ADMIN — MORPHINE SULFATE 4 MG: 2 INJECTION, SOLUTION INTRAMUSCULAR; INTRAVENOUS at 03:37

## 2019-01-01 RX ADMIN — PRAVASTATIN SODIUM 80 MG: 40 TABLET ORAL at 21:23

## 2019-01-01 RX ADMIN — METHOCARBAMOL TABLETS 500 MG: 500 TABLET, COATED ORAL at 10:34

## 2019-01-01 RX ADMIN — Medication 10 ML: at 23:12

## 2019-01-01 RX ADMIN — CARVEDILOL 3.12 MG: 3.12 TABLET, FILM COATED ORAL at 16:04

## 2019-01-01 RX ADMIN — HYDROCODONE BITARTRATE AND ACETAMINOPHEN 1 TABLET: 7.5; 325 TABLET ORAL at 18:28

## 2019-01-01 RX ADMIN — FAMOTIDINE 20 MG: 20 TABLET ORAL at 20:06

## 2019-01-01 RX ADMIN — SERTRALINE HYDROCHLORIDE 100 MG: 50 TABLET ORAL at 10:34

## 2019-01-01 RX ADMIN — FUROSEMIDE 40 MG: 10 INJECTION, SOLUTION INTRAMUSCULAR; INTRAVENOUS at 20:14

## 2019-01-01 RX ADMIN — BUMETANIDE 1 MG: 0.25 INJECTION INTRAMUSCULAR; INTRAVENOUS at 15:01

## 2019-01-01 RX ADMIN — HYDROCODONE BITARTRATE AND ACETAMINOPHEN 1 TABLET: 7.5; 325 TABLET ORAL at 15:59

## 2019-01-01 RX ADMIN — CASTOR OIL AND BALSAM, PERU: 788; 87 OINTMENT TOPICAL at 05:31

## 2019-01-01 RX ADMIN — HYDROCODONE BITARTRATE AND ACETAMINOPHEN 1 TABLET: 7.5; 325 TABLET ORAL at 18:35

## 2019-01-01 RX ADMIN — CARVEDILOL 3.12 MG: 3.12 TABLET, FILM COATED ORAL at 18:13

## 2019-01-01 RX ADMIN — SERTRALINE HYDROCHLORIDE 100 MG: 50 TABLET ORAL at 08:27

## 2019-01-01 RX ADMIN — MEXILETINE HYDROCHLORIDE 300 MG: 150 CAPSULE ORAL at 17:00

## 2019-01-01 RX ADMIN — MEXILETINE HYDROCHLORIDE 150 MG: 150 CAPSULE ORAL at 17:43

## 2019-01-01 RX ADMIN — CASTOR OIL AND BALSAM, PERU: 788; 87 OINTMENT TOPICAL at 22:21

## 2019-01-01 RX ADMIN — CARVEDILOL 3.12 MG: 3.12 TABLET, FILM COATED ORAL at 17:01

## 2019-01-01 RX ADMIN — SERTRALINE HYDROCHLORIDE 100 MG: 50 TABLET ORAL at 09:15

## 2019-01-01 RX ADMIN — CARVEDILOL 3.12 MG: 3.12 TABLET, FILM COATED ORAL at 08:16

## 2019-01-01 RX ADMIN — ACETAMINOPHEN 650 MG: 325 TABLET, FILM COATED ORAL at 05:18

## 2019-01-01 RX ADMIN — HYDROCODONE BITARTRATE AND ACETAMINOPHEN 1 TABLET: 7.5; 325 TABLET ORAL at 11:17

## 2019-01-01 RX ADMIN — CASTOR OIL AND BALSAM, PERU: 788; 87 OINTMENT TOPICAL at 14:46

## 2019-01-01 RX ADMIN — AMIODARONE HYDROCHLORIDE 1 MG/MIN: 50 INJECTION, SOLUTION INTRAVENOUS at 03:26

## 2019-01-01 RX ADMIN — AMIODARONE HYDROCHLORIDE 100 MG: 200 TABLET ORAL at 09:50

## 2019-01-01 RX ADMIN — AMIODARONE HYDROCHLORIDE 100 MG: 200 TABLET ORAL at 09:25

## 2019-01-01 RX ADMIN — Medication 10 ML: at 22:18

## 2019-01-01 RX ADMIN — LEVOTHYROXINE SODIUM 150 MCG: 150 TABLET ORAL at 06:36

## 2019-01-01 RX ADMIN — OXYCODONE HYDROCHLORIDE 5 MG: 5 TABLET ORAL at 19:03

## 2019-01-01 RX ADMIN — SODIUM CHLORIDE 10 MG: 9 INJECTION INTRAMUSCULAR; INTRAVENOUS; SUBCUTANEOUS at 02:27

## 2019-01-01 RX ADMIN — HYDROCODONE BITARTRATE AND ACETAMINOPHEN 1 TABLET: 7.5; 325 TABLET ORAL at 13:45

## 2019-01-01 RX ADMIN — MEXILETINE HYDROCHLORIDE 150 MG: 150 CAPSULE ORAL at 23:52

## 2019-01-01 RX ADMIN — MEXILETINE HYDROCHLORIDE 300 MG: 150 CAPSULE ORAL at 08:19

## 2019-01-01 RX ADMIN — PRAVASTATIN SODIUM 80 MG: 40 TABLET ORAL at 21:19

## 2019-01-01 RX ADMIN — Medication 10 ML: at 12:16

## 2019-01-01 RX ADMIN — POTASSIUM CHLORIDE 10 MEQ: 10 INJECTION, SOLUTION INTRAVENOUS at 20:36

## 2019-01-01 RX ADMIN — SPIRONOLACTONE 50 MG: 25 TABLET ORAL at 09:25

## 2019-01-01 RX ADMIN — MICONAZOLE NITRATE 2 % TOPICAL POWDER: at 10:36

## 2019-01-01 RX ADMIN — CARVEDILOL 3.12 MG: 3.12 TABLET, FILM COATED ORAL at 16:15

## 2019-01-01 RX ADMIN — PRAVASTATIN SODIUM 80 MG: 40 TABLET ORAL at 20:59

## 2019-01-01 RX ADMIN — LEVOTHYROXINE SODIUM 150 MCG: 150 TABLET ORAL at 07:15

## 2019-01-01 RX ADMIN — AMIODARONE HYDROCHLORIDE 100 MG: 200 TABLET ORAL at 08:16

## 2019-01-01 RX ADMIN — POTASSIUM CHLORIDE 40 MEQ: 750 TABLET, EXTENDED RELEASE ORAL at 09:11

## 2019-01-01 RX ADMIN — MILRINONE LACTATE IN DEXTROSE 0.2 MCG/KG/MIN: 200 INJECTION, SOLUTION INTRAVENOUS at 12:54

## 2019-01-01 RX ADMIN — Medication 10 ML: at 15:24

## 2019-01-01 RX ADMIN — Medication 10 ML: at 14:15

## 2019-01-01 RX ADMIN — Medication 10 ML: at 22:01

## 2019-01-01 RX ADMIN — CASTOR OIL AND BALSAM, PERU: 788; 87 OINTMENT TOPICAL at 05:19

## 2019-01-01 RX ADMIN — CASTOR OIL AND BALSAM, PERU: 788; 87 OINTMENT TOPICAL at 14:59

## 2019-01-01 RX ADMIN — SODIUM CHLORIDE 10 MG: 9 INJECTION INTRAMUSCULAR; INTRAVENOUS; SUBCUTANEOUS at 18:11

## 2019-01-01 RX ADMIN — Medication 10 ML: at 12:04

## 2019-01-01 RX ADMIN — POTASSIUM CHLORIDE 40 MEQ: 750 TABLET, EXTENDED RELEASE ORAL at 15:54

## 2019-01-01 RX ADMIN — FUROSEMIDE 40 MG: 40 TABLET ORAL at 08:27

## 2019-01-01 RX ADMIN — HYDROCODONE BITARTRATE AND ACETAMINOPHEN 1 TABLET: 7.5; 325 TABLET ORAL at 07:21

## 2019-01-01 RX ADMIN — MILRINONE LACTATE IN DEXTROSE 0.2 MCG/KG/MIN: 200 INJECTION, SOLUTION INTRAVENOUS at 17:49

## 2019-01-01 RX ADMIN — CARVEDILOL 3.12 MG: 3.12 TABLET, FILM COATED ORAL at 08:29

## 2019-01-01 RX ADMIN — POTASSIUM CHLORIDE 40 MEQ: 750 TABLET, FILM COATED, EXTENDED RELEASE ORAL at 09:50

## 2019-01-01 RX ADMIN — CASTOR OIL AND BALSAM, PERU: 788; 87 OINTMENT TOPICAL at 12:03

## 2019-01-01 RX ADMIN — CASTOR OIL AND BALSAM, PERU: 788; 87 OINTMENT TOPICAL at 21:35

## 2019-01-01 RX ADMIN — METHOCARBAMOL TABLETS 500 MG: 500 TABLET, COATED ORAL at 22:12

## 2019-01-01 RX ADMIN — MICONAZOLE NITRATE 2 % TOPICAL POWDER: at 20:07

## 2019-01-01 RX ADMIN — HYDROCODONE BITARTRATE AND ACETAMINOPHEN 1 TABLET: 7.5; 325 TABLET ORAL at 01:16

## 2019-01-01 RX ADMIN — METHOCARBAMOL TABLETS 500 MG: 500 TABLET, COATED ORAL at 09:50

## 2019-01-01 RX ADMIN — OXYCODONE HYDROCHLORIDE 5 MG: 5 TABLET ORAL at 22:25

## 2019-01-01 RX ADMIN — FAMOTIDINE 20 MG: 20 TABLET ORAL at 11:34

## 2019-01-01 RX ADMIN — POTASSIUM CHLORIDE 40 MEQ: 750 TABLET, EXTENDED RELEASE ORAL at 20:06

## 2019-01-01 RX ADMIN — HYDROCODONE BITARTRATE AND ACETAMINOPHEN 1 TABLET: 7.5; 325 TABLET ORAL at 23:12

## 2019-01-01 RX ADMIN — CASTOR OIL AND BALSAM, PERU: 788; 87 OINTMENT TOPICAL at 21:14

## 2019-01-01 RX ADMIN — Medication 10 ML: at 15:57

## 2019-01-01 RX ADMIN — BUMETANIDE 2 MG: 0.25 INJECTION INTRAMUSCULAR; INTRAVENOUS at 15:25

## 2019-01-01 RX ADMIN — MEXILETINE HYDROCHLORIDE 150 MG: 150 CAPSULE ORAL at 09:11

## 2019-01-01 RX ADMIN — AMIODARONE HYDROCHLORIDE 400 MG: 200 TABLET ORAL at 17:44

## 2019-01-01 RX ADMIN — HYDROCODONE BITARTRATE AND ACETAMINOPHEN 1 TABLET: 7.5; 325 TABLET ORAL at 12:43

## 2019-01-01 RX ADMIN — WARFARIN SODIUM 3 MG: 1 TABLET ORAL at 16:37

## 2019-01-01 RX ADMIN — OXYCODONE HYDROCHLORIDE 5 MG: 5 TABLET ORAL at 12:48

## 2019-01-01 RX ADMIN — HYDROCODONE BITARTRATE AND ACETAMINOPHEN 1 TABLET: 7.5; 325 TABLET ORAL at 22:12

## 2019-01-01 RX ADMIN — POTASSIUM CHLORIDE 10 MEQ: 10 INJECTION, SOLUTION INTRAVENOUS at 06:40

## 2019-01-01 RX ADMIN — SERTRALINE HYDROCHLORIDE 100 MG: 50 TABLET ORAL at 08:29

## 2019-01-01 RX ADMIN — LEVOTHYROXINE SODIUM 150 MCG: 150 TABLET ORAL at 07:12

## 2019-01-01 RX ADMIN — MEXILETINE HYDROCHLORIDE 300 MG: 150 CAPSULE ORAL at 17:08

## 2019-01-01 RX ADMIN — FUROSEMIDE 40 MG: 40 TABLET ORAL at 11:11

## 2019-01-01 RX ADMIN — HYDROCODONE BITARTRATE AND ACETAMINOPHEN 1 TABLET: 7.5; 325 TABLET ORAL at 16:41

## 2019-01-01 RX ADMIN — METHOCARBAMOL TABLETS 500 MG: 500 TABLET, COATED ORAL at 22:01

## 2019-01-01 RX ADMIN — WARFARIN SODIUM 2.5 MG: 2.5 TABLET ORAL at 16:37

## 2019-01-01 RX ADMIN — FAMOTIDINE 20 MG: 20 TABLET ORAL at 09:22

## 2019-01-01 RX ADMIN — Medication 10 ML: at 12:03

## 2019-01-01 RX ADMIN — MEXILETINE HYDROCHLORIDE 150 MG: 150 CAPSULE ORAL at 09:31

## 2019-01-01 RX ADMIN — FUROSEMIDE 40 MG: 10 INJECTION, SOLUTION INTRAMUSCULAR; INTRAVENOUS at 05:42

## 2019-01-01 RX ADMIN — HYDROCODONE BITARTRATE AND ACETAMINOPHEN 1 TABLET: 7.5; 325 TABLET ORAL at 12:37

## 2019-01-01 RX ADMIN — POTASSIUM CHLORIDE 40 MEQ: 750 TABLET, EXTENDED RELEASE ORAL at 09:22

## 2019-01-01 RX ADMIN — Medication 10 ML: at 15:25

## 2019-01-01 RX ADMIN — HYDROCODONE BITARTRATE AND ACETAMINOPHEN 1 TABLET: 7.5; 325 TABLET ORAL at 06:07

## 2019-01-01 RX ADMIN — BUMETANIDE 2 MG: 0.25 INJECTION INTRAMUSCULAR; INTRAVENOUS at 22:48

## 2019-01-01 RX ADMIN — FAMOTIDINE 20 MG: 20 TABLET ORAL at 20:07

## 2019-01-01 RX ADMIN — AMIODARONE HYDROCHLORIDE 200 MG: 200 TABLET ORAL at 12:24

## 2019-01-01 RX ADMIN — Medication 20 ML: at 09:41

## 2019-01-01 RX ADMIN — SPIRONOLACTONE 50 MG: 25 TABLET ORAL at 08:41

## 2019-01-01 RX ADMIN — SERTRALINE HYDROCHLORIDE 100 MG: 50 TABLET ORAL at 12:49

## 2019-01-01 RX ADMIN — REGADENOSON 0.4 MG: 0.08 INJECTION, SOLUTION INTRAVENOUS at 09:41

## 2019-01-01 RX ADMIN — CARVEDILOL 3.12 MG: 3.12 TABLET, FILM COATED ORAL at 16:01

## 2019-01-01 RX ADMIN — METHOCARBAMOL TABLETS 500 MG: 500 TABLET, COATED ORAL at 17:49

## 2019-01-01 RX ADMIN — MEXILETINE HYDROCHLORIDE 150 MG: 150 CAPSULE ORAL at 16:04

## 2019-01-01 RX ADMIN — AMIODARONE HYDROCHLORIDE 200 MG: 200 TABLET ORAL at 09:28

## 2019-01-01 RX ADMIN — CARVEDILOL 3.12 MG: 3.12 TABLET, FILM COATED ORAL at 17:49

## 2019-01-01 RX ADMIN — SERTRALINE HYDROCHLORIDE 100 MG: 50 TABLET ORAL at 09:22

## 2019-01-01 RX ADMIN — FAMOTIDINE 20 MG: 20 TABLET ORAL at 21:19

## 2019-01-01 RX ADMIN — DEXTROSE MONOHYDRATE 150 MG: 5 INJECTION, SOLUTION INTRAVENOUS at 03:24

## 2019-01-01 RX ADMIN — CASTOR OIL AND BALSAM, PERU: 788; 87 OINTMENT TOPICAL at 13:50

## 2019-01-01 RX ADMIN — AMIODARONE HYDROCHLORIDE 100 MG: 200 TABLET ORAL at 08:29

## 2019-01-01 RX ADMIN — ZOLPIDEM TARTRATE 5 MG: 5 TABLET ORAL at 23:20

## 2019-01-01 RX ADMIN — POTASSIUM CHLORIDE 40 MEQ: 750 TABLET, EXTENDED RELEASE ORAL at 15:59

## 2019-01-01 RX ADMIN — MICONAZOLE NITRATE 2 % TOPICAL POWDER: at 08:42

## 2019-01-01 RX ADMIN — LISINOPRIL 5 MG: 5 TABLET ORAL at 09:07

## 2019-01-01 RX ADMIN — LEVOTHYROXINE SODIUM 150 MCG: 150 TABLET ORAL at 07:05

## 2019-01-01 RX ADMIN — HYDROCODONE BITARTRATE AND ACETAMINOPHEN 1 TABLET: 7.5; 325 TABLET ORAL at 05:24

## 2019-01-01 RX ADMIN — HYDROCODONE BITARTRATE AND ACETAMINOPHEN 1 TABLET: 7.5; 325 TABLET ORAL at 05:13

## 2019-01-01 RX ADMIN — SPIRONOLACTONE 50 MG: 25 TABLET ORAL at 09:49

## 2019-01-01 RX ADMIN — MAGNESIUM HYDROXIDE 30 ML: 400 SUSPENSION ORAL at 11:43

## 2019-01-01 RX ADMIN — OXYCODONE HYDROCHLORIDE 5 MG: 5 TABLET ORAL at 00:22

## 2019-01-01 RX ADMIN — MICONAZOLE NITRATE 2 % TOPICAL POWDER: at 21:33

## 2019-01-01 RX ADMIN — ZOLPIDEM TARTRATE 5 MG: 5 TABLET ORAL at 22:36

## 2019-01-01 RX ADMIN — METHOCARBAMOL TABLETS 500 MG: 500 TABLET, COATED ORAL at 20:59

## 2019-01-01 RX ADMIN — HYDROCODONE BITARTRATE AND ACETAMINOPHEN 1 TABLET: 7.5; 325 TABLET ORAL at 20:48

## 2019-01-01 RX ADMIN — WARFARIN SODIUM 6 MG: 2 TABLET ORAL at 16:01

## 2019-01-01 RX ADMIN — CASTOR OIL AND BALSAM, PERU: 788; 87 OINTMENT TOPICAL at 15:24

## 2019-01-01 RX ADMIN — AMIODARONE HYDROCHLORIDE 1 MG/MIN: 50 INJECTION, SOLUTION INTRAVENOUS at 04:00

## 2019-01-01 RX ADMIN — CASTOR OIL AND BALSAM, PERU: 788; 87 OINTMENT TOPICAL at 06:00

## 2019-01-01 RX ADMIN — ZOLPIDEM TARTRATE 5 MG: 5 TABLET ORAL at 21:51

## 2019-01-01 RX ADMIN — BUMETANIDE 1 MG: 0.25 INJECTION INTRAMUSCULAR; INTRAVENOUS at 23:06

## 2019-01-01 RX ADMIN — METHOCARBAMOL TABLETS 500 MG: 500 TABLET, COATED ORAL at 17:03

## 2019-01-01 RX ADMIN — FUROSEMIDE 40 MG: 10 INJECTION, SOLUTION INTRAMUSCULAR; INTRAVENOUS at 13:49

## 2019-01-01 RX ADMIN — POTASSIUM CHLORIDE 10 MEQ: 10 INJECTION, SOLUTION INTRAVENOUS at 13:00

## 2019-01-01 RX ADMIN — HYDROCODONE BITARTRATE AND ACETAMINOPHEN 1 TABLET: 7.5; 325 TABLET ORAL at 05:00

## 2019-01-01 RX ADMIN — AMIODARONE HYDROCHLORIDE 200 MG: 200 TABLET ORAL at 21:22

## 2019-01-01 RX ADMIN — HYDROCODONE BITARTRATE AND ACETAMINOPHEN 1 TABLET: 7.5; 325 TABLET ORAL at 14:46

## 2019-01-01 RX ADMIN — FUROSEMIDE 80 MG: 10 INJECTION, SOLUTION INTRAMUSCULAR; INTRAVENOUS at 17:13

## 2019-01-01 RX ADMIN — METHOCARBAMOL TABLETS 500 MG: 500 TABLET, COATED ORAL at 08:29

## 2019-01-01 RX ADMIN — FUROSEMIDE 80 MG: 10 INJECTION, SOLUTION INTRAVENOUS at 16:57

## 2019-01-01 RX ADMIN — CARVEDILOL 6.25 MG: 6.25 TABLET, FILM COATED ORAL at 16:41

## 2019-01-01 RX ADMIN — SERTRALINE HYDROCHLORIDE 100 MG: 50 TABLET ORAL at 11:34

## 2019-01-01 RX ADMIN — Medication 10 ML: at 21:12

## 2019-01-01 RX ADMIN — FAMOTIDINE 20 MG: 20 TABLET ORAL at 08:27

## 2019-01-01 RX ADMIN — AMIODARONE HYDROCHLORIDE 200 MG: 200 TABLET ORAL at 10:34

## 2019-01-01 RX ADMIN — HYDROCODONE BITARTRATE AND ACETAMINOPHEN 1 TABLET: 7.5; 325 TABLET ORAL at 21:11

## 2019-01-01 RX ADMIN — WARFARIN SODIUM 4 MG: 2 TABLET ORAL at 16:04

## 2019-01-01 RX ADMIN — MILRINONE LACTATE IN DEXTROSE 0.2 MCG/KG/MIN: 200 INJECTION, SOLUTION INTRAVENOUS at 03:12

## 2019-01-01 RX ADMIN — ACETAMINOPHEN 650 MG: 325 TABLET, FILM COATED ORAL at 07:17

## 2019-01-01 RX ADMIN — HYDROCODONE BITARTRATE AND ACETAMINOPHEN 1 TABLET: 7.5; 325 TABLET ORAL at 16:26

## 2019-01-01 RX ADMIN — METHOCARBAMOL TABLETS 500 MG: 500 TABLET, COATED ORAL at 21:34

## 2019-01-01 RX ADMIN — ACETAMINOPHEN 650 MG: 325 TABLET ORAL at 19:55

## 2019-01-01 RX ADMIN — ZOLPIDEM TARTRATE 5 MG: 5 TABLET ORAL at 23:13

## 2019-01-01 RX ADMIN — FAMOTIDINE 20 MG: 20 TABLET ORAL at 20:20

## 2019-01-01 RX ADMIN — LEVOTHYROXINE SODIUM 150 MCG: 150 TABLET ORAL at 05:30

## 2019-01-01 RX ADMIN — HYDROCODONE BITARTRATE AND ACETAMINOPHEN 1 TABLET: 7.5; 325 TABLET ORAL at 00:35

## 2019-01-01 RX ADMIN — CASTOR OIL AND BALSAM, PERU: 788; 87 OINTMENT TOPICAL at 16:16

## 2019-01-01 RX ADMIN — HYDROCODONE BITARTRATE AND ACETAMINOPHEN 1 TABLET: 7.5; 325 TABLET ORAL at 02:05

## 2019-01-01 RX ADMIN — CARVEDILOL 3.12 MG: 3.12 TABLET, FILM COATED ORAL at 16:37

## 2019-01-01 RX ADMIN — PRAVASTATIN SODIUM 80 MG: 40 TABLET ORAL at 22:01

## 2019-01-01 RX ADMIN — MICONAZOLE NITRATE 2 % TOPICAL POWDER: at 22:00

## 2019-01-01 RX ADMIN — POTASSIUM CHLORIDE 10 MEQ: 10 INJECTION, SOLUTION INTRAVENOUS at 07:11

## 2019-01-01 RX ADMIN — FAMOTIDINE 20 MG: 20 TABLET ORAL at 21:05

## 2019-01-01 RX ADMIN — OXYCODONE HYDROCHLORIDE 5 MG: 5 TABLET ORAL at 12:18

## 2019-01-01 RX ADMIN — POTASSIUM CHLORIDE 10 MEQ: 200 INJECTION, SOLUTION INTRAVENOUS at 14:30

## 2019-01-01 RX ADMIN — FUROSEMIDE 40 MG: 10 INJECTION, SOLUTION INTRAMUSCULAR; INTRAVENOUS at 05:18

## 2019-01-01 RX ADMIN — OXYCODONE HYDROCHLORIDE 5 MG: 5 TABLET ORAL at 02:59

## 2019-01-01 RX ADMIN — BUMETANIDE 2 MG: 0.25 INJECTION INTRAMUSCULAR; INTRAVENOUS at 22:36

## 2019-01-01 RX ADMIN — FUROSEMIDE 40 MG: 10 INJECTION, SOLUTION INTRAMUSCULAR; INTRAVENOUS at 22:18

## 2019-01-01 RX ADMIN — SPIRONOLACTONE 50 MG: 25 TABLET ORAL at 09:16

## 2019-01-01 RX ADMIN — HYDROCODONE BITARTRATE AND ACETAMINOPHEN 1 TABLET: 7.5; 325 TABLET ORAL at 03:12

## 2019-01-01 RX ADMIN — SPIRONOLACTONE 50 MG: 25 TABLET ORAL at 08:27

## 2019-01-01 RX ADMIN — METHOCARBAMOL TABLETS 500 MG: 500 TABLET, COATED ORAL at 16:41

## 2019-01-01 RX ADMIN — HYDROCODONE BITARTRATE AND ACETAMINOPHEN 1 TABLET: 7.5; 325 TABLET ORAL at 13:48

## 2019-01-01 RX ADMIN — Medication 10 ML: at 13:54

## 2019-01-01 RX ADMIN — MEXILETINE HYDROCHLORIDE 150 MG: 150 CAPSULE ORAL at 01:09

## 2019-01-01 RX ADMIN — CARVEDILOL 3.12 MG: 3.12 TABLET, FILM COATED ORAL at 10:34

## 2019-01-01 RX ADMIN — CASTOR OIL AND BALSAM, PERU: 788; 87 OINTMENT TOPICAL at 10:36

## 2019-01-01 RX ADMIN — HYDROCODONE BITARTRATE AND ACETAMINOPHEN 1 TABLET: 7.5; 325 TABLET ORAL at 02:48

## 2019-01-01 RX ADMIN — PRAVASTATIN SODIUM 80 MG: 40 TABLET ORAL at 22:20

## 2019-01-01 RX ADMIN — ACETAMINOPHEN 650 MG: 325 TABLET, FILM COATED ORAL at 20:59

## 2019-01-01 RX ADMIN — POTASSIUM CHLORIDE 10 MEQ: 200 INJECTION, SOLUTION INTRAVENOUS at 13:30

## 2019-01-01 RX ADMIN — OXYCODONE HYDROCHLORIDE 5 MG: 5 TABLET ORAL at 13:08

## 2019-01-01 RX ADMIN — LEVOTHYROXINE SODIUM 150 MCG: 150 TABLET ORAL at 07:03

## 2019-01-01 RX ADMIN — ACETAMINOPHEN 650 MG: 325 TABLET, FILM COATED ORAL at 21:51

## 2019-01-01 RX ADMIN — SERTRALINE HYDROCHLORIDE 100 MG: 50 TABLET ORAL at 12:24

## 2019-01-01 RX ADMIN — POTASSIUM CHLORIDE 40 MEQ: 750 TABLET, EXTENDED RELEASE ORAL at 12:49

## 2019-01-01 RX ADMIN — CASTOR OIL AND BALSAM, PERU: 788; 87 OINTMENT TOPICAL at 06:03

## 2019-01-01 RX ADMIN — POTASSIUM CHLORIDE 40 MEQ: 750 TABLET, FILM COATED, EXTENDED RELEASE ORAL at 08:09

## 2019-01-01 RX ADMIN — ACETAMINOPHEN 650 MG: 325 TABLET, FILM COATED ORAL at 17:53

## 2019-01-01 RX ADMIN — METHOCARBAMOL TABLETS 500 MG: 500 TABLET, COATED ORAL at 17:04

## 2019-01-01 RX ADMIN — POTASSIUM CHLORIDE 40 MEQ: 750 TABLET, EXTENDED RELEASE ORAL at 17:45

## 2019-01-01 RX ADMIN — SPIRONOLACTONE 25 MG: 25 TABLET ORAL at 09:22

## 2019-01-01 RX ADMIN — WARFARIN SODIUM 4 MG: 2 TABLET ORAL at 17:00

## 2019-01-01 RX ADMIN — Medication 10 ML: at 05:52

## 2019-01-01 RX ADMIN — MEXILETINE HYDROCHLORIDE 300 MG: 150 CAPSULE ORAL at 09:22

## 2019-01-01 RX ADMIN — MEXILETINE HYDROCHLORIDE 150 MG: 150 CAPSULE ORAL at 08:16

## 2019-01-01 RX ADMIN — WARFARIN SODIUM 3 MG: 1 TABLET ORAL at 17:16

## 2019-01-01 RX ADMIN — ACETAMINOPHEN 650 MG: 325 TABLET ORAL at 06:57

## 2019-01-01 RX ADMIN — LEVOTHYROXINE SODIUM 150 MCG: 150 TABLET ORAL at 06:43

## 2019-01-01 RX ADMIN — FUROSEMIDE 40 MG: 40 TABLET ORAL at 15:33

## 2019-01-01 RX ADMIN — MICONAZOLE NITRATE 2 % TOPICAL POWDER: at 21:20

## 2019-01-01 RX ADMIN — POTASSIUM CHLORIDE 10 MEQ: 10 INJECTION, SOLUTION INTRAVENOUS at 23:00

## 2019-01-01 RX ADMIN — SERTRALINE HYDROCHLORIDE 100 MG: 50 TABLET ORAL at 09:50

## 2019-01-01 RX ADMIN — AMIODARONE HYDROCHLORIDE 200 MG: 200 TABLET ORAL at 09:59

## 2019-01-01 RX ADMIN — METHOCARBAMOL TABLETS 500 MG: 500 TABLET, COATED ORAL at 17:16

## 2019-01-01 RX ADMIN — FUROSEMIDE 80 MG: 10 INJECTION, SOLUTION INTRAVENOUS at 13:41

## 2019-01-01 RX ADMIN — SPIRONOLACTONE 50 MG: 25 TABLET ORAL at 09:07

## 2019-01-01 RX ADMIN — Medication 10 ML: at 21:01

## 2019-01-01 RX ADMIN — FAMOTIDINE 20 MG: 20 TABLET ORAL at 21:13

## 2019-01-01 RX ADMIN — FUROSEMIDE 40 MG: 10 INJECTION, SOLUTION INTRAMUSCULAR; INTRAVENOUS at 05:28

## 2019-01-01 RX ADMIN — ZOLPIDEM TARTRATE 5 MG: 5 TABLET ORAL at 21:34

## 2019-01-01 RX ADMIN — Medication 10 ML: at 06:11

## 2019-01-01 RX ADMIN — FUROSEMIDE 40 MG: 10 INJECTION, SOLUTION INTRAMUSCULAR; INTRAVENOUS at 04:45

## 2019-01-01 RX ADMIN — METHOCARBAMOL TABLETS 500 MG: 500 TABLET, COATED ORAL at 08:16

## 2019-01-01 RX ADMIN — Medication 10 ML: at 14:59

## 2019-01-01 RX ADMIN — POTASSIUM CHLORIDE 10 MEQ: 10 INJECTION, SOLUTION INTRAVENOUS at 06:00

## 2019-01-01 RX ADMIN — LISINOPRIL 5 MG: 5 TABLET ORAL at 13:37

## 2019-01-01 RX ADMIN — SODIUM CHLORIDE 10 MG: 9 INJECTION INTRAMUSCULAR; INTRAVENOUS; SUBCUTANEOUS at 12:16

## 2019-01-01 RX ADMIN — FUROSEMIDE 40 MG: 10 INJECTION, SOLUTION INTRAMUSCULAR; INTRAVENOUS at 19:49

## 2019-01-01 RX ADMIN — AMIODARONE HYDROCHLORIDE 200 MG: 200 TABLET ORAL at 17:47

## 2019-01-01 RX ADMIN — CASTOR OIL AND BALSAM, PERU: 788; 87 OINTMENT TOPICAL at 06:36

## 2019-01-01 RX ADMIN — HYDROCODONE BITARTRATE AND ACETAMINOPHEN 1 TABLET: 7.5; 325 TABLET ORAL at 08:47

## 2019-01-01 RX ADMIN — CARVEDILOL 3.12 MG: 3.12 TABLET, FILM COATED ORAL at 09:22

## 2019-01-01 RX ADMIN — POTASSIUM CHLORIDE 40 MEQ: 750 TABLET, FILM COATED, EXTENDED RELEASE ORAL at 08:29

## 2019-01-01 RX ADMIN — PRAVASTATIN SODIUM 80 MG: 40 TABLET ORAL at 22:12

## 2019-01-01 RX ADMIN — Medication 10 ML: at 06:58

## 2019-01-01 RX ADMIN — POTASSIUM CHLORIDE 40 MEQ: 750 TABLET, EXTENDED RELEASE ORAL at 22:03

## 2019-01-01 RX ADMIN — SERTRALINE HYDROCHLORIDE 100 MG: 50 TABLET ORAL at 08:42

## 2019-01-01 RX ADMIN — CARVEDILOL 3.12 MG: 3.12 TABLET, FILM COATED ORAL at 09:16

## 2019-01-01 RX ADMIN — ONDANSETRON 8 MG: 2 INJECTION INTRAMUSCULAR; INTRAVENOUS at 03:35

## 2019-01-01 RX ADMIN — DEXTROSE MONOHYDRATE 150 MG: 5 INJECTION, SOLUTION INTRAVENOUS at 03:45

## 2019-01-01 RX ADMIN — SACUBITRIL AND VALSARTAN 1 TABLET: 24; 26 TABLET, FILM COATED ORAL at 11:30

## 2019-01-01 RX ADMIN — Medication 10 ML: at 06:13

## 2019-01-01 RX ADMIN — PERFLUTREN 2 ML: 6.52 INJECTION, SUSPENSION INTRAVENOUS at 14:47

## 2019-01-01 RX ADMIN — FUROSEMIDE 80 MG: 10 INJECTION, SOLUTION INTRAMUSCULAR; INTRAVENOUS at 14:11

## 2019-01-01 RX ADMIN — CARVEDILOL 3.12 MG: 3.12 TABLET, FILM COATED ORAL at 17:44

## 2019-01-01 RX ADMIN — Medication 10 ML: at 14:10

## 2019-01-01 RX ADMIN — HYDROCODONE BITARTRATE AND ACETAMINOPHEN 1 TABLET: 7.5; 325 TABLET ORAL at 11:42

## 2019-01-01 RX ADMIN — PRAVASTATIN SODIUM 80 MG: 40 TABLET ORAL at 21:11

## 2019-01-01 RX ADMIN — CARVEDILOL 6.25 MG: 6.25 TABLET, FILM COATED ORAL at 09:25

## 2019-01-01 RX ADMIN — CASTOR OIL AND BALSAM, PERU: 788; 87 OINTMENT TOPICAL at 07:05

## 2019-01-01 RX ADMIN — BUMETANIDE 2 MG: 0.25 INJECTION INTRAMUSCULAR; INTRAVENOUS at 06:00

## 2019-01-01 RX ADMIN — BUMETANIDE 1 MG: 0.25 INJECTION INTRAMUSCULAR; INTRAVENOUS at 15:40

## 2019-01-01 RX ADMIN — FUROSEMIDE 40 MG: 10 INJECTION, SOLUTION INTRAMUSCULAR; INTRAVENOUS at 11:15

## 2019-01-01 RX ADMIN — Medication 10 ML: at 05:43

## 2019-01-01 RX ADMIN — Medication 10 ML: at 06:36

## 2019-01-01 RX ADMIN — Medication 10 ML: at 07:21

## 2019-01-01 RX ADMIN — POTASSIUM CHLORIDE 10 MEQ: 10 INJECTION, SOLUTION INTRAVENOUS at 07:00

## 2019-01-01 RX ADMIN — FUROSEMIDE 40 MG: 10 INJECTION, SOLUTION INTRAMUSCULAR; INTRAVENOUS at 12:01

## 2019-01-01 RX ADMIN — CASTOR OIL AND BALSAM, PERU: 788; 87 OINTMENT TOPICAL at 15:33

## 2019-01-01 RX ADMIN — BUMETANIDE 1 MG: 0.25 INJECTION INTRAMUSCULAR; INTRAVENOUS at 07:30

## 2019-01-01 RX ADMIN — LEVOTHYROXINE SODIUM 100 MCG: 100 TABLET ORAL at 06:45

## 2019-01-01 RX ADMIN — SERTRALINE HYDROCHLORIDE 100 MG: 50 TABLET ORAL at 08:09

## 2019-01-01 RX ADMIN — BUMETANIDE 2 MG: 0.25 INJECTION INTRAMUSCULAR; INTRAVENOUS at 23:13

## 2019-01-01 RX ADMIN — AMIODARONE HYDROCHLORIDE 0.5 MG/MIN: 50 INJECTION, SOLUTION INTRAVENOUS at 06:11

## 2019-01-01 RX ADMIN — METHOCARBAMOL TABLETS 500 MG: 500 TABLET, COATED ORAL at 16:15

## 2019-01-01 RX ADMIN — MEXILETINE HYDROCHLORIDE 300 MG: 150 CAPSULE ORAL at 11:34

## 2019-01-01 RX ADMIN — Medication 5 ML: at 07:34

## 2019-01-01 RX ADMIN — FAMOTIDINE 20 MG: 20 TABLET ORAL at 09:25

## 2019-01-01 RX ADMIN — LISINOPRIL 5 MG: 5 TABLET ORAL at 08:17

## 2019-01-01 RX ADMIN — FAMOTIDINE 20 MG: 20 TABLET ORAL at 08:41

## 2019-01-01 RX ADMIN — CASTOR OIL AND BALSAM, PERU: 788; 87 OINTMENT TOPICAL at 21:19

## 2019-01-01 RX ADMIN — MORPHINE SULFATE 2 MG: 2 INJECTION, SOLUTION INTRAMUSCULAR; INTRAVENOUS at 04:53

## 2019-01-01 RX ADMIN — HYDROCODONE BITARTRATE AND ACETAMINOPHEN 1 TABLET: 7.5; 325 TABLET ORAL at 01:25

## 2019-01-01 RX ADMIN — METHOCARBAMOL TABLETS 500 MG: 500 TABLET, COATED ORAL at 08:09

## 2019-01-01 RX ADMIN — OXYCODONE HYDROCHLORIDE 5 MG: 5 TABLET ORAL at 10:02

## 2019-01-01 RX ADMIN — Medication 10 ML: at 07:05

## 2019-01-01 RX ADMIN — SACUBITRIL AND VALSARTAN 1 TABLET: 24; 26 TABLET, FILM COATED ORAL at 10:02

## 2019-01-01 RX ADMIN — FUROSEMIDE 80 MG: 10 INJECTION, SOLUTION INTRAVENOUS at 15:12

## 2019-01-01 RX ADMIN — CASTOR OIL AND BALSAM, PERU: 788; 87 OINTMENT TOPICAL at 21:52

## 2019-01-01 RX ADMIN — CARVEDILOL 3.12 MG: 3.12 TABLET, FILM COATED ORAL at 09:07

## 2019-01-01 RX ADMIN — MILRINONE LACTATE IN DEXTROSE 0.2 MCG/KG/MIN: 200 INJECTION, SOLUTION INTRAVENOUS at 20:49

## 2019-01-01 RX ADMIN — METHOCARBAMOL TABLETS 500 MG: 500 TABLET, COATED ORAL at 21:19

## 2019-01-01 RX ADMIN — WARFARIN SODIUM 4 MG: 4 TABLET ORAL at 23:19

## 2019-01-01 RX ADMIN — CASTOR OIL AND BALSAM, PERU: 788; 87 OINTMENT TOPICAL at 22:13

## 2019-01-01 RX ADMIN — SERTRALINE HYDROCHLORIDE 100 MG: 50 TABLET ORAL at 09:25

## 2019-01-01 RX ADMIN — CASTOR OIL AND BALSAM, PERU: 788; 87 OINTMENT TOPICAL at 22:00

## 2019-01-01 RX ADMIN — OXYCODONE HYDROCHLORIDE 5 MG: 5 TABLET ORAL at 17:35

## 2019-01-01 RX ADMIN — PRAVASTATIN SODIUM 80 MG: 40 TABLET ORAL at 21:46

## 2019-01-01 RX ADMIN — PRAVASTATIN SODIUM 80 MG: 40 TABLET ORAL at 05:10

## 2019-01-01 RX ADMIN — MEXILETINE HYDROCHLORIDE 300 MG: 150 CAPSULE ORAL at 10:02

## 2019-01-01 RX ADMIN — CARVEDILOL 3.12 MG: 3.12 TABLET, FILM COATED ORAL at 11:34

## 2019-01-01 RX ADMIN — Medication 10 ML: at 05:18

## 2019-01-01 RX ADMIN — MILRINONE LACTATE IN DEXTROSE 0.2 MCG/KG/MIN: 200 INJECTION, SOLUTION INTRAVENOUS at 05:56

## 2019-01-01 RX ADMIN — WARFARIN SODIUM 3 MG: 1 TABLET ORAL at 17:03

## 2019-01-01 RX ADMIN — SACUBITRIL AND VALSARTAN 1 TABLET: 24; 26 TABLET, FILM COATED ORAL at 21:06

## 2019-01-01 RX ADMIN — OXYCODONE HYDROCHLORIDE 5 MG: 5 TABLET ORAL at 20:20

## 2019-01-01 RX ADMIN — POTASSIUM CHLORIDE 40 MEQ: 750 TABLET, FILM COATED, EXTENDED RELEASE ORAL at 09:25

## 2019-01-01 RX ADMIN — POTASSIUM CHLORIDE 40 MEQ: 750 TABLET, FILM COATED, EXTENDED RELEASE ORAL at 10:34

## 2019-01-01 RX ADMIN — FUROSEMIDE 40 MG: 40 TABLET ORAL at 15:23

## 2019-01-01 RX ADMIN — LIDOCAINE HYDROCHLORIDE 5 ML: 10 INJECTION, SOLUTION EPIDURAL; INFILTRATION; INTRACAUDAL; PERINEURAL at 08:46

## 2019-01-01 RX ADMIN — PRAVASTATIN SODIUM 80 MG: 40 TABLET ORAL at 21:06

## 2019-01-01 RX ADMIN — POTASSIUM CHLORIDE 40 MEQ: 750 TABLET, EXTENDED RELEASE ORAL at 08:16

## 2019-01-01 RX ADMIN — HYDROCODONE BITARTRATE AND ACETAMINOPHEN 1 TABLET: 7.5; 325 TABLET ORAL at 12:25

## 2019-01-01 RX ADMIN — CASTOR OIL AND BALSAM, PERU: 788; 87 OINTMENT TOPICAL at 05:52

## 2019-01-01 RX ADMIN — BUMETANIDE 1 MG: 0.25 INJECTION INTRAMUSCULAR; INTRAVENOUS at 16:16

## 2019-01-01 RX ADMIN — AMIODARONE HYDROCHLORIDE 0.5 MG/MIN: 50 INJECTION, SOLUTION INTRAVENOUS at 17:09

## 2019-01-01 RX ADMIN — MICONAZOLE NITRATE 2 % TOPICAL POWDER: at 08:13

## 2019-03-06 PROBLEM — Z45.02 AICD DISCHARGE: Status: ACTIVE | Noted: 2019-01-01

## 2019-03-06 PROBLEM — E87.6 ACUTE HYPOKALEMIA: Status: ACTIVE | Noted: 2019-01-01

## 2019-03-06 NOTE — ED TRIAGE NOTES
Patient reports AICD fired today and last week.  Patient reports fatigue and weight gain over last 6-9months

## 2019-03-07 NOTE — PROGRESS NOTES
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

## 2019-03-07 NOTE — CONSULTS
[address]    Name:  Ellen Moreira  MR#:  [id number]  :  1960  ACCOUNT #:  [de-identified]  DATE OF SERVICE:  2019      HISTORY OF PRESENT ILLNESS:  [facility] She was see me in the office tomorrow as she is changing cardiologist.  I take care of her  as well. They are originally from [place]. About 10 years ago, she had some events where she felt fatigue and tiredness, got a pacemaker, was then told that she needed another type of pacemaker, and then on the third time, got a defibrillator for presumed low ejection fraction. She thinks the lowest ejection fraction that was seen was 28%. She thinks she has had two heart catheterizations that were normal.    Over the past couple of months, she has been feeling weaker and more fatigued. She has noticed that she feels a sense of increased weight gain, gaining 8 pounds a day with mild pedal edema and feels tired on walking and more and more short of breath. About a week ago, she had a firing of defibrillator. At that time her doctors added amiodarone. Tonight, she was eating dinner, collapsed and was found to have her defibrillator fired & came to the emergency room. Interrogation showed her to have had ventricular arrhythmia. Currently, she is sitting up in the bed, anxious, pale, fatigued looking, blood pressures varying from 120 to 170. Her  is at bedside. PAST MEDICAL HISTORY:  She has a history of atrial fibrillation, previously followed by Dr. Marck Montalvo. She was on a NOAC before, possibly had a clot on the NOAC and is now seeing Dr. Mayte Coto, who monitors her INR. She has a history of depression, hypertension, heart failure, incidentally pulmonary nodule, lymphedema of the left arm, morbid obesity, and pacemaker.     Other medical history includes:  Left renal infarction perhaps in 2017, pulmonary hypertension, atrial fibrillation, staph infection with initial ICD implant and explanted 12/10, reimplanted in . Left renal infarction 2017  off [place], on vitamin K and warfarin. Lower extremity Dopplers were negative. PAST SURGICAL HISTORY:  Surgical history includes a pacemaker in , , and  and then a redo in 2018 with generator change. The first pacemaker in  was infected, replacement in , lead placement was somehow needed to be changed and has defibrillator placed also. Hysterectomy in . Heart catheterization in , was normal.  Heart catheterization in  and  were normal.  Cholecystectomy in . Breast biopsy on the right in . FAMILY HISTORY:  Mother  of heart disease. Father had  of heart disease, CAD, colon cancer, diabetes. One sister has had heart attack. Multiple members of the family with heart attack and cancer. Please see family history. SOCIAL HISTORY:  , two kids. Daughter was killed in an [address] in . Former . No alcohol, tobacco, or drugs. ALLERGIES:  INCLUDE AUGMENTIN, WHICH CAUSES NAUSEA AND VOMITING; NEOSPORIN, WHICH CAUSES A RASH. REVIEW OF SYSTEMS:  An 11-system review of systems is positive as noted above, and negative otherwise. PHYSICAL EXAMINATION  VITAL SIGNS:  Temperature 97.7, pulse 60, respirations 15, blood pressure 174/75, sats 96%. GENERAL:  She is alert, obese female, in no acute distress. Appears fatigued, normocephalic, atraumatic. Anicteric sclerae. NECK:  Supple. No JVD. LUNGS:  Clear to auscultation throughout. CARDIOVASCULAR:  Shows regular rate and rhythm with 1/6 systolic murmur. ABDOMEN:  Obese, nontender. EXTREMITIES:  Show 1+ edema. NEUROLOGICAL:  Grossly afocal.  Mood is somewhat downcast.  Her affect is somewhat tensed. LABORATORY DATA:  Sodium 133, potassium 2.4, chloride 90, bicarb 34, BUN 22, creatinine 1.35, protein 8.8, white count 9.5, hemoglobin 11.9. Urinalysis is negative.   INR is 2.5, glucose is 120. TSH is 4.8. Free T4 is 1.3. MEDICATIONS PRIOR TO ADMISSION:  Include Robaxin 500 mg t.i.d., Norco, Lunesta, Lasix 80 mg b.i.d., potassium 20 mEq daily, Coumadin 4 mg daily, Entresto 49/51 mg b.i.d., Aldactone 25 mg daily, Remeron 15 mg daily, Pravachol 80 mg daily, Zoloft 100 mg daily, ranitidine 150 mg b.i.d., Coreg 3.125 mg b.i.d. The patient also confirms that she is taking amiodarone 1200 mg a day, I presume it is 400 mg t.i.d. The review of her chart indicates that she had a procedure on 03/29/2018 with a history of nonischemic cardiomyopathy, EF of 45% with biventricular pacing, class II, with complete heart block, pacer dependent for a battery generator change. IMPRESSION:  1. Ventricular fibrillation with firing of the defibrillator for the second time in a week. The patient has been started on amiodarone. I will continue to load, now do it IV, we will have  with EP in the morning. The question is whether there is a new scar. She has had three normal heart catheterizations, so it is hard to know whether a repeat cath will really be of help, perhaps a stress test may be a more appropriate starting point along with the echocardiogram.  2.  Nonischemic cardiomyopathy. Ejection fraction 28% to 45%, is on Entresto and low-dose Coreg. Elevated blood pressure, slight, but she is obviously quite upset post the defibrillator firing. We will see where she stands after this. Her heart rate is in the 60s, so I do not think she needs Corlanor. 3.  Continue goal-directed therapy with Entresto, Coreg, Aldactone. 4.  Hypokalemia. This could be the trigger. I do not know what the circumstances of prior firing were and I am not sure why she is so hypokalemic. We will repeat potassium and replete. 5.  Atrial fibrillation. Appears to be chronic. I do not see P-waves on the tracings, on the telemetry, or on the EKG.   She is V-paced at this point and remains on chronic anticoagulation. 6.  Questionable anticoagulation failure with Savaysa, had renal infarct and now put on Coumadin with INRs being managed. INR is at goal of 2.5.  7.  Hypotension, labile now. 8.  Morbid obesity.         Kirt Ruvalcaba MD      [id number]  D:  03/06/2019 23:28  T:  03/07/2019 6:30  JOB #:  [id number]

## 2019-03-07 NOTE — PROGRESS NOTES
Reason for Admission:   'Defibrillator fired\"                  RRAT Score:     13-Moderate-Yellow             Do you (patient/family) have any concerns for transition/discharge? None reported                Plan for utilizing home health:     No current needs but patient may need an [facility] for CHF. Likelihood of readmission? Moderate            Transition of Care Plan:      CM met with patient and  at to inform of CM role and to assess needs. Demographic info verified. Patient has defribriltor but does not use any other DME. Patient reportedly is independent with ADLs and self-care, alert and oriented. Patient's  available to assist with any needs. Patient last seen her PCP a week ago-seen every three month. Preferred pharmacy is [facility]. Patient received a stress/echo today; awaiting results. CM to monitor.      Ina Thornton MS

## 2019-03-07 NOTE — CONSULTS
Cardiac Electrophysiology Hospital Consultation Note     Subjective:      Janie Hadley is a 60 y.o. patient who is seen for evaluation of VT and ICD shock  There is no strip in chart this am  She has medtronic BIV ICD generator change at [facility] by Dr Luna Kuhn 3/2018  She had VT and syncope and ICD shock around 3/25 and was started on loading dose 400 mg tid of amiodarone  She never had it before  She was in bed last night, fell \"head rush\" then shocked.   She thought she also passed out around then  She has gained weight 8 lbs a day and could not afford entresto  Her mother and brother both had cardiomyopathy and sudden death  She had complete av block first and then pacemaker implant and then the last 3 devices have been ICD and BIV ICD  The left chest pacer was infected and removed  The current BIV ICD is Medtronic and right chest  Was told to have non ischemic cardiomyopathy and reportedly echo with improved LVEF in March 2018 but new echo is pending  + orthopnea    Patient Active Problem List   Diagnosis Code    CHF (congestive heart failure) ([facility] I50.9    CAD (coronary artery disease) I25.10    Essential hypertension I10    Obesity, morbid ([address] E66.01    Pacemaker Z95.0    Atrial fibrillation ([facility] I48.91    Depression F32.9    Insomnia G47.00    Chronic anticoagulation Z79.01    DDD (degenerative disc disease), cervical M50.30    DDD (degenerative disc disease), thoracic M51.34    Long term prescription opiate use Z79.891    Incidental pulmonary nodule- 2mm RLL- repeat CT due in Feb 2019 R91.1    Acute hypokalemia E87.6    AICD discharge Z45.02     Current Facility-Administered Medications   Medication Dose Route Frequency Provider Last Rate Last Dose    carvedilol (COREG) tablet 3.125 mg  3.125 mg Oral BID WITH MEALS Peyman Gagnon MD        pravastatin (PRAVACHOL) tablet 80 mg  80 mg Oral QHS Kyle Evans MD   80 mg at 03/07/19 0510    famotidine (PEPCID) tablet 20 mg  20 mg Oral BID Omi Duarte MD        sertraline (ZOLOFT) tablet 100 mg  100 mg Oral DAILY Padmini Gagnon MD        sodium chloride (NS) flush 5-40 mL  5-40 mL IntraVENous Q8H Padmini Gagnon MD        sodium chloride (NS) flush 5-40 mL  5-40 mL IntraVENous PRN Omi Duarte MD        sodium chloride (NS) flush 5-40 mL  5-40 mL IntraVENous Q8H Omi Duarte MD   Stopped at 03/07/19 0510    sodium chloride (NS) flush 5-40 mL  5-40 mL IntraVENous PRN Omi Duarte MD        saline peripheral flush soln 20 mL  20 mL InterCATHeter RAD ONCE Shmuel Jolly MD        regadenoson (LEXISCAN) injection 0.4 mg  0.4 mg IntraVENous RAD ONCE Shmuel Jolly MD        mexiletine (MEXITIL) capsule 300 mg  300 mg Oral Q8H Patti Ng MD        Warfarin - pharmacy to dose   Other Rx Dosing/Monitoring Padmini Gagnon MD        amiodarone (CORDARONE) 375 mg/250 mL D5W infusion  0.5-1 mg/min IntraVENous TITRATE Shmuel Jolly MD 40 mL/hr at 03/07/19 0326 1 mg/min at 03/07/19 0326    sacubitril-valsartan (ENTRESTO) 24-26 mg tablet 1 Tab  1 Tab Oral Q12H George Lerner MD   Stopped at 03/07/19 0300     Allergies   Allergen Reactions    Augmentin [Amoxicillin-Pot Clavulanate] Nausea and Vomiting    Neosporin [Neomycin-Bacitracin-Polymyxin] Rash     No current facility-administered medications on file prior to encounter. Current Outpatient Medications on File Prior to Encounter   Medication Sig Dispense Refill    methocarbamol (ROBAXIN) 500 mg tablet TAKE 1 TABLET BY MOUTH THREE TIMES DAILY 90 Tab 1    HYDROcodone-acetaminophen (NORCO) 7.5-325 mg per tablet 1 Tab, EVERY 8 HOURS AS NEEDED FOR PAIN 90 Tab 0    HYDROcodone-acetaminophen (NORCO) 7.5-325 mg per tablet Take 1 Tab by mouth every eight (8) hours as needed for Pain. Max Daily Amount: 3 Tabs. 90 Tab 0    eszopiclone (LUNESTA) 3 mg tablet Take 1 Tab by mouth nightly as needed for Sleep.  Max Daily Amount: 3 mg. 30 Tab 5    furosemide (LASIX) 80 mg tablet Take 80 mg by mouth two (2) times a day.  potassium chloride (K-DUR, KLOR-CON) 20 mEq tablet Take 20 mEq by mouth daily.  warfarin (COUMADIN) 4 mg tablet Take 4 mg by mouth daily.  sacubitril-valsartan (ENTRESTO) 49 mg/51 mg tablet Take 1 Tab by mouth two (2) times a day.  pravastatin (PRAVACHOL) 80 mg tablet Take 80 mg by mouth nightly.  sertraline (ZOLOFT) 100 mg tablet Take 100 mg by mouth daily.  raNITIdine hcl 150 mg capsule Take 150 mg by mouth two (2) times a day.  spironolactone (ALDACTONE) 25 mg tablet Take 25 mg by mouth daily.  mirtazapine (REMERON) 15 mg tablet Take 15 mg by mouth nightly.  carvedilol (COREG) 3.125 mg tablet Take 3.125 mg by mouth two (2) times daily (with meals). Past Medical History:   Diagnosis Date    Atrial fibrillation ([facility]     Dr. Kenny Dias    Chronic anticoagulation     was on NOAC before. ?had clot on noac. now on warfarin. Dr. Jonathan Morales monitors INR    DDD (degenerative disc disease), cervical 1/30/2018    DDD (degenerative disc disease), thoracic 1/30/2018    Depression     no h/o hospitalization    Essential hypertension 1/8/2018    Heart failure ([address]     Incidental pulmonary nodule 2mm, RLL- no follow up needed 2/16/2018    Noted on abdomen/pelvis CT Feb 2018. No smoking history. 1. No renal, ureteral, or bladder calculi. 2. No acute findings. 3. 2 mm pulmonary nodule right lower lobe. No follow-up needed if the patient is low risk. If the patient is high-risk, CT could be considered in 1 year.      Insomnia     Kidney infarction ([address] 2017    d/t clot per pt. recalls being on blood thinner.  Lymphedema of left arm     developed after infection of pacemaker site    Obesity, morbid ([address] 1/30/2018    Pacemaker     and defib.  Dr. Juhi Horton Stomach flu 2015    hospital admit X2     Past Surgical History:   Procedure Laterality Date    HX BREAST BIOPSY Right 2010    Benign    HX CHOLECYSTECTOMY  1984    HX HEART CATHETERIZATION  2006    d/t abnl stress test, per pat, test was normal    HX HEART CATHETERIZATION  ~2009, 2012    per pt normal.     HX HYSTERECTOMY  2012    d/t heavy bleeding. ?endometriosis. +fibroid. per pt, no cancer.  HX PACEMAKER  2010, 2011, 2012, 3/29/2018    first 2010 (infected), replacement 2011 (lead not placed correctly), 2nd replacement 2012- pacemaker, defibrillator, 3rd replacement 2018     Family History   Problem Relation Age of Onset    Breast Cancer Maternal Aunt         late 35s    Uterine Cancer Maternal Aunt     Heart Disease Mother         sudden cardiac arrest    Heart Disease Father     Coronary Artery Disease Father     Colon Cancer Father         76s    Diabetes Father     COPD Sister     Cancer Brother 43        pancreatic    Uterine Cancer Maternal Grandmother     Other Maternal Grandfather         mva    Heart Disease Paternal Grandmother     Heart Disease Paternal Grandfather     Heart Disease Other     Diabetes Other     Other Sister         sudden cardiac arrest    Heart Attack Sister     No Known Problems Sister     Heart Disease Brother     Kidney Disease Brother     Other Brother         mva    No Known Problems Brother     Heart Attack Paternal Uncle 36    Other Paternal Aunt         aneurysm    Celiac Disease Son     Seizures Daughter     Other Daughter 22        MVA     Social History     Tobacco Use    Smoking status: Never Smoker    Smokeless tobacco: Never Used   Substance Use Topics    Alcohol use: No        Review of Systems:   Constitutional: Negative for fever, chills, + weight gain, + malaise/fatigue. HEENT: Negative for nosebleeds, vision changes. Respiratory: Negative for cough, hemoptysis  Cardiovascular: Negative for chest pain, palpitations, + orthopnea, no claudication, + leg swelling, no syncope, and PND.    Gastrointestinal: Negative for nausea, vomiting, diarrhea, blood in stool and melena. Genitourinary: Negative for dysuria, and hematuria. Musculoskeletal: Negative for myalgias, arthralgia. Skin: Negative for rash. Heme: Does not bleed or bruise easily. Neurological: Negative for speech change and focal weakness     Objective:     Visit Vitals  /61 (BP 1 Location: Right arm, BP Patient Position: At rest)   Pulse 60   Temp 97.6 °F (36.4 °C)   Resp 16   Ht 5' 5\" (1.651 m)   Wt 262 lb 5.6 oz (119 kg)   SpO2 94%   Breastfeeding? No   BMI 43.66 kg/m²      Physical Exam:   Constitutional: well-developed and well-nourished. No respiratory distress. Head: Normocephalic and atraumatic. Eyes: Pupils are equal, round  ENT: hearing normal  Neck: supple. No JVD present. Cardiovascular: Normal rate, regular rhythm. Exam reveals no gallop and no friction rub. No murmur heard. Pulmonary/Chest: Effort normal and breath sounds normal. No wheezes. Abdominal: Soft, morbidly obese  Musculoskeletal: 1+ edema. Neurological: alert,oriented. Skin: Skin is warm and dry right sided BIV ICD pocket  Psychiatric: normal mood and affect. Behavior is normal. Judgment and thought content normal.           Assessment/Plan:   Ventricular arrhythmia with BIV ICD shock and ? Syncope then: will get Medtronic to reprint the event  BIV ICD supposed to function properly. ECG with lateral wall pacing  I added mexiletine 300 mg tid for a month and will try to turn off amiodarone IV tomorrow. She is morbidly obese so it takes more time and dose to load amiodarone. In the future hopefully she will be on amiodarone 200 mg bid and off mexiletine  Non ischemic cardiomyopathy echo is pending.   Need to take GDMT, unfortunately she could not afford and has not been taking entresto and this could have caused decompensated CHF and VT ICD shocks  AFIB with hx of embolism, coumadin INR 2.5 therapeutic    Thank you for involving me in this patient's care and please call with further concerns or questions. Daisy Huertas M.D.   Electrophysiology/Cardiology  [facility] [place]  [facility] [address], [place] [address] [place] [address]  [phone]

## 2019-03-07 NOTE — PROGRESS NOTES
Hospitalist Progress Note  Chuy Flores MD  Answering service: [phone] OR 36 from in house phone        Date of Service:  3/7/2019  NAME:  Jimmie Herrera  :  1960  MRN:  [id number]      Admission Summary:   A 25-year-old white female with past medical history of nonischemic cardiomyopathy, chronic systolic CHF, complete heart block, status post AICD, cardiac pacemaker implantation, status post AICD generator change, atrial fibrillation, long-term anticoagulation on Coumadin; coronary artery disease, degenerative disc disease, depression, essential hypertension, pulmonary nodules, chronic left arm lymphedema, obesity, presented to the Emergency Department from home with chief complaints of AICD discharge (fired) and passing out (syncope). Interval history / Subjective:    Seen for follow up Vfib s/p ICD fire at home  Anxious, and tearful today. Denies any palpitations, complaining of back pain. Assessment & Plan:     Vfib with ICD fire at home  - Cardiology following  - EP following  - Amiodarone IV started, likely transition to PO soon  - Continue telemetry monitoring. Afib - chronic, rate controlled, V paced BiV ICD in place  - Continue coumadin, INR at goal  - Pharmacy to dose    NICM - EF 28-45%  - Entresto (Was not taking this due to medication cost per Dr. Althea De La Cruz), Coreg, Aldactone   - Echo pending    Hypokalemia - repelte as needed, ? If trigger for Vfib? Elevated creatinine, ? Acute on CKD? - Continue holding lasix for now, euvolemic on exam. I and O, avoid nephrotoxins.      Code status: FULL  DVT prophylaxis: Coumadin     Care Plan discussed with: Patient/Family  Disposition: Home w/Family, ambulatory prior to admission     Hospital Problems  Date Reviewed: 3/6/2019          Codes Class Noted POA    Acute hypokalemia ICD-10-CM: E87.6  ICD-9-CM: 276.8  3/6/2019 Unknown        * (Principal) AICD discharge ICD-10-CM: Z45.02  ICD-9-CM: V71.89  3/6/2019 Unknown                Review of Systems:   All imaging and laboratory data reviewed. Vital Signs:    Last 24hrs VS reviewed since prior progress note. Most recent are:  Visit Vitals  /51 (BP 1 Location: Left arm, BP Patient Position: At rest)   Pulse 60   Temp 98.2 °F (36.8 °C)   Resp 16   Ht 5' 5\" (1.651 m)   Wt 118.8 kg (262 lb)   SpO2 96%   Breastfeeding? No   BMI 43.60 kg/m²         Intake/Output Summary (Last 24 hours) at 3/7/2019 1628  Last data filed at 3/6/2019 2240  Gross per 24 hour   Intake    Output 350 ml   Net -350 ml        Physical Examination:             Constitutional:  No acute distress, cooperative, tearful   ENT:  Oral mucous moist, oropharynx benign. Resp:  CTA bilaterally. No wheezing/rhonchi/rales. No accessory muscle use   CV:  Regular rhythm, normal rate, no murmurs, gallops, rubs, ICD in place    GI:  Soft, non distended, non tender. normoactive bowel sounds, no hepatosplenomegaly     Musculoskeletal:  trace edema, warm, 2+ pulses throughout    Neurologic:  Moves all extremities. AAOx3, CN II-XII reviewed     Psych:  Tearful, anxious        Data Review:    Imaging and laboratory data reviewed. Labs:     Recent Labs     03/07/19  0525 03/06/19  1850   WBC 8.4 9.5   HGB 11.4* 11.9   HCT 36.1 36.9    212     Recent Labs     03/07/19  1158 03/07/19  0306 03/06/19  1850    137 133*   K 3.8 3.9 2.4*   CL 96* 97 90*   CO2 30 27 34*   BUN 20 22* 22*   CREA 1.31* 1.21* 1.35*   * 116* 120*   CA 9.9 9.8 10.6*   MG 2.2  --  2.1   PHOS 2.6  --   --      Recent Labs     03/06/19  1850   SGOT 20   ALT 19   AP 91   TBILI 0.6   TP 8.8*   ALB 4.2   GLOB 4.6*     Recent Labs     03/07/19  0525 03/06/19  1850   INR 2.5* 2.5*   PTP 23.8* 24.4*   APTT  --  39.8*      No results for input(s): FE, TIBC, PSAT, FERR in the last 72 hours.    No results found for: FOL, RBCF   No results for input(s): PH, PCO2, PO2 in the last 72 hours.   Recent Labs     03/06/19  1850   TROIQ 0.05*     Lab Results   Component Value Date/Time    Cholesterol, total 179 01/18/2018 09:04 AM    HDL Cholesterol 42 01/18/2018 09:04 AM    LDL, calculated 101 (H) 01/18/2018 09:04 AM    Triglyceride 179 (H) 01/18/2018 09:04 AM     No results found for: Akhil Araujo  Lab Results   Component Value Date/Time    Color YELLOW/STRAW 03/06/2019 08:45 PM    Appearance CLEAR 03/06/2019 08:45 PM    Specific gravity 1.009 03/06/2019 08:45 PM    pH (UA) 7.5 03/06/2019 08:45 PM    Protein NEGATIVE  03/06/2019 08:45 PM    Glucose NEGATIVE  03/06/2019 08:45 PM    Ketone NEGATIVE  03/06/2019 08:45 PM    Bilirubin NEGATIVE  03/06/2019 08:45 PM    Urobilinogen 0.2 03/06/2019 08:45 PM    Nitrites NEGATIVE  03/06/2019 08:45 PM    Leukocyte Esterase NEGATIVE  03/06/2019 08:45 PM    Epithelial cells FEW 03/06/2019 08:45 PM    Bacteria NEGATIVE  03/06/2019 08:45 PM    WBC 0-4 03/06/2019 08:45 PM    RBC 0-5 03/06/2019 08:45 PM         Medications Reviewed:     Current Facility-Administered Medications   Medication Dose Route Frequency    carvedilol (COREG) tablet 3.125 mg  3.125 mg Oral BID WITH MEALS    pravastatin (PRAVACHOL) tablet 80 mg  80 mg Oral QHS    famotidine (PEPCID) tablet 20 mg  20 mg Oral BID    sertraline (ZOLOFT) tablet 100 mg  100 mg Oral DAILY    sodium chloride (NS) flush 5-40 mL  5-40 mL IntraVENous Q8H    sodium chloride (NS) flush 5-40 mL  5-40 mL IntraVENous PRN    sodium chloride (NS) flush 5-40 mL  5-40 mL IntraVENous Q8H    sodium chloride (NS) flush 5-40 mL  5-40 mL IntraVENous PRN    mexiletine (MEXITIL) capsule 300 mg  300 mg Oral Q8H    warfarin (COUMADIN) tablet 4 mg  4 mg Oral ONCE    acetaminophen (TYLENOL) tablet 650 mg  650 mg Oral Q4H PRN    oxyCODONE IR (ROXICODONE) tablet 5 mg  5 mg Oral Q4H PRN    ondansetron (ZOFRAN) injection 4 mg  4 mg IntraVENous Q8H PRN    bisacodyl (DULCOLAX) suppository 10 mg  10 mg Rectal DAILY PRN    Warfarin - pharmacy to dose   Other Rx Dosing/Monitoring    amiodarone (CORDARONE) 375 mg/250 mL D5W infusion  0.5-1 mg/min IntraVENous TITRATE    sacubitril-valsartan (ENTRESTO) 24-26 mg tablet 1 Tab  1 Tab Oral Q12H     ______________________________________________________________________  EXPECTED LENGTH OF STAY: 2d 12h  ACTUAL LENGTH OF STAY:          1                 Marty Amor MD

## 2019-03-07 NOTE — PROGRESS NOTES
Problem: Falls - Risk of  Goal: *Absence of Falls  Document Staci Fall Risk and appropriate interventions in the flowsheet. Outcome: Progressing Towards Goal  Fall Risk Interventions:  Mobility Interventions: Communicate number of staff needed for ambulation/transfer, Patient to call before getting OOB         Medication Interventions: Patient to call before getting OOB, Teach patient to arise slowly    Elimination Interventions: Call light in reach, Patient to call for help with toileting needs, Toileting schedule/hourly rounds             Problem: Pressure Injury - Risk of  Goal: *Prevention of pressure injury  Document Feliberto Scale and appropriate interventions in the flowsheet. Outcome: Progressing Towards Goal  Pressure Injury Interventions:             Activity Interventions: Increase time out of bed, Pressure redistribution bed/mattress(bed type)    Mobility Interventions: Pressure redistribution bed/mattress (bed type)    Nutrition Interventions: Document food/fluid/supplement intake, Offer support with meals,snacks and hydration

## 2019-03-07 NOTE — ED NOTES
CONSULT NOTE:  8:59 PM Loulou Young MD spoke with Dr. Marissa Yeung, Consult for Cardiology. Discussed available diagnostic tests and clinical findings. Dr. Marissa Yeung will arrange to see pt in the morning, will also have EP consult to the pt.

## 2019-03-07 NOTE — CARDIO/PULMONARY
Cardiac Rehab: Living with Heart Failure Booklet given to [facility]. Met with the pt. and her  to reinforce HF education    Educated using teach back method. Discussed diagnosis definition and assessed patient understanding. Reviewed importance of daily weight monitoring and Low Sodium diet (0966-4797 mg. Daily). [facility] does check her weight and knows her cardiac meds but never given sodium restrictions. Encouraged activity and rest periods within symptom limitations and as ordered by physician. Reviewed coreg, purpose of medication, potential side effects, compliance, and what to do if dose is missed. Discussed importance of reporting signs and symptoms of exacerbation, and when to report them to the doctor, to prevent re-hospitalization. [facility] was encouraged to keep all appointments with doctor. .     Discussed the Cardiac Rehab Program, benefits, format, and encouraged enrollment, when eligible. [place] program. Made aware of possible dietary consult at Cardiac Rehab if she is interested in the future.     Na Thompson RN

## 2019-03-07 NOTE — PROGRESS NOTES
Order came up for patient to be started on a Amio gtt. Nurse unsure of why the patient was being started on the gtt. Called Dr. John Gillespie to clarify the order. Dr. John Gillespie stated that the patient had a V-fib episode and needed the gtt. Per Dr. Stanton Larsen gave Amio bolus and started Amio gtt.

## 2019-03-07 NOTE — H&P
[address] [facility]  HISTORY AND PHYSICAL    Name:  Darwin Snyder  MR#:  [id number]  :  1960  ACCOUNT #:  [de-identified]  ADMIT DATE:  2019      CHIEF COMPLAINT:  Defibrillator fired. HISTORY OF PRESENT ILLNESS:  A 50-year-old white female with past medical history of nonischemic cardiomyopathy, chronic systolic CHF, complete heart block, status post AICD, cardiac pacemaker implantation, status post AICD generator change, atrial fibrillation, long-term anticoagulation on Coumadin; coronary artery disease, degenerative disc disease, depression, essential hypertension, pulmonary nodules, chronic left arm lymphedema, obesity, presented to the Emergency Department from home with chief complaints of AICD discharge (fired) and passing out (syncope). The patient notes that her AICD has fired twice, once last week and at approximately 5:15 p.m. She notes that when her AICD fired last week, she went to [facility], 2019, and was seen in Emergency Department and discharged home. Subsequently, she was seen by electrophysiologist/cardiologist, Dr. Anna Joseph, on 2019, for followup. She notes that she planned to switch over to a new Cardiologist, Dr. Kathy Lopes, whom she was scheduled to see tomorrow on 2019. Today; however, when her AICD discharge occurred, she was lying in her recliner. She reportedly had a syncopal episode, passed out for at least \"couple of minutes,\" regained consciousness. No reports of slurred speech, facial droop, numbness, paresthesias, focal weakness, headache, neck pain, back pain, abdominal pain, nausea, vomiting, diarrhea, melena, dysuria, hematuria, calf pain, increased leg swelling, edema (compared to baseline), fevers, chills, or rash. She notably had chest pain at the time the AICD discharge occurred; however, she is not complaining of any current chest pain.   She notes that she has chronic shortness of breath, dyspnea on exertion with limited exercise, poor activity tolerance with noted history of chronic systolic CHF. At current, she is ambulatory, but without assistance; however, she notes that she becomes very fatigue. She reportedly had a weight gain over the last 6 to 9 months. On arrival to the Emergency Department, initial recorded vital signs; blood pressure 119/63, heart rate 83, respiratory rate 18, saturation 97% on room air. [employer] technician interrogated the AICD on arrival to the Emergency Department. Labs today had show a potassium of 2.4. ED had ordered potassium chloride replacement. Request is now for the patient be admitted to the hospitalist service for continued evaluation and treatment. PAST MEDICAL HISTORY:  1. Chronic nonischemic cardiomyopathy. 2.  Chronic systolic CHF. 3.  Complete heart block, status post AICD, cardiac pacemaker implantation for atrial fibrillation, on long-term anticoagulation with Coumadin. 4.  Essential hypertension. 5.  Coronary artery disease. 6.  Degenerative disk disease. 7.  Depression. 8.  Pulmonary nodules. 9.  Obesity. 10.  Insomnia. 11.  Kidney infarction. 12.  Lymphedema of left arm. PAST SURGICAL HISTORY:  1. Right breast biopsy in 2010. 2.  Cholecystectomy in 1984. 3.  Heart catheterization 2006, 2009, 2012. 4.  Hysterectomy in 2012.  5.  Cardiac pacemaker implantation. 6.  Status post AICD implantation. 7.  Status post AICD generator change, 03/29/2018. CURRENT MEDICATIONS:  Medication list reviewed with the patient at the bedside. carvedilol (COREG) 3.125 mg tablet    --  --  Other, MD Peggy     Take 3.125 mg by mouth two (2) times daily (with meals). eszopiclone (LUNESTA) 3 mg tablet    02/19/18  -- Bonny Balderas MD    Take 1 Tab by mouth nightly as needed for Sleep. Max Daily Amount: 3 mg.    furosemide (LASIX) 80 mg tablet    --  --  Provider, Historical    Take 80 mg by mouth two (2) times a day.     HYDROcodone-acetaminophen (NORCO) 7.5-325 mg per tablet    05/25/18  -- Fili Capps MD    1 Tab, EVERY 8 HOURS AS NEEDED FOR PAIN    HYDROcodone-acetaminophen (NORCO) 7.5-325 mg per tablet    06/24/18  -- Fili Capps MD    Take 1 Tab by mouth every eight (8) hours as needed for Pain. Max Daily Amount: 3 Tabs. methocarbamol (ROBAXIN) 500 mg tablet    10/03/18  -- Jeri Scott NP    TAKE 1 TABLET BY MOUTH THREE TIMES DAILY    Notes:  Please consider 90 day supplies to promote better adherence    mirtazapine (REMERON) 15 mg tablet    --  -- Peggy Barrios MD    Take 15 mg by mouth nightly. potassium chloride (K-DUR, KLOR-CON) 20 mEq tablet    --  --  Provider, Historical    Take 20 mEq by mouth daily. pravastatin (PRAVACHOL) 80 mg tablet    --  --  Peggy Lindsay MD    Take 80 mg by mouth nightly. raNITIdine hcl 150 mg capsule    --  --  Peggy Lindsay MD    Take 150 mg by mouth two (2) times a day. sacubitril-valsartan (ENTRESTO) 49 mg/51 mg tablet    --  --  Provider, Historical    Take 1 Tab by mouth two (2) times a day. sertraline (ZOLOFT) 100 mg tablet    --  --  Peggy Lindsay MD    Take 100 mg by mouth daily. spironolactone (ALDACTONE) 25 mg tablet    --  -- Peggy Barrios MD    Take 25 mg by mouth daily. warfarin (COUMADIN) 4 mg tablet    --  --  Provider, Historical    Take 4 mg by mouth daily. ALLERGIES:  AUGMENTIN, NEOSPORIN. SOCIAL HISTORY:  Negative for smoking, alcohol, or illicit drugs. , ambulatory without assistance. FAMILY HISTORY:  Breast cancer in maternal aunt. Celiac disease, son. Seizures, daughter. Diabetes, father. Heart attack, sister, paternal uncle, brother. Heart disease - mother, father, sister, brother, paternal uncle. COPD, sister. Pancreatic cancer, brother. Uterine cancer, maternal grandmother. REVIEW OF SYSTEMS:  Pertinent positives as noted in HPI otherwise negative.     PHYSICAL EXAMINATION:  VITAL SIGNS:  Temperature 97.9 degrees Fahrenheit, blood pressure 130/51, heart rate of 60, respiratory rate of 20, O2 saturation 95% on room air. Recorded weight 267 pounds (121.4 kg), recorded height of 5 feet 5 inches tall. BMI 44.4. GENERAL:  Obese female, in no acute respiratory distress. PSYCHIATRIC:  The patient is awake, alert, oriented x3. NEUROLOGIC:  GCS is 15. Moves extremities x4 with generalized weakness. Sensation grossly intact. No slurred speech or facial droop. HEENT:  Normocephalic, atraumatic. PERRLA, EOMs intact. Sclerae anicteric. Conjunctivae clear. Nares are patent. Oropharynx is clear. Tongue is midline, nonedematous. NECK:  Supple without lymphadenopathy, JVD, or carotid bruits. No thyromegaly. LYMPHS:  Negative for cervical or supraclavicular adenopathy. LUNGS:  Clear to auscultation bilaterally. CVS/HEART:  Regular rate and rhythm. Normal S1, S2 without murmurs, rubs, or gallops. GASTROINTESTINAL:  Abdomen is obese, soft, nontender, nondistended. Normoactive bowel sounds. No rebound, guarding, or rigidity. No auscultated abdominal bruits or palpable abdominal mass. BACK:  No CVA tenderness. No step-off deformity. MUSCULOSKELETAL:  No acute palpable bony deformity. Negative calf tenderness. VASCULAR:  2+ radial, 1+ dorsalis pedis pulses without cyanosis or clubbing. Trace nonpitting edema to bilateral lower extremities. SKIN:  Warm and dry. LABORATORY DATA:  I reviewed sodium 133, potassium 3.4, chloride 90, CO2 of 34, BUN 22, creatinine 1.35, glucose 120, anion gap of 9, calcium is at 10.6, magnesium 2.1. GFR of 40. Total bilirubin 0.6, total protein 8.8, albumin is 4.2. ALT is 19, AST of 20, alkaline phosphatase is 91. Troponin I is 0.05. WBC 9.5, hemoglobin 11.9, hematocrit 36.9, platelets are 319, and neutrophils of 59%. INR 2.5, PT is 24.4, PTT is 39.8.     12-lead EKG, sinus rhythm with complete heart block and ventricular paced rhythm (biventricular paced rhythm) at 52 beats per minute. IMPRESSION AND PLAN:  1. Status post automatic implantable cardioverter-defibrillator discharge. Admit to telemetry. Placed her on monitoring. Consult with Cardiologist.  Plan for the patient to transition to electrophysiologist, Dr. Patric Woo, as a new provider per the patient. 2.  Acute hypokalemia. The Emergency Department had ordered potassium chloride replacement. Repeat potassium level, monitor closely. 3.  Dehydration with elevated BUN and creatinine. Repeat renal panel, placed on strict I's and O's. Daily weights. Of note, the patient has been on Lasix, but I am holding dosage this time as well until potassium levels are corrected. 4.  Acute chest pain - after automatic implantable cardioverter-defibrillator fired. Currently asymptomatic in this regard. Repeat troponin level. 5.  Elevated TSH. Placed add-on test for free T4 levels. 6.  Chronic systolic congestive heart failure with nonischemic cardiomyopathy. Plan is as noted above. Consult with Cardiologist.  7.  Long-term anticoagulation with Coumadin. The patient's INR level is currently in therapeutic range. Consult pharmacist for Coumadin dosing. Repeat PT/INR levels in a.m.  8.  Hyponatremia, mild. Repeat sodium level. 9.  Hypertension. Monitor blood pressure closely. Provide antihypertensive medications. 10.  Obesity, will need eventual weight loss, heart healthy diet, and lifestyle modification. 11.  Generalized weakness with debility. Placed on fall precautions. 12.  Venous thromboembolism prophylaxis. The patient is already on Coumadin. 13.  Code status:  Full code. 14.  Functional status. The patient has been ambulatory.         Jaguar Johnson MD [id number]  D:  03/06/2019 21:14  T:  03/07/2019 0:13  JOB #:  [id number]

## 2019-03-07 NOTE — PROGRESS NOTES
Pharmacist Note  Warfarin Dosing  Consult provided for this 60 y. o.female to manage warfarin for Atrial Fibrillation    INR Goal: 2 - 3    Home regimen/ tablet size: 4 mg daily    Drugs that may increase INR: None  Drugs that may decrease INR: None  Other current anticoagulants/ drugs that may increase bleeding risk: None  Risk factors: None  Daily INR ordered: YES    Recent Labs     03/07/19  0525 03/06/19  1850   HGB 11.4* 11.9   INR 2.5* 2.5*     Date               INR                  Dose  3/6  2.5  4 mg  (took PTA)   3/7  2.5  4 mg                                                                               Assessment/ Plan:  Warfarin 4 mg x 1. INR remains therapeutic. Continue current home regimen. Pharmacy will continue to monitor daily and adjust therapy as indicated. Please contact the pharmacist at  for outpatient recommendations if needed.

## 2019-03-07 NOTE — PROGRESS NOTES
3/7/2019   Margarita Damon MD  [facility]    . Yury Velásquez is a 60 y.o. female   Admit last night , see consult note  Fatigued, tired, mild edema and SOB  No cp  Discussion/Plans/Recs    1. CHF systolic AOC   nYHA 2-3  Restart entresto and coreg  2. Hypo --VF   3. VF scar and 2nd shock in one week  4, Check for ischemia  5. Stress and echo pending          Cardiac Studies/Hx:  No specialty comments available. Patient Vitals for the past 12 hrs:   Temp Pulse Resp BP SpO2   03/07/19 1526 98.2 °F (36.8 °C) 60 16 140/51 96 %   03/07/19 1135 97.7 °F (36.5 °C) 60 16 131/64 98 %   03/07/19 1022    157/61    03/07/19 0741 97.6 °F (36.4 °C) 60 16 157/61 94 %          Past Medical History:   Diagnosis Date    Atrial fibrillation ([address]     Dr. Stacy Kasper    Chronic anticoagulation     was on NOAC before. ?had clot on noac. now on warfarin. Dr. Pradeep Bunch monitors INR    DDD (degenerative disc disease), cervical 1/30/2018    DDD (degenerative disc disease), thoracic 1/30/2018    Depression     no h/o hospitalization    Essential hypertension 1/8/2018    Heart failure ([address]     Incidental pulmonary nodule 2mm, RLL- no follow up needed 2/16/2018    Noted on abdomen/pelvis CT Feb 2018. No smoking history. 1. No renal, ureteral, or bladder calculi. 2. No acute findings. 3. 2 mm pulmonary nodule right lower lobe. No follow-up needed if the patient is low risk. If the patient is high-risk, CT could be considered in 1 year.      Insomnia     Kidney infarction ([address] 2017    d/t clot per pt. recalls being on blood thinner.  Lymphedema of left arm     developed after infection of pacemaker site    Obesity, morbid ([address] 1/30/2018    Pacemaker     and defib.  Dr. Ander Webber flu 2015    hospital admit X2      ROS-pertinents  negative except as above  The pertinent portions of the medical history,physician and nursing notes, meds,vitals , labs and Ins/Outs,are reviewed in the electronic record.     Results for orders placed or performed during the hospital encounter of 03/06/19   EKG, 12 LEAD, INITIAL   Result Value Ref Range    Ventricular Rate 62 BPM    Atrial Rate 70 BPM    QRS Duration 218 ms    Q-T Interval 502 ms    QTC Calculation (Bezet) 509 ms    Calculated R Axis -136 degrees    Calculated T Axis 98 degrees    Diagnosis       Biventricular pacemaker detected  Sinus rhythm with complete heart block and Ventricular-paced rhythm  No previous ECGs available  Confirmed by Violet Magaña MD, Sean Webb ([id number]) on 3/7/2019 9:34:13 AM        Vitals:    03/07/19 0741 03/07/19 1022 03/07/19 1135 03/07/19 1526   BP: 157/61 157/61 131/64 140/51   BP 1 Location: Right arm  Right arm Left arm   BP Patient Position: At rest  At rest At rest   Pulse: 60  60 60   Resp: 16  16 16   Temp: 97.6 °F (36.4 °C)  97.7 °F (36.5 °C) 98.2 °F (36.8 °C)   SpO2: 94%  98% 96%   Weight:  262 lb (118.8 kg)     Height:  5' 5\" (1.651 m)         Objective:    Physical Exam:   Patient Vitals for the past 12 hrs:   Temp Pulse Resp BP SpO2   03/07/19 1526 98.2 °F (36.8 °C) 60 16 140/51 96 %   03/07/19 1135 97.7 °F (36.5 °C) 60 16 131/64 98 %   03/07/19 1022    157/61    03/07/19 0741 97.6 °F (36.4 °C) 60 16 157/61 94 %      General:  alert, cooperative, no distress, appears stated age   ENT, Neck:  no jvd   Chest Wall: inspection normal - no chest wall deformities or tenderness, respiratory effort normal   Lung: clear to auscultation bilaterally   Heart:  normal rate, regular rhythm, normal S1, S2, no murmurs, rubs, clicks or gallops   Abdomen: nondistended   Extremities: extremities normal, atraumatic, no cyanosis  edema 2+     Last 24hr Input/Output:    Intake/Output Summary (Last 24 hours) at 3/7/2019 9895  Last data filed at 3/7/2019 1400  Gross per 24 hour   Intake    Output 950 ml   Net -950 ml        Data Review:   Recent Results (from the past 24 hour(s))   EKG, 12 LEAD, INITIAL    Collection Time: 03/06/19 6:31 PM   Result Value Ref Range    Ventricular Rate 62 BPM    Atrial Rate 70 BPM    QRS Duration 218 ms    Q-T Interval 502 ms    QTC Calculation (Bezet) 509 ms    Calculated R Axis -136 degrees    Calculated T Axis 98 degrees    Diagnosis       Biventricular pacemaker detected  Sinus rhythm with complete heart block and Ventricular-paced rhythm  No previous ECGs available  Confirmed by David Portillo MD, Maddy Sweet ([id number]) on 3/7/2019 9:34:13 AM     CBC WITH AUTOMATED DIFF    Collection Time: 03/06/19  6:50 PM   Result Value Ref Range    WBC 9.5 3.6 - 11.0 K/uL    RBC 3.95 3.80 - 5.20 M/uL    HGB 11.9 11.5 - 16.0 g/dL    HCT 36.9 35.0 - 47.0 %    MCV 93.4 80.0 - 99.0 FL    MCH 30.1 26.0 - 34.0 PG    MCHC 32.2 30.0 - 36.5 g/dL    RDW 13.1 11.5 - 14.5 %    PLATELET 426 509 - 328 K/uL    MPV 9.8 8.9 - 12.9 FL    NRBC 0.0 0  WBC    ABSOLUTE NRBC 0.00 0.00 - 0.01 K/uL    NEUTROPHILS 69 32 - 75 %    LYMPHOCYTES 22 12 - 49 %    MONOCYTES 7 5 - 13 %    EOSINOPHILS 2 0 - 7 %    BASOPHILS 0 0 - 1 %    IMMATURE GRANULOCYTES 0 0.0 - 0.5 %    ABS. NEUTROPHILS 6.4 1.8 - 8.0 K/UL    ABS. LYMPHOCYTES 2.1 0.8 - 3.5 K/UL    ABS. MONOCYTES 0.7 0.0 - 1.0 K/UL    ABS. EOSINOPHILS 0.2 0.0 - 0.4 K/UL    ABS. BASOPHILS 0.0 0.0 - 0.1 K/UL    ABS. IMM. GRANS. 0.0 0.00 - 0.04 K/UL    DF AUTOMATED     METABOLIC PANEL, COMPREHENSIVE    Collection Time: 03/06/19  6:50 PM   Result Value Ref Range    Sodium 133 (L) 136 - 145 mmol/L    Potassium 2.4 (LL) 3.5 - 5.1 mmol/L    Chloride 90 (L) 97 - 108 mmol/L    CO2 34 (H) 21 - 32 mmol/L    Anion gap 9 5 - 15 mmol/L    Glucose 120 (H) 65 - 100 mg/dL    BUN 22 (H) 6 - 20 MG/DL    Creatinine 1.35 (H) 0.55 - 1.02 MG/DL    BUN/Creatinine ratio 16 12 - 20      GFR est AA 48 (L) >60 ml/min/1.73m2    GFR est non-AA 40 (L) >60 ml/min/1.73m2    Calcium 10.6 (H) 8.5 - 10.1 MG/DL    Bilirubin, total 0.6 0.2 - 1.0 MG/DL    ALT (SGPT) 19 12 - 78 U/L    AST (SGOT) 20 15 - 37 U/L    Alk.  phosphatase 91 45 - 117 U/L Protein, total 8.8 (H) 6.4 - 8.2 g/dL    Albumin 4.2 3.5 - 5.0 g/dL    Globulin 4.6 (H) 2.0 - 4.0 g/dL    A-G Ratio 0.9 (L) 1.1 - 2.2     TROPONIN I    Collection Time: 03/06/19  6:50 PM   Result Value Ref Range    Troponin-I, Qt. 0.05 (H) <0.05 ng/mL   SAMPLES BEING HELD    Collection Time: 03/06/19  6:50 PM   Result Value Ref Range    SAMPLES BEING HELD 1UA 1UC     COMMENT        Add-on orders for these samples will be processed based on acceptable specimen integrity and analyte stability, which may vary by analyte.    MAGNESIUM    Collection Time: 03/06/19  6:50 PM   Result Value Ref Range    Magnesium 2.1 1.6 - 2.4 mg/dL   PTT    Collection Time: 03/06/19  6:50 PM   Result Value Ref Range    aPTT 39.8 (H) 22.1 - 32.0 sec    aPTT, therapeutic range     58.0 - 77.0 SECS   PROTHROMBIN TIME + INR    Collection Time: 03/06/19  6:50 PM   Result Value Ref Range    INR 2.5 (H) 0.9 - 1.1      Prothrombin time 24.4 (H) 9.0 - 11.1 sec   TSH 3RD GENERATION    Collection Time: 03/06/19  6:50 PM   Result Value Ref Range    TSH 4.80 (H) 0.36 - 3.74 uIU/mL   T4, FREE    Collection Time: 03/06/19  6:50 PM   Result Value Ref Range    T4, Free 1.3 0.8 - 1.5 NG/DL   URINALYSIS W/ REFLEX CULTURE    Collection Time: 03/06/19  8:45 PM   Result Value Ref Range    Color YELLOW/STRAW      Appearance CLEAR CLEAR      Specific gravity 1.009 1.003 - 1.030      pH (UA) 7.5 5.0 - 8.0      Protein NEGATIVE  NEG mg/dL    Glucose NEGATIVE  NEG mg/dL    Ketone NEGATIVE  NEG mg/dL    Bilirubin NEGATIVE  NEG      Blood NEGATIVE  NEG      Urobilinogen 0.2 0.2 - 1.0 EU/dL    Nitrites NEGATIVE  NEG      Leukocyte Esterase NEGATIVE  NEG      WBC 0-4 0 - 4 /hpf    RBC 0-5 0 - 5 /hpf    Epithelial cells FEW FEW /lpf    Bacteria NEGATIVE  NEG /hpf    UA:UC IF INDICATED CULTURE NOT INDICATED BY UA RESULT CNI      Hyaline cast 0-2 0 - 5 /lpf   METABOLIC PANEL, BASIC    Collection Time: 03/07/19  3:06 AM   Result Value Ref Range    Sodium 137 136 - 145 mmol/L Potassium 3.9 3.5 - 5.1 mmol/L    Chloride 97 97 - 108 mmol/L    CO2 27 21 - 32 mmol/L    Anion gap 13 5 - 15 mmol/L    Glucose 116 (H) 65 - 100 mg/dL    BUN 22 (H) 6 - 20 MG/DL    Creatinine 1.21 (H) 0.55 - 1.02 MG/DL    BUN/Creatinine ratio 18 12 - 20      GFR est AA 55 (L) >60 ml/min/1.73m2    GFR est non-AA 45 (L) >60 ml/min/1.73m2    Calcium 9.8 8.5 - 10.1 MG/DL   PROTHROMBIN TIME + INR    Collection Time: 03/07/19  5:25 AM   Result Value Ref Range    INR 2.5 (H) 0.9 - 1.1      Prothrombin time 23.8 (H) 9.0 - 11.1 sec   CBC WITH AUTOMATED DIFF    Collection Time: 03/07/19  5:25 AM   Result Value Ref Range    WBC 8.4 3.6 - 11.0 K/uL    RBC 3.84 3.80 - 5.20 M/uL    HGB 11.4 (L) 11.5 - 16.0 g/dL    HCT 36.1 35.0 - 47.0 %    MCV 94.0 80.0 - 99.0 FL    MCH 29.7 26.0 - 34.0 PG    MCHC 31.6 30.0 - 36.5 g/dL    RDW 13.3 11.5 - 14.5 %    PLATELET 098 677 - 747 K/uL    MPV 9.7 8.9 - 12.9 FL    NRBC 0.0 0  WBC    ABSOLUTE NRBC 0.00 0.00 - 0.01 K/uL    NEUTROPHILS 66 32 - 75 %    LYMPHOCYTES 24 12 - 49 %    MONOCYTES 8 5 - 13 %    EOSINOPHILS 2 0 - 7 %    BASOPHILS 0 0 - 1 %    IMMATURE GRANULOCYTES 0 0.0 - 0.5 %    ABS. NEUTROPHILS 5.6 1.8 - 8.0 K/UL    ABS. LYMPHOCYTES 2.0 0.8 - 3.5 K/UL    ABS. MONOCYTES 0.6 0.0 - 1.0 K/UL    ABS. EOSINOPHILS 0.1 0.0 - 0.4 K/UL    ABS. BASOPHILS 0.0 0.0 - 0.1 K/UL    ABS. IMM.  GRANS. 0.0 0.00 - 0.04 K/UL    DF AUTOMATED     ECHO ADULT COMPLETE    Collection Time: 03/07/19 10:18 AM   Result Value Ref Range    LA Volume 133.06 (A) 22 - 52 mL    LV E' Lateral Velocity 7.97 cm/s    LV E' Septal Velocity 8.45 cm/s    Tapse 1.66 1.5 - 2.0 cm    Ao Root D 3.36 cm    LA Vol Index 59.97 16 - 28 ml/m2    Aortic Valve Systolic Peak Velocity 623.81 cm/s    AoV PG 7.9 mmHg    LVIDd 5.75 (A) 3.9 - 5.3 cm    LVPWd 0.95 (A) 0.6 - 0.9 cm    LVIDs 4.83 cm    IVSd 0.89 0.6 - 0.9 cm    MVA (PHT) 3.2 cm2    MV A Jude 26.74 cm/s    MV E Jude 118.95 cm/s    MV E/A 4.45     Left Atrium to Aortic Root Ratio 1.53     RVIDd 4.73 cm    LA Vol 4C 118.71 (A) 22 - 52 mL    LA Vol 2C 119.20 (A) 22 - 52 mL    LA Area 4C 33.1 cm2    LV Mass .0 (A) 67 - 162 g    LV Mass AL Index 109.1 43 - 95 g/m2    E/E' lateral 14.92     E/E' septal 14.08     E/E' ratio (averaged) 14.50     Mitral Valve E Wave Deceleration Time 239.0 ms    Mitral Valve Pressure Half-time 69.3 ms    Left Atrium Major Axis 5.14 cm    Triscuspid Valve Regurgitation Peak Gradient 29.0 mmHg    Pulmonic Valve Max Velocity 79.08 cm/s    TR Max Velocity 269.14 cm/s    PISA MR Rad 0.48 cm    LA Vol Index 53.73 16 - 28 ml/m2    LA Vol Index 53.50 16 - 28 ml/m2    PV peak gradient 2.5 mmHg   PHOSPHORUS    Collection Time: 03/07/19 11:58 AM   Result Value Ref Range    Phosphorus 2.6 2.6 - 4.7 MG/DL   METABOLIC PANEL, BASIC    Collection Time: 03/07/19 11:58 AM   Result Value Ref Range    Sodium 136 136 - 145 mmol/L    Potassium 3.8 3.5 - 5.1 mmol/L    Chloride 96 (L) 97 - 108 mmol/L    CO2 30 21 - 32 mmol/L    Anion gap 10 5 - 15 mmol/L    Glucose 175 (H) 65 - 100 mg/dL    BUN 20 6 - 20 MG/DL    Creatinine 1.31 (H) 0.55 - 1.02 MG/DL    BUN/Creatinine ratio 15 12 - 20      GFR est AA 50 (L) >60 ml/min/1.73m2    GFR est non-AA 41 (L) >60 ml/min/1.73m2    Calcium 9.9 8.5 - 10.1 MG/DL   MAGNESIUM    Collection Time: 03/07/19 11:58 AM   Result Value Ref Range    Magnesium 2.2 1.6 - 2.4 mg/dL   NUCLEAR CARDIAC STRESS TEST    Collection Time: 03/07/19 12:21 PM   Result Value Ref Range    Target  bpm    Exercise duration time 00:03:00     Stress Base Systolic  mmHg    Stress Base Diastolic BP 83 mmHg    Post peak HR 64 BPM    Baseline HR 60 BPM    Estimated workload 1.0 METS    Percent HR 47 %    Stress Rate Pressure Product 10,944 BPM*mmHg      Tim Guevara MD 3/7/2019

## 2019-03-07 NOTE — CONSULTS
Patient is seen  Full note will follow  Echo this am to reconfirm LVEF  BIV pacing without VT overnight  + family hx of cardiomyopathy and sudden death  This is her 4th device  BIV ICD replaced at [facility] 3/2018-[facility]  She was loaded on amiodarone 2/25, morbidly obese so will take time  Add mexiletine for now while continuing loading with amiodarone  CHF sx weight gain, did not take entresto due to cost of med

## 2019-03-07 NOTE — ED NOTES
2210: Patient left department with tech on cardiac monitor for transportation to inpatient bed. Patient's VS at the time of transfer were /79, 99 on RA, denies pain at this time, 97.9 orally, HR 60 paced rhythm on the monitor. Patient was alert and oriented x 4 and denies further needs at time of transfer. Patient's family went home prior to transfer to floor. TRANSFER - OUT REPORT:    Verbal report given to [facility] Jaime Cope RN(name) on Marciano Wilkinson  being transferred to [facility](unit) for routine progression of care       Report consisted of patients Situation, Background, Assessment and   Recommendations(SBAR). Information from the following report(s) SBAR, Kardex, ED Summary, [facility] and Recent Results was reviewed with the receiving nurse. Opportunity for questions and clarification was provided.

## 2019-03-08 NOTE — TELEPHONE ENCOUNTER
Called Pt. Two patient identifiers confirmed. Let Pt.  know that we have 2 weeks of Entresto.  He will be picking them up before 5 pm

## 2019-03-08 NOTE — PROGRESS NOTES
Pharmacist Note  Warfarin Dosing  Consult provided for this 60 y. o.female to manage warfarin for Atrial Fibrillation    INR Goal: 2 - 3    Home regimen/ tablet size: 4 mg daily    Drugs that may increase INR: Amiodarone  Drugs that may decrease INR: None  Other current anticoagulants/ drugs that may increase bleeding risk: None  Risk factors: None  Daily INR ordered: YES    Recent Labs     03/08/19  0254 03/07/19  0525 03/06/19  1850   HGB  --  11.4* 11.9   INR 2.7* 2.5* 2.5*     Date               INR                  Dose  3/6  2.5  4 mg  (took PTA)   3/7  2.5  4 mg  3/8  2.7  4 mg                                                                               Assessment/ Plan:  Warfarin 4 mg x 1. INR remains therapeutic. Continue current home regimen. Pharmacy will continue to monitor daily and adjust therapy as indicated. Please contact the pharmacist at  for outpatient recommendations if needed.

## 2019-03-08 NOTE — TELEPHONE ENCOUNTER
Pt  called stating Dr. Astrid Olvera left samples of Entresto with [facility] for him to  and he wants to know when he can come get them. pls call pt  at [phone].  Thanks

## 2019-03-08 NOTE — PROGRESS NOTES
Dr. Artur Brown has taken over as attending, as main pathology secondary to cardiac disease. Hospitalist will sign off, please re-consult if needed.

## 2019-03-08 NOTE — PROGRESS NOTES
[facility] Progresss Note     Subjective:      Moriah Flowers is a 60 y.o. 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  The current BIV ICD is [employer] and right chest    Echo with LVEF 40-45%  Nuclear stress test no ischemia or infarct and LVEF 47%    Tonight I met her , son and daughter in law  She walks inside the room fine no major BOWENS now      Patient Active Problem List   Diagnosis Code    CHF (congestive heart failure) ([facility] I50.9    CAD (coronary artery disease) I25.10    Essential hypertension I10    Obesity, morbid ([facility] E66.01    Pacemaker Z95.0    Atrial fibrillation ([facility] I48.91    Depression F32.9    Insomnia G47.00    Chronic anticoagulation Z79.01    DDD (degenerative disc disease), cervical M50.30    DDD (degenerative disc disease), thoracic M51.34    Long term prescription opiate use Z79.891    Incidental pulmonary nodule- 2mm RLL- repeat CT due in Feb 2019 R91.1    Acute hypokalemia E87.6    AICD discharge Z45.02     Current Facility-Administered Medications   Medication Dose Route Frequency Provider Last Rate Last Dose    spironolactone (ALDACTONE) tablet 25 mg  25 mg Oral DAILY Shmuel Jolly MD   25 mg at 03/08/19 1255    [START ON 3/9/2019] sacubitril-valsartan (ENTRESTO) 49-51 mg tablet 1 Tab  1 Tab Oral Q12H Shmuel Jolly MD        carvedilol (COREG) tablet 3.125 mg  3.125 mg Oral BID WITH MEALS Guy Gagnon MD   3.125 mg at 03/08/19 1604    pravastatin (PRAVACHOL) tablet 80 mg  80 mg Oral QHS Selma Alcantar MD   80 mg at 03/07/19 2106    famotidine (PEPCID) tablet 20 mg  20 mg Oral BID Edi Granados MD   20 mg at 03/08/19 1002    sertraline (ZOLOFT) tablet 100 mg  100 mg Oral DAILY Edi Granados MD   100 mg at 03/08/19 1002    sodium chloride (NS) flush 5-40 mL  5-40 mL IntraVENous Q8H Guy Gagnon MD   10 mL at 03/08/19 1258    sodium chloride (NS) flush 5-40 mL  5-40 mL IntraVENous PRN Edi Granados MD        sodium chloride (NS) flush 5-40 mL  5-40 mL IntraVENous Q8H Guy Gagnon MD   10 mL at 03/08/19 1400    sodium chloride (NS) flush 5-40 mL  5-40 mL IntraVENous PRN Edi Granados MD        mexiletine (MEXITIL) capsule 300 mg  300 mg Oral Caridad Corley MD   300 mg at 03/08/19 1708    acetaminophen (TYLENOL) tablet 650 mg  650 mg Oral Q4H PRN Juwan Blakely MD        oxyCODONE IR (ROXICODONE) tablet 5 mg  5 mg Oral Q4H PRN Juwan BOURNE MD   5 mg at 03/08/19 1002    ondansetron (ZOFRAN) injection 4 mg  4 mg IntraVENous Q8H PRN Juwan Blakely MD        bisacodyl (DULCOLAX) suppository 10 mg  10 mg Rectal DAILY PRN Juwan Blakely MD        Warfarin - pharmacy to dose   Other Rx Dosing/Monitoring Kelvin Gagnon MD        amiodarone (CORDARONE) 375 mg/250 mL D5W infusion  0.5-1 mg/min IntraVENous TITRATE Shmuel Jolly MD 20 mL/hr at 03/08/19 1709 0.5 mg/min at 03/08/19 1709     Allergies   Allergen Reactions    Augmentin [Amoxicillin-Pot Clavulanate] Nausea and Vomiting    Neosporin [Neomycin-Bacitracin-Polymyxin] Rash     No current facility-administered medications on file prior to encounter.       Current Outpatient Medications on File Prior to Encounter   Medication Sig Dispense Refill    methocarbamol (ROBAXIN) 500 mg tablet TAKE 1 TABLET BY MOUTH THREE TIMES DAILY 90 Tab 1    HYDROcodone-acetaminophen (NORCO) 7.5-325 mg per tablet 1 Tab, EVERY 8 HOURS AS NEEDED FOR PAIN 90 Tab 0    HYDROcodone-acetaminophen (NORCO) 7.5-325 mg per tablet Take 1 Tab by mouth every eight (8) hours as needed for Pain. Max Daily Amount: 3 Tabs. 90 Tab 0    eszopiclone (LUNESTA) 3 mg tablet Take 1 Tab by mouth nightly as needed for Sleep. Max Daily Amount: 3 mg. 30 Tab 5    furosemide (LASIX) 80 mg tablet Take 80 mg by mouth two (2) times a day.  potassium chloride (K-DUR, KLOR-CON) 20 mEq tablet Take 20 mEq by mouth daily.  warfarin (COUMADIN) 4 mg tablet Take 4 mg by mouth daily.  sacubitril-valsartan (ENTRESTO) 49 mg/51 mg tablet Take 1 Tab by mouth two (2) times a day.  pravastatin (PRAVACHOL) 80 mg tablet Take 80 mg by mouth nightly.  sertraline (ZOLOFT) 100 mg tablet Take 100 mg by mouth daily.  raNITIdine hcl 150 mg capsule Take 150 mg by mouth two (2) times a day.  spironolactone (ALDACTONE) 25 mg tablet Take 25 mg by mouth daily.  mirtazapine (REMERON) 15 mg tablet Take 15 mg by mouth nightly.  carvedilol (COREG) 3.125 mg tablet Take 3.125 mg by mouth two (2) times daily (with meals). Past Medical History:   Diagnosis Date    Atrial fibrillation ([address]     Dr. Be Stacy    Chronic anticoagulation     was on NOAC before. ?had clot on noac. now on warfarin. Dr. Richie Gonzalez monitors INR    DDD (degenerative disc disease), cervical 1/30/2018    DDD (degenerative disc disease), thoracic 1/30/2018    Depression     no h/o hospitalization    Essential hypertension 1/8/2018    Heart failure ([address]     Incidental pulmonary nodule 2mm, RLL- no follow up needed 2/16/2018    Noted on abdomen/pelvis CT Feb 2018. No smoking history. 1. No renal, ureteral, or bladder calculi. 2. No acute findings. 3. 2 mm pulmonary nodule right lower lobe. No follow-up needed if the patient is low risk. If the patient is high-risk, CT could be considered in 1 year.      Insomnia     Kidney infarction ([address] 2017    d/t clot per pt. recalls being on blood thinner.      Lymphedema of left arm     developed after infection of pacemaker site    Obesity, morbid ([facility] 1/30/2018    Pacemaker     and defib. Dr. Gonsales Born Stomach flu 2015    hospital admit X2     Past Surgical History:   Procedure Laterality Date    HX BREAST BIOPSY Right 2010    Benign    HX CHOLECYSTECTOMY  1984    HX HEART CATHETERIZATION  2006    d/t abnl stress test, per pat, test was normal    HX HEART CATHETERIZATION  ~2009, 2012    per pt normal.     HX HYSTERECTOMY  2012    d/t heavy bleeding. ?endometriosis. +fibroid. per pt, no cancer.  HX PACEMAKER  2010, 2011, 2012, 3/29/2018    first 2010 (infected), replacement 2011 (lead not placed correctly), 2nd replacement 2012- pacemaker, defibrillator, 3rd replacement 2018     Family History   Problem Relation Age of Onset    Breast Cancer Maternal Aunt         late 35s    Uterine Cancer Maternal Aunt     Heart Disease Mother         sudden cardiac arrest    Heart Disease Father     Coronary Artery Disease Father     Colon Cancer Father         76s    Diabetes Father     COPD Sister     Cancer Brother 43        pancreatic    Uterine Cancer Maternal Grandmother     Other Maternal Grandfather         mva    Heart Disease Paternal Grandmother     Heart Disease Paternal Grandfather     Heart Disease Other     Diabetes Other     Other Sister         sudden cardiac arrest    Heart Attack Sister     No Known Problems Sister     Heart Disease Brother     Kidney Disease Brother     Other Brother         mva    No Known Problems Brother     Heart Attack Paternal Uncle 36    Other Paternal Aunt         aneurysm    Celiac Disease Son     Seizures Daughter     Other Daughter 22        MVA     Social History     Tobacco Use    Smoking status: Never Smoker    Smokeless tobacco: Never Used   Substance Use Topics    Alcohol use: No        Review of Systems:   Constitutional: Negative for fever, chills, + weight gain, + malaise/fatigue. HEENT: Negative for nosebleeds, vision changes.    Respiratory: Negative for cough, hemoptysis  Cardiovascular: Negative for chest pain, palpitations, + orthopnea, no claudication, + leg swelling, no syncope, and PND. Gastrointestinal: Negative for nausea, vomiting, diarrhea, blood in stool and melena. Genitourinary: Negative for dysuria, and hematuria. Musculoskeletal: Negative for myalgias, arthralgia. Skin: Negative for rash. Heme: Does not bleed or bruise easily. Neurological: Negative for speech change and focal weakness     Objective:     Visit Vitals  /60   Pulse 60   Temp 97.6 °F (36.4 °C)   Resp 16   Ht 5' 5\" (1.651 m)   Wt 264 lb 15.9 oz (120.2 kg)   SpO2 96%   Breastfeeding? No   BMI 44.10 kg/m²      Physical Exam:   Constitutional: well-developed and well-nourished. No respiratory distress. Head: Normocephalic and atraumatic. Eyes: Pupils are equal, round  ENT: hearing normal  Neck: supple. No JVD present. Cardiovascular: Normal rate, regular rhythm. Exam reveals no gallop and no friction rub. No murmur heard. Pulmonary/Chest: Effort normal and breath sounds normal. No wheezes. Abdominal: Soft, morbidly obese  Musculoskeletal: trace+ edema. Neurological: alert,oriented. Skin: Skin is warm and dry right sided BIV ICD pocket  Psychiatric: normal mood and affect. Behavior is normal. Judgment and thought content normal.           Assessment/Plan:   Ventricular fibrillation with BIV ICD shock and Syncope   Will not be able to drive for 3-6 months while we monitor her BIV ICD  I added mexiletine 300 mg tid for a month and will try to turn off amiodarone IV tomorrow. She is morbidly obese so it takes more time and dose to load amiodarone.   In the future hopefully she will be on amiodarone 200 mg bid and off mexiletine  Non ischemic cardiomyopathy with LVEF above 35% so not likely to be able to use entresto outpatient  AFIB with hx of embolism, coumadin INR therapeutic  If she has recurrent VF she will need ablation  Need to correct potassium     Thank you for involving me in this patient's care and please call with further concerns or questions. Hilaria Ahmadi M.D.   Electrophysiology/Cardiology  [address] [facility] [place]  [facility], [facility] [address] [facility], [address]  [phone]

## 2019-03-08 NOTE — PROGRESS NOTES
3/8/2019   Jorge Bravo MD  [facility]    . Larraine Piles Larraine Katalina Jacobsine Katalina Sinha is a 60 y.o. female   Still fatigued, no chest pain, mild shortness of breath, mild edema  K is low   normal sinus rhythm   Nuclear stress test -No ischemia demonstrated. No infarct visible. LVEF 47%. Echo  03/06/19   ECHO ADULT COMPLETE 03/07/2019 3/7/2019    Narrative · Left ventricular mildly decreased systolic function. Estimated left   ventricular ejection fraction is 41 - 45%. Visually measured ejection   fraction. Left ventricle not well visualized. Left ventricular global   hypokinesis. · Left atrial cavity size is moderately dilated. · Right ventricle not well visualized. · Moderate mitral valve regurgitation. · Severely elevated central venous pressure (15+ mmHg); IVC diameter is   larger than 21 mm and collapses less than 50% with respiration. Signed by: Paras Gustafson MD      Discussion/Plans/Recs  1. CHF systolic acute on chronic   ----NYHA 2-3  ---EF around 45%  --increase to previous Entresto dose, restart aldactone  ---needs the diuresis   ---will get her samples    2. VF with 2 shocks  ---3 prev caths ok, new stress test no ischemia  ---scar   ---continue amio drip and amio/mexilitene until dc    3. Hypokalemia  --replete, hold off on dc until K is normal    4. Mild renal insufficiency -improved, watch as we add above meds    Anticipate dc Sunday or Monday need to make sure Creat and K are ok on meds  Fu with me Thursday in office  Transfer to [facility] service-all issues are CV         Cardiac Studies/Hx:  No specialty comments available.    Patient Vitals for the past 12 hrs:   Temp Pulse Resp BP SpO2   03/08/19 0806 97.9 °F (36.6 °C) 60 16 142/62 91 %   03/08/19 0313 97.6 °F (36.4 °C) 60 16 136/61 95 %   03/08/19 0258  60  122/51    03/07/19 2336 97.5 °F (36.4 °C) 64 16  94 %          Past Medical History:   Diagnosis Date    Atrial fibrillation (HCC)     Dr. Marylou Ellis    Chronic anticoagulation     was on NOAC before. ?had clot on noac. now on warfarin. Dr. Vides Shall monitors INR    DDD (degenerative disc disease), cervical 1/30/2018    DDD (degenerative disc disease), thoracic 1/30/2018    Depression     no h/o hospitalization    Essential hypertension 1/8/2018    Heart failure ([facility]     Incidental pulmonary nodule 2mm, RLL- no follow up needed 2/16/2018    Noted on abdomen/pelvis CT Feb 2018. No smoking history. 1. No renal, ureteral, or bladder calculi. 2. No acute findings. 3. 2 mm pulmonary nodule right lower lobe. No follow-up needed if the patient is low risk. If the patient is high-risk, CT could be considered in 1 year.      Insomnia     Kidney infarction ([address] 2017    d/t clot per pt. recalls being on blood thinner.  Lymphedema of left arm     developed after infection of pacemaker site    Obesity, morbid ([facility] 1/30/2018    Pacemaker     and defib. Dr. Shena Michaud flu 2015    hospital admit X2      ROS-pertinents  negative except as above  The pertinent portions of the medical history,physician and nursing notes, meds,vitals , labs and Ins/Outs,are reviewed in the electronic record.     Results for orders placed or performed during the hospital encounter of 03/06/19   EKG, 12 LEAD, INITIAL   Result Value Ref Range    Ventricular Rate 62 BPM    Atrial Rate 70 BPM    QRS Duration 218 ms    Q-T Interval 502 ms    QTC Calculation (Bezet) 509 ms    Calculated R Axis -136 degrees    Calculated T Axis 98 degrees    Diagnosis       Biventricular pacemaker detected  Sinus rhythm with complete heart block and Ventricular-paced rhythm  No previous ECGs available  Confirmed by Tom Clark MD, Alayna Montoya ([id number]) on 3/7/2019 9:34:13 AM        Vitals:    03/07/19 2336 03/08/19 0258 03/08/19 0313 03/08/19 0806   BP:  122/51 136/61 142/62   BP 1 Location:   Left arm    BP Patient Position:   At rest    Pulse: 64 60 60 60   Resp: 16  16 16   Temp: 97.5 °F (36.4 °C)  97.6 °F (36.4 °C) 97.9 °F (36.6 °C)   SpO2: 94%  95% 91%   Weight:   264 lb 15.9 oz (120.2 kg)    Height:           Objective:    Physical Exam:   Patient Vitals for the past 12 hrs:   Temp Pulse Resp BP SpO2   03/08/19 0806 97.9 °F (36.6 °C) 60 16 142/62 91 %   03/08/19 0313 97.6 °F (36.4 °C) 60 16 136/61 95 %   03/08/19 0258  60  122/51    03/07/19 2336 97.5 °F (36.4 °C) 64 16  94 %      General:  alert, cooperative, no distress, appears stated age   ENT, Neck:  no jvd   Chest Wall: inspection normal - no chest wall deformities or tenderness, respiratory effort normal   Lung: clear to auscultation bilaterally   Heart:  normal rate, regular rhythm, normal S1, S2, no murmurs, rubs, clicks or gallops   Abdomen: nondistended   Extremities: extremities normal, atraumatic, no cyanosis or edema     Last 24hr Input/Output:    Intake/Output Summary (Last 24 hours) at 3/8/2019 1000  Last data filed at 3/8/2019 0259  Gross per 24 hour   Intake 849 ml   Output 600 ml   Net 249 ml        Data Review:   Recent Results (from the past 24 hour(s))   ECHO ADULT COMPLETE    Collection Time: 03/07/19 10:18 AM   Result Value Ref Range    LA Volume 133.06 (A) 22 - 52 mL    LV E' Lateral Velocity 7.97 cm/s    LV E' Septal Velocity 8.45 cm/s    Tapse 1.66 1.5 - 2.0 cm    Ao Root D 3.36 cm    LA Vol Index 59.97 16 - 28 ml/m2    Aortic Valve Systolic Peak Velocity 310.89 cm/s    AoV PG 7.9 mmHg    LVIDd 5.75 (A) 3.9 - 5.3 cm    LVPWd 0.95 (A) 0.6 - 0.9 cm    LVIDs 4.83 cm    IVSd 0.89 0.6 - 0.9 cm    MVA (PHT) 3.2 cm2    MV A Jude 26.74 cm/s    MV E Jude 118.95 cm/s    MV E/A 4.45     Left Atrium to Aortic Root Ratio 1.53     RVIDd 4.73 cm    LA Vol 4C 118.71 (A) 22 - 52 mL    LA Vol 2C 119.20 (A) 22 - 52 mL    LA Area 4C 33.1 cm2    LV Mass .0 (A) 67 - 162 g    LV Mass AL Index 109.1 43 - 95 g/m2    E/E' lateral 14.92     E/E' septal 14.08     E/E' ratio (averaged) 14.50     Mitral Valve E Wave Deceleration Time 239.0 ms    Mitral Valve Pressure Half-time 69.3 ms    Left Atrium Major Axis 5.14 cm    Triscuspid Valve Regurgitation Peak Gradient 29.0 mmHg    Pulmonic Valve Max Velocity 79.08 cm/s    TR Max Velocity 269.14 cm/s    PISA MR Rad 0.48 cm    LA Vol Index 53.73 16 - 28 ml/m2    LA Vol Index 53.50 16 - 28 ml/m2    PV peak gradient 2.5 mmHg   PHOSPHORUS    Collection Time: 03/07/19 11:58 AM   Result Value Ref Range    Phosphorus 2.6 2.6 - 4.7 MG/DL   METABOLIC PANEL, BASIC    Collection Time: 03/07/19 11:58 AM   Result Value Ref Range    Sodium 136 136 - 145 mmol/L    Potassium 3.8 3.5 - 5.1 mmol/L    Chloride 96 (L) 97 - 108 mmol/L    CO2 30 21 - 32 mmol/L    Anion gap 10 5 - 15 mmol/L    Glucose 175 (H) 65 - 100 mg/dL    BUN 20 6 - 20 MG/DL    Creatinine 1.31 (H) 0.55 - 1.02 MG/DL    BUN/Creatinine ratio 15 12 - 20      GFR est AA 50 (L) >60 ml/min/1.73m2    GFR est non-AA 41 (L) >60 ml/min/1.73m2    Calcium 9.9 8.5 - 10.1 MG/DL   MAGNESIUM    Collection Time: 03/07/19 11:58 AM   Result Value Ref Range    Magnesium 2.2 1.6 - 2.4 mg/dL   NUCLEAR CARDIAC STRESS TEST    Collection Time: 03/07/19 12:21 PM   Result Value Ref Range    Target  bpm    Exercise duration time 00:03:00     Stress Base Systolic  mmHg    Stress Base Diastolic BP 83 mmHg    Post peak HR 64 BPM    Baseline HR 60 BPM    Estimated workload 1.0 METS    Percent HR 47 %    Stress Rate Pressure Product 10,944 BPM*mmHg   MAGNESIUM    Collection Time: 03/08/19  2:54 AM   Result Value Ref Range    Magnesium 2.3 1.6 - 2.4 mg/dL   METABOLIC PANEL, BASIC    Collection Time: 03/08/19  2:54 AM   Result Value Ref Range    Sodium 138 136 - 145 mmol/L    Potassium 2.7 (LL) 3.5 - 5.1 mmol/L    Chloride 97 97 - 108 mmol/L    CO2 31 21 - 32 mmol/L    Anion gap 10 5 - 15 mmol/L    Glucose 132 (H) 65 - 100 mg/dL    BUN 19 6 - 20 MG/DL    Creatinine 1.15 (H) 0.55 - 1.02 MG/DL    BUN/Creatinine ratio 17 12 - 20      GFR est AA 58 (L) >60 ml/min/1.73m2    GFR est non-AA 48 (L) >60 ml/min/1.73m2    Calcium 9.5 8.5 - 10.1 MG/DL   PHOSPHORUS    Collection Time: 03/08/19  2:54 AM   Result Value Ref Range    Phosphorus 3.3 2.6 - 4.7 MG/DL   PROTHROMBIN TIME + INR    Collection Time: 03/08/19  2:54 AM   Result Value Ref Range    INR 2.7 (H) 0.9 - 1.1      Prothrombin time 26.3 (H) 9.0 - 11.1 sec      Sam Hagan MD 3/8/2019

## 2019-03-08 NOTE — PROGRESS NOTES
A Spiritual Care Partner Volunteer visited patient in Rm 443 on 3/08/2019.   Documented by:  Chaplain Manuel MDiv, MS, [address] ([id number])

## 2019-03-09 NOTE — PROGRESS NOTES
Problem: Falls - Risk of  Goal: *Absence of Falls  Document Staci Fall Risk and appropriate interventions in the flowsheet.   Outcome: Progressing Towards Goal  Fall Risk Interventions:  Mobility Interventions: Communicate number of staff needed for ambulation/transfer         Medication Interventions: Teach patient to arise slowly    Elimination Interventions: Call light in reach

## 2019-03-09 NOTE — PROGRESS NOTES
Pharmacist Note  Warfarin Dosing  Consult provided for this 60 y. o.female to manage warfarin for Atrial Fibrillation    INR Goal: 2 - 3    Home regimen/ tablet size: 4 mg daily    Drugs that may increase INR: Amiodarone  Drugs that may decrease INR: None  Other current anticoagulants/ drugs that may increase bleeding risk: None  Risk factors: None  Daily INR ordered: YES    Recent Labs     03/09/19  0331 03/08/19  0254 03/07/19  0525 03/06/19  1850   HGB  --   --  11.4* 11.9   INR 2.8* 2.7* 2.5* 2.5*     Date               INR                  Dose  3/6  2.5  4 mg  (took PTA)   3/7  2.5  4 mg  3/8  2.7  4 mg  3/9                   2.8                  4 mg                                                                               Assessment/ Plan:  Warfarin 4 mg x 1. INR remains therapeutic. Continue current home regimen. Pharmacy will continue to monitor daily and adjust therapy as indicated. Please contact the pharmacist at  for outpatient recommendations if needed.

## 2019-03-09 NOTE — PROGRESS NOTES
Bedside shift change report given to Andrew Rios RN (oncoming nurse) by TINY Bowers (offgoing nurse). Report included the following information SBAR, Kardex, Intake/Output, MAR, Accordion and Cardiac Rhythm Paced.

## 2019-03-09 NOTE — ROUTINE PROCESS
Bedside shift change report given to Emir Suggs RN (oncoming nurse) by Ghassan Alexis RN (offgoing nurse).  Report included the following information SBAR, Procedure Summary, Intake/Output, MAR and Recent Results, Paced cardiac rhythm

## 2019-03-09 NOTE — PROGRESS NOTES
Problem: Falls - Risk of  Goal: *Absence of Falls  Document Staci Fall Risk and appropriate interventions in the flowsheet. Outcome: Progressing Towards Goal  Fall Risk Interventions:  Mobility Interventions: Communicate number of staff needed for ambulation/transfer         Medication Interventions: Teach patient to arise slowly    Elimination Interventions: Call light in reach, Toilet paper/wipes in reach             Problem: Pressure Injury - Risk of  Goal: *Prevention of pressure injury  Document Feliberto Scale and appropriate interventions in the flowsheet. Outcome: Progressing Towards Goal  Pressure Injury Interventions:             Activity Interventions: Increase time out of bed    Mobility Interventions: Pressure redistribution bed/mattress (bed type)    Nutrition Interventions: Document food/fluid/supplement intake    Friction and Shear Interventions: Minimize layers

## 2019-03-09 NOTE — PROGRESS NOTES
Problem: Pressure Injury - Risk of  Goal: *Prevention of pressure injury  Document Feliberto Scale and appropriate interventions in the flowsheet. Outcome: Progressing Towards Goal  Pressure Injury Interventions:             Activity Interventions: Increase time out of bed    Mobility Interventions: Pressure redistribution bed/mattress (bed type)    Nutrition Interventions: Document food/fluid/supplement intake    Friction and Shear Interventions: Minimize layers

## 2019-03-09 NOTE — PROGRESS NOTES
Spiritual Care Partner Volunteer visited patient in room 443  on 3.09.19. Documented by: : Rev. Avinash Carlson.  Jose R Wolfe; [facility], to contact [phone] [address] call: [phone]

## 2019-03-09 NOTE — PROGRESS NOTES
[employer] [facility] Progress Note         NAME:  Debora Cerrato   :   1960   MRN:   [id number]     Assessment/Plan: she feels a little stronger and less sob today   CHF systolic acute on chronic   ----NYHA 2-3  ---EF around 45%  --continue entresto and aldactone       2. VF with 2 shocks  ---3 prev caths ok, new stress test no ischemia  ---scar   ---continu amio/mexilitene now po      3. Hypokalemia, better today but not yet normal  --replete, hold off on dc until K is normal     4. Mild renal insufficiency stable     19   ECHO ADULT COMPLETE 2019 3/7/2019    Narrative · Left ventricular mildly decreased systolic function. Estimated left   ventricular ejection fraction is 41 - 45%. Visually measured ejection   fraction. Left ventricle not well visualized. Left ventricular global   hypokinesis. · Left atrial cavity size is moderately dilated. · Right ventricle not well visualized. · Moderate mitral valve regurgitation. · Severely elevated central venous pressure (15+ mmHg); IVC diameter is   larger than 21 mm and collapses less than 50% with respiration. Signed by: Bernadette Duque MD          Subjective:   Cardiac ROS: Patient denies any exertional chest pain, dyspnea, palpitations, syncope, orthopnea, edema or paroxysmal nocturnal dyspnea. Review of Systems:   Pertinent items are noted in the History of Present Illness. Objective:       No intake/output data recorded.  1901 -  0700  In: 3096 [P.O.:480; I.V.:1153]  Out: -     Telemetry: normal sinus rhythm    Physical Exam:  Visit Vitals  /50   Pulse 61   Temp 97.9 °F (36.6 °C)   Resp 18   Ht 5' 5\" (1.651 m)   Wt 122.6 kg (270 lb 4.5 oz)   SpO2 95%   Breastfeeding? No   BMI 44.98 kg/m²       Neck: no JVD  Heart: regular rate and rhythm  Lungs: clear to auscultation bilaterally, diminished breath sounds R base  Abdomen: soft, non-tender.  Bowel sounds normal. No masses,  no organomegaly  Extremities: edema 1+    Additional comments: None    Care Plan discussed with:    Comments   Patient     Family      RN     Care Manager                    Consultant:        Data Review:     No results for input(s): TNIPOC in the last 72 hours. No lab exists for component: ITNL   Recent Labs     03/06/19 1850   TROIQ 0.05*     Recent Labs     03/08/19  1850 03/08/19  0254 03/07/19  1158 03/07/19  0525  03/06/19  1850   * 138 136  --    < > 133*   K 3.5 2.7* 3.8  --    < > 2.4*   CL 94* 97 96*  --    < > 90*   CO2 30 31 30  --    < > 34*   BUN 17 19 20  --    < > 22*   CREA 1.28* 1.15* 1.31*  --    < > 1.35*   * 132* 175*  --    < > 120*   PHOS  --  3.3 2.6  --   --   --    MG 2.1 2.3 2.2  --   --  2.1   ALB  --   --   --   --   --  4.2   WBC  --   --   --  8.4  --  9.5   HGB  --   --   --  11.4*  --  11.9   HCT  --   --   --  36.1  --  36.9   PLT  --   --   --  195  --  212    < > = values in this interval not displayed.      Recent Labs     03/09/19  0331 03/08/19  0254 03/07/19  0525 03/06/19 1850   INR 2.8* 2.7* 2.5* 2.5*   PTP 27.1* 26.3* 23.8* 24.4*   APTT  --   --   --  39.8*       Medications reviewed  Current Facility-Administered Medications   Medication Dose Route Frequency    amiodarone (CORDARONE) tablet 400 mg  400 mg Oral BID    spironolactone (ALDACTONE) tablet 25 mg  25 mg Oral DAILY    sacubitril-valsartan (ENTRESTO) 49-51 mg tablet 1 Tab  1 Tab Oral Q12H    potassium chloride SR (KLOR-CON 10) tablet 40 mEq  40 mEq Oral BID    carvedilol (COREG) tablet 3.125 mg  3.125 mg Oral BID WITH MEALS    pravastatin (PRAVACHOL) tablet 80 mg  80 mg Oral QHS    famotidine (PEPCID) tablet 20 mg  20 mg Oral BID    sertraline (ZOLOFT) tablet 100 mg  100 mg Oral DAILY    sodium chloride (NS) flush 5-40 mL  5-40 mL IntraVENous Q8H    sodium chloride (NS) flush 5-40 mL  5-40 mL IntraVENous PRN    sodium chloride (NS) flush 5-40 mL  5-40 mL IntraVENous Q8H    sodium chloride (NS) flush 5-40 mL  5-40 mL IntraVENous PRN    mexiletine (MEXITIL) capsule 300 mg  300 mg Oral Q8H    acetaminophen (TYLENOL) tablet 650 mg  650 mg Oral Q4H PRN    oxyCODONE IR (ROXICODONE) tablet 5 mg  5 mg Oral Q4H PRN    ondansetron (ZOFRAN) injection 4 mg  4 mg IntraVENous Q8H PRN    bisacodyl (DULCOLAX) suppository 10 mg  10 mg Rectal DAILY PRN    Warfarin - pharmacy to dose   Other Rx Dosing/Monitoring       Data Reviewed: current meds, labs,recent radiology, intake/output/weight and problem list reviewed    Robert Harvey MD

## 2019-03-10 NOTE — DISCHARGE SUMMARY
Cardiology Discharge Summary     Patient ID:  Boogie Sanchez  [id number]  65 y.o.  1960    Admit Date: 3/6/2019    Discharge Date: 3/10/2019     Admitting Physician: Paty Juarez MD     Discharge Physician: Dhiraj Raza MD    Admission Diagnoses: AICD discharge [Z45.02]; Acute hypokalemia [E87.6]    Discharge Diagnoses: Principal Problem:    AICD discharge (3/6/2019)    Active Problems:    Acute hypokalemia (3/6/2019)        Discharge Condition: Good    Cardiology Procedures this Admission:  Nuclear stress test  EchoCardiogram    Hospital Course:      admitted with dehydration hypokalemia and aicd shocks   lasix temporarily stopped potassium repleted  Dr. Saira Carver saw patient and recommended a temporary combination of amiodarone and mexiletine  Her lytes today are normal   I will resume lasix only once a day for now  Amiodarone to be titrate as outpatient as per Dr. Saira Carver input  Continue mexiletine   CHF systolic compensated at this time   ----NYHA 2-3  ---EF around 45%  --continue entresto and  aldactone and coreg and lasix +potassium        2. VF with 2 shocks  ---3 prev caths ok, new stress test no ischemia  ---scar   ---continu amio/mexilitene now po   close follow up this week     3. Hypokalemia, normal      4. Mild renal insufficiency  stable    5. INR therapeutic          03/06/19   ECHO ADULT COMPLETE 03/07/2019 3/7/2019     Narrative · Left ventricular mildly decreased systolic function. Estimated left   ventricular ejection fraction is 41 - 45%. Visually measured ejection   fraction. Left ventricle not well visualized. Left ventricular global   hypokinesis. · Left atrial cavity size is moderately dilated. · Right ventricle not well visualized. · Moderate mitral valve regurgitation. · Severely elevated central venous pressure (15+ mmHg); IVC diameter is   larger than 21 mm and collapses less than 50% with respiration.          Signed by:  Alejandro Ferguson MD            Consults: Cardiology  Visit Vitals  /51 (BP 1 Location: Right arm, BP Patient Position: Sitting;Head of bed elevated (Comment degrees))   Pulse 60   Temp 97.4 °F (36.3 °C)   Resp 16   Ht 5' 5\" (1.651 m)   Wt 123.3 kg (271 lb 13.2 oz)   SpO2 95%   Breastfeeding? No   BMI 45.23 kg/m²       Discharge Exam:   Neck: no carotid bruit and no JVD  Heart: regular rate and rhythm  Lungs: clear to auscultation bilaterally  Abdomen: soft, non-tender. Bowel sounds normal. No masses,  no organomegaly  Extremities: edema trace  Recent Results (from the past 24 hour(s))   PROTHROMBIN TIME + INR    Collection Time: 03/10/19  3:25 AM   Result Value Ref Range    INR 2.6 (H) 0.9 - 1.1      Prothrombin time 25.5 (H) 9.0 - 77.1 sec   METABOLIC PANEL, BASIC    Collection Time: 03/10/19  3:25 AM   Result Value Ref Range    Sodium 135 (L) 136 - 145 mmol/L    Potassium 4.4 3.5 - 5.1 mmol/L    Chloride 101 97 - 108 mmol/L    CO2 26 21 - 32 mmol/L    Anion gap 8 5 - 15 mmol/L    Glucose 135 (H) 65 - 100 mg/dL    BUN 19 6 - 20 MG/DL    Creatinine 1.20 (H) 0.55 - 1.02 MG/DL    BUN/Creatinine ratio 16 12 - 20      GFR est AA 56 (L) >60 ml/min/1.73m2    GFR est non-AA 46 (L) >60 ml/min/1.73m2    Calcium 9.2 8.5 - 10.1 MG/DL   MAGNESIUM    Collection Time: 03/10/19  3:25 AM   Result Value Ref Range    Magnesium 2.4 1.6 - 2.4 mg/dL       Disposition: home                   Patient Instructions:   Current Discharge Medication List      START taking these medications    Details   amiodarone (PACERONE) 400 mg tablet Take 1 Tab by mouth two (2) times a day. Qty: 60 Tab, Refills: 1      mexiletine (MEXITIL) 150 mg capsule Take 2 Caps by mouth every eight (8) hours. Qty: 90 Cap, Refills: 0         CONTINUE these medications which have CHANGED    Details   furosemide (LASIX) 80 mg tablet Take 1 Tab by mouth daily.   Qty: 30 Tab, Refills: 1         CONTINUE these medications which have NOT CHANGED    Details   !! sacubitril-valsartan (ENTRESTO) 49 mg/51 mg tablet Take 1 Tab by mouth two (2) times a day. Qty: 28 Tab, Refills: 0    Comments: [id number]  5/21  [employer]  Associated Diagnoses: Systolic CHF, acute (HCC)      methocarbamol (ROBAXIN) 500 mg tablet TAKE 1 TABLET BY MOUTH THREE TIMES DAILY  Qty: 90 Tab, Refills: 1    Comments: Please consider 90 day supplies to promote better adherence  Associated Diagnoses: DDD (degenerative disc disease), thoracic; DDD (degenerative disc disease), cervical      !! HYDROcodone-acetaminophen (NORCO) 7.5-325 mg per tablet 1 Tab, EVERY 8 HOURS AS NEEDED FOR PAIN  Qty: 90 Tab, Refills: 0    Associated Diagnoses: DDD (degenerative disc disease), thoracic; DDD (degenerative disc disease), cervical      !! HYDROcodone-acetaminophen (NORCO) 7.5-325 mg per tablet Take 1 Tab by mouth every eight (8) hours as needed for Pain. Max Daily Amount: 3 Tabs. Qty: 90 Tab, Refills: 0    Associated Diagnoses: DDD (degenerative disc disease), thoracic; DDD (degenerative disc disease), cervical      eszopiclone (LUNESTA) 3 mg tablet Take 1 Tab by mouth nightly as needed for Sleep. Max Daily Amount: 3 mg. Qty: 30 Tab, Refills: 5    Associated Diagnoses: Insomnia, unspecified type      potassium chloride (K-DUR, KLOR-CON) 20 mEq tablet Take 20 mEq by mouth daily. warfarin (COUMADIN) 4 mg tablet Take 4 mg by mouth daily. !! sacubitril-valsartan (ENTRESTO) 49 mg/51 mg tablet Take 1 Tab by mouth two (2) times a day. pravastatin (PRAVACHOL) 80 mg tablet Take 80 mg by mouth nightly. sertraline (ZOLOFT) 100 mg tablet Take 100 mg by mouth daily. raNITIdine hcl 150 mg capsule Take 150 mg by mouth two (2) times a day. spironolactone (ALDACTONE) 25 mg tablet Take 25 mg by mouth daily. mirtazapine (REMERON) 15 mg tablet Take 15 mg by mouth nightly. carvedilol (COREG) 3.125 mg tablet Take 3.125 mg by mouth two (2) times daily (with meals). !! - Potential duplicate medications found. Please discuss with provider.           Referenced discharge instructions provided by nursing for diet and activity.     Follow-up with Shanae lazaro coming Thursday 3/14/19     Signed:  George Wong MD  3/10/2019  10:41 AM

## 2019-03-10 NOTE — PROGRESS NOTES
Problem: Pressure Injury - Risk of  Goal: *Prevention of pressure injury  Document Feliberto Scale and appropriate interventions in the flowsheet. Outcome: Progressing Towards Goal  Pressure Injury Interventions:             Activity Interventions: Increase time out of bed    Mobility Interventions: Pressure redistribution bed/mattress (bed type)    Nutrition Interventions: Document food/fluid/supplement intake    Friction and Shear Interventions: HOB 30 degrees or less, Lift sheet

## 2019-03-10 NOTE — PROGRESS NOTES
Pt to be discharged home under self care. Discharge instructions reviewed with patient and spouse at bedside. All questions answered. Discussed heart failure education at length to include daily weights, diet/sodium restrictions, early s/sx of fluid retentions, etc.  All questions answered. Medications also discussed to include all changes, new meds, medications already taken for today. Prescriptions called into [facility]. Insurance will not cover the mexilitene per WM and MD made aware. No changes made to this medications. Also discussed the Amio only being covered at the 200mg strength. Prescriptions changed to accomodate pricing/insurance coverage (take 400 mg total per dose [id number] a day. ). MD did not want to change mexilitene to another medication covered by insurance. Pt and spouse made aware of this fact and discussed. Written education also given on medications, and also on AICD firing and Heart Failure. IVSL removed from right hand, site WNL and cath tip intact. Dressing applied. Tele discontinued. All belongings with patient. Pt discharged via wheelchair to waiting car driven by spouse.

## 2019-03-19 NOTE — PROGRESS NOTES
Chief Complaint   Patient presents with    Shortness of [facility] Follow Up    New Patient       1. Have you been to the ER, urgent care clinic since your last visit? Hospitalized since your last visit? Yes When: 3/6-3/10 Where: [facility] Reason for visit: Hypokalemia    2. Have you seen or consulted any other health care providers outside of the [address] [place] [address] since your last visit? Include any pap smears or colon screening. No    3) Do you have an Advance Directive on file? no    4) Are you interested in receiving information on Advance Directives? NO      Patient is accompanied by self I have received verbal consent from [facility] to discuss any/all medical information while they are present in the room.

## 2019-03-19 NOTE — PROGRESS NOTES
Visit Vitals  /74 (BP 1 Location: Right arm, BP Patient Position: Sitting)   Pulse 61   Resp 19   Ht 5' 5\" (1.651 m)   Wt 276 lb (125.2 kg)   SpO2 96%   BMI 45.93 kg/m²

## 2019-03-19 NOTE — PROGRESS NOTES
Sherita Mota     1960       Shmuel Berg MD, [facility]  Date of IAUMW-7/05/0784   PCP is Casandra Chavez MD   [facility]  [facility]  HPI:  Tuan Palma is a 60 y.o. female   Subjective:  Patient with a history of a nonischemic cardiomyopathy. Had a pacemaker previously. She had an AICD. It fired on March 6th and she was admitted through March 10th. She had collapse at home. She had a previous episode a week prior and been on Amiodarone. She decided to switch cardiologists since we see her . She was hospitalized, had a low potassium. She was discharged on Amiodarone and then we were going to cover her for one month with mexilitene while she absorbs the Amiodarone. Since she has been home she noticed a worsening of symptoms that have been concerning to her, mild shortness of breath, weakness, some exhaustion, feels like a pressure under her breasts when she retains fluid, although weights have remained stable since she left the hospital.  She has also noted some mild tremor. She has no chest pain or angina, but that pressure under the chest.  She has 1+ edema, which has been unchanged. She had a cath and previous stress test, which have been normal. She had an EP study 03/31/18. Assessment/Plan:     Admission 03/06-03/10 with dehydration, hypokalemia, shock, AICD, temporary combination of Amiodarone and Mexiletine, and resume Lasix once daily. EF 45%. Impression:  1. CHF, systolic, nonischemic cardiomyopathy, NYHA 2-3, EF 45%. Continue Entresto, Aldactone, Coreg, Lasix and potassium. 2. VF with two shocks, three prior caths, normal new stress test, no ischemia, likely scar. Continue Amiodarone and Mexiletine. Likely these are side effects, some  exhaustion, but is tolerable. Will try to get her off Mexiletine by the 1st or 2nd week in April.   I told her once the Mexiletine bottle runs out from the month's supply to stop taking it and not refill it. 3. Hypokalemia. Will recheck blood work today. 4. Mild renal insufficiency. Plan:    1. Follow up in 2-3 weeks. 2. Lab work today. 3. Stop Mexiletine at the end of the one month course. Future Appointments   Date Time Provider Mariel Becerra   4/9/2019 11:00 AM Alina Joseph  [address]   6/6/2019  9:45 AM [email or web address], [address]   6/6/2019 10:00 AM Alfreda Bragg  [address]      Patient Instructions   Please get your blood work completed in the next few weeks. Key CAD CHF Meds             furosemide (LASIX) 80 mg tablet  (Taking) Take 1 Tab by mouth daily. mexiletine (MEXITIL) 150 mg capsule  (Taking) Take 2 Caps by mouth every eight (8) hours. amiodarone (CORDARONE) 200 mg tablet  (Taking) Take 2 Tabs by mouth two (2) times a day. warfarin (COUMADIN) 4 mg tablet  (Taking) Take 4 mg by mouth daily. sacubitril-valsartan (ENTRESTO) 49 mg/51 mg tablet  (Taking) Take 1 Tab by mouth two (2) times a day. pravastatin (PRAVACHOL) 80 mg tablet  (Taking) Take 80 mg by mouth nightly. spironolactone (ALDACTONE) 25 mg tablet  (Taking) Take 25 mg by mouth daily. carvedilol (COREG) 3.125 mg tablet  (Taking) Take 3.125 mg by mouth two (2) times daily (with meals). Corrections and substantive additions have been made to the Past Medical History and/or Problem List.      Impression:   1. Chronic systolic congestive heart failure ([address]    2. NICM (nonischemic cardiomyopathy) ([address]    3. Ventricular fibrillation ([address]    4. Hypokalemia    5. CKD (chronic kidney disease) stage 3, GFR 30-59 ml/min (HCC)    6. Chronic atrial fibrillation (HCC)    7. Systolic CHF, chronic ([address]       Cardiac History:   No specialty comments available. ROS-except as noted above. . A complete cardiac and respiratory are reviewed and negative except as above ; Resp-denies wheezing  or productive cough,.  Const- No unusual weight loss or fever; Neuro-no recent seizure or CVA ; GI- No BRBPR, abdom pain, bloating ; - no  hematuria   see supplement sheet, initialed and to be scanned by staff  Past Medical History:   Diagnosis Date    AICD (automatic cardioverter/defibrillator) present     Atrial fibrillation ([address]     Chronic anticoagulation     was on NOAC before. ?had clot on noac. now on warfarin. Dr. Jonathan Fritz monitors INR    DDD (degenerative disc disease), cervical 1/30/2018    DDD (degenerative disc disease), thoracic 1/30/2018    Depression     no h/o hospitalization    Essential hypertension 1/8/2018    Incidental pulmonary nodule 2mm, RLL- no follow up needed 2/16/2018    Noted on abdomen/pelvis CT Feb 2018. No smoking history. 1. No renal, ureteral, or bladder calculi. 2. No acute findings. 3. 2 mm pulmonary nodule right lower lobe. No follow-up needed if the patient is low risk. If the patient is high-risk, CT could be considered in 1 year.      Insomnia     Kidney infarction ([address] 2017    d/t clot per pt. recalls being on blood thinner. 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 she has never used smokeless tobacco. She reports that she does not drink alcohol or use drugs. Exam and Labs:    Visit Vitals  /74 (BP 1 Location: Right arm, BP Patient Position: Sitting)   Pulse 61   Resp 19   Ht 5' 5\" (1.651 m)   Wt 276 lb (125.2 kg)   SpO2 96%   BMI 45.93 kg/m²    @Constitutional:  NAD, comfortable  Head: NC,AT. Eyes: No scleral icterus. Neck:  Neck supple. No JVD present. Throat: moist mucous membranes. Chest: Effort normal & normal respiratory excursion . Neurological: alert, conversant and oriented . Skin: Skin is not cold. No obvious systemic rash noted. Not diaphoretic. No erythema. Psychiatric:  Grossly normal mood and affect. Behavior appears normal. Extremities:  no clubbing or cyanosis. Abdomen: non distended    Lungs:breath sounds normal. No stridor. distress, wheezes or  Rales. Heart:    normal rate, regular rhythm, normal S1, S2, no murmurs, rubs, clicks or gallops , PMI non displaced. Edema: Edema is 1+ to knees non pitting  Lab Results   Component Value Date/Time    Cholesterol, total 179 01/18/2018 09:04 AM    HDL Cholesterol 42 01/18/2018 09:04 AM    LDL, calculated 101 (H) 01/18/2018 09:04 AM    Triglyceride 179 (H) 01/18/2018 09:04 AM     Lab Results   Component Value Date/Time    Sodium 135 (L) 03/10/2019 03:25 AM    Potassium 4.4 03/10/2019 03:25 AM    Chloride 101 03/10/2019 03:25 AM    CO2 26 03/10/2019 03:25 AM    Anion gap 8 03/10/2019 03:25 AM    Glucose 135 (H) 03/10/2019 03:25 AM    BUN 19 03/10/2019 03:25 AM    Creatinine 1.20 (H) 03/10/2019 03:25 AM    BUN/Creatinine ratio 16 03/10/2019 03:25 AM    GFR est AA 56 (L) 03/10/2019 03:25 AM    GFR est non-AA 46 (L) 03/10/2019 03:25 AM    Calcium 9.2 03/10/2019 03:25 AM      Wt Readings from Last 3 Encounters:   03/19/19 276 lb (125.2 kg)   03/10/19 271 lb 13.2 oz (123.3 kg)   04/23/18 269 lb (122 kg)      BP Readings from Last 3 Encounters:   03/19/19 126/74   03/10/19 102/59   04/23/18 124/54      Current Outpatient Medications   Medication Sig    furosemide (LASIX) 80 mg tablet Take 1 Tab by mouth daily.  mexiletine (MEXITIL) 150 mg capsule Take 2 Caps by mouth every eight (8) hours.  amiodarone (CORDARONE) 200 mg tablet Take 2 Tabs by mouth two (2) times a day.  methocarbamol (ROBAXIN) 500 mg tablet TAKE 1 TABLET BY MOUTH THREE TIMES DAILY    HYDROcodone-acetaminophen (NORCO) 7.5-325 mg per tablet 1 Tab, EVERY 8 HOURS AS NEEDED FOR PAIN    eszopiclone (LUNESTA) 3 mg tablet Take 1 Tab by mouth nightly as needed for Sleep. Max Daily Amount: 3 mg.     potassium chloride (K-DUR, KLOR-CON) 20 mEq tablet Take 20 mEq by mouth daily.  warfarin (COUMADIN) 4 mg tablet Take 4 mg by mouth daily.  sacubitril-valsartan (ENTRESTO) 49 mg/51 mg tablet Take 1 Tab by mouth two (2) times a day.  pravastatin (PRAVACHOL) 80 mg tablet Take 80 mg by mouth nightly.  sertraline (ZOLOFT) 100 mg tablet Take 100 mg by mouth daily.  raNITIdine hcl 150 mg capsule Take 150 mg by mouth two (2) times a day.  spironolactone (ALDACTONE) 25 mg tablet Take 25 mg by mouth daily.  mirtazapine (REMERON) 15 mg tablet Take 15 mg by mouth nightly.  carvedilol (COREG) 3.125 mg tablet Take 3.125 mg by mouth two (2) times daily (with meals). No current facility-administered medications for this visit. Impression see above.

## 2019-03-20 NOTE — TELEPHONE ENCOUNTER
Patient's  called stating that the patient had her INR checked and it is currently 3.9. She was taken off of coumadin for 2 days and is to restart on a 3mg dose. Her INR is due to be re-checked on 4/1/19. If you have any questions, please call Lizzy Ho @ [phone].  Thanks

## 2019-03-22 NOTE — TELEPHONE ENCOUNTER
Verified patient with two types of identifiers. Patient's  Bharat Painter (on HIPAA) called to state patient is out of Entresto and he cannot afford to fill the prescription. Asked if he applied for the free 30 day card that I gave him at his wife's last appointment. He states he was told he would still have a copay with the card. Asked  if he filled out the patient assistance forms I gave him at his wife's last appointment, he denies this. Told him to fill these out and return to the office as previously instructed. Patient's  verbalized understanding and will call with any other questions.

## 2019-03-25 NOTE — TELEPHONE ENCOUNTER
Faxed request for rush order wheelchair to [address] per patient's 's request. Received confirmation.

## 2019-03-25 NOTE — TELEPHONE ENCOUNTER
Pt called stating he was supposed to have a wheel chair order through his insurance for the pt. He also stated he has not heard anything yet and the insurance company stated for the dr to call them and have it expedited. He also would like a phone call when this has been done.  Phone #[phone]  Phone #[phone]  Thanks

## 2019-03-25 NOTE — PROGRESS NOTES
Nurse navigator note - cardiology Pt post ICD firing/ pt with debility - and weakness/ dyspnea - with walking in her room. Requesting wheelchair. ICD 10 codes - CHF I50.30, muscle weakness M62.81, and dyspnea - R06.00 Pt post 8 [facility] admission - follow up with Dr. John Gillespie 3/6/19 - 
Diagnosis Date  Atrial fibrillation (HCC)    
  Dr. Lillian Chow  CAD (coronary artery disease)    
 Chronic anticoagulation    
  was on NOAC before. ?had clot on noac. now on warfarin. Dr. Ilene Cm monitors INR  Congestive heart failure (HCC)    
 DDD (degenerative disc disease), cervical 1/30/2018  DDD (degenerative disc disease), thoracic 1/30/2018  Depression    
  no h/o hospitalization  Essential hypertension 1/8/2018  Heart failure (HCC)    
 Incidental pulmonary nodule 2mm, RLL- no follow up needed 2/16/2018  
  Noted on abdomen/pelvis CT Feb 2018. No smoking history. 1. No renal, ureteral, or bladder calculi. 2. No acute findings. 3. 2 mm pulmonary nodule right lower lobe. No follow-up needed if the patient is low risk. If the patient is high-risk, CT could be considered in 1 year.    
 Insomnia    
 Kidney infarction ([address] 2017  
  d/t clot per pt. recalls being on blood thinner.  Lymphedema of left arm    
  developed after infection of pacemaker site  Obesity, morbid ([address] 1/30/2018  Pacemaker    
  and defib. Dr. Lillian Chow  Stomach flu  called Sanya fontaine - [phone] - they advised to have provider call this # -  
NN call to [phone] - Provided with vendors - : 
 
[employer] [phone] Coretta Gordon [phone] [employer] - [phone] [employer] [place] - [phone]. Call to Coretta Gordon and they requested order, demographics, diagnosis code - necessity, and notes - Fax to [phone].  
 
The above to office nurse - with [employer] - for processing as pt hopes to travel on Thursday - the company will try to accommodate based on insurance approval. 
 Call to and reached pt's  re: the documents being sent - and vendor - Jimenez Skaggs RN , CHFN, CCM 
NN [facility] LASHAWN Mcnally [phone]

## 2019-03-25 NOTE — LETTER
3/25/2019 4:00 PM 
 
Ms. Marinelli 97 : 1960 
[address] [facility] [id number] Please provide this patient with a wheelchair. See attached records.   
 
 
 
 
 
 
 
 
 
 
 
Sincerely, 
 
 
 
 
 
 
Tim Guevara MD

## 2019-03-26 NOTE — TELEPHONE ENCOUNTER
Spoke to Caitlin Glaser at [address] and without documentation that patient cannot walk with a walker the patient does not qualify for a wheelchair.  The patient's insurance states the wheelchair is a luxury not a necessity  Will let patient's  know about denial.

## 2019-03-27 NOTE — TELEPHONE ENCOUNTER
Called Pt. Two patient identifiers confirmed. Talked to  of pt and  Let him know the insurance denied the wheel chair.  States\" Pt has gained another 12 lbs and can barley walk across the living room without her walker. \"

## 2019-04-01 NOTE — TELEPHONE ENCOUNTER
Pt's  called stating she is too weak to walk, has to use a wheelchair, and she is up to 290lbs. He also stated she is very depressed.   Phone #[phone]  Thanks

## 2019-04-01 NOTE — TELEPHONE ENCOUNTER
Verified patient with two types of identifiers. Called patient, she reports an 18lb weight gain as well as increased shortness of breath and fatigue. Scheduled patient to see Dr. Tameka Underwood tomorrow. Patient verbalized understanding and will call with any other questions.

## 2019-04-02 NOTE — PROGRESS NOTES
Sherita Mota     1960       Shmuel Granados MD, [facility]  Date of WKHTF-6/4/9100   PCP is Danny Gutierrez MD   [facility] [place]  [facility]  HPI:  Ligia Jarvis is a 60 y.o. female   Follow up of CAD, CHF, and atrial fibrillation. Today Ms. Mota reports that she has had increased fluid retention which has caused her weight to increase from 278 to 296. In December she weighed 248 on her scale at home. In January, her weight had increased to 268 secondary to fluid retention. She is still urinating with her diuretics. Recently, Ms. Maude Riedel has had increased shortness of breath and has noticed arrhythmia. She notes that her most recent INR was 1.9  On exam she has 3+ edema to knees and above  Denies chest pain,  syncope  has no tachycardia, palpitations. Assessment/Plan:     1. Systolic CHF, chronic with acute on chronic: NYHA 2-3 EF 45%. Continue Coreg and Entresto. We will increase Aldactone to 50 mg daily and temporarily increase Furosamide to 80 mg BID for 3 days. Check BMP in 2 weeks. Follow up in 3 weeks. 2. Vfib with 2 shocks: 3 previous caths okay. Stress test showed now ischemia. We were bridging with Mexiletine and now have finished the month and will continue Amiodarone 200 mg BID for 14 days then 200 mg daily. Needs refill. 3. Renal deficiency, mild: stable    4. Hypokalemia on potasium: recheck labs    5. Atrial fibrillation: continue Warfarin    6. HTN: stable      F/U in 3 weeks. Patient Instructions   Please increase your aldactone to 50mg daily. Increase your Lasix to twice daily for three days. Call Gerard in 3 days and let her know how you are doing. Please decrease your amiodarone to 200mg twice daily for 2 weeks. After 2 weeks call Gerard and she will send a new prescription for one daily. Please weigh yourself daily and keep a log. Impression:   1. Chronic systolic congestive heart failure ([address]    2. Ventricular fibrillation ([facility]    3. Hypokalemia    4. Chronic atrial fibrillation (HCC)    5. Essential hypertension       Cardiac History:   No specialty comments available. ROS-except as noted above. . A complete cardiac and respiratory are reviewed and negative except as above ; Resp-denies wheezing  or productive cough,. Const- No unusual weight loss or fever; Neuro-no recent seizure or CVA ; GI- No BRBPR, abdom pain, bloating ; - no  hematuria   see supplement sheet, initialed and to be scanned by staff  Past Medical History:   Diagnosis Date    Atrial fibrillation ([facility]     Dr. Jeremiah Gilliam    CAD (coronary artery disease)     Chronic anticoagulation     was on NOAC before. ?had clot on noac. now on warfarin. Dr. Rina Leal monitors INR    Congest[facility] failure ([facility]     DDD (degenerative disc disease), cervical 1/30/2018    DDD (degenerative disc disease), thoracic 1/30/2018    Depression     no h/o hospitalization    Essential hypertension 1/8/2018    Heart failure ([facility]     Incidental pulmonary nodule 2mm, RLL- no follow up needed 2/16/2018    Noted on abdomen/pelvis CT Feb 2018. No smoking history. 1. No renal, ureteral, or bladder calculi. 2. No acute findings. 3. 2 mm pulmonary nodule right lower lobe. No follow-up needed if the patient is low risk. If the patient is high-risk, CT could be considered in 1 year.      Insomnia     Kidney infarction ([facility] 2017    d/t clot per pt. recalls being on blood thinner.  Lymphedema of left arm     developed after infection of pacemaker site    Obesity, morbid ([facility] 1/30/2018    Pacemaker     and defib. Dr. Adolfo Whitlock Stomach flu 2015    hospital admit X2      Social Hx= reports that she has never smoked. She has never used smokeless tobacco. She reports that she does not drink alcohol or use drugs.      Exam and Labs:  /82 (BP 1 Location: Right arm, BP Patient Position: Sitting)   Pulse 60   Resp 16   Ht 5' 5\" (1.651 m)   Wt 296 lb (134.3 kg)   BMI 49.26 kg/m² Constitutional:  NAD, comfortable  Head: NC,AT. Eyes: No scleral icterus. Neck:  Neck supple. No JVD present. Throat: moist mucous membranes. Chest: Effort normal & normal respiratory excursion . Neurological: alert, conversant and oriented . Skin: Skin is not cold. No obvious systemic rash noted. Not diaphoretic. No erythema. Psychiatric:  Grossly normal mood and affect. Behavior appears normal. Extremities:  no clubbing or cyanosis. Abdomen: non distended    Lungs:breath sounds normal. No stridor. distress, wheezes or  Rales. Heart: normal rate, regular rhythm, normal S1, S2, no murmurs, rubs, clicks or gallops , PMI non displaced. Edema: Edema is as above  Lab Results   Component Value Date/Time    Cholesterol, total 179 01/18/2018 09:04 AM    HDL Cholesterol 42 01/18/2018 09:04 AM    LDL, calculated 101 (H) 01/18/2018 09:04 AM    Triglyceride 179 (H) 01/18/2018 09:04 AM     Lab Results   Component Value Date/Time    Sodium 145 (H) 03/19/2019 12:55 PM    Potassium 3.8 03/19/2019 12:55 PM    Chloride 103 03/19/2019 12:55 PM    CO2 28 03/19/2019 12:55 PM    Anion gap 8 03/10/2019 03:25 AM    Glucose 107 (H) 03/19/2019 12:55 PM    BUN 16 03/19/2019 12:55 PM    Creatinine 1.13 (H) 03/19/2019 12:55 PM    BUN/Creatinine ratio 14 03/19/2019 12:55 PM    GFR est AA 61 03/19/2019 12:55 PM    GFR est non-AA 53 (L) 03/19/2019 12:55 PM    Calcium 9.0 03/19/2019 12:55 PM      Wt Readings from Last 3 Encounters:   04/02/19 296 lb (134.3 kg)   03/19/19 276 lb (125.2 kg)   03/10/19 271 lb 13.2 oz (123.3 kg)      BP Readings from Last 3 Encounters:   04/02/19 154/82   03/19/19 126/74   03/10/19 102/59      Current Outpatient Medications   Medication Sig    amiodarone (CORDARONE) 200 mg tablet Take 1 Tab by mouth two (2) times a day for 14 days.  spironolactone (ALDACTONE) 50 mg tablet Take 1 Tab by mouth daily.     sacubitril-valsartan (ENTRESTO) 49 mg/51 mg tablet Take 1 Tab by mouth two (2) times a day.  furosemide (LASIX) 80 mg tablet Take 1 Tab by mouth daily.  methocarbamol (ROBAXIN) 500 mg tablet TAKE 1 TABLET BY MOUTH THREE TIMES DAILY    HYDROcodone-acetaminophen (NORCO) 7.5-325 mg per tablet 1 Tab, EVERY 8 HOURS AS NEEDED FOR PAIN    eszopiclone (LUNESTA) 3 mg tablet Take 1 Tab by mouth nightly as needed for Sleep. Max Daily Amount: 3 mg.  potassium chloride (K-DUR, KLOR-CON) 20 mEq tablet Take 20 mEq by mouth daily.  warfarin (COUMADIN) 4 mg tablet Take 4 mg by mouth daily. Alternating doses    pravastatin (PRAVACHOL) 80 mg tablet Take 80 mg by mouth nightly.  sertraline (ZOLOFT) 100 mg tablet Take 100 mg by mouth daily.  raNITIdine hcl 150 mg capsule Take 150 mg by mouth two (2) times a day.  mirtazapine (REMERON) 15 mg tablet Take 15 mg by mouth nightly.  carvedilol (COREG) 3.125 mg tablet Take 3.125 mg by mouth two (2) times daily (with meals). No current facility-administered medications for this visit. Impression see above.       Written by Lupe Robles, as dictated by Catalina Alfonso MD.

## 2019-04-02 NOTE — PROGRESS NOTES
Visit Vitals  /82 (BP 1 Location: Right arm, BP Patient Position: Sitting)   Pulse 60   Resp 16   Ht 5' 5\" (1.651 m)   Wt 296 lb (134.3 kg)   BMI 49.26 kg/m²

## 2019-04-02 NOTE — PATIENT INSTRUCTIONS
Please increase your aldactone to 50mg daily. Increase your Lasix to twice daily for three days. Call Brianna Cerrato in 3 days and let her know how you are doing. Please decrease your amiodarone to 200mg twice daily for 2 weeks. After 2 weeks call Brianna Cerrato and she will send a new prescription for one daily. Please weigh yourself daily and keep a log.

## 2019-04-05 NOTE — TELEPHONE ENCOUNTER
Attempted to reach patient by telephone. A message was left for return call. Verified patient with two types of identifiers. Patient reports she is still 296lbs and feeling short of breath. She confirms she increased her lasix to BID for the last three days and has increased her aldactone to 50mg daily.  Will check with MD.

## 2019-04-05 NOTE — TELEPHONE ENCOUNTER
Patient's  states that the increased dosages of lasix and aldactone are not working for his wife.     They can be reached at [phone]

## 2019-04-09 NOTE — TELEPHONE ENCOUNTER
Patients  is calling to speak with you regarding a possible change in medication due to her dieretic not working   Phone: [phone]

## 2019-04-09 NOTE — TELEPHONE ENCOUNTER
Verified patient with two types of identifiers. Gave patient's  Dr. Adelina Spence message. Metolazone 5mg sent to patient's preferred pharmacy. Patient's  educated on wife taking metolazone exactly 30 minutes prior to lasix. He will call me and update me on patient in the next few days.

## 2019-04-15 NOTE — ED NOTES
Bedside and Verbal shift change report given to [address] [place] [address] [address] (oncoming nurse) by Chana Baires RN (offgoing nurse). Report included the following information SBAR, Kardex, ED Summary, [facility] and Recent Results.

## 2019-04-15 NOTE — ROUTINE PROCESS
TRANSFER - OUT REPORT: 
 
Verbal report given to [facility] RN(name) on [employer]  being transferred to 356(unit) for routine progression of care Report consisted of patients Situation, Background, Assessment and  
Recommendations(SBAR). Information from the following report(s) SBAR, Kardex, ED Summary, MAR and Med Rec Status was reviewed with the receiving nurse. Lines:  
Peripheral IV 04/15/19 Right Hand (Active) Site Assessment Clean, dry, & intact 4/15/2019  4:41 AM  
Phlebitis Assessment 0 4/15/2019  4:41 AM  
Infiltration Assessment 0 4/15/2019  4:41 AM  
Dressing Status Clean, dry, & intact 4/15/2019  4:41 AM  
Hub Color/Line Status Pink 4/15/2019  4:41 AM  
   
Peripheral IV 04/15/19 Right Wrist (Active) Site Assessment Clean, dry, & intact 4/15/2019  4:41 AM  
Phlebitis Assessment 0 4/15/2019  4:41 AM  
Dressing Status Clean, dry, & intact 4/15/2019  4:41 AM  
Hub Color/Line Status Pink 4/15/2019  4:41 AM  
  
 
Opportunity for questions and clarification was provided. Patient transported with: 
 Monitor Tech

## 2019-04-15 NOTE — PROGRESS NOTES
Patient seen and examined on rounds, seen by Dr. Bailey  early this am for chronic systolic CHF, and multiple ICD shocks overnight in setting of severe hypokalemia - Replete K and recheck again this morning after IV has been completed 
- Dr. Kyree Parra has evaluated, likely due to potassium deficiency from diuresis, but for now continuing mexilentine and amiodarone, switching from IV to oral  
- K goal above 4, Mag above 2

## 2019-04-15 NOTE — PROGRESS NOTES
ICD interrogation today reveals 11 successful, appropriate ICD shocks for VF. SHARIF Irene 9 [facility] 04/15/19

## 2019-04-15 NOTE — H&P
[address] [place] [address] HISTORY AND PHYSICAL Name:  [facility] 
MR#:  [id number] :  1960 ACCOUNT #:  [de-identified] ADMIT DATE:  04/15/2019 CHIEF COMPLAINT:  Defibrillator firing. HISTORY OF PRESENT ILLNESS:  A 74-year-old white female with past medical history of nonischemic cardiomyopathy, chronic systolic CHF, complete heart block, status post AICD, cardiac pacemaker implantation, status post AICD generator change, atrial fibrillation, long-term anticoagulation on Coumadin, coronary artery disease, degenerative disk disease, depression, essential hypertension, pulmonary nodules, chronic left arm lymphedema,  obesity, presented to the Emergency Department from home, chief complaint of AICD discharge (firing). The patient was noted to have onset of defibrillator firing of approximately one and a half hours prior to admission on approximately four occasions. The patient has had similar presentation, in which she was hospitalized last month on 2019 and 03/10/2019 with very similar presentation in addition to hypokalemia. She was eventually discharged by the Cardiology Service with Entresto, Aldactone, Lasix, Coreg, potassium replacement, amiodarone, and Mexiletine. Tonight, the patient additionally complains of having shortness of breath, which she noticed had been more progressive than acute. On arrival to the Emergency Department, initially recorded vital signs of blood pressure 114/72, heart rate 63, respiratory rate of 20, and O2 saturation of 93% on room air. She was placed on 2 L oxygen via nasal cannula, O2 saturation decreased to 92% on room air. Workup included chest x-ray, portable, showing a stable cardiac silhouette with clear lungs. A 12-lead EKG shows a paced rhythm, frequent PVCs, left axis deviation, nonspecific intraventricular block, ST changes in inferior and anterolateral leads at 74 beats per minute.   Troponin was less than 0.05. ProBNP equals 814. Potassium 2.2, sodium 127, elevated BUN 27, creatinine 1.68 (compared to the last creatinine 1.13 on 03/19/2019). The patient was given potassium chloride 10 mEq IV over 1 hour x 4 runs and of potassium chloride 20 mEq p.o., Zofran 8 mg, IV morphine 4 mg and additional 2 mg IV, Amiodarone 150 mg IV followed by IV infusion. The patient is now seen for admission to the Hospitalist Service. She has not complained of any current dizziness, lightheadedness, new-onset focal weakness, numbness, paresthesias, slurred speech, facial droop, new-onset back pain, current chest pain, palpitations, abdominal pain, current nausea, vomiting, diarrhea, melena, dysuria, hematuria, calf pain, increased swelling, edema (compared to baseline), fever, chills, or rash. PAST MEDICAL HISTORY: 
1. Atrial fibrillation. 2.  Chronic nonischemic cardiomyopathy. 3.  Chronic CHF. Last 2D echocardiogram on 03/07/2019 showed left ventricular ejection fraction of 41% to 45%. 4.  Complete heart block. 5.  Status post AICD. 6.  Cardiac pacemaker implantation. 7.  Atrial fibrillation. 8.  Long-term anticoagulation on Coumadin. 9.  Essential hypertension. 10.  Coronary artery disease. 11.  Degenerative disk disease. 12.  Depression. 13.  Pulmonary nodule. 14.  Obesity. 15.  Insomnia. 16.  Kidney infarction. 17.  Lymphedema of left arm. PAST SURGICAL HISTORY: 
1.  Status post right breast biopsy in 2010. 2.  Cholecystectomy in 1984. 3.  Heart catheterization in 2006, 2009, 2012. 4.  Hysterectomy in 2012. 
5.  Cardiac pacemaker implantation. 6.  AICD implantation. 7.  AICD generator change on 03/29/2018. MEDICATIONS:  Medication list reviewed and noted on chart records. amiodarone (CORDARONE) 200 mg tablet  4/14/2019 04/02/19 04/16/19  Shmuel Jolly MD   
 Take 1 Tab by mouth two (2) times a day for 14 days.   
 carvedilol (COREG) 3.125 mg tablet  4/14/2019  --  -- Morrison Cogan, MD  
 Take 3.125 mg by mouth two (2) times daily (with meals). eszopiclone (LUNESTA) 3 mg tablet  4/14/2019 02/19/18  -- Ryan Palmer MD  
 Take 1 Tab by mouth nightly as needed for Sleep. Max Daily Amount: 3 mg.  
 furosemide (LASIX) 80 mg tablet  4/14/2019  --  --  Provider, Historical  
 Take 80 mg by mouth two (2) times a day. HYDROcodone-acetaminophen (NORCO) 7.5-325 mg per tablet  4/14/2019 05/25/18  -- Ryan Palmer MD  
 1 Tab, EVERY 8 HOURS AS NEEDED FOR PAIN  
 methocarbamol (ROBAXIN) 500 mg tablet  4/14/2019  10/03/18  -- Gonzalez Fulton NP  
 TAKE 1 TABLET BY MOUTH THREE TIMES DAILY Notes:  Please consider 90 day supplies to promote better adherence  
 metOLazone (ZAROXOLYN) 5 mg tablet  4/14/2019 04/09/19  -- Idalia Ames MD  
 Take 1 Tab by mouth daily. To be taken 30 minutes prior to Lasix. potassium chloride (K-DUR, KLOR-CON) 20 mEq tablet  4/14/2019  --  --  Provider, Historical  
 Take 20 mEq by mouth daily. pravastatin (PRAVACHOL) 80 mg tablet  4/14/2019  --  -- Peggy Jacob MD  
 Take 80 mg by mouth nightly. raNITIdine hcl 150 mg capsule  4/14/2019  --  -- Peggy Jacob MD  
 Take 150 mg by mouth two (2) times a day. sacubitril-valsartan (ENTRESTO) 49 mg/51 mg tablet  4/8/2019 03/22/19  -- Idalia Ames MD  
 Take 1 Tab by mouth two (2) times a day. Notes:  Currently not taking because insurance does not cover and cannot afford  
 sertraline (ZOLOFT) 100 mg tablet  4/14/2019  --  -- Peggy Jacob MD  
 Take 100 mg by mouth daily. spironolactone (ALDACTONE) 50 mg tablet  4/14/2019 04/02/19  -- Idalia Ames MD  
 Take 1 Tab by mouth daily. warfarin (COUMADIN) 3 mg tablet  4/14/2019  --  --  Provider, Historical  
 Take 3 mg by mouth every Tuesday, Thursday, Saturday & Sunday. warfarin (COUMADIN) 4 mg tablet  4/12/2019  --  --  Provider, Historical  
 Take 4 mg by mouth every Monday, Wednesday, Friday. ALLERGIES:  TO AUGMENTIN, NEOSPORIN. SOCIAL HISTORY:  Negative for smoking, alcohol, or illicit drugs. , has been ambulatory without assistance. She is using her father's walker. FAMILY HISTORY:  Breast cancer in maternal aunt. Celiac disease in son. Seizure disorder in daughter. Diabetes in father. Heart attack in paternal uncle and brother. Heart disease in mother, father, sister, brother, paternal uncle. COPD in sister. Pancreatic cancer in brother. Uterine cancer in maternal grandmother. REVIEW OF SYSTEMS:  Pertinent positives as noted in HPI, otherwise negative. PHYSICAL EXAMINATION: 
VITAL SIGNS:  Blood pressure is 149/81, heart rate of 60, respiratory rate of 14, O2 saturation 95% on 2 L O2 via nasal cannula, assist with ventilation. GENERAL:  Obese female, in no acute respiratory distress while at rest. 
PSYCHIATRIC:  The patient is awake, alert, oriented x3. NEUROLOGIC:  GCS is 15. Moves extremities x4. Sensation is grossly intact without slurred speech or facial droop. HEENT:  Normocephalic, atraumatic. PERRLA, EOMs intact. Sclerae anicteric. Conjunctivae clear. Nares are patent. Oropharynx is clear. Tongue is midline, nonedematous. NECK:  Supple without lymphadenopathy, JVD, or carotid bruits. No thyromegaly. LYMPH:  Negative for cervical or supraclavicular adenopathy. LUNGS:  Clear to auscultation bilaterally. CVS/HEART:  Regular rate and rhythm. Normal S1, S2 without murmurs, rubs, or gallops. GASTROINTESTINAL:  Abdomen is obese, soft, nontender, nondistended. Normoactive bowel sounds. No rebound or guarding. No rigidity. No auscultated abdominal bruits or palpable abdominal mass. BACK:  No CVA tenderness. No step-off deformity. MUSCULOSKELETAL:  No acute palpable bony deformity. Negative calf tenderness. VASCULAR:  2+ radial, 1+ dorsalis pedis pulses without cyanosis or clubbing. Nonpitting edema to lower extremities. SKIN:  Warm and dry. LABORATORY DATA:  Labs are reviewed. Sodium is 127, potassium 3.2, chloride 83, CO2 of 35, BUN of 27, creatinine 1.68, glucose 170, anion gap of 9, calcium is 9.7, magnesium is 3.2. GFR 31. Total bilirubin 0.6, total protein 8.2, albumin is 3.8. ALT is 15, AST of 18, alkaline phosphatase is 93. Troponin less than 0.05. ProBNP is 814. WBC 8.6, hemoglobin 11.2, hematocrit 34.5, platelets of 746, and neutrophils of 65%. IMAGING DATA:  Chest x-ray, portable, results reviewed. A 12-lead EKG, results reviewed. IMPRESSION AND PLAN: 
1. Automatic implantable cardioverter-defibrillator discharge. Admit the patient to telemetry. Consult with cardiologist.  The patient has been placed on amiodarone intravenous infusion. May continue on the same. 2.  Acute hypokalemia. Continue potassium chloride replacement. Repeat BNP post replacement. 3.  Acute hyponatremia. Place that on test for urine sodium, urine osmolality, repeat BNP, and proceed towards slow correction of sodium. Placed on neurovascular checks. 4.  Shortness of breath. Continue with oxygen therapy and pulse oximetry monitoring. 5.  Chronic kidney disease injury. We will repeat renal panel. No aggressive intravenous fluids was noted. Concerns for fluid retention. 6.  Nonischemic cardiomyopathy. Plan is as noted above. 7.  Hypertension. Monitor blood pressure closely. Resume the patient back on home medications. 8.  Obesity. We have advised an eventual weight loss, heart healthy diet, lifestyle modifications. 9.  Venous thromboembolism prophylaxis. The patient is currently on Coumadin therapy. 10.  Code status. Full code. Merrill Milian MD [id number] D:  04/15/2019 6:58 T:  04/15/2019 9:45 JOB #:  [id number]

## 2019-04-15 NOTE — ED TRIAGE NOTES
Triage: Pt arrives from home via EMS with CC of AICD firing 4x at approximately 1 am.  Pt was recently admitted with a similar complaint. Pt reports some SOB around the time her AICD went off but that has since resolved.

## 2019-04-15 NOTE — CONSULTS
Cardiac Electrophysiology Hospital Consultation Note     Subjective:      Betito Hastings is a 60 y.o. patient who is seen for evaluation/management of ICD shocks. She presented to the ER overnight, reported ICD firing x 4 in past 1.5 hours PTA. Since arriving to the ER, she reports ICD firing 4-5 times. Device (Medtronic biventricular ICD) check has been requested for further evaluation, but not yet completed. Other reported symptoms are intermittent palpitations, nausea, & SOB. She had been seen by Dr. Jaime Ellis recently, who prescribed metolazone for volume overload. States she has lost 23 lbs since starting it on Wednesday. In ER, K 2.2 & Mg 2.2. Receiving IV K supplementation. On amiodarone IV gtt at time of exam.  Still feeling somewhat weak. No ICD shock since amiodarone IV. NICM, NYHA II-III. Unable to afford Entresto, but on spironolactone, low dose carvedilol, furosemide. Previous:  Nuclear stress test (03/07/2019): Hypertensive response to stress. LVEF 47%. No infarct visible. Echo (03/07/2019): LVEF 41-45%, LV global hypokinesis. LA mod dilated. Mod MR. Trace TR. Severely elevated CVP (>15 mmHg). Admit 03/2018 after VT, syncope, ICD shock. Started loading dose amiodarone 400 mg po tid. Prescribed mexiletine, which she discontinued after 14 days due to cost.    Medtronic biventricular ICD implant by Dr. Trav Moses at [facility] 03/2018. Initial device previously placed for complete AVB, then last 3 devices have been ICD, biventricular ICD. Left chest pacer was infected & removed. Family history of cardiomyopathy & sudden death.     Problem List  Date Reviewed: 4/15/2019          Codes Class Noted    Acute hypokalemia ICD-10-CM: E87.6  ICD-9-CM: 276.8  3/6/2019        * (Principal) AICD discharge ICD-10-CM: Z45.02  ICD-9-CM: V71.89  3/6/2019        Incidental pulmonary nodule- 2mm RLL- repeat CT due in Feb 2019 ICD-10-CM: R91.1  ICD-9-CM: 793.11  2/16/2018 Overview Addendum 2/19/2018  2:42 PM by Elissa Tolbert MD     Noted on abdomen/pelvis CT Feb 2018. 5 year smoking history. 5-10 pack year history when younger. 1. No renal, ureteral, or bladder calculi. 2. No acute findings. 3. 2 mm pulmonary nodule right lower lobe. No follow-up needed if the patient is  low risk.  If the patient is high-risk, CT could be considered in 1 year.                Obesity, morbid ([facility] ICD-10-CM: E66.01  ICD-9-CM: 278.01  1/30/2018        Pacemaker ICD-10-CM: Z95.0  ICD-9-CM: V45.01  Unknown        Atrial fibrillation ([facility] ICD-10-CM: I48.91  ICD-9-CM: 427.31  Unknown    Overview Signed 1/30/2018 10:54 AM by MD Dr. Fer Fitch             Depression ICD-10-CM: F32.9  ICD-9-CM: 311  Unknown        Insomnia ICD-10-CM: G47.00  ICD-9-CM: 780.52  Unknown        Chronic anticoagulation ICD-10-CM: Z79.01  ICD-9-CM: V58.61  Unknown    Overview Signed 1/30/2018 10:56 AM by Elissa Tolbert MD     was on NOAC before. now on warfarin             DDD (degenerative disc disease), cervical ICD-10-CM: M50.30  ICD-9-CM: 722.4  1/30/2018        DDD (degenerative disc disease), thoracic ICD-10-CM: M51.34  ICD-9-CM: 722.51  1/30/2018        Long term prescription opiate use ICD-10-CM: Z79.891  ICD-9-CM: V58.69  1/30/2018        CHF (congestive heart failure) ([facility] ICD-10-CM: I50.9  ICD-9-CM: 428.0  1/8/2018        CAD (coronary artery disease) ICD-10-CM: I25.10  ICD-9-CM: 414.00  1/8/2018        Essential hypertension ICD-10-CM: I10  ICD-9-CM: 401.9  1/8/2018              Current Facility-Administered Medications   Medication Dose Route Frequency Provider Last Rate Last Dose    amiodarone (CORDARONE) 375 mg/250 mL D5W infusion  1 mg/min IntraVENous TITRATE Tommy Porras MD 40 mL/hr at 04/15/19 0400 1 mg/min at 04/15/19 0400    sodium chloride (NS) flush 5-40 mL  5-40 mL IntraVENous Q8H Abraham Gagnon MD        sodium chloride (NS) flush 5-40 mL  5-40 mL IntraVENous PRN Alem Esparza MD        mexiletine (MEXITIL) capsule 150 mg  150 mg Oral Q8H Edwin GUZMAN NP        potassium chloride SR (KLOR-CON 10) tablet 40 mEq  40 mEq Oral BID Edwin GUZMAN NP         Current Outpatient Medications   Medication Sig Dispense Refill    metOLazone (ZAROXOLYN) 5 mg tablet Take 1 Tab by mouth daily. To be taken 30 minutes prior to Lasix. 30 Tab 2    amiodarone (CORDARONE) 200 mg tablet Take 1 Tab by mouth two (2) times a day for 14 days. 28 Tab 0    spironolactone (ALDACTONE) 50 mg tablet Take 1 Tab by mouth daily. 90 Tab 3    sacubitril-valsartan (ENTRESTO) 49 mg/51 mg tablet Take 1 Tab by mouth two (2) times a day. 28 Tab 0    furosemide (LASIX) 80 mg tablet Take 1 Tab by mouth daily. 30 Tab 1    methocarbamol (ROBAXIN) 500 mg tablet TAKE 1 TABLET BY MOUTH THREE TIMES DAILY 90 Tab 1    HYDROcodone-acetaminophen (NORCO) 7.5-325 mg per tablet 1 Tab, EVERY 8 HOURS AS NEEDED FOR PAIN 90 Tab 0    eszopiclone (LUNESTA) 3 mg tablet Take 1 Tab by mouth nightly as needed for Sleep. Max Daily Amount: 3 mg. 30 Tab 5    potassium chloride (K-DUR, KLOR-CON) 20 mEq tablet Take 20 mEq by mouth daily.  warfarin (COUMADIN) 4 mg tablet Take 4 mg by mouth daily. Alternating doses      pravastatin (PRAVACHOL) 80 mg tablet Take 80 mg by mouth nightly.  sertraline (ZOLOFT) 100 mg tablet Take 100 mg by mouth daily.  raNITIdine hcl 150 mg capsule Take 150 mg by mouth two (2) times a day.  mirtazapine (REMERON) 15 mg tablet Take 15 mg by mouth nightly.  carvedilol (COREG) 3.125 mg tablet Take 3.125 mg by mouth two (2) times daily (with meals).        Allergies   Allergen Reactions    Augmentin [Amoxicillin-Pot Clavulanate] Nausea and Vomiting    Neosporin [Neomycin-Bacitracin-Polymyxin] Rash     Past Medical History:   Diagnosis Date    At[facility] fibrillation ([facility]     Dr. Joan Benjamin    CAD (coronary artery disease)     Chronic anticoagulation     was on NOAC before. ?had clot on noac. now on warfarin. Dr. Chandler Gambino monitors INR    Congestive heart failure ([address]     DDD (degenerative disc disease), cervical 1/30/2018    DDD (degenerative disc disease), thoracic 1/30/2018    Depression     no h/o hospitalization    Essential hypertension 1/8/2018    Heart failure ([address]     Incidental pulmonary nodule 2mm, RLL- no follow up needed 2/16/2018    Noted on abdomen/pelvis CT Feb 2018. No smoking history. 1. No renal, ureteral, or bladder calculi. 2. No acute findings. 3. 2 mm pulmonary nodule right lower lobe. No follow-up needed if the patient is low risk. If the patient is high-risk, CT could be considered in 1 year.      Insomnia     Kidney infarction ([address] 2017    d/t clot per pt. recalls being on blood thinner.  Lymphedema of left arm     developed after infection of pacemaker site    Obesity, morbid ([address] 1/30/2018    Pacemaker     and defib. Dr. Lucero Garcia Stomach flu 2015    hospital admit X2     Past Surgical History:   Procedure Laterality Date    HX BREAST BIOPSY Right 2010    Benign    HX CHOLECYSTECTOMY  1984    HX HEART CATHETERIZATION  2006    d/t abnl stress test, per pat, test was normal    HX HEART CATHETERIZATION  ~2009, 2012    per pt normal.     HX HYSTERECTOMY  2012    d/t heavy bleeding. ?endometriosis. +fibroid. per pt, no cancer.      HX PACEMAKER  2010, 2011, 2012, 3/29/2018    first 2010 (infected), replacement 2011 (lead not placed correctly), 2nd replacement 2012- pacemaker, defibrillator, 3rd replacement 2018     Family History   Problem Relation Age of Onset    Breast Cancer Maternal Aunt         late 35s    Uterine Cancer Maternal Aunt     Heart Disease Mother         sudden cardiac arrest    Heart Disease Father     Coronary Artery Disease Father     Colon Cancer Father         76s    Diabetes Father     COPD Sister     Cancer Brother 43        pancreatic    Uterine Cancer Maternal Grandmother     Other Maternal Grandfather         mva    Heart Disease Paternal Grandmother     Heart Disease Paternal Grandfather     Heart Disease Other     Diabetes Other     Other Sister         sudden cardiac arrest    Heart Attack Sister     No Known Problems Sister     Heart Disease Brother     Kidney Disease Brother     Other Brother         mva    No Known Problems Brother     Heart Attack Paternal Uncle 36    Other Paternal Aunt         aneurysm    Celiac Disease Son     Seizures Daughter     Other Daughter 22        MVA     Social History     Tobacco Use    Smoking status: Never Smoker    Smokeless tobacco: Never Used   Substance Use Topics    Alcohol use: No        Review of Systems:   Constitutional: Negative for fever, chills, weight loss, + malaise/fatigue. HEENT: Negative for nosebleeds, vision changes. Respiratory: Negative for cough, hemoptysis  Cardiovascular: Negative for chest pain, + palpitations, + recent orthopnea, claudication, +leg swelling, no PND. + multiple ICD shocks in past 24 hrs. Gastrointestinal: Negative for nausea, vomiting, diarrhea, blood in stool and melena. Genitourinary: Negative for dysuria, and hematuria. Musculoskeletal: Negative for myalgias, arthralgia. Skin: Negative for rash. Heme: Does not bleed or bruise easily. Neurological: Negative for speech change and focal weakness     Objective:     Visit Vitals  /57   Pulse 62   Resp 22   SpO2 93%      Physical Exam:   Constitutional: well-developed and well-nourished. No respiratory distress. Head: Normocephalic and atraumatic. Eyes: Pupils are equal, round  ENT: hearing grossly normal  Neck: supple. No JVD present. Cardiovascular: Normal rate, regular rhythm. Exam reveals no gallop and no friction rub. No murmur heard. Pulmonary/Chest: Effort normal and breath sounds normal. No wheezes. Abdominal: Soft, no tenderness. Obese. Musculoskeletal: bilat 1+ edema. Neurological: alert,oriented.    Skin: Skin is warm and dry. Right chest ICD site. Psychiatric: normal mood and affect. Behavior is normal. Judgment and thought content normal.      EKG: Atrial sensing, ventricular pacing 74 bpm.       Assessment/Plan:   Ms. Harpreet Chery has had multiple shocks from her biventricular ICD in the past 24 hours. She has had similar problem approx 6 weeks ago. Started amiodarone & mexiletine then, but discontinued mexiletine since. Recent increase in diuretic.  K 2.2 on arrival.  Continue to supplement IV as ordered in ED, start KCl 40 mg po tid for now, may reduce later pending K on recheck. Mg WNL. Continue to check K & Mg daily, maintain K>4 & Mg>2. ICD interrogation pending. Restart amiodarone 200 mg po bid. Start mexiletine 150 mg po tid. VT & ICD shocks likely due to electrolyte abnormality secondary to diuresis, but will need amiodarone & mexiletine concurrently to suppress. Discontinue amiodarone IV. Advised patient that she is not permitted to drive x 3 months due to ICD shock according to [facility] guidelines. She verbalizes understanding. SHARIF Doe  [facility] [place]  04/15/19    Addendum from EP attending:   I have seen, examined patient, and discussed with nurse practitioner, registered nurse, reviewed, updated note and agree with the assessment and plan    I have talked to her this am. She said she fell all 9 shocks (4 at home and 5 in ER) she did not have syncope  She has recently been taking metolazone and in ER potassium was severely low  Vital signs are stable  Exam shows regular rhythm and no rub  Morbidly obese  No leg edema  Assessment and Plan:  Replace Kcl today  Restart mexiletine  Check BIV ICD, I talked to [employer]    Addendum  BIV ICD has no atrial lead  VT and VF with ICD shocks 11 times 25 J  Battery 6 years left    Thank you for involving me in this patient's care and please call with further concerns or questions.       Lian Medina, M.D.  Electrophysiology/Cardiology  [facility] [place]  [address] [facility], [facility] 91  [address] [phone]

## 2019-04-15 NOTE — PROGRESS NOTES
Admission Medication Reconciliation: 
 
Information obtained from: Patient, Rx Query Significant PMH/Disease States:  
Past Medical History:  
Diagnosis Date  Atrial fibrillation ([facility] Dr. Heidy Davis  CAD (coronary artery disease)  Chronic anticoagulation   
 was on NOAC before. ?had clot on noac. now on warfarin. Dr. Jolene Early monitors INR  Congestive heart failure ([facility]  DDD (degenerative disc disease), cervical 2018  DDD (degenerative disc disease), thoracic 2018  Depression   
 no h/o hospitalization  Essential hypertension 2018  Heart failure ([facility]  Incidental pulmonary nodule 2mm, RLL- no follow up needed 2018 Noted on abdomen/pelvis CT 2018. No smoking history. 1. No renal, ureteral, or bladder calculi. 2. No acute findings. 3. 2 mm pulmonary nodule right lower lobe. No follow-up needed if the patient is low risk. If the patient is high-risk, CT could be considered in 1 year.  Insomnia  Kidney infarction ([facility] 2017  
 d/t clot per pt. recalls being on blood thinner.  Lymphedema of left arm   
 developed after infection of pacemaker site  Obesity, morbid ([facility] 2018  Pacemaker   
 and defib. Dr. Heidy Davis  Stomach flu 2015  
 hospital admit X2 Chief Complaint for this Admission:  AICD Discharge Allergies:  Augmentin [amoxicillin-pot clavulanate] and Neosporin [neomycin-bacitracin-polymyxin] Prior to Admission Medications:  
Prior to Admission Medications Prescriptions Last Dose Informant Patient Reported? Taking? HYDROcodone-acetaminophen (NORCO) 7.5-325 mg per tablet 2019 at Unknown time  No Yes Si Tab, EVERY 8 HOURS AS NEEDED FOR PAIN  
amiodarone (CORDARONE) 200 mg tablet 2019 at Unknown time  No Yes Sig: Take 1 Tab by mouth two (2) times a day for 14 days. carvedilol (COREG) 3.125 mg tablet 2019 at Unknown time  Yes Yes Sig: Take 3.125 mg by mouth two (2) times daily (with meals). eszopiclone (LUNESTA) 3 mg tablet 4/14/2019 at Unknown time  No Yes Sig: Take 1 Tab by mouth nightly as needed for Sleep. Max Daily Amount: 3 mg.  
furosemide (LASIX) 80 mg tablet 4/14/2019 at Unknown time  Yes Yes Sig: Take 80 mg by mouth two (2) times a day. metOLazone (ZAROXOLYN) 5 mg tablet 4/14/2019 at Unknown time  No Yes Sig: Take 1 Tab by mouth daily. To be taken 30 minutes prior to Lasix. methocarbamol (ROBAXIN) 500 mg tablet 4/14/2019 at Unknown time  No Yes Sig: TAKE 1 TABLET BY MOUTH THREE TIMES DAILY potassium chloride (K-DUR, KLOR-CON) 20 mEq tablet 4/14/2019 at Unknown time  Yes Yes Sig: Take 20 mEq by mouth daily. pravastatin (PRAVACHOL) 80 mg tablet 4/14/2019 at Unknown time  Yes Yes Sig: Take 80 mg by mouth nightly. raNITIdine hcl 150 mg capsule 4/14/2019 at Unknown time  Yes Yes Sig: Take 150 mg by mouth two (2) times a day. sacubitril-valsartan (ENTRESTO) 49 mg/51 mg tablet 4/8/2019 at Unknown time  No Yes Sig: Take 1 Tab by mouth two (2) times a day. sertraline (ZOLOFT) 100 mg tablet 4/14/2019 at Unknown time  Yes Yes Sig: Take 100 mg by mouth daily. spironolactone (ALDACTONE) 50 mg tablet 4/14/2019 at Unknown time  No Yes Sig: Take 1 Tab by mouth daily. warfarin (COUMADIN) 3 mg tablet 4/14/2019 at Unknown time  Yes Yes Sig: Take 3 mg by mouth every Tuesday, Thursday, Saturday & Sunday. warfarin (COUMADIN) 4 mg tablet 4/12/2019  Yes Yes Sig: Take 4 mg by mouth every Monday, Wednesday, Friday. Facility-Administered Medications: None Comments/Recommendations: Added: None Removed: mirtazapine Changed: furosemide (changed from daily to BID); warfarin (changed to 4 mg on MWF & 3 mg on TThSatSun) Notes: Verified patient's allergies Patient is an excellence historian, was aware of medication strengths and frequency Was started on [facility] Wednesday Not currently Lui Book[employer] as patient reports no longer able to afford medications Thank you for allowing me to participate in the care of your patient! Denis Garza, JustinaD Candidate [id number] All information from student has been reviewed and confirmed. Thank you for allowing pharmacy to participate in the coordination of this patient's care. If you have any other questions, please contact the medication reconciliation pharmacist at x [phone]. Royal Enriquez PharmLetha D., BCPS

## 2019-04-15 NOTE — ED PROVIDER NOTES
HPI  
 
60year old female with atrial fib, CAD, CHF, DDD, HTN, CHF, presents with defibrillator firing 4x in the past 1 1/2 hours. States 1 month ago her defibrillator fired, seen [facility] and sent home. Fired again, came here and was admitted for 4 days. Placed on amiodarone and Mexiletine. Her potassium and magnesium were reportedly low and replaced. She continues to be diuresed and has los 20 of 40 pounds she had put on in the course of hte month. Lytes have not been rechecked. She states all day today she has felt her heart racing off and on, nauseated, and SOB. She denies chest pain. No fevers. Urinating ok. No swelling in legs. Past Medical History:  
Diagnosis Date  Atrial fibrillation ([address] Dr. Leana Truong  CAD (coronary artery disease)  Chronic anticoagulation   
 was on NOAC before. ?had clot on noac. now on warfarin. Dr. Parvin Houser monitors INR  Congestive heart failure ([address]  DDD (degenerative disc disease), cervical 1/30/2018  DDD (degenerative disc disease), thoracic 1/30/2018  Depression   
 no h/o hospitalization  Essential hypertension 1/8/2018  Heart failure ([address]  Incidental pulmonary nodule 2mm, RLL- no follow up needed 2/16/2018 Noted on abdomen/pelvis CT Feb 2018. No smoking history. 1. No renal, ureteral, or bladder calculi. 2. No acute findings. 3. 2 mm pulmonary nodule right lower lobe. No follow-up needed if the patient is low risk. If the patient is high-risk, CT could be considered in 1 year.    
 Insomnia  Kidney infarction ([address] 2017  
 d/t clot per pt. recalls being on blood thinner.  Lymphedema of left arm   
 developed after infection of pacemaker site  Obesity, morbid ([address] 1/30/2018  Pacemaker   
 and defib. Dr. Leana Truong  Stomach flu 2015  
 hospital admit X2 Past Surgical History:  
Procedure Laterality Date  HX BREAST BIOPSY Right 2010 Benign [address] CATHETERIZATION  2006 d/t abnl stress test, per pat, test was normal  
 HX HEART CATHETERIZATION  ~2009, 2012  
 per pt normal.   
 HX HYSTERECTOMY  2012  
 d/t heavy bleeding. ?endometriosis. +fibroid. per pt, no cancer.  HX PACEMAKER  2010, 2011, 2012, 3/29/2018  
 first 2010 (infected), replacement 2011 (lead not placed correctly), 2nd replacement 2012- pacemaker, defibrillator, 3rd replacement 2018 Family History:  
Problem Relation Age of Onset  Breast Cancer Maternal Aunt   
     late 35s  Uterine Cancer Maternal Aunt  Heart Disease Mother   
     sudden cardiac arrest  
 Heart Disease Father  Coronary Artery Disease Father  Colon Cancer Father 76s  Diabetes Father  COPD Sister  Cancer Brother 43  
     pancreatic  Uterine Cancer Maternal Grandmother  Other Maternal Grandfather   
     mva  
 Heart Disease Paternal Grandmother  Heart Disease Paternal Grandfather  Heart Disease Other  Diabetes Other  Other Sister   
     sudden cardiac arrest  
 Heart Attack Sister  No Known Problems Sister  Heart Disease Brother  Kidney Disease Brother  Other Brother   
     mva  No Known Problems Brother  Heart Attack Paternal Uncle 36  
 Other Paternal Aunt   
     aneurysm  Celiac Disease Son   
 Seizures Daughter  Other Daughter 22 MVA Social History Socioeconomic History  Marital status:  Spouse name: Paz Lima  Number of children: 2  
 Years of education: Not on file  Highest education level: Not on file Occupational History  Occupation: on disability Social Needs  Financial resource strain: Not on file  Food insecurity:  
  Worry: Not on file Inability: Not on file  Transportation needs:  
  Medical: Not on file Non-medical: Not on file Tobacco Use  Smoking status: Never Smoker  Smokeless tobacco: Never Used Substance and Sexual Activity  Alcohol use: No  
 Drug use: No  
 Sexual activity: Yes  
  Partners: Male Comment: monogamous with  Lifestyle  Physical activity:  
  Days per week: Not on file Minutes per session: Not on file  Stress: Not on file Relationships  Social connections:  
  Talks on phone: Not on file Gets together: Not on file Attends Jew service: Not on file Active member of club or organization: Not on file Attends meetings of clubs or organizations: Not on file Relationship status: Not on file  Intimate partner violence:  
  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 Lives with , son, dtr in law.   
   
 dtr  in [address] ALLERGIES: Augmentin [amoxicillin-pot clavulanate] and Neosporin [neomycin-bacitracin-polymyxin] Review of Systems HENT: Negative for congestion. Eyes: Negative for visual disturbance. Respiratory: Positive for shortness of breath. Cardiovascular: Negative for chest pain. Gastrointestinal: Positive for nausea. Negative for abdominal pain and vomiting. Endocrine: Negative for polyuria. Genitourinary: Negative for dysuria. Musculoskeletal: Negative for gait problem. Skin: Negative for rash. Neurological: Negative for headaches. Psychiatric/Behavioral: Negative for dysphoric mood. Vitals:  
 04/15/19 0247 BP: 114/72 Pulse: 60 Resp: 18 SpO2: 92% Physical Exam  
Constitutional: She is oriented to person, place, and time. She appears well-developed and well-nourished. No distress. HENT:  
Head: Normocephalic and atraumatic. Mouth/Throat: No oropharyngeal exudate. Eyes: Pupils are equal, round, and reactive to light.  Right eye exhibits no discharge. Left eye exhibits no discharge. No scleral icterus. Neck: Normal range of motion. Neck supple. No JVD present. Cardiovascular: Normal rate, regular rhythm and normal heart sounds. No murmur heard. Pulmonary/Chest: Effort normal and breath sounds normal. No stridor. No respiratory distress. She has no wheezes. She has no rales. She exhibits no tenderness. Abdominal: Soft. Bowel sounds are normal. She exhibits no distension and no mass. There is no tenderness. There is no rebound and no guarding. Musculoskeletal: Normal range of motion. Neurological: She is oriented to person, place, and time. Skin: Skin is warm and dry. Capillary refill takes less than 2 seconds. No rash noted. Psychiatric: She has a normal mood and affect. Her behavior is normal. Judgment and thought content normal.  
  
 
MDM Number of Diagnoses or Management Options AICD discharge: Hypokalemia:  
Ventricular tachyarrhythmia [facility]):  
Critical Care Total time providing critical care: 30-74 minutes 60 minutes Procedures ED EKG interpretation: 
Rhythm: paced; and irregular. Rate (approx.): 74; Axis: left axis deviation; P wave: ; QRS interval: prolonged; ST/T wave: non-specific changes;. This EKG was interpreted by Yung Valenzuela MD,ED Provider. AICD fired 2x since arrival.  Amiodarone bolus and gtt ordered. STAT poc CHEM 8. Morphine for pain. Doing better on gtt, no further AICD firings and potassium being replaced. Sp texted with hospitalist re admission and spoke with Dr. Sarah Lyons, cardiology. Agrees with current treatment pain.

## 2019-04-16 NOTE — PROGRESS NOTES
Addendum In terms of meds Was on Entresto but cost is concern, if so then can use Lisinopril 5 mg daily start today Restart coreg and spironolactone and pravastatin

## 2019-04-16 NOTE — PROGRESS NOTES
Cardiology Progress Note Admit Date: 4/15/2019 Admit Diagnosis: AICD discharge [Z45.02] Date: 4/16/2019     Time: 9:07 AM 
Assessment/Plan/Discussion:Cardiology Attending:  
 
Patient seen on the day of progress note and examined  and agree with Advance Practice Provider (MARIO, NP,PA)  assessment and plans. Jarett Carrillo is a 60 y.o. female Seen with PA, PA student and  was present She is remarkably less edematous than her last OV with the diuretics but this likely did drive her K much lower than anticipated Several shocks with K at 2.2 Now K is 3.1 No angina ( no cad) , less sob and less lower extremity edema  Now Her BNP was in past relatively low so has component of chronic venous insufficiency here Put on our service Replete K to 4 
mexilitene back on, cost is high for them 03/06/19 ECHO ADULT COMPLETE 03/07/2019 3/7/2019 Narrative · Left ventricular mildly decreased systolic function. Estimated left  
ventricular ejection fraction is 41 - 45%. Visually measured ejection  
fraction. Left ventricle not well visualized. Left ventricular global  
hypokinesis. · Left atrial cavity size is moderately dilated. · Right ventricle not well visualized. · Moderate mitral valve regurgitation. · Severely elevated central venous pressure (15+ mmHg); IVC diameter is  
larger than 21 mm and collapses less than 50% with respiration. Signed by: Jesika Garza MD  
 on warfarin fot clotting (had clot on [facility]) Non-ischemic cardiomyopathy (NICM) chronic systolic CHF-2 Makenna Khanna MD   
 
 
 
Subjective: 
Feeling better. Less edema of legs. No more ICD shocks. Assessment and Plan  
 
1. S/p ICD shocks - Correcting K 
 - Amio and Mexiletine - Metolazone stopped 2. Hypokalemia 
 - improved, but needs replacement to continue 3.  CHF 
 - systolic, nonischemic cardiomyopathy,  
 - NYHA 2 
 - EF 45%. - Weight down 20 pounds 
 - GDT meds on hold 4. CKD 
 - stage III and stable K correcting, but needs continued replacement. No more shocks. On Amio and mexiletine. Will take on our service. ROS: 
 
 GENERAL Recent weight loss - no Fever -----------------   no 
 Chills -----------------   no 
 
 EYES, VISION Visual Changes - no 
 
 EARS, NOSE, THROAT Hearing loss ----------- no 
 Swallowing difficulties - no CARDIOVASCULAR Chest pain/pressure ---- no 
 Arrhythmia/palpitations - no RESPIRATORY Cough ------------------ no Shortness of breath - no Wheezing -------------- no  GASTROINTESTINAL Abdominal pain - no Heartburn -------- no 
 Bloody stool ----- no 
 
 GENITOURINARY Frequent urination - no Urgency -------------- no 
 MUSCULOSKELETAL Joint pain/swelling ---- no 
 Musculoskeletal pain - no 
 
 SKIN & INTEGUMENTARY Rashes - no Sores --- no 
 
 
   NEUROLOGICAL Numbness/tingling - no Sensation loss ------ no 
 
 PSYCHIATRIC Nervousness/anxiety - no Depression -------------- no 
 
 ENDOCRINE Heat/cold intolerance - no Excessive thirst -------- no 
 
 HEMATOLOGIC/LYMPHATIC Abnormal bleeding - no ALL/IMMUN Allergic reaction ------ no 
 Recurrent infections - no Objective: 
 
 Physical Exam: 
             
Visit Vitals /75 (BP 1 Location: Left arm, BP Patient Position: At rest) Pulse 70 Temp 97.6 °F (36.4 °C) Resp 16 Wt 272 lb (123.4 kg) SpO2 96% Comment: when doing vitals his oxygen was off reading at 87% BMI 45.26 kg/m² General Appearance:   Well developed, well nourished,alert and oriented x 3, and 
 individual in no acute distress. Ears/Nose/Mouth/Throat:    Hearing grossly normal. 
  
    Neck:  Supple. Chest:    Lungs diminished to auscultation bilaterally. Cardiovascular:   Regular rate and rhythm, S1, S2 normal, no murmur. Abdomen:    Soft, non-tender, bowel sounds are active. Extremities:  1+ edema bilaterally. Skin:  Warm and dry. Telemetry: normal sinus rhythm Data Review:  
 Labs:   
Recent Results (from the past 24 hour(s)) METABOLIC PANEL, BASIC Collection Time: 04/15/19  5:48 PM  
Result Value Ref Range Sodium 128 (L) 136 - 145 mmol/L Potassium 3.3 (L) 3.5 - 5.1 mmol/L Chloride 88 (L) 97 - 108 mmol/L  
 CO2 34 (H) 21 - 32 mmol/L Anion gap 6 5 - 15 mmol/L Glucose 131 (H) 65 - 100 mg/dL BUN 25 (H) 6 - 20 MG/DL Creatinine 1.64 (H) 0.55 - 1.02 MG/DL  
 BUN/Creatinine ratio 15 12 - 20 GFR est AA 39 (L) >60 ml/min/1.73m2 GFR est non-AA 32 (L) >60 ml/min/1.73m2 Calcium 9.2 8.5 - 10.1 MG/DL PROTHROMBIN TIME + INR Collection Time: 04/15/19 10:05 PM  
Result Value Ref Range INR 2.3 (H) 0.9 - 1.1 Prothrombin time 21.9 (H) 9.0 - 11.1 sec METABOLIC PANEL, BASIC Collection Time: 04/16/19  4:16 AM  
Result Value Ref Range Sodium 131 (L) 136 - 145 mmol/L Potassium 3.1 (L) 3.5 - 5.1 mmol/L Chloride 91 (L) 97 - 108 mmol/L  
 CO2 33 (H) 21 - 32 mmol/L Anion gap 7 5 - 15 mmol/L Glucose 131 (H) 65 - 100 mg/dL BUN 24 (H) 6 - 20 MG/DL Creatinine 1.40 (H) 0.55 - 1.02 MG/DL  
 BUN/Creatinine ratio 17 12 - 20 GFR est AA 46 (L) >60 ml/min/1.73m2 GFR est non-AA 38 (L) >60 ml/min/1.73m2 Calcium 9.5 8.5 - 10.1 MG/DL  
CBC WITH AUTOMATED DIFF Collection Time: 04/16/19  4:16 AM  
Result Value Ref Range WBC 8.5 3.6 - 11.0 K/uL  
 RBC 3.73 (L) 3.80 - 5.20 M/uL  
 HGB 11.2 (L) 11.5 - 16.0 g/dL HCT 35.0 35.0 - 47.0 % MCV 93.8 80.0 - 99.0 FL  
 MCH 30.0 26.0 - 34.0 PG  
 MCHC 32.0 30.0 - 36.5 g/dL  
 RDW 14.1 11.5 - 14.5 % PLATELET 036 961 - 061 K/uL MPV 9.9 8.9 - 12.9 FL  
 NRBC 0.0 0  WBC ABSOLUTE NRBC 0.00 0.00 - 0.01 K/uL NEUTROPHILS 66 32 - 75 % LYMPHOCYTES 22 12 - 49 % MONOCYTES 9 5 - 13 % EOSINOPHILS 2 0 - 7 % BASOPHILS 0 0 - 1 % IMMATURE GRANULOCYTES 1 (H) 0.0 - 0.5 % ABS. NEUTROPHILS 5.6 1.8 - 8.0 K/UL  
 ABS. LYMPHOCYTES 1.8 0.8 - 3.5 K/UL  
 ABS. MONOCYTES 0.8 0.0 - 1.0 K/UL  
 ABS. EOSINOPHILS 0.2 0.0 - 0.4 K/UL  
 ABS. BASOPHILS 0.0 0.0 - 0.1 K/UL  
 ABS. IMM. GRANS. 0.0 0.00 - 0.04 K/UL  
 DF AUTOMATED MAGNESIUM Collection Time: 04/16/19  4:16 AM  
Result Value Ref Range Magnesium 2.4 1.6 - 2.4 mg/dL Radiology:  
 
  
Current Facility-Administered Medications Medication Dose Route Frequency  potassium chloride 10 mEq in 50 ml IVPB  10 mEq IntraVENous Q1H  
 [START ON 4/17/2019] potassium chloride SR (KLOR-CON 10) tablet 40 mEq  40 mEq Oral TID  sodium chloride (NS) flush 5-40 mL  5-40 mL IntraVENous Q8H  
 sodium chloride (NS) flush 5-40 mL  5-40 mL IntraVENous PRN  
 mexiletine (MEXITIL) capsule 150 mg  150 mg Oral Q8H  
 amiodarone (CORDARONE) tablet 200 mg  200 mg Oral BID  acetaminophen (TYLENOL) tablet 650 mg  650 mg Oral Q6H PRN  
 Warfarin- pharmacy to dose   Other Rx Dosing/Monitoring Akbar Stahl. CHRISTIE Quinones M.D. [facility] [address] [place] [address], [address] Latesha Larsen 
 [phone]

## 2019-04-16 NOTE — PROGRESS NOTES
Problem: Falls - Risk of 
Goal: *Absence of Falls Description Document Dara Marin Fall Risk and appropriate interventions in the flowsheet. 4/16/2019 0144 by Luis Manuel Cortes Outcome: Progressing Towards Goal 
Note:  
Fall Risk Interventions: 
Mobility Interventions: Communicate number of staff needed for ambulation/transfer, Patient to call before getting OOB Medication Interventions: Patient to call before getting OOB, Teach patient to arise slowly History of Falls Interventions: Door open when patient unattended, Evaluate medications/consider consulting pharmacy 4/16/2019 0143 by Luis Manuel Cortes Outcome: Progressing Towards Goal 
Note:  
Fall Risk Interventions: 
Mobility Interventions: Communicate number of staff needed for ambulation/transfer, Patient to call before getting OOB Medication Interventions: Patient to call before getting OOB, Teach patient to arise slowly History of Falls Interventions: Door open when patient unattended, Evaluate medications/consider consulting pharmacy Problem: Heart Failure: Day 1 Goal: Treatments/Interventions/Procedures Outcome: Progressing Towards Goal 
Note: CTM BMP, potassium and kidney function and replace if necessary. INR drawn, 2.3, one dose of Coumadin 4mg given

## 2019-04-16 NOTE — PROGRESS NOTES
2000: patient resting in bed, no c/o pain at this time. VSS, SR/BBB/Paced on tele, afebrile, RA. Skin intact, up with minimal assist. Patient asked if she was getting her Coumadin for today. I read MD notes, nothing to say that we are holding for any reason. Dr. Aime jay, consult to Pharmacy for Coumadin therapy place, will check INR also. 2205: INR drawn and sent to alb 2234: INR 2.3, 4mg Coumadin given as ordered. CHG done, patients bed was changed, as her old one wasn't working properly. 2300: Patient currently resting in bed, no c/o pain. Call light within reach, will ctm. Bedside and Verbal shift change report given to Henny Ron (oncoming nurse) by Marino Das (offgoing nurse). Report included the following information SBAR, Kardex, MAR, Med Rec Status and Cardiac Rhythm Paced.

## 2019-04-16 NOTE — ROUTINE PROCESS
Patient has been complaining of back pain was ordered tylenol but has not had good results. Patient reports that she takes Norco at home. MD called new orders obtained.

## 2019-04-16 NOTE — PROGRESS NOTES
Reason for Admission:   Ventricular tachyarrhythmia ([facility] [address] RRAT Score:     17 Resources/supports as identified by patient/family:   Spouse is supportive. Uses walker for assistance Top Challenges facing patient (as identified by patient/family and CM): Finances/Medication cost?   [employer] Transportation? Spouse transports patient. Support system or lack thereof? Patient uses a walker for assistance. Patient advised walker is worn and needs another one. Living arrangements? Lives with spouse. Self-care/ADLs/Cognition? Patient is independent with ADL's. Patient was alert and oriented. Current Advanced Directive/Advance Care Plan:  Not on file Plan for utilizing home health:    TBD Likelihood of readmission: moderate Transition of Care Plan:          CM met with patient to discuss discharge plans. Patient is alert and oriented. Spouse at bedside. Patient lives with spouse. Uses a rolling walker that she advised is old and warn. No oxygen uses room air. Patient doesn't drive. Spouse transports patient to and from and will transport at discharge. Patient confirmed PCP Dr. Marietta Cross and see's Dr every 3 months. Patient advised never had home health and is not interested if applicable. Patient also declined [address] [place] [address]. CM monitoring patient for updates. FARSHAD Hastings/CRM Care Management Interventions PCP Verified by CM: Yes Mode of Transport at Discharge: Other (see comment)(spouse will transport) Current Support Network: Lives with Spouse Confirm Follow Up Transport: Family(spouse) Plan discussed with Pt/Family/Caregiver: Yes Discharge Location Discharge Placement: Home

## 2019-04-16 NOTE — ROUTINE PROCESS
Bedside and Verbal shift change report given to David Cain (oncoming nurse) by Linda Jones (offgoing nurse). Report included the following information SBAR, Kardex, Intake/Output, MAR and Recent Results.

## 2019-04-17 NOTE — PROGRESS NOTES
A Spiritual Care Partner Volunteer visited patient in Rm 356 on 4/17/2019. Documented by: 
Chaplain Berger MDiv, MS, [employer] 
[address] [place] [address] [phone]

## 2019-04-17 NOTE — PROGRESS NOTES
[id number] Pt alert and oriented. OOB with sba, pt calls appropriately. Norco given for chronic back pain. No other complaints this shift, pt appears to be resting comfortably. Bedside and Verbal shift change report given to Linnette Ulloa (oncoming nurse) by Kody Patel (offgoing nurse). Report included the following information SBAR, Kardex and MAR.

## 2019-04-17 NOTE — ROUTINE PROCESS
Rollator order sent to [employer] [facility] for delivery to hospital. Scott Osuna and delivery. [phone]

## 2019-04-17 NOTE — PROGRESS NOTES
Cardiology Progress Note Admit Date: 4/15/2019 Admit Diagnosis: AICD discharge [Z45.02] Date: 4/17/2019     Time: 9:07 AM 
Assessment/Plan/Discussion:Cardiology Attending:  
 
Patient seen on the day of progress note and examined  and agree with Advance Practice Provider (MARIO, NP,PA)  assessment and plans. Dagoberto Gonzalez is a 60 y.o. female Seen in afternoon Little more leg edema now that sitting up a tad Restarted ACE and aldactone yesterday, getting her back on CHF meds Shock due to low K Ef 40-45% by echo, 47 by nuke, no CAD Non-ischemic cardiomyopathy (NICM) due to HTN, obesity, likely ZULEYMA 
K coming up Lab Results Component Value Date/Time Potassium 3.6 04/17/2019 04:20 AM  
 Potassium (POC) 2.2 (LL) 04/15/2019 03:12 AM  
  
Ricky Meneses MD   
 
 
 
Subjective: No more ICD shocks. Denies CP or SOB Assessment and Plan  
 
1. S/p ICD shocks - Correcting K 
 - Amio and Mexiletine - Metolazone stopped 2. Hypokalemia 
 -K 3.6 
 - continue replacement. 3. CHF 
 - systolic, nonischemic cardiomyopathy,  
 - NYHA 2 
 - EF 45%. - Weight down 20 pounds 
 - GDT meds on hold 4. CKD 
 - stage III and stable K moving up. Continue replacement and check labs in am.  Entresto stopped and lisinopril started. Cost an issue. If K appropriate in am, will plan on discharging home. ROS: 
 
 GENERAL Recent weight loss - no Fever -----------------   no 
 Chills -----------------   no 
 
 EYES, VISION Visual Changes - no 
 
 EARS, NOSE, THROAT Hearing loss ----------- no 
 Swallowing difficulties - no CARDIOVASCULAR Chest pain/pressure ---- no 
 Arrhythmia/palpitations - no RESPIRATORY Cough ------------------ no Shortness of breath - no Wheezing -------------- no GASTROINTESTINAL Abdominal pain - no Heartburn -------- no 
 Bloody stool ----- no 
 
 GENITOURINARY Frequent urination - no Urgency -------------- no 
 MUSCULOSKELETAL Joint pain/swelling ---- no 
 Musculoskeletal pain - no 
 
 SKIN & INTEGUMENTARY Rashes - no Sores --- no 
 
 
   NEUROLOGICAL Numbness/tingling - no Sensation loss ------ no 
 
 PSYCHIATRIC Nervousness/anxiety - no Depression -------------- no 
 
 ENDOCRINE Heat/cold intolerance - no Excessive thirst -------- no 
 
 HEMATOLOGIC/LYMPHATIC Abnormal bleeding - no ALL/IMMUN Allergic reaction ------ no 
 Recurrent infections - no Objective: 
 
 Physical Exam: 
             
Visit Vitals /70 (BP 1 Location: Left arm, BP Patient Position: At rest) Pulse 61 Temp 97.4 °F (36.3 °C) Resp 18 Wt 273 lb (123.8 kg) SpO2 94% BMI 45.43 kg/m² General Appearance:   Well developed, well nourished,alert and oriented x 3, and 
 individual in no acute distress. Ears/Nose/Mouth/Throat:    Hearing grossly normal. 
  
    Neck:  Supple. Chest:    Lungs diminished to auscultation bilaterally. Cardiovascular:   Regular rate and rhythm, S1, S2 normal, no murmur. Abdomen:    Soft, non-tender, bowel sounds are active. Extremities:  1+ edema bilaterally. Skin:  Warm and dry. Telemetry: normal sinus rhythm Data Review:  
 Labs:   
Recent Results (from the past 24 hour(s)) PROTHROMBIN TIME + INR Collection Time: 04/17/19  4:20 AM  
Result Value Ref Range INR 2.0 (H) 0.9 - 1.1 Prothrombin time 19.7 (H) 9.0 - 11.1 sec METABOLIC PANEL, BASIC Collection Time: 04/17/19  4:20 AM  
Result Value Ref Range Sodium 133 (L) 136 - 145 mmol/L Potassium 3.6 3.5 - 5.1 mmol/L Chloride 94 (L) 97 - 108 mmol/L  
 CO2 33 (H) 21 - 32 mmol/L Anion gap 6 5 - 15 mmol/L Glucose 140 (H) 65 - 100 mg/dL BUN 26 (H) 6 - 20 MG/DL Creatinine 1.40 (H) 0.55 - 1.02 MG/DL  
 BUN/Creatinine ratio 19 12 - 20 GFR est AA 46 (L) >60 ml/min/1.73m2 GFR est non-AA 38 (L) >60 ml/min/1.73m2 Calcium 9.6 8.5 - 10.1 MG/DL Radiology:  
 
  
Current Facility-Administered Medications Medication Dose Route Frequency  potassium chloride SR (KLOR-CON 10) tablet 40 mEq  40 mEq Oral TID  spironolactone (ALDACTONE) tablet 50 mg  50 mg Oral DAILY  carvedilol (COREG) tablet 3.125 mg  3.125 mg Oral BID WITH MEALS  pravastatin (PRAVACHOL) tablet 80 mg  80 mg Oral QHS  lisinopril (PRINIVIL, ZESTRIL) tablet 5 mg  5 mg Oral DAILY  HYDROcodone-acetaminophen (NORCO) 7.5-325 mg per tablet 1 Tab  1 Tab Oral Q6H PRN  
 sodium chloride (NS) flush 5-40 mL  5-40 mL IntraVENous Q8H  
 sodium chloride (NS) flush 5-40 mL  5-40 mL IntraVENous PRN  
 mexiletine (MEXITIL) capsule 150 mg  150 mg Oral Q8H  
 amiodarone (CORDARONE) tablet 200 mg  200 mg Oral BID  acetaminophen (TYLENOL) tablet 650 mg  650 mg Oral Q6H PRN  
 Warfarin- pharmacy to dose   Other Rx Dosing/Monitoring Maged Magana. CHRISTIE Mata M.D. [facility] [address] [place] [address] Rd, [address] [facility] 
 [phone]

## 2019-04-17 NOTE — PROGRESS NOTES
Pharmacist Note  Warfarin Dosing Consult provided for this 60 y. o.female to manage warfarin for Atrial Fibrillation INR Goal: 2 - 3 Home regimen: 3 mg by mouth every Tuesday, Thursday, Saturday & Sunday 
                           4 mg by mouth every Monday, Wednesday, Friday Daily INR ordered: YES Recent Labs 04/17/19 
8775 04/16/19 
1121 04/16/19 
4912 04/15/19 
2205 04/15/19 
0310 HGB  --   --  11.2*  --  11.2* INR 2.0* 2.1*  --  2.3*  --   
 
 
Date               INR                  Dose 4/15  2.3  4 mg 4/16                 2.1                   (dose not ordered) 4/17                 2 
                                                                            
Assessment/ Plan: 
No INR initially drawn on 4/16 due to timing and administration of initial dose. Dose never ordered secondary to later repeat INR. Will order 6mg x1 due to missing dose and INR being on the low end of the target range. Pharmacy will continue to monitor daily and adjust therapy as indicated. Please contact the pharmacist at  for outpatient recommendations if needed.

## 2019-04-18 NOTE — PROGRESS NOTES
[id number] Pt alert and oriented.  at bedside, attentive. No complaints this shift, back pain well controlled with prn norco. Pt appears to be resting comfortably. OOB with assistance and device. Good PO intake. Call bell within reach, safety maintained, will ctm. Bedside and Verbal shift change report given to Urban Lopez (oncoming nurse) by Omi Hussein (offgoing nurse). Report included the following information SBAR, Kardex and MAR.

## 2019-04-18 NOTE — PROGRESS NOTES
Pharmacist Note  Warfarin Dosing Consult provided for this 60 y. o.female to manage warfarin for Atrial Fibrillation INR Goal: 2 - 3 Home regimen: 3 mg by mouth every Tuesday, Thursday, Saturday & Sunday 
                           4 mg by mouth every Monday, Wednesday, Friday Daily INR ordered: YES Recent Labs 04/18/19 
0407 04/17/19 
5081 04/16/19 
1121 04/16/19 2028 HGB  --   --   --  11.2* INR 2.1* 2.0* 2.1*  --   
 
 
Date               INR                  Dose 4/15  2.3  4 mg 4/16                 2.1                   (dose missed) 4/17                  2                    6mg 4/18                 2.1 Assessment/ Plan: Will order 4mg x1 due to missing dose on 4/16 and INR being on the low end of the target range. Pharmacy will continue to monitor daily and adjust therapy as indicated. Please contact the pharmacist at  for outpatient recommendations if needed.

## 2019-04-18 NOTE — DISCHARGE INSTRUCTIONS
Patient Education        Hypokalemia: Care Instructions  Your Care Instructions    Hypokalemia (say \"pb-na-aqd-REAGAN-astrid-uh\") is a low level of potassium. The heart, muscles, kidneys, and nervous system all need potassium to work well. This problem has many different causes. Kidney problems, diet, and medicines like diuretics and laxatives can cause it. So can vomiting or diarrhea. In some cases, cancer is the cause. Your doctor may do tests to find the cause of your low potassium levels. You may need medicines to bring your potassium levels back to normal. You may also need regular blood tests to check your potassium. If you have very low potassium, you may need intravenous (IV) medicines. You also may need tests to check the electrical activity of your heart. Heart problems caused by low potassium levels can be very serious. Follow-up care is a key part of your treatment and safety. Be sure to make and go to all appointments, and call your doctor if you are having problems. It's also a good idea to know your test results and keep a list of the medicines you take. How can you care for yourself at home? · If your doctor recommends it, eat foods that have a lot of potassium. These include fresh fruits, juices, and vegetables. They also include nuts, beans, and milk. · Be safe with medicines. If your doctor prescribes medicines or potassium supplements, take them exactly as directed. Call your doctor if you have any problems with your medicines. · Get your potassium levels tested as often as your doctor tells you. When should you call for help? Call 911 anytime you think you may need emergency care. For example, call if:    · You feel like your heart is missing beats.  Heart problems caused by low potassium can cause death.     · You passed out (lost consciousness).     · You have a seizure.    Call your doctor now or seek immediate medical care if:    · You feel weak or unusually tired.     · You have severe arm or leg cramps.     · You have tingling or numbness.     · You feel sick to your stomach, or you vomit.     · You have belly cramps.     · You feel bloated or constipated.     · You have to urinate a lot.     · You feel very thirsty most of the time.     · You are dizzy or lightheaded, or you feel like you may faint.     · You feel depressed, or you lose touch with reality.    Watch closely for changes in your health, and be sure to contact your doctor if:    · You do not get better as expected. Where can you learn more? Go to http://huyen-anthony.info/. Enter G358 in the search box to learn more about \"Hypokalemia: Care Instructions. \"  Current as of: March 14, 2018  Content Version: 11.9  © 7066-3549 [employer]. Care instructions adapted under license by [employer] (which disclaims liability or warranty for this information). If you have questions about a medical condition or this instruction, always ask your healthcare professional. [place] [address] any warranty or liability for your use of this information. Patient Education        Low Sodium Diet (2,000 Milligram): Care Instructions  Your Care Instructions    Too much sodium causes your body to hold on to extra water. This can raise your blood pressure and force your heart and kidneys to work harder. In very serious cases, this could cause you to be put in the hospital. It might even be life-threatening. By limiting sodium, you will feel better and lower your risk of serious problems. The most common source of sodium is salt. People get most of the salt in their diet from canned, prepared, and packaged foods. Fast food and restaurant meals also are very high in sodium. Your doctor will probably limit your sodium to less than 2,000 milligrams (mg) a day. This limit counts all the sodium in prepared and packaged foods and any salt you add to your food.   Follow-up care is a key part of your treatment and safety. Be sure to make and go to all appointments, and call your doctor if you are having problems. It's also a good idea to know your test results and keep a list of the medicines you take. How can you care for yourself at home? Read food labels  · Read labels on cans and food packages. The labels tell you how much sodium is in each serving. Make sure that you look at the serving size. If you eat more than the serving size, you have eaten more sodium. · Food labels also tell you the Percent Daily Value for sodium. Choose products with low Percent Daily Values for sodium. · Be aware that sodium can come in forms other than salt, including monosodium glutamate (MSG), sodium citrate, and sodium bicarbonate (baking soda). MSG is often added to Asian food. When you eat out, you can sometimes ask for food without MSG or added salt. Buy low-sodium foods  · Buy foods that are labeled \"unsalted\" (no salt added), \"sodium-free\" (less than 5 mg of sodium per serving), or \"low-sodium\" (less than 140 mg of sodium per serving). Foods labeled \"reduced-sodium\" and \"light sodium\" may still have too much sodium. Be sure to read the label to see how much sodium you are getting. · Buy fresh vegetables, or frozen vegetables without added sauces. Buy low-sodium versions of canned vegetables, soups, and other canned goods. Prepare low-sodium meals  · Cut back on the amount of salt you use in cooking. This will help you adjust to the taste. Do not add salt after cooking. One teaspoon of salt has about 2,300 mg of sodium. · Take the salt shaker off the table. · Flavor your food with garlic, lemon juice, onion, vinegar, herbs, and spices. Do not use soy sauce, lite soy sauce, steak sauce, onion salt, garlic salt, celery salt, mustard, or ketchup on your food. · Use low-sodium salad dressings, sauces, and ketchup. Or make your own salad dressings and sauces without adding salt.   · Use less salt (or none) when recipes call for it. You can often use half the salt a recipe calls for without losing flavor. Other foods such as rice, pasta, and grains do not need added salt. · Rinse canned vegetables, and cook them in fresh water. This removes some--but not all--of the salt. · Avoid water that is naturally high in sodium or that has been treated with water softeners, which add sodium. Call your local water company to find out the sodium content of your water supply. If you buy bottled water, read the label and choose a sodium-free brand. Avoid high-sodium foods  · Avoid eating:  ? Smoked, cured, salted, and canned meat, fish, and poultry. ? Ham, soria, hot dogs, and luncheon meats. ? Regular, hard, and processed cheese and regular peanut butter. ? Crackers with salted tops, and other salted snack foods such as pretzels, chips, and salted popcorn. ? Frozen prepared meals, unless labeled low-sodium. ? Canned and dried soups, broths, and bouillon, unless labeled sodium-free or low-sodium. ? Canned vegetables, unless labeled sodium-free or low-sodium. ? [place] fries, pizza, tacos, and other fast foods. ? Pickles, olives, ketchup, and other condiments, especially soy sauce, unless labeled sodium-free or low-sodium. Where can you learn more? Go to http://huyen-anthony.info/. Enter C225 in the search box to learn more about \"Low Sodium Diet (2,000 Milligram): Care Instructions. \"  Current as of: March 28, 2018  Content Version: 11.9  © 0717-7117 [employer]. Care instructions adapted under license by [employer] (which disclaims liability or warranty for this information). If you have questions about a medical condition or this instruction, always ask your healthcare professional. [place] [address] any warranty or liability for your use of this information.

## 2019-04-18 NOTE — DISCHARGE SUMMARY
Cardiology Discharge Summary     Patient ID:  Isma Julian  [id number]  76 y.o.  1960    Admit Date: 4/15/2019    Discharge Date: 04/18/19    Admitting Physician: Francisca Hanna MD     Discharge Physician: Soraida Corral. Earl Herr M.D. Admission Diagnoses:   AICD discharge [Z45.02]    Discharge Diagnoses:   Principal Problem:    AICD discharge (3/6/2019)      Discharge Condition: Good    Cardiology Procedures this Admission:  none    Consults: EP    Hospital Course:   Admitted following 14 shocks from ICD  K level found to be 2.2. Diuretic stopped and K placement started. Compensated from CHF standpoint. Leg edema improved on arrival  EP consulted and started Mexiletine and Amiodarone   K recovered and patient without further ICD discharges  Entresto changed to Lisinopril as patient was not taking 2/2 cost  Other GDT meds resumed  Plan for BMP one day prior to OV for follow up  Stable on day of discharge. Assessment/Plan/Discussion:Cardiology Attending:     Patient seen on the day of progress note and examined  and agree with Advance Practice Provider (MARIO, NP,PA)  assessment and plans. Isma Julian is a 60 y.o. female   Edema down , got her hose  Take lasix to 120 mg daily  K 4.6 on aldactone, ACE   For rhythm one more month of mexilitene  See me next Tuesday  Future Appointments   Date Time Provider Mariel Becerra   4/23/2019 11:00 AM Maciel Hudson  [address]   6/6/2019  9:45 AM PACEMAKER3, [address]   6/6/2019 10:00 AM Charlie Valencia  [facility], MD          Visit Vitals  /58 (BP 1 Location: Right arm, BP Patient Position: Sitting; At rest)   Pulse 60   Temp 98 °F (36.7 °C)   Resp 16   Wt 275 lb (124.7 kg)   SpO2 96%   BMI 45.76 kg/m²       Physical Exam  Abdomen: soft, non-tender.  Bowel sounds normal.   Extremities: 1+ LE edema, + PP bilaterally   Heart: regular rate and rhythm, S1, S2 normal, no murmurs, clicks, rubs or gallops  Lungs: clear to auscultation bilaterally  Neck: supple, symmetrical, trachea midline  Neurologic: Grossly normal  Pulses: 2+ and symmetrical    Labs:   Recent Labs     04/16/19  0416   WBC 8.5   HGB 11.2*   HCT 35.0        Recent Labs     04/18/19  0407 04/17/19  0420 04/16/19  1121 04/16/19  0416   * 133*  --  131*   K 4.6 3.6  --  3.1*   CL 97 94*  --  91*   CO2 31 33*  --  33*   * 140*  --  131*   BUN 30* 26*  --  24*   CREA 1.41* 1.40*  --  1.40*   CA 9.4 9.6  --  9.5   MG  --   --   --  2.4   INR 2.1* 2.0* 2.1*  --        No results for input(s): TROIQ, CPK, CKMB in the last 72 hours. Disposition: home    Patient Instructions:   Current Discharge Medication List      START taking these medications    Details   lisinopril (PRINIVIL, ZESTRIL) 5 mg tablet Take 1 Tab by mouth daily. Qty: 30 Tab, Refills: 0      mexiletine (MEXITIL) 150 mg capsule Take 1 Cap by mouth every eight (8) hours. Qty: 90 Cap, Refills: 0         CONTINUE these medications which have CHANGED    Details   amiodarone (CORDARONE) 200 mg tablet Take 1 Tab by mouth two (2) times a day for 14 days. Qty: 28 Tab, Refills: 0      furosemide (LASIX) 40 mg tablet Take 3 Tabs by mouth daily. Qty: 30 Tab, Refills: 0         CONTINUE these medications which have NOT CHANGED    Details   !! warfarin (COUMADIN) 4 mg tablet Take 4 mg by mouth every Monday, Wednesday, Friday. !! warfarin (COUMADIN) 3 mg tablet Take 3 mg by mouth every Tuesday, Thursday, Saturday & Sunday. spironolactone (ALDACTONE) 50 mg tablet Take 1 Tab by mouth daily.   Qty: 90 Tab, Refills: 3      methocarbamol (ROBAXIN) 500 mg tablet TAKE 1 TABLET BY MOUTH THREE TIMES DAILY  Qty: 90 Tab, Refills: 1    Comments: Please consider 90 day supplies to promote better adherence  Associated Diagnoses: DDD (degenerative disc disease), thoracic; DDD (degenerative disc disease), cervical      HYDROcodone-acetaminophen (NORCO) 7.5-325 mg per tablet 1 Tab, EVERY 8 HOURS AS NEEDED FOR PAIN  Qty: 90 Tab, Refills: 0    Associated Diagnoses: DDD (degenerative disc disease), thoracic; DDD (degenerative disc disease), cervical      eszopiclone (LUNESTA) 3 mg tablet Take 1 Tab by mouth nightly as needed for Sleep. Max Daily Amount: 3 mg. Qty: 30 Tab, Refills: 5    Associated Diagnoses: Insomnia, unspecified type      potassium chloride (K-DUR, KLOR-CON) 20 mEq tablet Take 20 mEq by mouth daily. pravastatin (PRAVACHOL) 80 mg tablet Take 80 mg by mouth nightly. sertraline (ZOLOFT) 100 mg tablet Take 100 mg by mouth daily. raNITIdine hcl 150 mg capsule Take 150 mg by mouth two (2) times a day. carvedilol (COREG) 3.125 mg tablet Take 3.125 mg by mouth two (2) times daily (with meals). !! - Potential duplicate medications found. Please discuss with provider. STOP taking these medications       sacubitril-valsartan (ENTRESTO) 49 mg/51 mg tablet Comments:   Reason for Stopping:         metOLazone (ZAROXOLYN) 5 mg tablet Comments:   Reason for Stopping:               Reference discharge instructions provided by nursing for diet and activity.     Follow-up with   Future Appointments   Date Time Provider Mariel Becerra   4/23/2019 11:00 AM Earle Quinteros  [address] 6/6/2019  9:45 AM Roberto Puckett, [address] [facility]   6/6/2019 10:00 AM Fred Reilly  [address]       Signed:  Ana Reyes PA-C

## 2019-04-23 NOTE — LETTER
4/23/19 Patient: Manisha Cardoza YOB: 1960 Date of Visit: 4/23/2019 Homero Goss MD 
[address] [place] [id number] VIA Facsimile: [phone] Dear Homero Goss MD, Thank you for referring Ms. Sherita Mota to [facility] for evaluation. My notes for this consultation are attached. If you have questions, please do not hesitate to call me. I look forward to following your patient along with you.  
 
 
Sincerely, 
 
Lane Bee MD

## 2019-04-23 NOTE — PROGRESS NOTES
Sherita Mota     1960       Shmuel Medina MD, [facility]  Date of [id number]-7/78/0824   PCP is Kim Ruiz MD   [facility] [place] [facility]  [facility]  HPI:  Virgilio Pallas is a 60 y.o. female   Pt has systolic CHF with an EF of 45%. She had incresae in weight due to fluid retention. Baseline around 269. She increased to 296 by 04/02/2019 and was admitted , then down to 273 with the use of IV diuretics. Her BNP was not particularly high nor  was her ProBNP. She has a history of very mild CAD. At discharge on 04/18/2019, she was started on Lisinopril 5 mg. and her Lasix was increased to 120 mg. She continues on Aldactone. Earnestine Pimple was stopped due to cost. However, her main issue was firing of her defibrillator due to hypokalemia. She is now bridged with Mexiletine.   + lower extremity edema  + dyspnea on exertion   Today Ms. Mota reports that she has felt tired since leaving the hospital as well as short of breath. Some of her ankle fluid has returned and she has experienced multiple episodes of arrhythmia. She denies experiencing chest pain in this time. She does note that she has been taking her Lasix spread out at one tablet at three times a day rather than three tablets once a day. Denies chest pain, syncope, has no tachycardia. Assessment/Plan:       1. Acute on chronic systolic heart failure: we will have her take the Furosemide all together 120 mg daily. Increase Lisinopril to 10 mg. Continue Coreg. Somewhat limited on the Coreg dose until we see how her ventricular arrhythmias go. Consider Aldactone at the next visit. 2. AICD Shock, Vfib: likely related to hypokalemia. K is now normal. Recheck in 1 week.    Lab Results   Component Value Date/Time    Potassium 4.8 04/22/2019 02:36 PM    Potassium (POC) 2.2 (LL) 04/15/2019 03:12 AM        3. CKD, Mild: improved  Lab Results   Component Value Date/Time    Creatinine (POC) 1.6 (H) 04/15/2019 03:12 AM    Creatinine 1.48 (H) 04/22/2019 02:36 PM        4. Atrial Fibrillation: Continue Warfarin. Lab Results   Component Value Date/Time    INR 2.1 (H) 04/18/2019 04:07 AM    INR 2.0 (H) 04/17/2019 04:20 AM    INR 2.1 (H) 04/16/2019 11:21 AM    Prothrombin time 20.6 (H) 04/18/2019 04:07 AM    Prothrombin time 19.7 (H) 04/17/2019 04:20 AM    Prothrombin time 20.9 (H) 04/16/2019 11:21 AM      5. Hypertension: stable    6. Echo result  03/06/19   ECHO ADULT COMPLETE 03/07/2019 3/7/2019    Narrative · Left ventricular mildly decreased systolic function. Estimated left   ventricular ejection fraction is 41 - 45%. Visually measured ejection   fraction. Left ventricle not well visualized. Left ventricular global   hypokinesis. · Left atrial cavity size is moderately dilated. · Right ventricle not well visualized. · Moderate mitral valve regurgitation. · Severely elevated central venous pressure (15+ mmHg); IVC diameter is   larger than 21 mm and collapses less than 50% with respiration. Signed by: Brandi Melgar MD       F/U in 2 weeks. She will have her INR checked at that time. Get BNP done in one week. Future Appointments   Date Time Provider [facility]am   5/7/2019 11:00 AM Abbie Kee  [address]   6/6/2019  9:45 AM PACEMAKER3, [address]   6/6/2019 10:00 AM Bg Miguel  [address]      Patient Instructions   Please make sure you take three of your lasix tabs all at one time once a day. Please increase your lisionpril to 10mg dialy. Please get your blood work compelted in a week. Continue your amiodarone 200mg twice a day for 14 days as prescribed in the hospital, then start taking the amiodarone 200mg once a day. You will be scheduled for a 2 week follow up. Key CAD CHF Meds             lisinopril (PRINIVIL, ZESTRIL) 10 mg tablet (Taking) Take 1 Tab by mouth daily. furosemide (LASIX) 40 mg tablet (Taking) Take 3 Tabs by mouth daily. amiodarone (CORDARONE) 200 mg tablet (Taking) Take 1 Tab by mouth daily. mexiletine (MEXITIL) 150 mg capsule (Taking) Take 1 Cap by mouth every eight (8) hours. warfarin (COUMADIN) 4 mg tablet (Taking) Take 4 mg by mouth every Monday, Wednesday, Friday. warfarin (COUMADIN) 3 mg tablet (Taking) Take 3 mg by mouth every Tuesday, Thursday, Saturday & Sunday. spironolactone (ALDACTONE) 50 mg tablet (Taking) Take 1 Tab by mouth daily. pravastatin (PRAVACHOL) 80 mg tablet (Taking) Take 80 mg by mouth nightly. carvedilol (COREG) 3.125 mg tablet (Taking) Take 3.125 mg by mouth two (2) times daily (with meals). Impression:   1. Acute on chronic systolic congestive heart failure ([address]    2. Chronic atrial fibrillation (HCC)    3. Essential hypertension    4. Ventricular fibrillation ([address]    5. Hypokalemia    6. NICM (nonischemic cardiomyopathy) ([address]    7. CKD (chronic kidney disease) stage 3, GFR 30-59 ml/min ([facility])       Cardiac History:   No specialty comments available. ROS-except as noted above. . A complete cardiac and respiratory are reviewed and negative except as above ; Resp-denies wheezing  or productive cough,. Const- No unusual weight loss or fever; Neuro-no recent seizure or CVA ; GI- No BRBPR, abdom pain, bloating ; - no  hematuria   see supplement sheet, initialed and to be scanned by staff  Past Medical History:   Diagnosis Date    AICD (automatic cardioverter/defibrillator) present     AICD (automatic cardioverter/defibrillator) present     Atrial fibrillation ([address]     Chronic anticoagulation     was on NOAC before. ?had clot on noac. now on warfarin.  Dr. Ellen Steiner monitors INR    DDD (degenerative disc disease), cervical 1/30/2018    DDD (degenerative disc disease), thoracic 1/30/2018    Depression     no h/o hospitalization    Essential hypertension 1/8/2018    Incidental pulmonary nodule 2mm, RLL- no follow up needed 2/16/2018    Noted on abdomen/pelvis CT Feb 2018. No smoking history. 1. No renal, ureteral, or bladder calculi. 2. No acute findings. 3. 2 mm pulmonary nodule right lower lobe. No follow-up needed if the patient is low risk. If the patient is high-risk, CT could be considered in 1 year.      Insomnia     Kidney infarction ([facility] 2017    d/t clot per pt. recalls being on blood thinner. 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 1/30/2018    Stomach flu 2015    hospital admit X2    Systolic CHF, chronic (HCC)     EF 45% normal caths and stress tests      Social Hx= reports that she has never smoked. She has never used smokeless tobacco. She reports that she does not drink alcohol or use drugs. Exam and Labs:  /80 (BP 1 Location: Left arm, BP Patient Position: Sitting)   Pulse 61   Resp 16   Ht 5' 5\" (1.651 m)   Wt 285 lb (129.3 kg)   SpO2 98%   BMI 47.43 kg/m² Constitutional:  NAD, comfortable  Head: NC,AT. Eyes: No scleral icterus. Neck:  Neck supple. No JVD present. Throat: moist mucous membranes. Chest: Effort normal & normal respiratory excursion . Neurological: alert, conversant and oriented . Skin: Skin is not cold. No obvious systemic rash noted. Not diaphoretic. No erythema. Psychiatric:  Grossly normal mood and affect. Behavior appears normal. Extremities:  no clubbing or cyanosis. Abdomen: non distended    Lungs:breath sounds normal. No stridor. distress, wheezes or  Rales. Heart: normal rate, regular rhythm, normal S1, S2, no murmurs, rubs, clicks or gallops , PMI non displaced. Edema: Edema is 2-3+ to the knees bilaterally with compression stockings on.   Lab Results   Component Value Date/Time    Cholesterol, total 179 01/18/2018 09:04 AM    HDL Cholesterol 42 01/18/2018 09:04 AM    LDL, calculated 101 (H) 01/18/2018 09:04 AM    Triglyceride 179 (H) 01/18/2018 09:04 AM     Lab Results   Component Value Date/Time    Sodium 136 04/22/2019 02:36 PM    Potassium 4.8 04/22/2019 02:36 PM    Chloride 97 04/22/2019 02:36 PM    CO2 26 04/22/2019 02:36 PM    Anion gap 4 (L) 04/18/2019 04:07 AM    Glucose 98 04/22/2019 02:36 PM    BUN 31 (H) 04/22/2019 02:36 PM    Creatinine 1.48 (H) 04/22/2019 02:36 PM    BUN/Creatinine ratio 21 04/22/2019 02:36 PM    GFR est AA 44 (L) 04/22/2019 02:36 PM    GFR est non-AA 38 (L) 04/22/2019 02:36 PM    Calcium 8.9 04/22/2019 02:36 PM      Wt Readings from Last 3 Encounters:   04/23/19 285 lb (129.3 kg)   04/18/19 275 lb (124.7 kg)   04/02/19 296 lb (134.3 kg)      BP Readings from Last 3 Encounters:   04/23/19 130/80   04/18/19 111/58   04/02/19 154/82      Current Outpatient Medications   Medication Sig    lisinopril (PRINIVIL, ZESTRIL) 10 mg tablet Take 1 Tab by mouth daily.  furosemide (LASIX) 40 mg tablet Take 3 Tabs by mouth daily.  amiodarone (CORDARONE) 200 mg tablet Take 1 Tab by mouth daily.  mexiletine (MEXITIL) 150 mg capsule Take 1 Cap by mouth every eight (8) hours.  warfarin (COUMADIN) 4 mg tablet Take 4 mg by mouth every Monday, Wednesday, Friday.  warfarin (COUMADIN) 3 mg tablet Take 3 mg by mouth every Tuesday, Thursday, Saturday & Sunday.  spironolactone (ALDACTONE) 50 mg tablet Take 1 Tab by mouth daily.  methocarbamol (ROBAXIN) 500 mg tablet TAKE 1 TABLET BY MOUTH THREE TIMES DAILY    HYDROcodone-acetaminophen (NORCO) 7.5-325 mg per tablet 1 Tab, EVERY 8 HOURS AS NEEDED FOR PAIN    eszopiclone (LUNESTA) 3 mg tablet Take 1 Tab by mouth nightly as needed for Sleep. Max Daily Amount: 3 mg.  potassium chloride (K-DUR, KLOR-CON) 20 mEq tablet Take 20 mEq by mouth daily.  pravastatin (PRAVACHOL) 80 mg tablet Take 80 mg by mouth nightly.  sertraline (ZOLOFT) 100 mg tablet Take 100 mg by mouth daily.  raNITIdine hcl 150 mg capsule Take 150 mg by mouth two (2) times a day.  carvedilol (COREG) 3.125 mg tablet Take 3.125 mg by mouth two (2) times daily (with meals).      No current facility-administered medications for this visit. Impression see above.       Written by Danitza Pratt, as dictated by Sameera Stevens MD.

## 2019-04-23 NOTE — PATIENT INSTRUCTIONS
Please make sure you take three of your lasix tabs all at one time once a day. Please increase your lisionpril to 10mg dialy. Please get your blood work compelted in a week. Continue your amiodarone 200mg twice a day for 14 days as prescribed in the hospital, then start taking the amiodarone 200mg once a day. You will be scheduled for a 2 week follow up.

## 2019-04-26 NOTE — TELEPHONE ENCOUNTER
Kimberlee from [facility] will faxing over something to obatin a diagnostic code for this patient at #[phone].      Phone: [phone]

## 2019-05-01 NOTE — TELEPHONE ENCOUNTER
Patient states she was instructed to call when she has finished the first two weeks of amiodarone so you could call in a different dosage.      She can be reached at [phone]

## 2019-05-02 NOTE — TELEPHONE ENCOUNTER
Verified patient with two types of identifiers.  Resent 200mg once a day of amiodarone per patient request by VO from MD.

## 2019-05-03 NOTE — TELEPHONE ENCOUNTER
Two patient identifiers verified. Per MD patient called and given results of labs. Confirmed patient's appointment on 5-7-19. Patient verbalized understanding and denies any further questions or concerns at this time.

## 2019-05-03 NOTE — PROGRESS NOTES
Labs look stable  Low BNP and stable renal fx  Future Appointments  5/7/2019   11:00 AM   MD Fely Danielle 76  6/6/2019   9:45 AM    P[facility]ER3, [facility] [place]  6/6/2019   10:00 AM   MD Fely Mueller 76

## 2019-05-03 NOTE — TELEPHONE ENCOUNTER
Attempted to reach patient by telephone.  A message was left for return call.     ----- Message from Belen Dumont MD sent at 5/2/2019  9:21 PM EDT -----  Labs look stable  Low BNP and stable renal fx  Future Appointments  5/7/2019   11:00 AM   MD Fely Yousif 76  6/6/2019   9:45 AM    P[facility]EMAKER3, [facility] [place]  6/6/2019   10:00 AM   MD Fely Balderrama

## 2019-05-07 NOTE — PROGRESS NOTES
Visit Vitals  /60 (BP 1 Location: Left arm, BP Patient Position: Sitting)   Pulse 60   Resp 16   Ht 5' 5\" (1.651 m)   Wt 289 lb (131.1 kg)   SpO2 95%   BMI 48.09 kg/m²

## 2019-05-07 NOTE — PROGRESS NOTES
Sherita Mota     1960       Shmuel Underwood MD, [facility]  Date of LRDRP-4/6/5531   PCP is Eulogio Davis MD   [facility] [place]  [facility]  HPI:  Abner Hoff is a 60 y.o. female  Pt is coming in today for a 1 month f/u of CHF with EFof 45%. She has had significant weight gain but relatively low BNP so we think there is a component of venous insufficiency involved. I had to admit her for IV diuretics because her weight went to 296 on 4/9/19. She was discharged at 273 pounds after IV diuretics. She was at risk for secondary infection. She had not been taking her Entresto and she had a hypokalemia leading to her defibrillator firing. Because of this, Dr. Luis Rothman restarted Mexiletine for 1 month and continued Amiodarone. On her last visit we increased furosemide to 150 increased lisinopril to 10 she continues on warfarin for Afib    Repeat chemistry on 5/1/19 showed potassium of 4.5 and stable kidney fucnction with a BNP at 34. Her weight today is up 4 pounds. We have a note from Ms. Carmen Rodriguez at [facility] as patient is followed for hypercoagulopathy. Had a left renal embolic infarction in the past. She got an INR done which was 2.7. Today Ms. Mota reports that she has been experiencing difficulty breathing. If she walks across the room it is worsened. She uses a walker in the house, but feels dizzy if she walks. When she initally left the hospital, she felt better with the fluid off and felt pretty good\" and could \"do more stuff. \" She has been on Lasix for an extended period of time. Has cut down intake of fluid and salt. Ms. Kelsey Greenwood and her  are moving to an apartment soon because all bedrooms in their house are upstairs and difficult to get to. She has a service dog and a cat SAEID for anxiety. I will give her a letter approving this. Denies chest pain, has no tachycardia, palpitations or sense of arrhythmia. Assessment/Plan:     1. Edema: venous insufficiency as much as systolic heart failure. May have tolerance to the Lasix. Regardless, she is continuing to gain weight. She says that she has cut back on fluid ad salt. I encouraged her to wear the stockings. I have changed Lasix to Bumex. The equivalent dosage is 3 mg daily. We are going to consider a CT of the abdomen with contrast to look at venous flow. She may eventually need [facility] with IV infusions. Her labs are stable. 2. Chronic systolic failure: NYHA3. On lisinopril and coreg. Did not previuosly keke aldact cannot afford Entresto. Low BNP so I think this is chronic not acute. 3. AICD Shock: led to hypokalemia potentially. Now normal    4. Ventricular fibrillation: continue warfarin    5. CKD 3: stable    6. HTN: stable    7. Moderate MR: stable    8. Hypercoagulable state: on warfarin      F/U in 2 weeks       Impression:   1. Acute on chronic systolic congestive heart failure ([address]    2. Chronic atrial fibrillation (HCC)    3. Essential hypertension    4. Ventricular fibrillation ([address]    5. Hypokalemia    6. NICM (nonischemic cardiomyopathy) ([address]    7. CKD (chronic kidney disease) stage 3, GFR 30-59 ml/min ([facility])       Cardiac History:   No specialty comments available. ROS-except as noted above. . A complete cardiac and respiratory are reviewed and negative except as above ; Resp-denies wheezing  or productive cough,. Const- No unusual weight loss or fever; Neuro-no recent seizure or CVA ; GI- No BRBPR, abdom pain, bloating ; - no  hematuria   see supplement sheet, initialed and to be scanned by staff  Past Medical History:   Diagnosis Date    AICD (automatic cardioverter/defibrillator) present     AICD (automatic cardioverter/defibrillator) present     Atrial fibrillation ([address]     Chronic anticoagulation     was on NOAC before. ?had clot on noac. now on warfarin.  Dr. Amalia Nguyen monitors INR    DDD (degenerative disc disease), cervical 1/30/2018    DDD (degenerative disc disease), thoracic 1/30/2018    Depression     no h/o hospitalization    Essential hypertension 1/8/2018    Incidental pulmonary nodule 2mm, RLL- no follow up needed 2/16/2018    Noted on abdomen/pelvis CT Feb 2018. No smoking history. 1. No renal, ureteral, or bladder calculi. 2. No acute findings. 3. 2 mm pulmonary nodule right lower lobe. No follow-up needed if the patient is low risk. If the patient is high-risk, CT could be considered in 1 year.      Insomnia     Kidney infarction ([address] 2017    d/t clot per pt. recalls being on blood thinner.  Lymphedema of left arm     developed after infection of pacemaker site    Obesity, morbid ([address] 1/30/2018    Stomach flu 2015    hospital admit X2    Systolic CHF, chronic (HCC)     EF 45% normal caths and stress tests      Social Hx= reports that she has never smoked. She has never used smokeless tobacco. She reports that she does not drink alcohol or use drugs. Exam and Labs:  /60 (BP 1 Location: Left arm, BP Patient Position: Sitting)   Pulse 60   Resp 16   Ht 5' 5\" (1.651 m)   Wt 289 lb (131.1 kg)   SpO2 95%   BMI 48.09 kg/m² Constitutional:  NAD, comfortable  Head: NC,AT. Eyes: No scleral icterus. Neck:  Neck supple. No JVD present. Throat: moist mucous membranes. Chest: Effort normal & normal respiratory excursion . Neurological: alert, conversant and oriented . Skin: Skin is not cold. No obvious systemic rash noted. Not diaphoretic. No erythema. Psychiatric:  Grossly normal mood and affect. Behavior appears normal. Extremities:  no clubbing or cyanosis. Abdomen: non distended    Lungs:breath sounds normal. No stridor. distress, wheezes or  Rales. Heart: normal rate, regular rhythm, normal S1, S2, no murmurs, rubs, clicks or gallops , PMI non displaced. Edema: Edema is 2+ to the knees bilaterally.   Lab Results   Component Value Date/Time    Cholesterol, total 179 01/18/2018 09:04 AM    HDL Cholesterol 42 01/18/2018 09:04 AM    LDL, calculated 101 (H) 01/18/2018 09:04 AM    Triglyceride 179 (H) 01/18/2018 09:04 AM     Lab Results   Component Value Date/Time    Sodium 141 05/01/2019 01:09 PM    Potassium 4.5 05/01/2019 01:09 PM    Chloride 100 05/01/2019 01:09 PM    CO2 27 05/01/2019 01:09 PM    Anion gap 4 (L) 04/18/2019 04:07 AM    Glucose 99 05/01/2019 01:09 PM    BUN 18 05/01/2019 01:09 PM    Creatinine 1.50 (H) 05/01/2019 01:09 PM    BUN/Creatinine ratio 12 05/01/2019 01:09 PM    GFR est AA 43 (L) 05/01/2019 01:09 PM    GFR est non-AA 38 (L) 05/01/2019 01:09 PM    Calcium 9.3 05/01/2019 01:09 PM      Wt Readings from Last 3 Encounters:   05/07/19 289 lb (131.1 kg)   04/23/19 285 lb (129.3 kg)   04/18/19 275 lb (124.7 kg)      BP Readings from Last 3 Encounters:   05/07/19 100/60   04/23/19 130/80   04/18/19 111/58      Current Outpatient Medications   Medication Sig    bumetanide (BUMEX) 1 mg tablet Take 3 Tabs by mouth daily.  amiodarone (CORDARONE) 200 mg tablet Take 1 Tab by mouth daily.  lisinopril (PRINIVIL, ZESTRIL) 10 mg tablet Take 1 Tab by mouth daily.  mexiletine (MEXITIL) 150 mg capsule Take 1 Cap by mouth every eight (8) hours.  warfarin (COUMADIN) 4 mg tablet Take 4 mg by mouth every Monday, Wednesday, Friday.  warfarin (COUMADIN) 3 mg tablet Take 3 mg by mouth every Tuesday, Thursday, Saturday & Sunday.  spironolactone (ALDACTONE) 50 mg tablet Take 1 Tab by mouth daily.  methocarbamol (ROBAXIN) 500 mg tablet TAKE 1 TABLET BY MOUTH THREE TIMES DAILY    HYDROcodone-acetaminophen (NORCO) 7.5-325 mg per tablet 1 Tab, EVERY 8 HOURS AS NEEDED FOR PAIN    eszopiclone (LUNESTA) 3 mg tablet Take 1 Tab by mouth nightly as needed for Sleep. Max Daily Amount: 3 mg.  potassium chloride (K-DUR, KLOR-CON) 20 mEq tablet Take 20 mEq by mouth daily.  pravastatin (PRAVACHOL) 80 mg tablet Take 80 mg by mouth nightly.     sertraline (ZOLOFT) 100 mg tablet Take 100 mg by mouth daily.    raNITIdine hcl 150 mg capsule Take 150 mg by mouth two (2) times a day.  carvedilol (COREG) 3.125 mg tablet Take 3.125 mg by mouth two (2) times daily (with meals). No current facility-administered medications for this visit. Impression see above.       Written by Mehul Goldsmith, as dictated by Ta Lopez MD.

## 2019-05-07 NOTE — Clinical Note
5/28/19 Patient: Fernanda Tinoco YOB: 1960 Date of Visit: 5/7/2019 Fabio Baird MD 
8 [facility] [address] [id number] VIA Facsimile: [phone] Dear Fabio Baird MD, Thank you for referring Ms. Sherita Mota to [facility] for evaluation. My notes for this consultation are attached. If you have questions, please do not hesitate to call me. I look forward to following your patient along with you.  
 
 
Sincerely, 
 
Alonzo Naik MD

## 2019-05-07 NOTE — PATIENT INSTRUCTIONS
Stop taking lasix. Start taking bumex 3 mg (3 x 1 mg tablets) daily. Our office will contact the Outpatient [facility]), so expect a call from them. You will be scheduled for a visit every 2 weeks. Follow up with Dr. Mario Shaffer in 2-3 weeks.

## 2019-05-07 NOTE — TELEPHONE ENCOUNTER
H[facility]el from [facility] is calling to check on the status of a prior Heidi Gip for [facility].  She can be reached at [phone]

## 2019-05-07 NOTE — LETTER
5/7/2019 [employer] [address] [place] [id number] To Whom it May Concern, 
 
[facility] and [employer] are both patients  of Dr. Venkata Schroeder at [facility]. They require a service animal(dog)  for  blood glucose and a (cat) for anxiety. Please call our office at [phone] regarding any questions or concerns. Sincerely, Hemant Freedman RN

## 2019-05-16 NOTE — TELEPHONE ENCOUNTER
Verified patient with two types of identifiers. Spoke to patient's , he will call [facility] tomorrow morning to see if they have availability tomorrow for diuretics. If not, he will take her to [facility]. He will call me and keep me updated.

## 2019-05-16 NOTE — TELEPHONE ENCOUNTER
Verified patient with two types of identifiers. Patient's  called to report Bumex is not working and patient is increalingly short of breath. He also report she is sick and saw her PCP Tuesday. He reports she has a strong, productive cough with green sputum. He reports she is taking doxycycline and cough medicine but her shortness of breath and swelling are getting worse.  Will notify MD.

## 2019-05-17 NOTE — TELEPHONE ENCOUNTER
Faxed order for 1mg IV bumex with BMP every 3 weeks to [address]. Received confirmation. Refaxed order with date and time. Received confirmation.

## 2019-05-24 NOTE — PROGRESS NOTES
Visit Vitals  /60 (BP 1 Location: Left arm, BP Patient Position: Sitting)   Pulse 61   Resp 16   Ht 5' 5\" (1.651 m)   Wt 288 lb 3.2 oz (130.7 kg)   SpO2 97%   BMI 47.96 kg/m²

## 2019-05-24 NOTE — PATIENT INSTRUCTIONS
Make sure you have stopped your mexiletine. Please follow up with [facility] for IV bumex and lab work. You will be scheduled for a 3 month follow up.

## 2019-05-24 NOTE — Clinical Note
Sherita Mota     1960       Shmuel Ardon MD, [facility] Date of Visit-5/24/2019 PCP is Owen Fallon MD  
[facility] [place] [facility] HPI:  Lacey Denise is a 60 y.o. female Subjective: Three week follow up of diastolic dysfunction, venous insufficiency, lymphedema. Patient with an EF of 45%, CHF, significant weight gain continues, but relatively low BNP, so I think this is more venous insufficiency. I admitted her for IV diuretics earlier this year, weight went from 296 on 04/09/19 and she was discharged at 273. She was not taking Entresto due to cost and she had hypokalemia leading to her defibrillator firing. She was restarted on Mexiletine for one month and continued Amiodarone. We had increased her Furosemide and Lisinopril and continued her on Warfarin for a-fib. She has had continuing difficulties ambulating. She has severe edema. We changed the Lasix to Bumex 3 mg daily. We have now felt she is ready for [facility] infusions. She reports that her weight continues to be problematic. She continues to feel short of breath and symptomatic. She notes continued difficulty with ambulation, is in a wheelchair. She denies overt chest pain, chest pressure with exertion, but at rest does have occasional pain. She has some sense of mild palpitations. Impression/Plan: 1. Significant edema, venous insufficiency, systolic heart failure, EF 45%. 2. Chronic CHF, not responding to outpatient diuretics with Lasix, Bumex and occasional Metolazone. We have to be careful about hypokalemia, continuing to monitor potassium level. Is NYHA3, on Lisinopril and Coreg and cannot afford Entresto. Chronic CHF, will now go to [facility] for IV infusions. 3. AICD shock due to hypokalemia. Potentially now potassium is normal.  Will follow closely for completion. 4. Ventricular fibrillation. Continue Warfarin. 5. CKD3. Monitoring renal function. 6. Hypertension, labile with the following changes. 7. Moderate MR, stable. 8. Hypercoagulable state. Continue Warfarin. EKG:  
 
Assessment/Plan:    
Future Appointments Date Time Provider Mariel Becerra 6/12/2019  2:20 PM COUMADIN, [address]  
6/24/2019  3:00 PM BREMO FT CHAIR 2 [facility]  
7/15/2019  3:00 PM BREMO FT CHAIR 2 [facility]  
8/5/2019  3:00 PM BREMO FT CHAIR 2 [facility]  
8/23/2019 10:20 AM Shmuel Jolly MD [facility] SCHED  
8/26/2019  3:00 PM BREMO FT CHAIR 2 [facility]  
9/11/2019  3:30 PM REMOTE1, [address]  
12/16/2019 11:45 AM REMOTE1, [address]  
3/25/2020  9:00 AM REMOTE1, [facility] SCHED  
7/2/2020  9:45 AM PACEMAKER3, [address]  
7/2/2020 10:00 AM Arjun Ashby  [address] Patient Instructions Make sure you have stopped your mexiletine. Please follow up with [facility] for IV bumex and lab work. You will be scheduled for a 3 month follow up. Key CAD CHF Meds   
    
  
 bumetanide (BUMEX) 1 mg tablet (Taking) Take 3 Tabs by mouth daily. amiodarone (CORDARONE) 200 mg tablet (Taking) Take 1 Tab by mouth daily. lisinopril (PRINIVIL, ZESTRIL) 10 mg tablet (Taking) Take 1 Tab by mouth daily. mexiletine (MEXITIL) 150 mg capsule (Taking/Discontinued) Take 1 Cap by mouth every eight (8) hours. warfarin (COUMADIN) 4 mg tablet (Taking) Take 4 mg by mouth every Monday, Wednesday, Friday. warfarin (COUMADIN) 3 mg tablet (Taking) Take 3 mg by mouth every Tuesday, Thursday, Saturday & Sunday. spironolactone (ALDACTONE) 50 mg tablet (Taking) Take 1 Tab by mouth daily. pravastatin (PRAVACHOL) 80 mg tablet (Taking) Take 80 mg by mouth nightly. carvedilol (COREG) 3.125 mg tablet (Taking) Take 3.125 mg by mouth two (2) times daily (with meals). Future Appointments Date Time Provider Mariel Becerra 5/31/2019  3:00 PM COUMADIN, [address]  
6/3/2019  3:00 [facility]  
6/6/2019  9:45 AM PACEMAKER3, [address]  
6/6/2019 10:00 AM Mar Delacruz  [address]  
6/24/2019  3:00 PM BREMO FT CHAIR 2 [facility]  
7/15/2019  3:00 PM [facility]  
8/23/2019 10:20 AM Kanwal Das  [address] Patient Instructions Make sure you have stopped your mexiletine. Please follow up with [facility] for IV bumex and lab work. You will be scheduled for a 3 month follow up. Key CAD CHF Meds   
    
  
 bumetanide (BUMEX) 1 mg tablet (Taking) Take 3 Tabs by mouth daily. amiodarone (CORDARONE) 200 mg tablet (Taking) Take 1 Tab by mouth daily. lisinopril (PRINIVIL, ZESTRIL) 10 mg tablet (Taking) Take 1 Tab by mouth daily. mexiletine (MEXITIL) 150 mg capsule (Taking) Take 1 Cap by mouth every eight (8) hours. warfarin (COUMADIN) 4 mg tablet (Taking) Take 4 mg by mouth every Monday, Wednesday, Friday. warfarin (COUMADIN) 3 mg tablet (Taking) Take 3 mg by mouth every Tuesday, Thursday, Saturday & Sunday. spironolactone (ALDACTONE) 50 mg tablet (Taking) Take 1 Tab by mouth daily. pravastatin (PRAVACHOL) 80 mg tablet (Taking) Take 80 mg by mouth nightly. carvedilol (COREG) 3.125 mg tablet (Taking) Take 3.125 mg by mouth two (2) times daily (with meals). Impression: No diagnosis found. Cardiac History: No specialty comments available. ROS-except as noted above. . A complete cardiac and respiratory are reviewed and negative except as above ; Resp-denies wheezing  or productive cough,. Const- No unusual weight loss or fever; Neuro-no recent seizure or CVA ; GI- No BRBPR, abdom pain, bloating ; - no  hematuria  
see supplement sheet, initialed and to be scanned by staff Past Medical History:  
Diagnosis Date  AICD (automatic cardioverter/defibrillator) present  AICD (automatic cardioverter/defibrillator) present  Atrial fibrillation ([address]  Chronic anticoagulation   
 was on NOAC before. ?had clot on noac. now on warfarin. Dr. Fredrick Hazel monitors INR  DDD (degenerative disc disease), cervical 1/30/2018  DDD (degenerative disc disease), thoracic 1/30/2018  Depression   
 no h/o hospitalization  Essential hypertension 1/8/2018  Incidental pulmonary nodule 2mm, RLL- no follow up needed 2/16/2018 Noted on abdomen/pelvis CT Feb 2018. No smoking history. 1. No renal, ureteral, or bladder calculi. 2. No acute findings. 3. 2 mm pulmonary nodule right lower lobe. No follow-up needed if the patient is low risk. If the patient is high-risk, CT could be considered in 1 year.    
 Insomnia  Kidney infarction ([address] 2017  
 d/t clot per pt. recalls being on blood thinner.  Lymphedema of left arm   
 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  
 hospital admit X2  Systolic CHF, chronic ([address] EF 45% normal caths and stress tests Social Hx= reports that she has never smoked. She has never used smokeless tobacco. She reports that she does not drink alcohol or use drugs. Exam and Labs:   
Visit Vitals /60 (BP 1 Location: Left arm, BP Patient Position: Sitting) Pulse 61 Resp 16 Ht 5' 5\" (1.651 m) Wt 288 lb 3.2 oz (130.7 kg) SpO2 97% BMI 47.96 kg/m² @Constitutional:  NAD, comfortable Head: NC,AT. Eyes: No scleral icterus. Neck:  Neck supple. No JVD present. Throat: moist mucous membranes. Chest: Effort normal & normal respiratory excursion . Neurological: alert, conversant and oriented . Skin: Skin is not cold. No obvious systemic rash noted. Not diaphoretic. No erythema. Psychiatric:  Grossly normal mood and affect. Behavior appears normal. Extremities:  no clubbing or cyanosis. Abdomen: non distended Lungs:breath sounds normal. No stridor. distress, wheezes or  Rales. Heart:    normal rate, regular rhythm, normal S1, S2, no murmurs, rubs, clicks or gallops , PMI non displaced. Edema: Edema is 3+ to thighs Lab Results Component Value Date/Time Cholesterol, total 179 01/18/2018 09:04 AM  
 HDL Cholesterol 42 01/18/2018 09:04 AM  
 LDL, calculated 101 (H) 01/18/2018 09:04 AM  
 Triglyceride 179 (H) 01/18/2018 09:04 AM  
 
Lab Results Component Value Date/Time Sodium 141 05/01/2019 01:09 PM  
 Potassium 4.5 05/01/2019 01:09 PM  
 Chloride 100 05/01/2019 01:09 PM  
 CO2 27 05/01/2019 01:09 PM  
 Anion gap 4 (L) 04/18/2019 04:07 AM  
 Glucose 99 05/01/2019 01:09 PM  
 BUN 18 05/01/2019 01:09 PM  
 Creatinine 1.50 (H) 05/01/2019 01:09 PM  
 BUN/Creatinine ratio 12 05/01/2019 01:09 PM  
 GFR est AA 43 (L) 05/01/2019 01:09 PM  
 GFR est non-AA 38 (L) 05/01/2019 01:09 PM  
 Calcium 9.3 05/01/2019 01:09 PM  
  
Wt Readings from Last 3 Encounters:  
05/24/19 288 lb 3.2 oz (130.7 kg) 05/07/19 289 lb (131.1 kg) 04/23/19 285 lb (129.3 kg) BP Readings from Last 3 Encounters:  
05/24/19 130/60  
05/07/19 100/60  
04/23/19 130/80 Current Outpatient Medications Medication Sig  bumetanide (BUMEX) 1 mg tablet Take 3 Tabs by mouth daily.  amiodarone (CORDARONE) 200 mg tablet Take 1 Tab by mouth daily.  lisinopril (PRINIVIL, ZESTRIL) 10 mg tablet Take 1 Tab by mouth daily.  mexiletine (MEXITIL) 150 mg capsule Take 1 Cap by mouth every eight (8) hours.  warfarin (COUMADIN) 4 mg tablet Take 4 mg by mouth every Monday, Wednesday, Friday.  warfarin (COUMADIN) 3 mg tablet Take 3 mg by mouth every Tuesday, Thursday, Saturday & Sunday.  spironolactone (ALDACTONE) 50 mg tablet Take 1 Tab by mouth daily.  methocarbamol (ROBAXIN) 500 mg tablet TAKE 1 TABLET BY MOUTH THREE TIMES DAILY  HYDROcodone-acetaminophen (NORCO) 7.5-325 mg per tablet 1 Tab, EVERY 8 HOURS AS NEEDED FOR PAIN  
  eszopiclone (LUNESTA) 3 mg tablet Take 1 Tab by mouth nightly as needed for Sleep. Max Daily Amount: 3 mg.  potassium chloride (K-DUR, KLOR-CON) 20 mEq tablet Take 20 mEq by mouth daily.  pravastatin (PRAVACHOL) 80 mg tablet Take 80 mg by mouth nightly.  raNITIdine hcl 150 mg capsule Take 150 mg by mouth two (2) times a day.  carvedilol (COREG) 3.125 mg tablet Take 3.125 mg by mouth two (2) times daily (with meals). No current facility-administered medications for this visit. Impression see above.

## 2019-05-24 NOTE — PROGRESS NOTES
Sherita Mota     1960       Shmuel Paez MD, [facility] Date of Visit-5/24/2019 PCP is Shaji Mckeon MD  
[address] [place] [address] [facility] HPI:  Randal Garcia is a 60 y.o. female Dictation on: 06/09/2019  9:25 PM by: Walter Pozo [[id number]] EKG:  
 
Assessment/Plan:    
Future Appointments Date Time Provider Mariel Mariam 6/12/2019  2:20 PM COUMADIN, [address]  
6/24/2019  3:00 PM BREMO FT CHAIR 2 [facility]  
7/15/2019  3:00 PM BREMO FT CHAIR 2 [facility]  
8/5/2019  3:00 PM BREMO FT CHAIR 2 [facility]  
8/23/2019 10:20 AM Shmuel Jolly MD [facility] SCHED  
8/26/2019  3:00 PM BREMO FT CHAIR 2 [facility]  
9/11/2019  3:30 PM REMOTE1, [address]  
12/16/2019 11:45 AM REMOTE1, [address]  
3/25/2020  9:00 AM REMOTE1, [facility] SCHED  
7/2/2020  9:45 AM PACEMAKER3, [address]  
7/2/2020 10:00 AM Gino Rob  [address] Patient Instructions Make sure you have stopped your mexiletine. Please follow up with [facility] for IV bumex and lab work. You will be scheduled for a 3 month follow up. Key CAD CHF Meds   
    
  
 bumetanide (BUMEX) 1 mg tablet (Taking) Take 3 Tabs by mouth daily. amiodarone (CORDARONE) 200 mg tablet (Taking) Take 1 Tab by mouth daily. lisinopril (PRINIVIL, ZESTRIL) 10 mg tablet (Taking) Take 1 Tab by mouth daily. mexiletine (MEXITIL) 150 mg capsule (Taking/Discontinued) Take 1 Cap by mouth every eight (8) hours. warfarin (COUMADIN) 4 mg tablet (Taking) Take 4 mg by mouth every Monday, Wednesday, Friday. warfarin (COUMADIN) 3 mg tablet (Taking) Take 3 mg by mouth every Tuesday, Thursday, Saturday & Sunday. spironolactone (ALDACTONE) 50 mg tablet (Taking) Take 1 Tab by mouth daily.   
 pravastatin (PRAVACHOL) 80 mg tablet (Taking) Take 80 mg by mouth nightly. carvedilol (COREG) 3.125 mg tablet (Taking) Take 3.125 mg by mouth two (2) times daily (with meals). Future Appointments Date Time Provider Mariel Becerra 5/31/2019  3:00 PM COUMADIN, [address]  
6/3/2019  3:00 PM BREMO FT CHAIR 2 [facility]  
6/6/2019  9:45 AM PACEMAKER3, [address]  
6/6/2019 10:00 AM Caitlin Quinteros  [address]  
6/24/2019  3:00 PM BREMO FT CHAIR 2 [facility]  
7/15/2019  3:00 PM [facility]  
8/23/2019 10:20 AM Antelmo Mccabe  [address] Patient Instructions Make sure you have stopped your mexiletine. Please follow up with [facility] for IV bumex and lab work. You will be scheduled for a 3 month follow up. Key CAD CHF Meds   
    
  
 bumetanide (BUMEX) 1 mg tablet (Taking) Take 3 Tabs by mouth daily. amiodarone (CORDARONE) 200 mg tablet (Taking) Take 1 Tab by mouth daily. lisinopril (PRINIVIL, ZESTRIL) 10 mg tablet (Taking) Take 1 Tab by mouth daily. mexiletine (MEXITIL) 150 mg capsule (Taking) Take 1 Cap by mouth every eight (8) hours. warfarin (COUMADIN) 4 mg tablet (Taking) Take 4 mg by mouth every Monday, Wednesday, Friday. warfarin (COUMADIN) 3 mg tablet (Taking) Take 3 mg by mouth every Tuesday, Thursday, Saturday & Sunday. spironolactone (ALDACTONE) 50 mg tablet (Taking) Take 1 Tab by mouth daily. pravastatin (PRAVACHOL) 80 mg tablet (Taking) Take 80 mg by mouth nightly. carvedilol (COREG) 3.125 mg tablet (Taking) Take 3.125 mg by mouth two (2) times daily (with meals). Impression: No diagnosis found. Cardiac History: No specialty comments available. ROS-except as noted above. . A complete cardiac and respiratory are reviewed and negative except as above ; Resp-denies wheezing  or productive cough,.  Const- No unusual weight loss or fever; Neuro-no recent seizure or CVA ; GI- No BRBPR, abdom pain, bloating ; - no  hematuria  
see supplement sheet, initialed and to be scanned by staff Past Medical History:  
Diagnosis Date  AICD (automatic cardioverter/defibrillator) present  AICD (automatic cardioverter/defibrillator) present  Atrial fibrillation ([address]  Chronic anticoagulation   
 was on NOAC before. ?had clot on noac. now on warfarin. Dr. Jr Denise monitors INR  DDD (degenerative disc disease), cervical 1/30/2018  DDD (degenerative disc disease), thoracic 1/30/2018  Depression   
 no h/o hospitalization  Essential hypertension 1/8/2018  Incidental pulmonary nodule 2mm, RLL- no follow up needed 2/16/2018 Noted on abdomen/pelvis CT Feb 2018. No smoking history. 1. No renal, ureteral, or bladder calculi. 2. No acute findings. 3. 2 mm pulmonary nodule right lower lobe. No follow-up needed if the patient is low risk. If the patient is high-risk, CT could be considered in 1 year.    
 Insomnia  Kidney infarction ([address] 2017  
 d/t clot per pt. recalls being on blood thinner.  Lymphedema of left arm   
 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  
 hospital admit X2  Systolic CHF, chronic ([address] EF 45% normal caths and stress tests Social Hx= reports that she has never smoked. She has never used smokeless tobacco. She reports that she does not drink alcohol or use drugs. Exam and Labs:   
Visit Vitals /60 (BP 1 Location: Left arm, BP Patient Position: Sitting) Pulse 61 Resp 16 Ht 5' 5\" (1.651 m) Wt 288 lb 3.2 oz (130.7 kg) SpO2 97% BMI 47.96 kg/m² @Constitutional:  NAD, comfortable Head: NC,AT. Eyes: No scleral icterus. Neck:  Neck supple. No JVD present. Throat: moist mucous membranes. Chest: Effort normal & normal respiratory excursion . Neurological: alert, conversant and oriented . Skin: Skin is not cold. No obvious systemic rash noted. Not diaphoretic. No erythema. Psychiatric:  Grossly normal mood and affect. Behavior appears normal. Extremities:  no clubbing or cyanosis. Abdomen: non distended Lungs:breath sounds normal. No stridor. distress, wheezes or  Rales. Heart:    normal rate, regular rhythm, normal S1, S2, no murmurs, rubs, clicks or gallops , PMI non displaced. Edema: Edema is 3+ to thighs Lab Results Component Value Date/Time Cholesterol, total 179 01/18/2018 09:04 AM  
 HDL Cholesterol 42 01/18/2018 09:04 AM  
 LDL, calculated 101 (H) 01/18/2018 09:04 AM  
 Triglyceride 179 (H) 01/18/2018 09:04 AM  
 
Lab Results Component Value Date/Time Sodium 141 05/01/2019 01:09 PM  
 Potassium 4.5 05/01/2019 01:09 PM  
 Chloride 100 05/01/2019 01:09 PM  
 CO2 27 05/01/2019 01:09 PM  
 Anion gap 4 (L) 04/18/2019 04:07 AM  
 Glucose 99 05/01/2019 01:09 PM  
 BUN 18 05/01/2019 01:09 PM  
 Creatinine 1.50 (H) 05/01/2019 01:09 PM  
 BUN/Creatinine ratio 12 05/01/2019 01:09 PM  
 GFR est AA 43 (L) 05/01/2019 01:09 PM  
 GFR est non-AA 38 (L) 05/01/2019 01:09 PM  
 Calcium 9.3 05/01/2019 01:09 PM  
  
Wt Readings from Last 3 Encounters:  
05/24/19 288 lb 3.2 oz (130.7 kg) 05/07/19 289 lb (131.1 kg) 04/23/19 285 lb (129.3 kg) BP Readings from Last 3 Encounters:  
05/24/19 130/60  
05/07/19 100/60  
04/23/19 130/80 Current Outpatient Medications Medication Sig  bumetanide (BUMEX) 1 mg tablet Take 3 Tabs by mouth daily.  amiodarone (CORDARONE) 200 mg tablet Take 1 Tab by mouth daily.  lisinopril (PRINIVIL, ZESTRIL) 10 mg tablet Take 1 Tab by mouth daily.  mexiletine (MEXITIL) 150 mg capsule Take 1 Cap by mouth every eight (8) hours.  warfarin (COUMADIN) 4 mg tablet Take 4 mg by mouth every Monday, Wednesday, Friday.  warfarin (COUMADIN) 3 mg tablet Take 3 mg by mouth every Tuesday, Thursday, Saturday & Sunday.  spironolactone (ALDACTONE) 50 mg tablet Take 1 Tab by mouth daily.  methocarbamol (ROBAXIN) 500 mg tablet TAKE 1 TABLET BY MOUTH THREE TIMES DAILY  HYDROcodone-acetaminophen (NORCO) 7.5-325 mg per tablet 1 Tab, EVERY 8 HOURS AS NEEDED FOR PAIN  
 eszopiclone (LUNESTA) 3 mg tablet Take 1 Tab by mouth nightly as needed for Sleep. Max Daily Amount: 3 mg.  potassium chloride (K-DUR, KLOR-CON) 20 mEq tablet Take 20 mEq by mouth daily.  pravastatin (PRAVACHOL) 80 mg tablet Take 80 mg by mouth nightly.  raNITIdine hcl 150 mg capsule Take 150 mg by mouth two (2) times a day.  carvedilol (COREG) 3.125 mg tablet Take 3.125 mg by mouth two (2) times daily (with meals). No current facility-administered medications for this visit. Impression see above.

## 2019-05-24 NOTE — PROGRESS NOTES
A full discussion of the nature of anticoagulants has been carried out. A benefit risk analysis has been presented to the patient, so that they understand the justification for choosing anticoagulation at this time. The need for frequent and regular monitoring, precise dosage adjustment and compliance is stressed. Side effects of potential bleeding are discussed. The patient should avoid any OTC items containing aspirin or ibuprofen, and should avoid great swings in general diet. Avoid alcohol consumption. Call if any signs of abnormal bleeding. Next PT/INR test in 1 week. Patient was getting INR checked from another provider. Will now attend [facility] clinic. Reports just finishing an antibiotic. Last INR was 2.1  Instructed to take 2 mg tonight then resume prior dosing and will recheck levels next week. Patient verbalized understanding      Anticoagulation Summary  As of 5/24/2019    INR goal:   2.0-3.0   TTR:      INR used for dosing:   3.8!  (5/24/2019)   Warfarin maintenance plan:   4 mg (4 mg x 1) every Mon, Wed, Fri; 3 mg (3 mg x 1) all other days   Weekly warfarin total:   24 mg   Plan last modified:   Miguel Schmidt RN (5/24/2019)   Next INR check:   5/31/2019   Target end date:       Indications    Atrial fibrillation ([address] [I48.91]  Chronic anticoagulation [Z79.01]             Anticoagulation Episode Summary     INR check location:       Preferred lab:       Send INR reminders to:       Comments:         Anticoagulation Care Providers     Provider Role Specialty Phone number    Jhony Iyer MD Responsible Cardiology [phone]          Future Appointments   Date Time Provider Mariel [facility]   5/24/2019 10:40 AM Jhony Iyer  [address]   5/31/2019  3:00 PM COUMADIN, [address]   6/3/2019  3:00 PM LAURY  CHAIR 2 [facility]   6/6/2019  9:45 AM PACEMAKER3, [address]   6/6/2019 10:00 AM Lee Medrano  [address] 6/24/2019  3:00 PM LAURY FT CHAIR 2 [facility]   7/15/2019  3:00 PM LAURY MULLER CHAIR 2 [facility]     \

## 2019-05-31 NOTE — PROGRESS NOTES
A full discussion of the nature of anticoagulants has been carried out. A benefit risk analysis has been presented to the patient, so that they understand the justification for choosing anticoagulation at this time. The need for frequent and regular monitoring, precise dosage adjustment and compliance is stressed. Side effects of potential bleeding are discussed. The patient should avoid any OTC items containing aspirin or ibuprofen, and should avoid great swings in general diet. Avoid alcohol consumption. Call if any signs of abnormal bleeding. Next PT/INR test in 4 days. Patient had recently finished antibiotic at last appt and has now restarted again for another 10 day course. Instructed to hold coumadin tonight then take 1/2 dose while on the antibiotic. Will recheck levels on Tuesday. Patient verbalized understanding      Anticoagulation Summary  As of 2019    INR goal:   2.0-3.0   TTR:      INR used for dosin.0!  (2019)   Warfarin maintenance plan:   4 mg (4 mg x 1) every Mon, Wed, Fri; 3 mg (3 mg x 1) all other days   Weekly warfarin total:   24 mg   Plan last modified:   Puneet Boggs RN (2019)   Next INR check:   2019   Target end date:       Indications    Atrial fibrillation ([facility] [I48.91]  Chronic anticoagulation [Z79.01]             Anticoagulation Episode Summary     INR check location:       Preferred lab:       Send INR reminders to:       Comments:         Anticoagulation Care Providers     Provider Role Specialty Phone number    Anna Dunn MD [facility] [phone]          Future Appointments   Date Time Provider Mariel Becerra   6/3/2019  3:00 PM [facility] FT CHAIR 2 [facility]    2019  3:00 PM COUMADIN [place] [address]   2019  9:45 AM PACEMAKER3,  [address]   2019 10:00 AM Simone Martinez  [address]   2019  3:00 PM BREMO FT CHAIR 2 [facility]    7/15/2019  3:00 PM LAURY FT CHAIR 2 [address] [facility]""   8/23/2019 10:20 AM Anna Dunn  [address]

## 2019-06-03 NOTE — PROGRESS NOTES
1500 patient was administered  bumetanide (BUMEX) injection 1 mg per IV push. 22G (1 \") angiocath placed in R/antecubital on  1st attempt. Labs drawn and medication infused over 4 minutes. Visit Vitals  /64   Pulse 63   Temp 98.6 °F (37 °C)   SpO2 94%     Recent Results (from the past 12 hour(s))   METABOLIC PANEL, BASIC    Collection Time: 06/03/19  3:07 PM   Result Value Ref Range    Sodium 136 136 - 145 mmol/L    Potassium 3.8 3.5 - 5.1 mmol/L    Chloride 98 97 - 108 mmol/L    CO2 31 21 - 32 mmol/L    Anion gap 7 5 - 15 mmol/L    Glucose 107 (H) 65 - 100 mg/dL    BUN 23 (H) 6 - 20 MG/DL    Creatinine 1.48 (H) 0.55 - 1.02 MG/DL    BUN/Creatinine ratio 16 12 - 20      GFR est AA 44 (L) >60 ml/min/1.73m2    GFR est non-AA 36 (L) >60 ml/min/1.73m2    Calcium 9.6 8.5 - 10.1 MG/DL     Procedure tolerated well. Pt exited clinic accompanied by spouse after 10 minute wait time.

## 2019-06-04 NOTE — PROGRESS NOTES
A full discussion of the nature of anticoagulants has been carried out. A benefit risk analysis has been presented to the patient, so that they understand the justification for choosing anticoagulation at this time. The need for frequent and regular monitoring, precise dosage adjustment and compliance is stressed. Side effects of potential bleeding are discussed. The patient should avoid any OTC items containing aspirin or ibuprofen, and should avoid great swings in general diet. Avoid alcohol consumption. Call if any signs of abnormal bleeding. Next PT/INR test in 1 week. Patient remains on antibiotic until Friday. Will see PCP tomorrow since still not feeling well. Instructed to remain on 1/2 dose daily while on antibiotic then will resume regular dosing when completed. Patient unable to attend appt on Monday or Tuesday.   Will recheck levels next 19    Anticoagulation Summary  As of 2019    INR goal:   2.0-3.0   TTR:   23.7 % (1 d)   INR used for dosin.8 (2019)   Warfarin maintenance plan:   4 mg (4 mg x 1) every Mon, Wed, Fri; 3 mg (3 mg x 1) all other days   Weekly warfarin total:   24 mg   Plan last modified:   Tamera Perry RN (2019)   Next INR check:   2019   Target end date:       Indications    [facility] fibrillation [facility] [I48.91]  Chronic anticoagulation [Z79.01]             Anticoagulation Episode Summary     INR check location:       Preferred lab:       Send INR reminders to:       Comments:         Anticoagulation Care Providers     Provider Role Specialty Phone number    Ludy Luis MD [facility] Cardiology [phone]          Future Appointments   Date Time Provider Mariel Becerra   2019 11:40 AM COUMADINPHIL SCHED   2019  9:45 AM PACEMAKER3,  [address]   2019 10:00 AM Mehul Evans  [address]   2019  2:20 PM COUMADIN,  [address]   2019  3:00 PM ANASTASIYAMO FT CHAIR 2 [facility]   7/15/2019  3:00 PM ANASTASIYAMO FT CHAIR 2 [facility]   8/5/2019  3:00 PM ANASTASIYAMO FT CHAIR 2 [facility]   8/23/2019 10:20 AM Shmuel Jolly  [address]   8/26/2019  3:00 PM [facility] FT CHAIR 2 [facility]

## 2019-06-05 NOTE — TELEPHONE ENCOUNTER
Verified patient with two types of identifiers. Patient's  called to state Dr. Vimal Pappas office to state her last remote device check showed increased fluid around her heart and lungs. He reports Dr. Vimal Pappas office will send us the report. Patient has appointment tomorrow with Dr. Luis Rothman.

## 2019-06-06 NOTE — PROGRESS NOTES
Cardiac Electrophysiology OFFICE Note     Subjective:      Ana Jacob is a 60 y.o. patient who is seen for evaluation of Medtronic BIV ICD  She was met in hospital  She and her  said she is gaining weight and is getting bumex 3 mg every day and go to infusion center  She also has been treated with antibiotic for dyspnea bronchitis  She has not had ICD shock or VT since April 15 2019  She stopped mexiletine due to nausea  NICM, NYHA III. Unable to afford Entresto, but on spironolactone, low dose carvedilol, bumex  Refused metolazone        Previous:  Nuclear stress test (03/07/2019): Hypertensive response to stress. LVEF 47%. No infarct visible.     Echo (03/07/2019): LVEF 41-45%, LV global hypokinesis. LA mod dilated. Mod MR. Trace TR. Severely elevated CVP (>15 mmHg).    Admit 03/2018 after VT, syncope, ICD shock. Started loading dose amiodarone 400 mg po tid. Prescribed mexiletine, which she discontinued after 14 days due to cost and then nausea     Medtronic biventricular ICD implant by Dr. Michaelle Carroll at [facility] 03/2018. Initial device previously placed for complete AVB, then last 3 devices have been ICD, biventricular ICD. Left chest pacer was infected & removed.     Family history of cardiomyopathy & sudden death.         Patient Active Problem List   Diagnosis Code    Essential hypertension I10    Obesity, morbid ([address] E66.01    Pacemaker Z95.0    Atrial fibrillation ([address] I48.91    Depression F32.9    Insomnia G47.00    Chronic anticoagulation Z79.01    DDD (degenerative disc disease), cervical M50.30    DDD (degenerative disc disease), thoracic M51.34    Long term prescription opiate use Z79.891    Incidental pulmonary nodule- 2mm RLL- repeat CT due in Feb 2019 R91.1    Acute hypokalemia E87.6    AICD discharge Z45.02    AICD (automatic cardioverter/defibrillator) present M65.090    Systolic CHF, chronic (HCC) I50.22     Current Outpatient Medications   Medication Sig Dispense Refill    prochlorperazine (COMPAZINE) 5 mg tablet Take 1 Tab by mouth every six (6) hours as needed for Nausea for up to 7 days. Indications: Nausea and Vomiting 30 Tab 3    bumetanide (BUMEX) 1 mg tablet Take 3 Tabs by mouth daily. 270 Tab 1    amiodarone (CORDARONE) 200 mg tablet Take 1 Tab by mouth daily. 90 Tab 3    lisinopril (PRINIVIL, ZESTRIL) 10 mg tablet Take 1 Tab by mouth daily. 30 Tab 5    warfarin (COUMADIN) 4 mg tablet Take 4 mg by mouth every Monday, Wednesday, Friday.  warfarin (COUMADIN) 3 mg tablet Take 3 mg by mouth every Tuesday, Thursday, Saturday & Sunday.  spironolactone (ALDACTONE) 50 mg tablet Take 1 Tab by mouth daily. 90 Tab 3    methocarbamol (ROBAXIN) 500 mg tablet TAKE 1 TABLET BY MOUTH THREE TIMES DAILY 90 Tab 1    HYDROcodone-acetaminophen (NORCO) 7.5-325 mg per tablet 1 Tab, EVERY 8 HOURS AS NEEDED FOR PAIN 90 Tab 0    eszopiclone (LUNESTA) 3 mg tablet Take 1 Tab by mouth nightly as needed for Sleep. Max Daily Amount: 3 mg. 30 Tab 5    potassium chloride (K-DUR, KLOR-CON) 20 mEq tablet Take 20 mEq by mouth daily.  pravastatin (PRAVACHOL) 80 mg tablet Take 80 mg by mouth nightly.  raNITIdine hcl 150 mg capsule Take 150 mg by mouth two (2) times a day.  carvedilol (COREG) 3.125 mg tablet Take 3.125 mg by mouth two (2) times daily (with meals). Allergies   Allergen Reactions    Augmentin [Amoxicillin-Pot Clavulanate] Nausea and Vomiting    Neosporin [Neomycin-Bacitracin-Polymyxin] Rash     Past Medical History:   Diagnosis Date    AICD (automatic cardioverter/defibrillator) present     AICD (automatic cardioverter/defibrillator) present     Atrial fibrillation (HCC)     Chronic anticoagulation     was on NOAC before. ?had clot on noac. now on warfarin.  Dr. Rebecca Gallegos monitors INR    DDD (degenerative disc disease), cervical 1/30/2018    DDD (degenerative disc disease), thoracic 1/30/2018    Depression     no h/o hospitalization  Essential hypertension 1/8/2018    Incidental pulmonary nodule 2mm, RLL- no follow up needed 2/16/2018    Noted on abdomen/pelvis CT Feb 2018. No smoking history. 1. No renal, ureteral, or bladder calculi. 2. No acute findings. 3. 2 mm pulmonary nodule right lower lobe. No follow-up needed if the patient is low risk. If the patient is high-risk, CT could be considered in 1 year.      Insomnia     Kidney infarction ([address] 2017    d/t clot per pt. recalls being on blood thinner.  Lymphedema of left arm     developed after infection of pacemaker site    Obesity, morbid ([address] 1/30/2018    Stomach flu 2015    hospital admit X2    Systolic CHF, chronic (HCC)     EF 45% normal caths and stress tests     Past Surgical History:   Procedure Laterality Date    HX BREAST BIOPSY Right 2010    Benign    HX CHOLECYSTECTOMY  1984    HX HEART CATHETERIZATION  2006    d/t abnl stress test, per pat, test was normal    HX HEART CATHETERIZATION  ~2009, 2012    per pt normal.     HX HYSTERECTOMY  2012    d/t heavy bleeding. ?endometriosis. +fibroid. per pt, no cancer.      HX PACEMAKER  2010, 2011, 2012, 3/29/2018    first 2010 (infected), replacement 2011 (lead not placed correctly), 2nd replacement 2012- pacemaker, defibrillator, 3rd replacement 2018     Family History   Problem Relation Age of Onset    Breast Cancer Maternal Aunt         late 35s    Uterine Cancer Maternal Aunt     Heart Disease Mother         sudden cardiac arrest    Heart Disease Father     Coronary Artery Disease Father     Colon Cancer Father         76s    Diabetes Father     COPD Sister     Cancer Brother 43        pancreatic    Uterine Cancer Maternal Grandmother     Other Maternal Grandfather         mva    Heart Disease Paternal Grandmother     Heart Disease Paternal Grandfather     Heart Disease Other     Diabetes Other     Other Sister         sudden cardiac arrest    Heart Attack Sister     No Known Problems Sister    [facility] Heart Disease Brother     Kidney Disease Brother     Other Brother         mva    No Known Problems Brother     Heart Attack Paternal Uncle 36    Other Paternal Aunt         aneurysm    Celiac Disease Son     Seizures Daughter     Other Daughter 22        MVA     Social History     Tobacco Use    Smoking status: Never Smoker    Smokeless tobacco: Never Used   Substance Use Topics    Alcohol use: No        Review of Systems:   Constitutional: Negative for fever, chills, weight loss, + malaise/fatigue. HEENT: Negative for nosebleeds, vision changes. Respiratory: Negative for cough, hemoptysis  Cardiovascular: Negative for chest pain, palpitations, orthopnea, claudication, + leg swelling, no syncope, and PND. Gastrointestinal: +  for nausea, no vomiting, diarrhea, blood in stool and melena. Genitourinary: Negative for dysuria, and hematuria. Musculoskeletal: Negative for myalgias, arthralgia. Skin: Negative for rash. Heme: Does not bleed or bruise easily. Neurological: Negative for speech change and focal weakness     Objective:     Visit Vitals  /84 (BP 1 Location: Left arm, BP Patient Position: Sitting)   Pulse 61   Resp 14   Ht 5' 5\" (1.651 m)   Wt 282 lb (127.9 kg)   SpO2 91%   BMI 46.93 kg/m²      Physical Exam:   Constitutional: well-developed and well-nourished. No respiratory distress. Head: Normocephalic and atraumatic. Eyes: Pupils are equal, round  ENT: hearing normal  Neck: supple. No JVD present. Cardiovascular: Normal rate, regular rhythm. Exam reveals no gallop and no friction rub. No murmur heard. Pulmonary/Chest: Effort normal and breath sounds normal. No wheezes. Abdominal: Soft, no tenderness. obese  Musculoskeletal: 3+ leg edema. Neurological: alert,oriented. Skin: Skin is warm and dry  Psychiatric: normal mood and affect. Behavior is normal. Judgment and thought content normal.         Assessment/Plan:       ICD-10-CM ICD-9-CM    1.  NICM (nonischemic cardiomyopathy) ([facility] I42.8 425.4    2. CKD (chronic kidney disease) stage 3, GFR 30-59 ml/min ([facility]) N18.3 585.3    3. Chronic systolic congestive heart failure ([facility]) I50.22 428.22      428.0    4. Obesity, morbid ([facility] E66.01 278.01    5. Atrial fibrillation, unspecified type ([facility] I48.91 427.31    6. Biventricular ICD (implantable cardioverter-defibrillator) in place Z95.810 V45.02        reviewed medications and side effects in detail   She is refusing metolazone  She wants to continue with infusion center and take bumex PO  She will continue amiodarone daily   No ICD shock or VT since April 15  She will not take mexiletine due to nausea and cost  She wants zofran for nausea but I recommended compazine as she is on amiodarone  Future Appointments   Date Time Provider Mariel Becerra   6/12/2019  2:20 PM COUMADIN, [address]   6/24/2019  3:00 PM BREMO FT CHAIR 2 [facility]   7/15/2019  3:00 PM BREMO FT CHAIR 2 [facility]   8/5/2019  3:00 PM BREMO FT CHAIR 2 [facility]   8/23/2019 10:20 AM Idalia Ames  [address]   8/26/2019  3:00 PM BREMO FT CHAIR 2 [facility]   9/11/2019  3:30 PM REMOTE1, [address]   12/16/2019 11:45 AM REMOTE1, [address]   3/25/2020  9:00 AM REMOTE1, [address]   7/2/2020  9:45 AM PACEMAKER3, [address]   7/2/2020 10:00 AM Leonardo Silverio  [address]         Thank you for involving me in this patient's care and please call with further concerns or questions. Jameel Pinedo M.D.   Electrophysiology/Cardiology  [facility]  [address] [place] [address]                             [phone]

## 2019-06-10 NOTE — PROGRESS NOTES
Subjective: Three week follow up of diastolic dysfunction, venous insufficiency, lymphedema. Patient with an EF of 45%, CHF, significant weight gain continues, but relatively low BNP, so I think this is more venous insufficiency. I admitted her for IV diuretics earlier this year, weight went from 296 on 04/09/19 and she was discharged at 273. She was not taking Entresto due to cost and she had hypokalemia leading to her defibrillator firing. She was restarted on Mexiletine for one month and continued Amiodarone. We had increased her Furosemide and Lisinopril and continued her on Warfarin for a-fib. She has had continuing difficulties ambulating. She has severe edema. We changed the Lasix to Bumex 3 mg daily. We have now felt she is ready for [facility] infusions. She reports that her weight continues to be problematic. She continues to feel short of breath and symptomatic. She notes continued difficulty with ambulation, is in a wheelchair. She denies overt chest pain, chest pressure with exertion, but at rest does have occasional pain. She has some sense of mild palpitations. Impression/Plan: 1. Significant edema, venous insufficiency, systolic heart failure, EF 45%. 2. Chronic CHF, not responding to outpatient diuretics with Lasix, Bumex and occasional Metolazone. We have to be careful about hypokalemia, continuing to monitor potassium level. Is NYHA3, on Lisinopril and Coreg and cannot afford Entresto. Chronic CHF, will now go to [facility] for IV infusions. 3. AICD shock due to hypokalemia. Potentially now potassium is normal.  Will follow closely for completion. 4. Ventricular fibrillation. Continue Warfarin. 5. CKD3. Monitoring renal function. 6. Hypertension, labile with the following changes. 7. Moderate MR, stable. 8. Hypercoagulable state. Continue Warfarin.

## 2019-06-10 NOTE — PROGRESS NOTES
Kidney fx and K are stable  Keep taking K   8/23/2019  10:20 AM   MD Lucita ClayAbbott [facility] Hospital[facility]

## 2019-06-12 NOTE — PROGRESS NOTES
A full discussion of the nature of anticoagulants has been carried out. A benefit risk analysis has been presented to the patient, so that they understand the justification for choosing anticoagulation at this time. The need for frequent and regular monitoring, precise dosage adjustment and compliance is stressed. Side effects of potential bleeding are discussed. The patient should avoid any OTC items containing aspirin or ibuprofen, and should avoid great swings in general diet. Avoid alcohol consumption. Call if any signs of abnormal bleeding. Next PT/INR test in 1 week. Patient has been taking 1/2 dose while on antibiotic. Has been off antibiotic for a couple days but continued to take 1/2 dose. She reports previously therapeutic before becoming ill. Will resume prior dosing and see where levels are at next week.   Patient verbalized understanding      Anticoagulation Summary  As of 2019    INR goal:   2.0-3.0   TTR:   88.3 % (1.3 wk)   INR used for dosin.6 (2019)   Warfarin maintenance plan:   4 mg (4 mg x 1) every Mon, Wed, Fri; 3 mg (3 mg x 1) all other days   Weekly warfarin total:   24 mg   Plan last modified:   Quinton Ortiz RN (2019)   Next INR check:   2019   Target end date:       Indications    Atrial fibrillation ([address] [I48.91]  Chronic anticoagulation [Z79.01]             Anticoagulation Episode Summary     INR check location:       Preferred lab:       Send INR reminders to:       Comments:         Anticoagulation Care Providers     Provider Role Specialty Phone number    Kanwal Das MD Responsible Cardiology [phone]          Future Appointments   Date Time Provider Mariel Becerra   2019  1:20 PM COUMADIN,  [address]   2019  3:00 PM BREMO FT CHAIR 2 [facility]   7/15/2019  3:00 PM BREMO FT CHAIR 2 [facility]   2019  3:00 PM BREMO FT CHAIR 2 [facility]   2019 10:20 AM Shmuel Jolly  [address]   8/26/2019  3:00 PM LAURY  CHAIR 2 [facility]   9/11/2019  3:30 PM REMOTE1, [address]   12/16/2019 11:45 AM REMOTE1, [address]   3/25/2020  9:00 AM REMOTE1, PHIL WALLACE   7/2/2020  9:45 AM PACEMAKER3, [address]   7/2/2020 10:00 AM Caitlin Quinteros  [address] [facility]

## 2019-06-13 NOTE — TELEPHONE ENCOUNTER
Attempted to reach patient by telephone.  A message was left for return call.     ----- Message from Steffany Diehl MD sent at 6/10/2019 10:37 AM EDT -----  Kidney fx and K are stable  Keep taking K   8/23/2019  10:20 AM   Christina Lerner MD       [place] [address]

## 2019-06-19 NOTE — PROGRESS NOTES
A full discussion of the nature of anticoagulants has been carried out. A benefit risk analysis has been presented to the patient, so that they understand the justification for choosing anticoagulation at this time. The need for frequent and regular monitoring, precise dosage adjustment and compliance is stressed. Side effects of potential bleeding are discussed. The patient should avoid any OTC items containing aspirin or ibuprofen, and should avoid great swings in general diet. Avoid alcohol consumption. Call if any signs of abnormal bleeding. Next PT/INR test in 1 week. Instructed to hold dose tonight then decrease dose to 3 mg daily. Written and verbal instructions given to patient. Patient verbalized understanding    TWD decreased 12.5%    Requested Prescriptions     Signed Prescriptions Disp Refills    warfarin (COUMADIN) 3 mg tablet 90 Tab 0     Sig: Take 1 Tab by mouth daily.  1 tab daily or as directed by coumadin clinic       Last office visit 19    Per Dr. Rommel Booth   Date Time Provider Mariel Becerra   2019  3:00 PM BREMO FT CHAIR 2 [facility]   2019  1:20 PM COUMADIN,  [address]   7/15/2019  3:00 PM BREMO FT CHAIR 2 [facility]   2019  3:00 PM BREMO FT CHAIR 2 [facility]   2019 10:20 AM Shmuel Jolly  [address]   2019  3:00 PM BREMO FT CHAIR 2 [facility]   2019  3:30 PM REMOTE1, [address]   2019 11:45 AM REMOTE1,  [address]   3/25/2020  9:00 AM REMOTE1, VILLARREAL CAVREY DONELL SCHED   2020  9:45 AM PACEMAKER3,  [address]   2020 10:00 AM Sola Miller  [address]         Anticoagulation Summary  As of 2019    INR goal:   2.0-3.0   TTR:   60.0 % (2.3 wk)   INR used for dosin.5! (2019)   Warfarin maintenance plan:   3 mg (3 mg x 1) every day   Weekly warfarin total:   21 mg   Plan last modified:   Jamey Gonzalez RN (6/19/2019)   Next INR check:   6/27/2019   Target end date:       Indications    Atrial fibrillation ([address] [I48.91]  Chronic anticoagulation [Z79.01]             Anticoagulation Episode Summary     INR check location:       Preferred lab:       Send INR reminders to:       Comments:         Anticoagulation Care Providers     Provider Role Specialty Phone number    Earle Quinteros MD [facility] Cardiology [phone]          Future Appointments   Date Time Provider Mariel Becerra   6/24/2019  3:00 PM BREMO FT CHAIR 2 [facility]   6/27/2019  1:20 PM COUMADIN, [address]   7/15/2019  3:00 PM BREMO FT CHAIR 2 [facility]   8/5/2019  3:00 PM BREMO FT CHAIR 2 [facility]   8/23/2019 10:20 AM Shmuel Jolly  [address]   8/26/2019  3:00 PM BREMO FT CHAIR 2 [facility]   9/11/2019  3:30 PM REMOTE1, [address]   12/16/2019 11:45 AM REMOTE1, [address]   3/25/2020  9:00 AM REMOTE1, VILL[facility] CAVREY DONELL SCHED   7/2/2020  9:45 AM PACEMAKER3, [address]   7/2/2020 10:00 AM Fred Reilly  [address]

## 2019-06-24 NOTE — PROGRESS NOTES
[facility] Note:  1500Pt arrived at [facility] in wheelchair and in no distress for lab and push. Assessment stable, no new complaints voiced. Medications received:  Bumex 1 mg ivp    1600 Tolerated treatment well, no adverse reaction noted. D/Cd from [facility] ambulatory and in no distress accompanied by spouse.   Next appt 7/15  1500  Visit Vitals  /53   Pulse (!) 59   Temp 97.6 °F (36.4 °C)   Resp 18     Recent Results (from the past 12 hour(s))   METABOLIC PANEL, BASIC    Collection Time: 06/24/19  3:29 PM   Result Value Ref Range    Sodium 138 136 - 145 mmol/L    Potassium 3.9 3.5 - 5.1 mmol/L    Chloride 100 97 - 108 mmol/L    CO2 32 21 - 32 mmol/L    Anion gap 6 5 - 15 mmol/L    Glucose 101 (H) 65 - 100 mg/dL    BUN 20 6 - 20 MG/DL    Creatinine 1.43 (H) 0.55 - 1.02 MG/DL    BUN/Creatinine ratio 14 12 - 20      GFR est AA 45 (L) >60 ml/min/1.73m2    GFR est non-AA 37 (L) >60 ml/min/1.73m2    Calcium 9.1 8.5 - 10.1 MG/DL

## 2019-06-24 NOTE — TELEPHONE ENCOUNTER
Two patient identifiers verified. Per MD patient called and given results of labs. Confirmed follow up appointment. Patient verbalized understanding and denies any further questions or concerns at this time.

## 2019-06-27 NOTE — PROGRESS NOTES
A full discussion of the nature of anticoagulants has been carried out. A benefit risk analysis has been presented to the patient, so that they understand the justification for choosing anticoagulation at this time. The need for frequent and regular monitoring, precise dosage adjustment and compliance is stressed. Side effects of potential bleeding are discussed. The patient should avoid any OTC items containing aspirin or ibuprofen, and should avoid great swings in general diet. Avoid alcohol consumption. Call if any signs of abnormal bleeding. Next PT/INR test in 1 week. Instructed to decrease dose to 1 tab daily except Mon/Thurs take 1/2 tab. Written and verbal instructions given to patient.    Patient verbalized understanding    TWD decreased 14.3%     Anticoagulation Summary  As of 6/27/2019    INR goal:   2.0-3.0   TTR:   40.5 % (3.4 wk)   INR used for dosing:   3.6! (6/27/2019)   Warfarin maintenance plan:   1.5 mg (3 mg x 0.5) every Mon, Thu; 3 mg (3 mg x 1) all other days   Weekly warfarin total:   18 mg   Plan last modified:   Quinton Ortiz RN (6/27/2019)   Next INR check:   7/8/2019   Target end date:       Indications    Atrial fibrillation ([address] [I48.91]  Chronic anticoagulation [Z79.01]             Anticoagulation Episode Summary     INR check location:       Preferred lab:       Send INR reminders to:       Comments:         Anticoagulation Care Providers     Provider Role Specialty Phone number    Kanwal Das MD [facility] Cardiology [phone]          Future Appointments   Date Time Provider Mariel Becerra   7/8/2019  2:00 PM COUMADIN, [address] [facility]   7/15/2019  3:00 PM [facility]   8/5/2019  3:00 PM BREMO FT CHAIR 2 [facility]   8/23/2019 10:20 AM Shmuel Jolly  [address]   8/26/2019  3:00 PM BREMO FT CHAIR 2 [facility]   9/11/2019  3:30 PM REMOTE1, [address]   12/16/2019 11:45 AM [facility] [address] [facility]   3/25/2020  9:00 AM REMOTE1, [facility]   7/2/2020  9:45 AM PACEMAKER3, [address] [facility]   7/2/2020 10:00 AM Gino Rob  [address]

## 2019-07-08 NOTE — PROGRESS NOTES
A full discussion of the nature of anticoagulants has been carried out. A benefit risk analysis has been presented to the patient, so that they understand the justification for choosing anticoagulation at this time. The need for frequent and regular monitoring, precise dosage adjustment and compliance is stressed. Side effects of potential bleeding are discussed. The patient should avoid any OTC items containing aspirin or ibuprofen, and should avoid great swings in general diet. Avoid alcohol consumption. Call if any signs of abnormal bleeding. Next PT/INR test in 1 week. Patients level remains elevated despite decrease in dosing regimen. Instructed to decrease dose to 1.5 mg on M/W/F and 3 mg all other days. Written and verbal instructions given to patient.    Patient verbalized understanding    TWD decreased 8.3%    Anticoagulation Summary  As of 7/8/2019    INR goal:   2.0-3.0   TTR:   27.9 % (1.2 mo)   INR used for dosing:   3.6! (7/8/2019)   Warfarin maintenance plan:   1.5 mg (3 mg x 0.5) every Mon, Wed, Fri; 3 mg (3 mg x 1) all other days   Weekly warfarin total:   16.5 mg   Plan last modified:   Dain Erickson RN (7/8/2019)   Next INR check:   7/17/2019   Target end date:       Indications    Atrial fibrillation ([facility] [I48.91]  Chronic anticoagulation [Z79.01]             Anticoagulation Episode Summary     INR check location:       Preferred lab:       Send INR reminders to:       Comments:         Anticoagulation Care Providers     Provider Role Specialty Phone number    Aram Medina MD [facility] Cardiology [phone]          Future Appointments   Date Time Provider Mariel Becerra   7/8/2019  2:00 PM COUMADIN, [address]   7/15/2019  3:00 PM [facility]   7/17/2019  1:20 PM COUMADIN, [address]   8/5/2019  3:00 PM BREMO FT CHAIR 2 [address]   8/23/2019 10:20 AM Aram Medina  [address]   8/26/2019 3:00 PM LAURY  CHAIR 2 [facility]   9/11/2019  3:30 PM REMOTE1, [address]   12/16/2019 11:45 AM REMOTE1, [address]   3/25/2020  9:00 AM [facility]   7/2/2020  9:45 AM PACEMAKER3, [address]   7/2/2020 10:00 AM Ángela Mayes  [address]

## 2019-07-12 NOTE — PROGRESS NOTES
A full discussion of the nature of anticoagulants has been carried out. A benefit risk analysis has been presented to the patient, so that they understand the justification for choosing anticoagulation at this time. The need for frequent and regular monitoring, precise dosage adjustment and compliance is stressed. Side effects of potential bleeding are discussed. The patient should avoid any OTC items containing aspirin or ibuprofen, and should avoid great swings in general diet. Avoid alcohol consumption. Call if any signs of abnormal bleeding. Next PT/INR test in 5 days. Patient was placed on antibiotic this Monday (Keflex). Instructed to take 1/2 tab while on remainder of antibiotic. Recheck levels next week.     Patient verbalized understanding      Anticoagulation Summary  As of 7/12/2019    INR goal:   2.0-3.0   TTR:   25.2 % (1.3 mo)   INR used for dosing:   3.8! (7/12/2019)   Warfarin maintenance plan:   1.5 mg (3 mg x 0.5) every Mon, Wed, Fri; 3 mg (3 mg x 1) all other days   Weekly warfarin total:   16.5 mg   Plan last modified:   Blanca Calzada RN (7/8/2019)   Next INR check:   7/17/2019   Target end date:       Indications    Atrial fibrillation ([address] [I48.91]  Chronic anticoagulation [Z79.01]             Anticoagulation Episode Summary     INR check location:       Preferred lab:       Send INR reminders to:       Comments:         Anticoagulation Care Providers     Provider Role Specialty Phone number    Raine Georges MD [facility] Cardiology [phone]          Future Appointments   Date Time Provider Mariel Becerra   7/15/2019  3:00 PM [facility]   7/17/2019  1:20 PM COUMADIN, [address]   8/5/2019  3:00 PM BREMO FT CHAIR 2 [facility]   8/23/2019 10:20 AM Shmuel Jolly  [address]   8/26/2019  3:00 PM [facility]   9/11/2019  3:30 PM REMOTE1, [address]   12/16/2019 11:45 AM REMOTE1, [address]   3/25/2020  9:00 AM REMOTE1, PHIL MARLEY [facility]   7/2/2020  9:45 AM PACEMAKER3, [address] [facility]   7/2/2020 10:00 AM Franco Rangel  [address]

## 2019-07-18 NOTE — PROGRESS NOTES
A full discussion of the nature of anticoagulants has been carried out. A benefit risk analysis has been presented to the patient, so that they understand the justification for choosing anticoagulation at this time. The need for frequent and regular monitoring, precise dosage adjustment and compliance is stressed. Side effects of potential bleeding are discussed. The patient should avoid any OTC items containing aspirin or ibuprofen, and should avoid great swings in general diet. Avoid alcohol consumption. Call if any signs of abnormal bleeding. Next PT/INR test in 1 week. Patient requesting Wed appt. Patient just completed antibiotic. Instructed to resume prior dosing of 3 mg daily except M/W/F take 1/2 tab (1.5 mg) and will see where levels are at next check and adjust as needed. Written and verbal instructions given to patient.    Patient verbalized understanding      Anticoagulation Summary  As of 2019    INR goal:   2.0-3.0   TTR:   28.5 % (1.5 mo)   INR used for dosin.2 (2019)   Warfarin maintenance plan:   1.5 mg (3 mg x 0.5) every Mon, Wed, Fri; 3 mg (3 mg x 1) all other days   Weekly warfarin total:   16.5 mg   Plan last modified:   Emperatriz Mena RN (2019)   Next INR check:   2019   Target end date:       Indications    Atrial fibrillation ([facility] [I48.91]  Chronic anticoagulation [Z79.01]             Anticoagulation Episode Summary     INR check location:       Preferred lab:       Send INR reminders to:       Comments:         Anticoagulation Care Providers     Provider Role Specialty Phone number    Farzad Renae MD [facility] Cardiology [phone]          Future Appointments   Date Time Provider Mariel Becerra   2019  9:40 AM COUMADIN, [place] [address]   2019  3:00 PM COUMADIN, [address]   2019  [facility]   2019 10:20 AM Farzad Renae  [address]   2019 3:00 PM LAURY  CHAIR 2 [facility]   9/11/2019  3:30 PM REMOTE1, [address]   12/16/2019 11:45 AM REMOTE1, [address]   3/25/2020  9:00 AM [facility]   7/2/2020  9:45 AM PACEMAKER3, [address]   7/2/2020 10:00 AM Erica Davenport  [address]

## 2019-07-31 NOTE — PROGRESS NOTES
A full discussion of the nature of anticoagulants has been carried out. A benefit risk analysis has been presented to the patient, so that they understand the justification for choosing anticoagulation at this time. The need for frequent and regular monitoring, precise dosage adjustment and compliance is stressed. Side effects of potential bleeding are discussed. The patient should avoid any OTC items containing aspirin or ibuprofen, and should avoid great swings in general diet. Avoid alcohol consumption. Call if any signs of abnormal bleeding. Next PT/INR test in 1 month with Dr. Naga Monet appt. Continue current dosing regimen.    Patient verbalized understanding      Anticoagulation Summary  As of 2019    INR goal:   2.0-3.0   TTR:   44.6 % (1.9 mo)   INR used for dosin.7 (2019)   Warfarin maintenance plan:   1.5 mg (3 mg x 0.5) every Mon, Wed, Fri; 3 mg (3 mg x 1) all other days   Weekly warfarin total:   16.5 mg   Plan last modified:   Gino Bonilla RN (2019)   Next INR check:   2019   Target end date:       Indications    Atrial fibrillation ([facility] [address] [I48.91]  Chronic anticoagulation [Z79.01]             Anticoagulation Episode Summary     INR check location:       Preferred lab:       Send INR reminders to:       Comments:         Anticoagulation Care Providers     Provider Role Specialty Phone number    Francine Bain MD [facility] Cardiology [phone]          Future Appointments   Date Time Provider Mariel Becerra   2019  3:00 PM COUMADIN,  [address]   2019  3:00 PM [facility]   2019 10:20 AM Francine Bain  [address]   2019  3:00 PM [facility]   2019  3:30 PM REMOTE1 [place] [address]   2019 11:45 AM REMOTE1,  [address]   3/25/2020  9:00 AM REMOTE1,  [address]   2020  9:45 AM Sravanthi [place] [address] 7/2/2020 10:00 AM Mazin Morris  [address]

## 2019-08-05 NOTE — PROGRESS NOTES
[facility] Outpatient Infusion Center Note:    4410 Pt arrived at [facility] in wheelchair and in no distress for lab and push. Assessment stable, no new complaints voiced. Medications received:  Bumex 1 mg ivp    1505 Tolerated treatment well, no adverse reaction noted. D/Cd from [facility] ambulatory and in no distress accompanied by spouse. Next appt 8/25/19. Visit Vitals  /64   Pulse (!) 59   Temp 98.5 °F (36.9 °C)   Resp 20   SpO2 93%   Breastfeeding?  No     Recent Results (from the past 12 hour(s))   METABOLIC PANEL, BASIC    Collection Time: 08/05/19  2:54 PM   Result Value Ref Range    Sodium 136 136 - 145 mmol/L    Potassium 4.1 3.5 - 5.1 mmol/L    Chloride 101 97 - 108 mmol/L    CO2 31 21 - 32 mmol/L    Anion gap 4 (L) 5 - 15 mmol/L    Glucose 110 (H) 65 - 100 mg/dL    BUN 20 6 - 20 MG/DL    Creatinine 1.48 (H) 0.55 - 1.02 MG/DL    BUN/Creatinine ratio 14 12 - 20      GFR est AA 44 (L) >60 ml/min/1.73m2    GFR est non-AA 36 (L) >60 ml/min/1.73m2    Calcium 8.8 8.5 - 10.1 MG/DL

## 2019-08-23 PROBLEM — I89.0 LYMPHEDEMA: Status: ACTIVE | Noted: 2019-01-01

## 2019-08-23 NOTE — PROGRESS NOTES
Sherita Mota     1960       Vipal K. Hendricks Epley MD, [facility]  Date of Visit-8/23/2019   PCP is Brett Bhat MD   [facility] [place]  [facility]  HPI:  Joseph Bacon is a 60 y.o. female   Pt is here today for a 3 month f/u of systolic CHF, afib, and HTN. Has had frequent CP with multiple caths showing no sign coronary disease. Now we have her going to out-patient infusion center. She notes SOB and overall swelling. Yoselin Remedies has been expensive. Overall the pt states she is feeling \"lousy\". She has been going to the out-patient infusion center for 3 weeks and has not seen improvement. She notes continued difficulty breathing at rest and on exertion. Pt presents in a wheelchair. She is now seeing Dr. Troy Cortes III for PCP and has been having trouble with her thyroid per recent labs. Denies chest pain, syncope  Has no tachycardia , palpitations or sense of arrythmia     Assessment/Plan:     1. Lymphedema  I think this is much more lymphedema than heart fail. Her previous BNP was not that high. She continues with generalized swelling but has no rales. Dr. Vignesh Adamson is now taking over and working on her overnight oximetry. If there is an ability to get her to lymphedema clinic, I think that would be helpful, especially for leg wraps. If her thyroid is abnormal, that may also help, but this is probably more metabolic. 2. Systolic CHF, chronic ([address]  As above. I think this is as much lymphedema as CHF. Her last EF was 40-45% in March and 47% by nuclear at the same time. Her right ventricle was not well seen but the pressures were high in the right side. This is consistent with volume overload. Her BNP despite all this in May was only 34, so I won't make any changes today. 3. Atrial fibrillation, unspecified type [facility])  She has a pacemaker, will need to review previous pacer checks. Continue anti-coag.     4. AICD discharge  Previously with firing of device, was on Amiodarone and Mexiltene. Now just on Amiodarone. 5. Chronic anticoagulation  On Warfarin. 6. Essential hypertension     F/u in 4 months. Refer to lymphedema clinic. Refill meds. Future Appointments   Date Time Provider Mariel Becerra   8/26/2019  3:00 PM BREMO FT CHAIR 2 [facility]   9/3/2019  1:40 PM COUMADIN, PHIL HDZ DONELL SCHED   9/11/2019  3:30 PM REMOTE1, [address]   9/16/2019  3:00 PM BREMO FT CHAIR 2 [facility]   10/7/2019  3:00 PM BREMO FT CHAIR [facility]   10/28/2019  3:00 PM BREMO FT CHAIR 2 [facility]   12/16/2019 11:45 AM REMOTE1, PHIL HDZ DONELL SCHED   12/18/2019  1:40 PM Mayda Pratt  [address]   3/25/2020  9:00 AM REMOTE1, PHIL HDZ DONELL SCHED   7/2/2020  9:45 AM PACEMAKER3, [address]   7/2/2020 10:00 AM Italia Solitario  [address]      Patient Instructions   A referral has been sent to the [facility], please call them at [phone] to schedule an appointment. Key CAD CHF Meds             warfarin (COUMADIN) 3 mg tablet Take 1 Tab by mouth daily. 1 tab daily or as directed by coumadin clinic    bumetanide (BUMEX) 1 mg tablet Take 3 Tabs by mouth daily. amiodarone (CORDARONE) 200 mg tablet Take 1 Tab by mouth daily. lisinopril (PRINIVIL, ZESTRIL) 10 mg tablet Take 1 Tab by mouth daily. warfarin (COUMADIN) 4 mg tablet Take 4 mg by mouth every Monday, Wednesday, Friday. Not taking    spironolactone (ALDACTONE) 50 mg tablet Take 1 Tab by mouth daily. pravastatin (PRAVACHOL) 80 mg tablet Take 80 mg by mouth nightly. carvedilol (COREG) 3.125 mg tablet Take 3.125 mg by mouth two (2) times daily (with meals). Impression:   1. Lymphedema    2. Systolic CHF, chronic ([facility]    3. Atrial fibrillation, unspecified type ([facility] [address]    4. AICD discharge    5. Chronic anticoagulation    6.  Essential hypertension       Cardiac History:   No specialty comments available. ROS-except as noted above. . A complete cardiac and respiratory are reviewed and negative except as above ; Resp-denies wheezing  or productive cough,. Const- No unusual weight loss or fever; Neuro-no recent seizure or CVA ; GI- No BRBPR, abdom pain, bloating ; - no  hematuria   see supplement sheet, initialed and to be scanned by staff  Past Medical History:   Diagnosis Date    AICD (automatic cardioverter/defibrillator) present     AICD (automatic cardioverter/defibrillator) present     Atrial fibrillation ([address]     Chronic anticoagulation     was on NOAC before. ?had clot on noac. now on warfarin. Dr. Debora Church monitors INR    DDD (degenerative disc disease), cervical 1/30/2018    DDD (degenerative disc disease), thoracic 1/30/2018    Depression     no h/o hospitalization    Essential hypertension 1/8/2018    Incidental pulmonary nodule 2mm, RLL- no follow up needed 2/16/2018    Noted on abdomen/pelvis CT Feb 2018. No smoking history. 1. No renal, ureteral, or bladder calculi. 2. No acute findings. 3. 2 mm pulmonary nodule right lower lobe. No follow-up needed if the patient is low risk. If the patient is high-risk, CT could be considered in 1 year.      Insomnia     Kidney infarction ([address] 2017    d/t clot per pt. recalls being on blood thinner. 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 She has never used smokeless tobacco. She reports that she does not drink alcohol or use drugs.      Exam and Labs:    Visit Vitals  /70 (BP 1 Location: Left arm, BP Patient Position: Sitting)   Pulse 61   Resp 16   Ht 5' 5\" (1.651 m)   Wt 296 lb 6.4 oz (134.4 kg)   SpO2 96%   BMI 49.32 kg/m²    @Constitutional:  NAD, comfortable  Head: NC,AT. Eyes: No scleral icterus. Neck:  Neck supple. No JVD present. Throat: moist mucous membranes. Chest: Effort normal & normal respiratory excursion . Neurological: alert, conversant and oriented . Skin: Skin is not cold. No obvious systemic rash noted. Not diaphoretic. No erythema. Psychiatric:  Grossly normal mood and affect. Behavior appears normal. Extremities:  no clubbing or cyanosis. Abdomen: non distended    Lungs:  breath sounds normal. No stridor. distress, wheezes or  Rales. Heart:  normal rate, regular rhythm, normal S1, S2, no murmurs, rubs, clicks or gallops, PMI non displaced. Edema:  Edema is 2+ pitting edema to the knees bilaterally. Lab Results   Component Value Date/Time    Cholesterol, total 179 01/18/2018 09:04 AM    HDL Cholesterol 42 01/18/2018 09:04 AM    LDL, calculated 101 (H) 01/18/2018 09:04 AM    Triglyceride 179 (H) 01/18/2018 09:04 AM     Lab Results   Component Value Date/Time    Sodium 136 08/05/2019 02:54 PM    Potassium 4.1 08/05/2019 02:54 PM    Chloride 101 08/05/2019 02:54 PM    CO2 31 08/05/2019 02:54 PM    Anion gap 4 (L) 08/05/2019 02:54 PM    Glucose 110 (H) 08/05/2019 02:54 PM    BUN 20 08/05/2019 02:54 PM    Creatinine 1.48 (H) 08/05/2019 02:54 PM    BUN/Creatinine ratio 14 08/05/2019 02:54 PM    GFR est AA 44 (L) 08/05/2019 02:54 PM    GFR est non-AA 36 (L) 08/05/2019 02:54 PM    Calcium 8.8 08/05/2019 02:54 PM      Wt Readings from Last 3 Encounters:   08/23/19 296 lb 6.4 oz (134.4 kg)   06/06/19 282 lb (127.9 kg)   05/24/19 288 lb 3.2 oz (130.7 kg)      BP Readings from Last 3 Encounters:   08/23/19 120/70   08/05/19 114/64   06/24/19 111/53      Current Outpatient Medications   Medication Sig    warfarin (COUMADIN) 3 mg tablet Take 1 Tab by mouth daily. 1 tab daily or as directed by coumadin clinic (Patient taking differently: Take 3 mg by mouth daily.  1 tab on Tues/Thurs/Sat and take 1/2 tab all other days or as directed by coumadin clinic)    bumetanide (BUMEX) 1 mg tablet Take 3 Tabs by mouth daily.  amiodarone (CORDARONE) 200 mg tablet Take 1 Tab by mouth daily.  lisinopril (PRINIVIL, ZESTRIL) 10 mg tablet Take 1 Tab by mouth daily.  warfarin (COUMADIN) 4 mg tablet Take 4 mg by mouth every Monday, Wednesday, Friday. Not taking    spironolactone (ALDACTONE) 50 mg tablet Take 1 Tab by mouth daily.  methocarbamol (ROBAXIN) 500 mg tablet TAKE 1 TABLET BY MOUTH THREE TIMES DAILY    HYDROcodone-acetaminophen (NORCO) 7.5-325 mg per tablet 1 Tab, EVERY 8 HOURS AS NEEDED FOR PAIN    eszopiclone (LUNESTA) 3 mg tablet Take 1 Tab by mouth nightly as needed for Sleep. Max Daily Amount: 3 mg.  potassium chloride (K-DUR, KLOR-CON) 20 mEq tablet Take 20 mEq by mouth daily.  pravastatin (PRAVACHOL) 80 mg tablet Take 80 mg by mouth nightly.  raNITIdine hcl 150 mg capsule Take 150 mg by mouth two (2) times a day.  carvedilol (COREG) 3.125 mg tablet Take 3.125 mg by mouth two (2) times daily (with meals). No current facility-administered medications for this visit. Facility-Administered Medications Ordered in Other Visits   Medication Dose Route Frequency    [START ON 8/26/2019] bumetanide (BUMEX) injection 1 mg  1 mg IntraVENous ONCE      Impression see above.      Written by Brittny Magaña, as dictated by Emily Martinez MD.

## 2019-08-23 NOTE — PATIENT INSTRUCTIONS
A referral has been sent to the [facility], please call them at [phone] to schedule an appointment.

## 2019-08-23 NOTE — PROGRESS NOTES
Visit Vitals  /70 (BP 1 Location: Left arm, BP Patient Position: Sitting)   Pulse 61   Resp 16   Ht 5' 5\" (1.651 m)   Wt 296 lb 6.4 oz (134.4 kg)   SpO2 96%   BMI 49.32 kg/m²

## 2019-08-23 NOTE — PROGRESS NOTES
A full discussion of the nature of anticoagulants has been carried out. A benefit risk analysis has been presented to the patient, so that they understand the justification for choosing anticoagulation at this time. The need for frequent and regular monitoring, precise dosage adjustment and compliance is stressed. Side effects of potential bleeding are discussed. The patient should avoid any OTC items containing aspirin or ibuprofen, and should avoid great swings in general diet. Avoid alcohol consumption. Call if any signs of abnormal bleeding. Next PT/INR test in 1 week. Patient requesting appt after Labor day. Patient reports she just started thyroid medication. Minor interaction with Warfarin as per Micromedex  Instructed to decrease dose to 1/2 tab daily except Tues/Thurs/Sat take 1 tab. Written and verbal instructions given to patient. Patient verbalized understanding    TWD decreased 9.1%    Anticoagulation Summary  As of 8/23/2019    INR goal:   2.0-3.0   TTR:   39.1 % (2.7 mo)   INR used for dosing:   3.9!  (8/23/2019)   Warfarin maintenance plan:   3 mg (3 mg x 1) every Tue, Thu, Sat; 1.5 mg (3 mg x 0.5) all other days   Weekly warfarin total:   15 mg   Plan last modified:   Ashvin Kim RN (8/23/2019)   Next INR check:      Target end date:       Indications    Atrial fibrillation ([facility] [I48.91]  Chronic anticoagulation [Z79.01]             Anticoagulation Episode Summary     INR check location:       Preferred lab:       Send INR reminders to:       Comments:         Anticoagulation Care Providers     Provider Role Specialty Phone number    Sharath Mclaughlin MD [facility] Cardiology [phone]          Future Appointments   Date Time Provider Mariel Becerra   8/23/2019 10:20 AM Sharath Mclaughlin  [address]   8/23/2019 10:20 AM COUMADIN, [address]   8/26/2019  3:00 PM [facility]   9/3/2019  1:40 PM PHIL SEVERINO DONELL SCHED   9/11/2019  3:30 PM REMOTE1, [address]   9/16/2019  3:00 PM [facility]   10/7/2019  3:00 PM BREMO FT CHAIR 2 [facility]   10/28/2019  3:00 PM [facility]   12/16/2019 11:45 AM REMOTE1, [address]   3/25/2020  9:00 AM REMOTE1, PHIL WILKESENA SCHED   7/2/2020  9:45 AM PACEMAKER3, [address]   7/2/2020 10:00 AM Italia Solitario  [address]

## 2019-08-23 NOTE — TELEPHONE ENCOUNTER
Request for bumex 3mg daily, spironolactone 50mg diaily and lisinopril 10mg daily. Last office visit today, next office visit 12-18-19.  Refills per verbal order from Dr. Tanner Mancera

## 2019-08-26 NOTE — PROGRESS NOTES
Outpatient Infusion Center Short Visit Progress Note    8211 Pt admit to [place] for Bumex/labs ambulatory in stable condition. Assessment completed. No new concerns voiced, PIV started in left [facility], labs drawn and sent for processing. Patient Vitals for the past 12 hrs:   Temp Pulse Resp BP   08/26/19 1452 98.2 °F (36.8 °C) (!) 58 18 130/74       Medications:  Medications Administered     bumetanide (BUMEX) injection 1 mg     Admin Date  08/26/2019 Action  Given Dose  1 mg Route  IntraVENous Administered By  Alexandro JIN                1510 Pt tolerated treatment well, PIV flushed and removed, D/c home ambulatory in no distress. Pt aware of next appointment scheduled for 9/16/19.

## 2019-09-01 NOTE — PROGRESS NOTES
Labs note some change in renal fx, so no more increase in diuretic, but that number is adequate for now, K and Na are fine  Continue lymphedema therapy

## 2019-09-03 NOTE — PROGRESS NOTES
A full discussion of the nature of anticoagulants has been carried out. A benefit risk analysis has been presented to the patient, so that they understand the justification for choosing anticoagulation at this time. The need for frequent and regular monitoring, precise dosage adjustment and compliance is stressed. Side effects of potential bleeding are discussed. The patient should avoid any OTC items containing aspirin or ibuprofen, and should avoid great swings in general diet. Avoid alcohol consumption. Call if any signs of abnormal bleeding. Next PT/INR test in 1 week. Levels remain elevated. Instructed to decrease dose to 1/2 tab daily except Wed/Sat take 1 tab. Written and verbal instructions given to patient.    Patient verbalized understanding    TWD decreased 10%    Anticoagulation Summary  As of 9/3/2019    INR goal:   2.0-3.0   TTR:   34.4 % (3.1 mo)   INR used for dosing:   3.6! (9/3/2019)   Warfarin maintenance plan:   3 mg (3 mg x 1) every Wed, Sat; 1.5 mg (3 mg x 0.5) all other days   Weekly warfarin total:   13.5 mg   Plan last modified:   Joan Junior RN (9/3/2019)   Next INR check:   9/11/2019   Target end date:       Indications    Atrial fibrillation ([address] [I48.91]  Chronic anticoagulation [Z79.01]             Anticoagulation Episode Summary     INR check location:       Preferred lab:       Send INR reminders to:       Comments:         Anticoagulation Care Providers     Provider Role Specialty Phone number    Xena Avila MD Responsible Cardiology [phone]          Future Appointments   Date Time Provider Mariel Becerra   9/3/2019  1:40 PM COUMADIN, [address]   9/11/2019  2:20 PM COUMADIN, [address]   9/11/2019  3:30 PM REMOTE1, [address]   9/16/2019  3:00 PM BREMO FT CHAIR 2 [facility]   10/7/2019  3:00 PM BREMO FT CHAIR 2 [facility]   10/28/2019  3:00 PM BREMO FT CHAIR 2 [facility] 12/16/2019 11:45 AM REMOTE1, PHIL MARLEY SCHED   12/18/2019  1:40 PM Brianna Alcala  [address]   3/25/2020  9:00 AM REMOTE1, PHIL MARLEY SCHED   7/2/2020  9:45 AM PACEMAKER3, [address]   7/2/2020 10:00 AM Deisy Graves  [address]

## 2019-09-04 NOTE — TELEPHONE ENCOUNTER
Pt's  called stating he need to speak with a nurse about a ref to a lipedema clinic. He can be reached at [phone].     [employer]

## 2019-09-05 NOTE — TELEPHONE ENCOUNTER
Patient's , Anna Calabrese, is calling again stating that he would like a call at your earliest convenience to discuss the patient's condition. Please advise.     Phone #: [phone]  Thanks

## 2019-09-06 NOTE — TELEPHONE ENCOUNTER
Verified patient with two types of identifiers. Patient's  returned my call. He states his wife's swelling has worsened and she cannot walk and is crying daily. He states [address] [place] [address] has a one year wait and [facility] is 12 weeks.  Will notify MD.

## 2019-09-07 NOTE — TELEPHONE ENCOUNTER
Called and spoke to patient -ID [id number]  Spoke to the patient and   She has marks on legs, but no pus or fever  Will need either in to lymphedema clinic sooner or admit for lymphedema  I have asked them to come to admitting on Monday at 9-10 am and we will admit for 3-4 days of IV diuretics

## 2019-09-09 PROBLEM — I89.0 LYMPHEDEMA OF BOTH LOWER EXTREMITIES: Status: ACTIVE | Noted: 2019-01-01

## 2019-09-09 NOTE — H&P
Cardiology Admission Note  Assessment/Plan/Discussion:Cardiology Attending:     Patient seen on the day of progress note and examined  and agree with Advance Practice Provider (MARIO, NP,PA)  assessment and plans. Ekta Guthrie is a 60 y.o. female   Horrible lymphedema, steadily worse   With pain in right pannus crease   Worse leg edema, face and belly and worse renal fx  Will need IV diuretics for comfort, she can now barely walk  Ask Renal to see for worse CKD  Recheck LV fx by MUGA  Had old pulmonary nodule, 18 m ago, get chest CT  19   ECHO ADULT COMPLETE 2019 3/7/2019    Narrative · Left ventricular mildly decreased systolic function. Estimated left   ventricular ejection fraction is 41 - 45%. Visually measured ejection   fraction. Left ventricle not well visualized. Left ventricular global   hypokinesis. · Left atrial cavity size is moderately dilated. · Right ventricle not well visualized. · Moderate mitral valve regurgitation. · Severely elevated central venous pressure (15+ mmHg); IVC diameter is   larger than 21 mm and collapses less than 50% with respiration. Signed by: MD Elvis Avila MD            Patient Name: Ekta Guthrie  : 1960 MRN: [id number]  Date: 2019  Time: 11:03 AM    Admit Diagnosis: Lymphedema of both lower extremities [I89.0]    Admitting/Attending Cardiologist: Marlin Ramos M.D.    HPI:  Ekta Guthrie is a 60 y.o. female admitted on 2019  for Lymphedema of both lower extremities [I89.0].    has a past medical history of AICD (automatic cardioverter/defibrillator) present, AICD (automatic cardioverter/defibrillator) present, Atrial fibrillation ([facility] [address] Chronic anticoagulation, DDD (degenerative disc disease), cervical (2018), DDD (degenerative disc disease), thoracic (2018), Depression, Essential hypertension (2018), Incidental pulmonary nodule 2mm, RLL- no follow up needed (2/16/2018), Insomnia, Kidney infarction [facility]) (2017), Lymphedema (8/23/2019), Lymphedema of left arm, Obesity, morbid ([facility] [address] (1/30/2018), Stomach flu (1044), and Systolic CHF, chronic ([facility] [address] She also has no past medical history of Asthma, Cancer ([facility] [address] Carotid artery disease ([facility] [address] Chronic kidney disease, Chronic obstructive pulmonary disease ([facility] [address] [facility] Diabetes ([facility] Glaucoma, Hyperlipidemia, Long term current use of anticoagulant therapy, Murmur, Myocardial infarction ([facility] [address] Pulmonary embolism ([facility] [address] Rheumatic fever, Stroke [facility]), Syncope, Thyroid disease, or Valvular heart disease. Presented to the ED for admission to inpatient for Volume overload, Lymphedema, SOB and weight gain. Accompanied by her , she notes she hurts all over. She recently was diagnosed with hypothyroidism and is currently on Synthroid. Her  notes she is so SOB with ambulating, she cannot walk but a few steps. She is sleeping in a recliner, mostly upright. She is also very fatigued and tired all the time. She c/o significant leg swelling and abdominal swelling. No CP or palpitations. H/o systolic HF, lymphedema, HTN, depression, DDD with chronic pain, Afib on chronic [address]. She has a BiV ICD, initial PPM for CHB, but changed for NICM. Subjective:  She looks miserable and feels as such. C/o edema and pain all over. HOB at 60 degrees because of orthopnea      Assessment and Plan     1. Volume overload   - admitted for volume control   - IV Bumex q 8 hours at 1 mg - for now   - Check daily BMP   - Daily weights and intake/output   - PureWick, as she get too SOB with standing  2. DAVID on CKD   - Cr. 1.90, baseline 1.4   - will check limited echo for EF (r/o CRS)   - daily BMP   - Hold Lisinopril for DAVID on CKD   - Hold Aldactone while diuresing with Bumex  3. Lymphedema   - Venous insufficiency   - Bumex IV q 8 hours  4.  Systolic HF   - acute on chronic   - NYHA class IV   - EF 40-45% on last echo 3/2019 - recheck echo for EF   - Coreg resumed   - Lisinopril held for DAVID on CKD   - Aldactone held with IV bumex and DAVID - K 4.6   - Daily weights   - proBNP 2700+   - S/p BiV ICD  5. Afib   - PPM   - [address] with Coumadin - Pharmacy consulted for dosing   - Amio 200 mg daily  6. HTN   - well controlled on Coreg  7. Hypothyroidism   - Synthroid PTA 75 mcg   - TSH and T4 pending  8. Hypercoagulable state   - INR 4.1   - No Coumadin today. Pharmacy consulted to dose  9. Hyponatremic   - Na 132   - daily BMP  10. H/o CHB   - s/p PPM - changed to BiV ICD with HF  11. DDD   - chronic pain and on chronic opiates   - will resume home meds  12. Body mass index is 49.32 kg/m². - Morbid obesity - unable to exercise with increased SOB. Very volume overloaded. Significant edema of legs and abdomen. IV Bumex as above. Resume most home meds and check limited echo. May need to consider Bumex gtt if poorly responds to q 8 hour schedule. Will follow up echo result. Daily BMPs. Anticipate she will be here for awhile to get the volume off. Anticipate will need rehab on discharge. Patient Active Problem List   Diagnosis Code    Essential hypertension I10    Obesity, morbid ([address] E66.01    Pacemaker Z95.0    Atrial fibrillation ([address] I48.91    Depression F32.9    Insomnia G47.00    Chronic anticoagulation Z79.01    DDD (degenerative disc disease), cervical M50.30    DDD (degenerative disc disease), thoracic M51.34    Long term prescription opiate use Z79.891    Incidental pulmonary nodule- 2mm RLL- repeat CT due in Feb 2019 R91.1    Acute hypokalemia E87.6    AICD discharge Z45.02    AICD (automatic cardioverter/defibrillator) present A87.049    Systolic CHF, chronic (HCC) I50.22    Lymphedema I89.0    Lymphedema of both lower extremities I89.0     No specialty comments available.     Review of Systems:     GENERAL   Recent weight loss - no   Fever -----------------   no   Chills -----------------   no     EYES, VISION   Visual Changes - no     EARS, NOSE, THROAT   Hearing loss ----------- no   Swallowing difficulties - no     CARDIOVASCULAR   Chest pain/pressure ---- no   Arrhythmia/palpitations - no       RESPIRATORY   Cough ------------------ no   Shortness of breath - yes   Wheezing -------------- no   GASTROINTESTINAL   Abdominal pain - no   Heartburn -------- no   Bloody stool ----- no     GENITOURINARY   Frequent urination - no   Urgency -------------- no     MUSCULOSKELETAL   Joint pain/swelling ---- yes   Musculoskeletal pain - yes     SKIN & INTEGUMENTARY   Rashes - no   Sores --- no         NEUROLOGICAL   Numbness/tingling - no   Sensation loss ------ no     PSYCHIATRIC   Nervousness/anxiety - no   Depression -------------- no     ENDOCRINE   Heat/cold intolerance - no   Excessive thirst -------- no     HEMATOLOGIC/LYMPHATIC   Abnormal bleeding - no     ALL/IMMUN   Allergic reaction ------ no   Recurrent infections - no       Previous treatment/evaluation includes   echocardiogram, persantine thallium and cardiac catheterization . Cardiac risk factors:   family history, obesity, sedentary life style, hypertension, post-menopausal.    Past Medical History:   Diagnosis Date    AICD (automatic cardioverter/defibrillator) present     AICD (automatic cardioverter/defibrillator) present     Atrial fibrillation (HCC)     Chronic anticoagulation     was on NOAC before. ?had clot on noac. now on warfarin. Dr. Kathie Herring monitors INR    DDD (degenerative disc disease), cervical 1/30/2018    DDD (degenerative disc disease), thoracic 1/30/2018    Depression     no h/o hospitalization    Essential hypertension 1/8/2018    Incidental pulmonary nodule 2mm, RLL- no follow up needed 2/16/2018    Noted on abdomen/pelvis CT Feb 2018. No smoking history. 1. No renal, ureteral, or bladder calculi. 2. No acute findings. 3. 2 mm pulmonary nodule right lower lobe. No follow-up needed if the patient is low risk. If the patient is high-risk, CT could be considered in 1 year.      Insomnia     Kidney infarction ([address] 2017    d/t clot per pt. recalls being on blood thinner.  Lymphedema 8/23/2019    Lymphedema of left arm     developed after infection of pacemaker site    Obesity, morbid ([facility] 1/30/2018    Stomach flu 2015    hospital admit X2    Systolic CHF, chronic (HCC)     EF 45% normal caths and stress tests     Past Surgical History:   Procedure Laterality Date    HX BREAST BIOPSY Right 2010    Benign    HX CHOLECYSTECTOMY  1984    HX HEART CATHETERIZATION  2006    d/t abnl stress test, per pat, test was normal    HX HEART CATHETERIZATION  ~2009, 2012    per pt normal.     HX HYSTERECTOMY  2012    d/t heavy bleeding. ?endometriosis. +fibroid. per pt, no cancer.  HX PACEMAKER  2010, 2011, 2012, 3/29/2018    first 2010 (infected), replacement 2011 (lead not placed correctly), 2nd replacement 2012- pacemaker, defibrillator, 3rd replacement 2018     Current Facility-Administered Medications   Medication Dose Route Frequency    sodium chloride (NS) flush 5-40 mL  5-40 mL IntraVENous Q8H    sodium chloride (NS) flush 5-40 mL  5-40 mL IntraVENous PRN    acetaminophen (TYLENOL) tablet 650 mg  650 mg Oral Q4H PRN    amiodarone (CORDARONE) tablet 200 mg  200 mg Oral DAILY    carvedilol (COREG) tablet 3.125 mg  3.125 mg Oral BID WITH MEALS    . PHARMACY TO SUBSTITUTE PER PROTOCOL (Reordered from: eszopiclone (LUNESTA) 3 mg tablet)    Per Protocol    [START ON 9/10/2019] levothyroxine (SYNTHROID) tablet 75 mcg  75 mcg Oral ACB    methocarbamol (ROBAXIN) tablet 500 mg  500 mg Oral TID    pravastatin (PRAVACHOL) tablet 80 mg  80 mg Oral QHS    . PHARMACY TO SUBSTITUTE PER PROTOCOL (Reordered from: raNITIdine hcl 150 mg capsule)    Per Protocol    potassium chloride SR (KLOR-CON 10) tablet 40 mEq  40 mEq Oral DAILY    sertraline (ZOLOFT) tablet 100 mg  100 mg Oral DAILY    bumetanide (BUMEX) injection 1 mg  1 mg IntraVENous Q8H     Current Outpatient Medications   Medication Sig    warfarin (COUMADIN) 3 mg tablet Take 3 mg by mouth two (2) times a week on Wednesday and Saturday.  warfarin (COUMADIN) 3 mg tablet Take 1.5 mg by mouth five (5) days a week. 5 x a week on Monday, Tuesday, Thursday, Friday, AND Sunday. 3 mg on WED AND SATURDAY    sertraline (ZOLOFT) 100 mg tablet Take 100 mg by mouth daily.  levothyroxine (SYNTHROID) 75 mcg tablet Take 1 Tab by mouth Daily (before breakfast).  spironolactone (ALDACTONE) 50 mg tablet Take 1 Tab by mouth daily.  lisinopril (PRINIVIL, ZESTRIL) 10 mg tablet Take 1 Tab by mouth daily.  bumetanide (BUMEX) 1 mg tablet Take 3 Tabs by mouth daily.  amiodarone (CORDARONE) 200 mg tablet Take 1 Tab by mouth daily.  methocarbamol (ROBAXIN) 500 mg tablet TAKE 1 TABLET BY MOUTH THREE TIMES DAILY    HYDROcodone-acetaminophen (NORCO) 7.5-325 mg per tablet 1 Tab, EVERY 8 HOURS AS NEEDED FOR PAIN    eszopiclone (LUNESTA) 3 mg tablet Take 1 Tab by mouth nightly as needed for Sleep. Max Daily Amount: 3 mg.  potassium chloride (K-DUR, KLOR-CON) 20 mEq tablet Take 20 mEq by mouth daily.  pravastatin (PRAVACHOL) 80 mg tablet Take 80 mg by mouth nightly.  raNITIdine hcl 150 mg capsule Take 150 mg by mouth two (2) times a day.  carvedilol (COREG) 3.125 mg tablet Take 3.125 mg by mouth two (2) times daily (with meals).        Allergies   Allergen Reactions    Augmentin [Amoxicillin-Pot Clavulanate] Nausea and Vomiting    Neosporin [Neomycin-Bacitracin-Polymyxin] Rash      Family History   Problem Relation Age of Onset    Breast Cancer Maternal Aunt         late 35s    Uterine Cancer Maternal Aunt     Heart Disease Mother         sudden cardiac arrest    Heart Disease Father     Coronary Artery Disease Father     Colon Cancer Father         76s    Diabetes Father     COPD Sister     Cancer Brother 43 pancreatic    Uterine Cancer Maternal Grandmother     Other Maternal Grandfather         mva    Heart Disease Paternal Grandmother     Heart Disease Paternal Grandfather     Heart Disease Other     Diabetes Other     Other Sister         sudden cardiac arrest    Heart Attack Sister     No Known Problems Sister     Heart Disease Brother     Kidney Disease Brother     Other Brother         mva    No Known Problems Brother     Heart Attack Paternal Uncle 36    Other Paternal Aunt         aneurysm    Celiac Disease Son     Seizures Daughter     Other Daughter 22        MVA      Social History     Socioeconomic History    Marital status:      Spouse name: Fredo Room    Number of children: 2    Years of education: Not on file    Highest education level: Not on file   Occupational History    Occupation: on disability   Tobacco Use    Smoking status: Never Smoker    Smokeless tobacco: Never Used   Substance and Sexual Activity    Alcohol use: No    Drug use: No    Sexual activity: Yes     Partners: Male     Comment: monogamous with    Social History Narrative    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 Lives with , son, dtr in law.         dtr  in [address] Objective:    Physical Exam    Vitals:   Vitals:    19 1002 19 1018 19 1030 19 1045   BP: 146/78 134/64 134/58 124/55   Pulse: 61 64 60 60   Resp: 16 21 17 23   Temp: 97.4 °F (36.3 °C)   97.4 °F (36.3 °C)   SpO2: 98% 98% 98% 98%   Height: 5' 5\" (1.651 m)          General:    Alert, cooperative, no distress, appears stated age. Neck:   Supple, no carotid bruit and no JVD. Back:     Symmetric, mild curvature. Lungs:     Crackles in bases to auscultation bilaterally. Heart[de-identified]    Regular rate and rhythm, S1, S2 normal, no murmur, click, rub or gallop. Abdomen:     Firm, non-tender, edematous Bowel sounds hypoactive.     Extremities:   Extremities normal, atraumatic, no cyanosis. 4++ edema. Vascular:   Pulses - 2+ radials   Skin:   Skin color normal. No rashes or lesions   Neurologic:   CN II-XII grossly intact. Telemetry: BiV paced    ECG:   EKG Results     Procedure 720 Value Units Date/Time    EKG 12 LEAD INITIAL [[id number]] Collected:  09/09/19 1016    Order Status:  Completed Updated:  09/09/19 1017     Ventricular Rate 60 BPM      Atrial Rate 55 BPM      QRS Duration 206 ms      Q-T Interval 498 ms      QTC Calculation (Bezet) 498 ms      Calculated R Axis -102 degrees      Calculated T Axis 74 degrees      Diagnosis --     Ventricular-paced rhythm  Biventricular pacemaker detected  When compared with ECG of 15-APR-2019 02:58,  Vent. rate has decreased BY  14 BPM              Data Review:     Radiology:   XR Results (most recent):  Results from Hospital Encounter encounter on 08/15/19   XR CHEST PA LAT    Narrative INDICATION:   chronic congestive heart failure    COMPARISON: April 15, 2019    FINDINGS:    Frontal and lateral views of the chest demonstrate cardiomegaly and a right  subclavian pacemaker. The lungs are adequately expanded. Small left and  moderate right pleural effusions with bibasilar atelectasis. The osseous  structures are unremarkable. Impression IMPRESSION:  Cardiomegaly with small left and moderate right pleural effusions. Bibasilar  atelectasis. Follow-up to complete resolution in 4-6 weeks recommended. 23X           Recent Labs     09/09/19  1005   TROIQ <0.05     Recent Labs     09/09/19  1005   *   K 4.6      CO2 27   BUN 25*   CREA 1.90*   *   CA 9.2     Recent Labs     09/09/19  1005   WBC 9.3   HGB 10.9*   HCT 34.8*        Recent Labs     09/09/19  1005   PTP 38.2*   INR 4.1*   SGOT 17   AP 76     No results for input(s): CHOL, LDLC in the last 72 hours. No lab exists for component: TGL, HDLC,  HBA1C  No results for input(s): CRP, TSH, TSHEXT in the last 72 hours.     No lab exists for component: [facility]    Kaela Brooks. Kristy Quintana M.D.          [facility] [address] [place] [address] [address] [address]     [address] [facility]     [phone]    Robe Garcia MD

## 2019-09-09 NOTE — PROGRESS NOTES
Pharmacist Note  Warfarin Dosing  Consult provided for this 60 y. o.female to manage warfarin for h/o Atrial Fibrillation. INR Goal: 2 - 3    Home regimen:  1.5mg on MTRFSun + 3mg on WSat (INR on admission 4.1)    Drugs that may increase INR: Amiodarone  Drugs that may decrease INR: None  Other current anticoagulants/ drugs that may increase bleeding risk: None  Risk factors: None  Daily INR ordered: YES    Recent Labs     09/09/19  1005   HGB 10.9*   INR 4.1*     Date               INR                  Dose  9/09                4.1                   HOLD                                                                                 Assessment/ Plan: Will HOLD warfarin today due to supratherapeutic INR. Pharmacy will continue to monitor daily and adjust therapy as indicated.

## 2019-09-09 NOTE — TELEPHONE ENCOUNTER
Verified patient with two types of identifiers. Called and spoke to [facility] about direct admission for patient. Verified patient with two types of identifiers. Bed placement called to state the hospital is full and the patient will need to go through the ER for admission. Notified MD and patient's .

## 2019-09-09 NOTE — PROGRESS NOTES
Admission Medication Reconciliation:    Information obtained from:  patient, Rx Query, chart review - anticoag and cardiologist visit notes  RxQuery data available¹:  YES    Comments/Recommendations: Updated PTA meds/reviewed patient's allergies. 1)  Patient is an excellent historian and was able to recite all of her medications without prompting of names or dosages. Last doses updated. 2)  Medication changes (since last review): Added  - sertraline 100 mg daily    Adjusted  - warfarin from 3 mg daily and 4 mg MWF to 1.5 mg all days EXCEPT 3 mg on Wednesdays and Saturdays. Has not taken warfarin yet today 9/9/19    Removed  - NA       ¹RxQuery pharmacy benefit data reflects medications filled and processed through the patient's insurance, however   this data does NOT capture whether the medication was picked up or is currently being taken by the patient. Allergies:  Augmentin [amoxicillin-pot clavulanate] and Neosporin [neomycin-bacitracin-polymyxin]    Significant PMH/Disease States:   Past Medical History:   Diagnosis Date    AICD (automatic cardioverter/defibrillator) present     AICD (automatic cardioverter/defibrillator) present     Atrial fibrillation (HCC)     Chronic anticoagulation     was on NOAC before. ?had clot on noac. now on warfarin. Dr. Nathaniel Nielsen monitors INR    DDD (degenerative disc disease), cervical 1/30/2018    DDD (degenerative disc disease), thoracic 1/30/2018    Depression     no h/o hospitalization    Essential hypertension 1/8/2018    Incidental pulmonary nodule 2mm, RLL- no follow up needed 2/16/2018    Noted on abdomen/pelvis CT Feb 2018. No smoking history. 1. No renal, ureteral, or bladder calculi. 2. No acute findings. 3. 2 mm pulmonary nodule right lower lobe. No follow-up needed if the patient is low risk. If the patient is high-risk, CT could be considered in 1 year. Insomnia     Kidney infarction ([address] 2017    d/t clot per pt. recalls being on blood thinner. Lymphedema 2019    Lymphedema of left arm     developed after infection of pacemaker site    Obesity, morbid ([address] 2018    Stomach flu     hospital admit X2    Systolic CHF, chronic (HCC)     EF 45% normal caths and stress tests     Chief Complaint for this Admission:    Chief Complaint   Patient presents with    Swelling     Prior to Admission Medications:   Prior to Admission Medications   Prescriptions Last Dose Informant Patient Reported? Taking? HYDROcodone-acetaminophen (NORCO) 7.5-325 mg per tablet 2019 at Unknown time  No Yes   Si Tab, EVERY 8 HOURS AS NEEDED FOR PAIN   amiodarone (CORDARONE) 200 mg tablet 2019 at Unknown time  No Yes   Sig: Take 1 Tab by mouth daily. bumetanide (BUMEX) 1 mg tablet 2019 at Unknown time  No Yes   Sig: Take 3 Tabs by mouth daily. carvedilol (COREG) 3.125 mg tablet 2019 at Unknown time  Yes Yes   Sig: Take 3.125 mg by mouth two (2) times daily (with meals). eszopiclone (LUNESTA) 3 mg tablet 2019 at Unknown time  No Yes   Sig: Take 1 Tab by mouth nightly as needed for Sleep. Max Daily Amount: 3 mg.   levothyroxine (SYNTHROID) 75 mcg tablet 2019 at Unknown time  No Yes   Sig: Take 1 Tab by mouth Daily (before breakfast). lisinopril (PRINIVIL, ZESTRIL) 10 mg tablet 2019 at Unknown time  No Yes   Sig: Take 1 Tab by mouth daily. methocarbamol (ROBAXIN) 500 mg tablet 2019 at Unknown time  No Yes   Sig: TAKE 1 TABLET BY MOUTH THREE TIMES DAILY   potassium chloride (K-DUR, KLOR-CON) 20 mEq tablet 2019 at Unknown time  Yes Yes   Sig: Take 20 mEq by mouth daily. pravastatin (PRAVACHOL) 80 mg tablet 2019 at Unknown time  Yes Yes   Sig: Take 80 mg by mouth nightly. raNITIdine hcl 150 mg capsule 2019 at Unknown time  Yes Yes   Sig: Take 150 mg by mouth two (2) times a day. sertraline (ZOLOFT) 100 mg tablet 2019 at Unknown time  Yes Yes   Sig: Take 100 mg by mouth daily.    spironolactone (ALDACTONE) 50 mg tablet 9/9/2019 at Unknown time  No Yes   Sig: Take 1 Tab by mouth daily. warfarin (COUMADIN) 3 mg tablet 9/7/2019  Yes Yes   Sig: Take 3 mg by mouth two (2) times a week on Wednesday and Saturday. warfarin (COUMADIN) 3 mg tablet 9/8/2019  Yes Yes   Sig: Take 1.5 mg by mouth five (5) days a week. 5 x a week on Monday, Tuesday, Thursday, Friday, AND Sunday. 3 mg on WED AND SATURDAY      Facility-Administered Medications: None       Please contact the main inpatient pharmacy with any questions or concerns at [phone] and we will direct you to the clinical pharmacist covering this patient's care while in-house.    Artemio Read

## 2019-09-09 NOTE — ED TRIAGE NOTES
Patient arrives from home for increased shortness of breath over the last week. Patient reports her cardiologist sent her to be admitted for CHF exacerbation. Patient wears 2L NC at baseline. Denies chest pain.

## 2019-09-09 NOTE — WOUND CARE
Wound Care Note:     New consult placed by nurse request for suggestions on sacrum    Chart shows:  Admitted for lymphedema of both lower extremities  Past Medical History:   Diagnosis Date    AICD (automatic cardioverter/defibrillator) present     AICD (automatic cardioverter/defibrillator) present     Atrial fibrillation ([address]     Chronic anticoagulation     was on NOAC before. ?had clot on noac. now on warfarin. Dr. Marie Rosas monitors INR    DDD (degenerative disc disease), cervical 1/30/2018    DDD (degenerative disc disease), thoracic 1/30/2018    Depression     no h/o hospitalization    Essential hypertension 1/8/2018    Incidental pulmonary nodule 2mm, RLL- no follow up needed 2/16/2018    Noted on abdomen/pelvis CT Feb 2018. No smoking history. 1. No renal, ureteral, or bladder calculi. 2. No acute findings. 3. 2 mm pulmonary nodule right lower lobe. No follow-up needed if the patient is low risk. If the patient is high-risk, CT could be considered in 1 year.      Insomnia     Kidney infarction ([address] 2017    d/t clot per pt. recalls being on blood thinner.  Lymphedema 8/23/2019    Lymphedema of left arm     developed after infection of pacemaker site    Obesity, morbid ([address] 1/30/2018    Stomach flu 2015    hospital admit X2    Systolic CHF, chronic (HCC)     EF 45% normal caths and stress tests     WBC = 9.3 on 9/9/19  Admitted from home    Assessment:   Patient is A&O x 4, communicative, continent with moderate assistance needed in repositioning. Bed: [facility]  Patient wearing briefs for incontinence and has a Pure Wick. Diet: Cardiac regular    Bilateral heels with light erythema that is blanchable. Bilateral buttocks buttocks skin intact and without erythema (there are a couple of ecchymosis). Faint palpable DP pulses bilaterally    1.  POA right anterior ankle with a linear line of serous blisters measuring 0.3 cm x 5 cm x 0, blisters are intact, no open area, chiqui-wound intact with edema but no erythema. Xeroform gauze and foam dressing applied. 2.  POA maculopapular erythematous rash with satellite lesions consistent with yeast to pannus, left greater than right. Nystatin powder to be ordered. 3.  POA sacrum with hyperpigmentation that appears to be patient's natural pigmentation (area measures 6 cm x 4 cm, there are two small wounds approximately 0.3 cm x 0.2 cm x 0.1 cm, wound beds are pink, non-blanching, no drainage, wound edges are open. These are probably a combination of moisture and pressure. Venelex ointment to be ordered. Call out to Dr. Bob Mcclure for orders. Patient repositioned on left side   Heels offloaded on pillows. Recommendations:    Sacrum and bilateral heels- Every 8 hours liberally apply Venelex ointment. Pannus- Every 12 hours cleanse with soap and water, blot dry, sprinkle with Nystatin powder and gently rub in. Right anterior ankle- Every other day cleanse with normal saline, wipe wound bed clean and dry, apply a piece of Xeroform gauze that has been folded in half, cover with Mepilex border. Skin Care & Pressure Prevention:  Minimize layers of linen/pads under patient to optimize support surface. Turn/reposition approximately every 2 hours and offload heels.   Manage incontinence / promote continence     Discussed above plan with patient & Rafa Amor RN    Transition of Care: Plan to follow as needed while admitted to hospital.    REYES Ellis, RN, [facility], [facility].  office [phone]  pager [phone] or call  to page

## 2019-09-09 NOTE — ROUTINE PROCESS
TRANSFER - OUT REPORT:    Verbal report given to [facility] RN(name) on [employer]  being transferred to [facility](unit) for routine progression of care       Report consisted of patients Situation, Background, Assessment and   Recommendations(SBAR). Information from the following report(s) SBAR, ED Summary, [facility] and Recent Results was reviewed with the receiving nurse. Lines:   Peripheral IV 09/09/19 Right Antecubital (Active)   Site Assessment Clean, dry, & intact 9/9/2019 10:05 AM   Phlebitis Assessment 0 9/9/2019 10:05 AM   Infiltration Assessment 0 9/9/2019 10:05 AM   Dressing Status Clean, dry, & intact 9/9/2019 10:05 AM        Opportunity for questions and clarification was provided.       Patient transported with:   Monitor  O2 @ 2 liters

## 2019-09-09 NOTE — ED PROVIDER NOTES
60 y.o. female with past medical history significant for HTN, obesity, A fib, lymphedema, AICD in place, CHF, DDD who presents via private vehicle with chief complaint of weight gain. Pt c/o increased generalized swelling, abdominal distention and weight gain for the past 6 months, worsening recently. Pt is on O2 NC at home. Pt takes PO bumex at home and gets bumex infusions. Pt reports that she recently had her TSH checked and was found to have hypothyroidism. Pt's cardiologist is Dr. Andrade Early.  There are no other acute medical concerns at this time. Social hx: denies tobacco use, denies EtOH consumption     PCP: Helio Bradford MD    Note written by Mark Vyas, as dictated by Shirley Patel MD 10:32 AM      The history is provided by the patient. No  was used. Past Medical History:   Diagnosis Date    AICD (automatic cardioverter/defibrillator) present     AICD (automatic cardioverter/defibrillator) present     Atrial fibrillation (HCC)     Chronic anticoagulation     was on NOAC before. ?had clot on noac. now on warfarin. Dr. Deisy Clancy monitors INR    DDD (degenerative disc disease), cervical 1/30/2018    DDD (degenerative disc disease), thoracic 1/30/2018    Depression     no h/o hospitalization    Essential hypertension 1/8/2018    Incidental pulmonary nodule 2mm, RLL- no follow up needed 2/16/2018    Noted on abdomen/pelvis CT Feb 2018. No smoking history. 1. No renal, ureteral, or bladder calculi. 2. No acute findings. 3. 2 mm pulmonary nodule right lower lobe. No follow-up needed if the patient is low risk. If the patient is high-risk, CT could be considered in 1 year.      Insomnia     Kidney infarction ([address] 2017    d/t clot per pt. recalls being on blood thinner.      Lymphedema 8/23/2019    Lymphedema of left arm     developed after infection of pacemaker site    Obesity, morbid ([address] 1/30/2018    Stomach flu 2015    hospital admit X2    Systolic CHF, chronic (HCC)     EF 45% normal caths and stress tests       Past Surgical History:   Procedure Laterality Date    HX BREAST BIOPSY Right 2010    Benign    HX CHOLECYSTECTOMY  1984    HX HEART CATHETERIZATION  2006    d/t abnl stress test, per pat, test was normal    HX HEART CATHETERIZATION  ~2009, 2012    per pt normal.     HX HYSTERECTOMY  2012    d/t heavy bleeding. ?endometriosis. +fibroid. per pt, no cancer.      HX PACEMAKER  2010, 2011, 2012, 3/29/2018    first 2010 (infected), replacement 2011 (lead not placed correctly), 2nd replacement 2012- pacemaker, defibrillator, 3rd replacement 2018         Family History:   Problem Relation Age of Onset    Breast Cancer Maternal Aunt         late 35s    Uterine Cancer Maternal Aunt     Heart Disease Mother         sudden cardiac arrest    Heart Disease Father     Coronary Artery Disease Father     Colon Cancer Father         76s    Diabetes Father     COPD Sister     Cancer Brother 43        pancreatic    Uterine Cancer Maternal Grandmother     Other Maternal Grandfather         mva    Heart Disease Paternal Grandmother     Heart Disease Paternal Grandfather     Heart Disease Other     Diabetes Other     Other Sister         sudden cardiac arrest    Heart Attack Sister     No Known Problems Sister     Heart Disease Brother     Kidney Disease Brother     Other Brother         mva    No Known Problems Brother     Heart Attack Paternal Uncle 36    Other Paternal Aunt         aneurysm    Celiac Disease Son     Seizures Daughter     Other Daughter 22        MVA       Social History     Socioeconomic History    Marital status:      Spouse name: Angel Mcelroy    Number of children: 2    Years of education: Not on file    Highest education level: Not on file   Occupational History    Occupation: on disability   Social Needs    Financial resource strain: Not on file    Food insecurity:     Worry: Not on file Inability: Not on file    Transportation needs:     Medical: Not on file     Non-medical: Not on file   Tobacco Use    Smoking status: Never Smoker    Smokeless tobacco: Never Used   Substance and Sexual Activity    Alcohol use: No    Drug use: No    Sexual activity: Yes     Partners: Male     Comment: monogamous with    Lifestyle    Physical activity:     Days per week: Not on file     Minutes per session: Not on file    Stress: Not on file   Relationships    Social connections:     Talks on phone: Not on file     Gets together: Not on file     Attends [employer] service: Not on file     Active member of club or organization: Not on file     Attends meetings of clubs or organizations: Not on file     Relationship status: Not on file    Intimate partner violence:     Fear of current or ex partner: Not on file     Emotionally abused: Not on file     Physically abused: Not on file     Forced sexual activity: Not on file   Other Topics Concern    Not on file   Social History Narrative    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 Lives with , son, dtr in law.         dtr  in [address] ALLERGIES: Augmentin [amoxicillin-pot clavulanate] and Neosporin [neomycin-bacitracin-polymyxin]    Review of Systems   Constitutional: Positive for unexpected weight change. Negative for chills. Respiratory: Negative for cough and shortness of breath. Cardiovascular: Negative for chest pain and leg swelling. Gastrointestinal: Positive for abdominal distention. Negative for abdominal pain. Genitourinary: Negative for dysuria. Musculoskeletal: Negative for back pain. Neurological: Negative for headaches. All other systems reviewed and are negative.       Vitals:    19 0952 19 1002 19 1018   BP:  146/78 134/64   Pulse: 63 61 64   Resp:  16 21   Temp:  97.4 °F (36.3 °C)    SpO2: 97% 98% 98%   Height:  5' 5\" (1.651 m)             Physical Exam Constitutional: She appears well-developed and well-nourished. No distress. HENT:   Head: Normocephalic and atraumatic. Eyes: Conjunctivae are normal.   Neck: Neck supple. Cardiovascular: Normal rate and regular rhythm. Pulmonary/Chest: Effort normal. No respiratory distress. Abdominal: She exhibits no distension. Musculoskeletal: Normal range of motion. She exhibits edema (3+ bilateral lower extremity). She exhibits no deformity. Neurological: She is alert. No cranial nerve deficit. Skin: Skin is warm and dry. Anasarca on abdomen   Psychiatric: Her behavior is normal.   Nursing note and vitals reviewed. Note written by Mark Farias, as dictated by Becky Galvan MD 10:37 AM         MDM     60 y.o. female presents with increased swelling and suspected failure of outpatient bumex therapy for CHF. Seen by cardiology and recommended she be admitted for diuresis and further management. Lab workup initiated. Cardiology available for consultation, admitted for further management. Procedures    ED EKG interpretation:  Rhythm: ventricular paced.  Rate (approx.): 60; Axis: normal; ST/T wave: normal.  Note written by Mark Farias, as dictated by Becky Galvan MD 10:38 AM

## 2019-09-09 NOTE — PROGRESS NOTES
NUTRITION COMPLETE ASSESSMENT    RECOMMENDATIONS:   1. Cardiac diet  2. RD add Glucerna Shakes BID  3. Strict I&Os     Interventions/Plan:   Food/Nutrient Delivery:  Modify diet/texture/consistency/nutrients(Cardiac) Commercial supplement(Glucerna Shake)        Nutrition Education:     Coordination of Care:    Nutrition Counseling:        Assessment:   Reason for Assessment:   [x] BPA skin Referral     Diet: Cardiac  Supplements: RD add Glucerna Shakes BID until appetite/intkae improves  Nutritionally Significant Medications: [x] Reviewed & Includes: Synthroid, Bumex 1 mg every 8 hr., Pravachol, warfarin, pepcid  Meal Intake: No data found. Subjective:  #, but I can gain 9# in one day from fluid. Don't eat canned foods and stopped using salt. Appetite not good x past ~6 months. Thought had flu, then found out it was PNE, started on ABX and wasn't eating. Per , \"I make her a slider and she only eats about half\". Found out my thryoid not working. Drink occasional Klaus's low CHO shake. Like Glucerna too. Eat Carbmaster yogurt and make it into a smoothie with almond milk, strawberries and ice. Objective:  Past Medical History:   Diagnosis Date    AICD (automatic cardioverter/defibrillator) present     AICD (automatic cardioverter/defibrillator) present     Atrial fibrillation (HCC)     Chronic anticoagulation     was on NOAC before. ?had clot on noac. now on warfarin. Dr. Dilshad Mcdonald monitors INR    DDD (degenerative disc disease), cervical 1/30/2018    DDD (degenerative disc disease), thoracic 1/30/2018    Depression     no h/o hospitalization    Essential hypertension 1/8/2018    Incidental pulmonary nodule 2mm, RLL- no follow up needed 2/16/2018    Noted on abdomen/pelvis CT Feb 2018. No smoking history. 1. No renal, ureteral, or bladder calculi. 2. No acute findings. 3. 2 mm pulmonary nodule right lower lobe. No follow-up needed if the patient is low risk.  If the patient is high-risk, CT could be considered in 1 year.      Insomnia     [address] 2017    d/t clot per pt. recalls being on blood thinner.  Lymphedema 8/23/2019    Lymphedema of left arm     developed after infection of pacemaker site    Obesity, morbid ([facility] 1/30/2018    Stomach flu 2015    hospital admit X2    Systolic CHF, chronic (HCC)     EF 45% normal caths and stress tests   Admit LE Lymphedema; Na+ restricted diet and Rx Bumex 1 mg IV every 8 hr. MD notes worsening renal fx. Although BMI 52, wt reflects worsening lymphedema; Hx CHF. Per pt # (BMI 39). Noted wt 285# (4/23/19) and 275# (4/18/19); Appears last time 240# range was remote: 243.7# (12/31/2016). Claims not eating and still gaining weight then found out thyroid wasn't working and also gaining fluid. Natural teeth, chew/swallow ok except pills. Sits up to eat meals. C/O diarrhea. No BM documented on flow sheets. Rx Warfarin and provide consistent Vit K diet. Hx CAD, HL. Rx Pravachol and cardiac diet. Estimated Nutrition Needs:   Kcals/day: 1760 Kcals/day  Protein: 110 g(~2 gm/kg IBW)  Fluid: 1800 ml(~1mL/kcal)     Based On: Ciaran Malone Soledad(MSJ x 1.2)  Weight Used: Adjusted wt with edema    Pt expected to meet estimated nutrient needs: [x] Unable to predict at this time  Comparative Standards:  ;  ;      Nutrition Diagnosis:   1.  Inadequate oral intake related to appetite as evidenced by pt/ report poor appetite x past ~6 months; PNE with ABX and poor intake then       Goals:     Consume minimum one ONS daily and minimum 50% all meals     Monitoring & Evaluation:    - Total energy intake, Liquid meal replacement, Protein intake, Mineral/element intake   - Weight/weight change, Lean body mass, fat free mass, GI profile     Previous Nutrition Goals Met:  N/A  Previous Recommendations:      N/A    Education & Discharge Needs:   [x] Identified and addressed: reinforced low Na+    [x] Participated in care plan, discharge planning, and/or interdisciplinary rounds        Cultural, Restorationism and ethnic food preferences identified:   None    Skin Integrity: [x]Intact: Anterior ankle blisters; yeast to pannus (rx. Nystatin powder); Sacrum moistrue/pressure  Edema: [x] Rx Bumex   Last BM: Not documented; Pt c/o diarrhea  Food Allergies: [x]NKFA    Anthropometrics:    Weight Loss Metrics 9/9/2019 8/23/2019 6/6/2019 5/24/2019 5/7/2019 4/23/2019 4/18/2019   Today's Wt 312 lb 9.6 oz 296 lb 6.4 oz 282 lb 288 lb 3.2 oz 289 lb 285 lb 275 lb   BMI 52.02 kg/m2 49.32 kg/m2 46.93 kg/m2 47.96 kg/m2 48.09 kg/m2 47.43 kg/m2 45.76 kg/m2      Last 3 Recorded Weights in this Encounter    09/09/19 1126   Weight: 141.8 kg (312 lb 9.6 oz)         Height: 5' 5\" (165.1 cm),    Body mass index is 52.02 kg/m². IBW : 56.7 kg (125 lb),    Usual Body Weight: 108.9 kg (240 lb),      Labs:    Lab Results   Component Value Date/Time    Sodium 132 (L) 09/09/2019 10:05 AM    Potassium 4.6 09/09/2019 10:05 AM    Chloride 100 09/09/2019 10:05 AM    CO2 27 09/09/2019 10:05 AM    Glucose 102 (H) 09/09/2019 10:05 AM    BUN 25 (H) 09/09/2019 10:05 AM    Creatinine 1.90 (H) 09/09/2019 10:05 AM    Calcium 9.2 09/09/2019 10:05 AM    Magnesium 2.5 (H) 09/09/2019 10:05 AM    Phosphorus 3.3 03/08/2019 02:54 AM    Albumin 3.2 (L) 09/09/2019 10:05 AM     Lab Results   Component Value Date/Time    Hemoglobin A1c 6.4 (H) 01/18/2018 09:04 AM    Hemoglobin A1c, External 5.7 11/15/2016     Lab Results   Component Value Date/Time    Glucose 102 (H) 09/09/2019 10:05 AM    Glucose (POC) 181 (H) 04/15/2019 03:12 AM      Lab Results   Component Value Date/Time    ALT (SGPT) 11 (L) 09/09/2019 10:05 AM    AST (SGOT) 17 09/09/2019 10:05 AM    Alk.  phosphatase 76 09/09/2019 10:05 AM    Bilirubin, total 0.6 09/09/2019 10:05 AM        Adrienne Pappas RD

## 2019-09-09 NOTE — PROGRESS NOTES
Primary Nurse Joel Medina RN and Deborah Nunn RN performed a dual skin assessment on this patient Impairment noted- see wound doc flow sheet  Feliberto score is 17  R. Ankle blisters  Sacrum purple/maroon, not blanachable (wound care consulted for assistance)   L.  Groin rash

## 2019-09-10 NOTE — PROGRESS NOTES
Pharmacist Note  Warfarin Dosing (home med)  Consult provided for this 60 y. o.female to manage warfarin for h/o Atrial Fibrillation. INR Goal: 2 - 3    Home regimen:  1.5mg on MTRFSun + 3mg on WSat (INR on admission 4.1)    Drugs that may increase INR: Amiodarone  Drugs that may decrease INR: None  Other current anticoagulants/ drugs that may increase bleeding risk: None  Risk factors: None  Daily INR ordered: YES    Recent Labs     09/10/19  1456 09/09/19  1005   HGB  --  10.9*   INR 5.1* 4.1*     Date               INR                  Dose  9/09                4.1                   HOLD  9/10                5.1                   HOLD                                                                               Assessment/ Plan: Will HOLD warfarin today due to supratherapeutic INR. Pharmacy will continue to monitor daily and adjust therapy as indicated.

## 2019-09-10 NOTE — NURSE NAVIGATOR
Chart reviewed by Heart Failure Nurse Navigator. Heart Failure database completed. EF:  Prior ef 41/45% (3/2019); repeat echo pending    ACEi/ARB/ARNi: lisinopril 10 mg daily (prior to admission)    BB: coreg 3.125 mg twice daily    Aldosterone Antagonist: spironolactone 25 mg daily (prior to admission)    Obstructive Sleep Apnea Screening: NO   STOP-BANG score:   Referred to Sleep Medicine:     CRT: AICD. NYHA Functional Class IV on admission. Heart Failure Teach Back in Patient Education. Heart Failure Avoiding Triggers on Discharge Instructions. Cardiologist: Dr. Fabien Merlos ([facility]      Post discharge follow up phone call to be made within 48-72 hours of discharge.

## 2019-09-10 NOTE — PROGRESS NOTES
Assessment/Plan/Discussion:Cardiology Attending:     Patient seen on the day of progress note and examined  and agree with Advance Practice Provider (MARIO, NP,PA)  assessment and plans. Stanford Devine is a 60 y.o. female   Seen in am with  at bedside  She did not note much change overnight  Just starting diuresis  We have other issues of obesity including yeast infection    this will be a long road to diurese  Thus far not much out, so add aldactone and increase diuretic  Lab Results   Component Value Date/Time    Creatinine (POC) 1.6 (H) 04/15/2019 03:12 AM    Creatinine 1.73 (H) 09/10/2019 05:16 AM      Shameka Bailey MD                                                                                  Cardiology Progress Note            Admit Date: 9/9/2019  Admit Diagnosis: Lymphedema of both lower extremities [I89.0]  Date: 9/10/2019     Time: 11:51 AM    Subjective:  Feeling about the same. Still SOB, edematous and painful. Just back from MUGA scan     Assessment and Plan     1. Volume overload              - admitted for volume control              - IV Bumex q 8 hours at 1 mg -               - Check daily BMP              - Daily weights and intake/output              - PureWick, as she get too SOB with standing  2. DAVID on CKD              - Cr. 1.73, baseline 1.4              - will check limited echo for EF (r/o CRS). MUGA with LVEF of 49%              - daily BMP              - Hold Lisinopril for DAVID on CKD              - Hold Aldactone while diuresing with Bumex  3. Lymphedema              - Venous insufficiency              - Bumex IV q 8 hours  4.  Systolic HF              - acute on chronic              - NYHA class IV              - EF 40-45% on last echo 3/2019 - MUGA with EF 49%              - Coreg resumed              - Lisinopril held for DAVID on CKD              - Aldactone held with IV bumex and DAVID - K 4.6              - Daily weights              - proBNP 2700+              - S/p BiV ICD  5. Afib              - PPM              - [address] with Coumadin - Pharmacy consulted for dosing              - Amio 200 mg daily  6. HTN              - well controlled on Coreg  7. Hypothyroidism              - Synthroid PTA 75 mcg              - TSH 45   - T3 1.4   - Synthroid increased to 100 mcg  8. Hypercoagulable state              - INR 4.1              - Pharmacy consulted to dose  9. Hyponatremic              - Na 131              - daily BMP  10. H/o CHB              - s/p PPM - changed to BiV ICD with HF  11. DDD              - chronic pain and on chronic opiates              - will resume home meds  12. Body mass index is 49.32 kg/m². - Morbid obesity - unable to exercise with increased SOB.    Very volume overloaded. Significant edema of legs and abdomen. IV Bumex as above. MUGA LVEF 49%. Appreciate wound care's help with yeast infection. Past Medical History:   Diagnosis Date    AICD (automatic cardioverter/defibrillator) present     AICD (automatic cardioverter/defibrillator) present     Atrial fibrillation (HCC)     Chronic anticoagulation     was on NOAC before. ?had clot on noac. now on warfarin. Dr. Gretchen Hernandez monitors INR    DDD (degenerative disc disease), cervical 1/30/2018    DDD (degenerative disc disease), thoracic 1/30/2018    Depression     no h/o hospitalization    Essential hypertension 1/8/2018    Incidental pulmonary nodule 2mm, RLL- no follow up needed 2/16/2018    Noted on abdomen/pelvis CT Feb 2018. No smoking history. 1. No renal, ureteral, or bladder calculi. 2. No acute findings. 3. 2 mm pulmonary nodule right lower lobe. No follow-up needed if the patient is low risk. If the patient is high-risk, CT could be considered in 1 year.      Insomnia     Kidney infarction ([facility] [address] 2017    d/t clot per pt. recalls being on blood thinner.      Lymphedema 8/23/2019    Lymphedema of left arm     developed after infection of pacemaker site    Obesity, morbid ([address] 1/30/2018    Stomach flu 2015    hospital admit X2    Systolic CHF, chronic (HCC)     EF 45% normal caths and stress tests      Social History     Tobacco Use    Smoking status: Never Smoker    Smokeless tobacco: Never Used   Substance Use Topics    Alcohol use: No    Drug use: No         ROS:     GENERAL   Recent weight loss - no   Fever -----------------   no   Chills -----------------   no     EYES, VISION   Visual Changes - no     EARS, NOSE, THROAT   Hearing loss ----------- no   Swallowing difficulties - no     CARDIOVASCULAR   Chest pain/pressure ---- no   Arrhythmia/palpitations - no       RESPIRATORY   Cough ------------------ no   Shortness of breath - no   Wheezing -------------- no   GASTROINTESTINAL   Abdominal pain - no   Heartburn -------- no   Bloody stool ----- no     GENITOURINARY   Frequent urination - no   Urgency -------------- no     MUSCULOSKELETAL   Joint pain/swelling ---- no   Musculoskeletal pain - no     SKIN & INTEGUMENTARY   Rashes - no   Sores --- no         NEUROLOGICAL   Numbness/tingling - no   Sensation loss ------ no     PSYCHIATRIC   Nervousness/anxiety - no   Depression -------------- no     ENDOCRINE   Heat/cold intolerance - no   Excessive thirst -------- no     HEMATOLOGIC/LYMPHATIC   Abnormal bleeding - no     ALL/IMMUN   Allergic reaction ------ no   Recurrent infections - no     Objective:     Physical Exam:                Visit Vitals  /85 (BP 1 Location: Right arm, BP Patient Position: At rest)   Pulse 60   Temp 98.2 °F (36.8 °C)   Resp 16   Ht 5' 5\" (1.651 m)   Wt 312 lb 12.8 oz (141.9 kg)   SpO2 97%   BMI 52.05 kg/m²        General Appearance:   Well developed, well nourished,alert and oriented x 3, and   individual in no acute distress. Ears/Nose/Mouth/Throat:    Hearing grossly normal.         Neck:  Supple. Chest:    Lungs diminished, crackles in bases to auscultation bilaterally.    Cardiovascular:   Regular rate and rhythm, S1, S2 normal, no murmur. Abdomen:    Soft, non-tender, bowel sounds are active. Extremities:  4+ edema bilaterally. Skin:  Warm and dry.      Telemetry: normal sinus rhythm          Data Review:    Labs:    Recent Results (from the past 24 hour(s))   METABOLIC PANEL, BASIC    Collection Time: 09/10/19  5:16 AM   Result Value Ref Range    Sodium 131 (L) 136 - 145 mmol/L    Potassium 4.8 3.5 - 5.1 mmol/L    Chloride 102 97 - 108 mmol/L    CO2 17 (L) 21 - 32 mmol/L    Anion gap 12 5 - 15 mmol/L    Glucose 86 65 - 100 mg/dL    BUN 26 (H) 6 - 20 MG/DL    Creatinine 1.73 (H) 0.55 - 1.02 MG/DL    BUN/Creatinine ratio 15 12 - 20      GFR est AA 36 (L) >60 ml/min/1.73m2    GFR est non-AA 30 (L) >60 ml/min/1.73m2    Calcium 8.8 8.5 - 10.1 MG/DL   NUCLEAR CARDIAC BLOOD POOL MUGA REST/STRS    Collection Time: 09/10/19 11:49 AM   Result Value Ref Range    Target  bpm          Radiology:        Current Facility-Administered Medications   Medication Dose Route Frequency    miconazole (MICOTIN) 2 % powder   Topical Q12H    balsam peru-castor oil (VENELEX) ointment   Topical Q8H    sodium chloride (NS) flush 5-40 mL  5-40 mL IntraVENous Q8H    sodium chloride (NS) flush 5-40 mL  5-40 mL IntraVENous PRN    acetaminophen (TYLENOL) tablet 650 mg  650 mg Oral Q4H PRN    amiodarone (CORDARONE) tablet 200 mg  200 mg Oral DAILY    carvedilol (COREG) tablet 3.125 mg  3.125 mg Oral BID WITH MEALS    zolpidem (AMBIEN) tablet 5 mg  5 mg Oral QHS PRN    methocarbamol (ROBAXIN) tablet 500 mg  500 mg Oral TID    pravastatin (PRAVACHOL) tablet 80 mg  80 mg Oral QHS    famotidine (PEPCID) tablet 20 mg  20 mg Oral QHS    potassium chloride SR (KLOR-CON 10) tablet 40 mEq  40 mEq Oral DAILY    sertraline (ZOLOFT) tablet 100 mg  100 mg Oral DAILY    bumetanide (BUMEX) injection 1 mg  1 mg IntraVENous Q8H    HYDROcodone-acetaminophen (NORCO) 7.5-325 mg per tablet 1 Tab  1 Tab Oral Q6H PRN    warfarin - pharmacy to dose   Other Rx Dosing/Monitoring    levothyroxine (SYNTHROID) tablet 100 mcg  100 mcg Oral ACB       Neil Kuhn M.D.      [facility] [address] [place] [address] 13, [address] [address],[address]   [facility] [facility]   [phone]

## 2019-09-10 NOTE — PROGRESS NOTES
Patient  requesting for cm to check into getting the patient a hospital bed at home, she does have CHF and would meet criteria pending attending MD choice.     Elvira Ruiz [facility]

## 2019-09-11 NOTE — PROGRESS NOTES
Problem: Heart Failure: Day 3  Goal: Medications  Outcome: Progressing Towards Goal     Problem: Falls - Risk of  Goal: *Absence of Falls  Description  Document Michaelle Conrad Fall Risk and appropriate interventions in the flowsheet. Outcome: Progressing Towards Goal  Note:   Fall Risk Interventions:  Mobility Interventions: Patient to call before getting OOB, Strengthening exercises (ROM-active/passive), PT Consult for mobility concerns, PT Consult for assist device competence         Medication Interventions: Teach patient to arise slowly, Utilize gait belt for transfers/ambulation, Patient to call before getting OOB    Elimination Interventions: Patient to call for help with toileting needs    History of Falls Interventions: Consult care management for discharge planning, Door open when patient unattended, Investigate reason for fall, Evaluate medications/consider consulting pharmacy, Room close to nurse's station, Utilize gait belt for transfer/ambulation, Assess for delayed presentation/identification of injury for 48 hrs (comment for end date), Vital signs minimum Q4HRs X 24 hrs (comment for end date)         Problem: Pressure Injury - Risk of  Goal: *Prevention of pressure injury  Description  Document Feliberto Scale and appropriate interventions in the flowsheet.   Outcome: Progressing Towards Goal  Note:   Pressure Injury Interventions:       Moisture Interventions: Apply protective barrier, creams and emollients, Check for incontinence Q2 hours and as needed, Internal/External urinary devices, Maintain skin hydration (lotion/cream)    Activity Interventions: Increase time out of bed, Pressure redistribution bed/mattress(bed type), PT/OT evaluation    Mobility Interventions: HOB 30 degrees or less, Pressure redistribution bed/mattress (bed type), PT/OT evaluation    Nutrition Interventions: Document food/fluid/supplement intake

## 2019-09-11 NOTE — PHYSICIAN ADVISORY
Letter of Status Determination:   Recommend hospitalization status upgraded from   OBSERVATION  to INPATIENT  Status     Pt Name:  Ellen Lopez   MR#   [address] [employer] [facility] # [id number] /  [id number]  Payor: Eleno Valerio / Plan: [address] [place], [place] [address]O / Product Type: [employer] Medicare /    [facility]#  [id number]   Room and Hospital  528/01  @ . formerly [facility] hospital   Hospitalization date  9/9/2019  9:49 AM   Current Attending Physician  Janessa Huitron MD   Principal diagnosis  Lymphedema of both lower extremities [I89.0]     Clinicals  60 y.o. y.o  female hospitalized with above diagnosis   This pt is receiving complex care that will be escalated further with addition of milrinone drip due to poor response from IV diuretics      [place]iman (MCG) criteria   Does   apply CHF M-190    STATUS DETERMINATION  Based on documented presenting clinical data, comorbid conditions, high risk of adverse events and deterioration, it is our recommendation that the patient's status should be upgraded from OBSERVATION to INPATIENT status. The final decision of the patient's hospitalization status depends on the attending physician's judgment. Additional comments     Payor: Eleno Valerio / Plan: [employer] [address] / Product Type: [employer] Care Medicare /     The information in this document is a recommendation to be used for utilization review and utilization management purposes only. This recommendation is not an order. The recommendation is made based on the information reviewed at the time of the referral, is pursuant to the [facility] Conditions of Participation (42 CFR Part 482), and is neither a judgment nor an assessment with regard to the appropriateness or quality of clinical care. For all Managed Care patients:  The Criteria are intended solely for use as screening guidelines with respect to the medical appropriateness of healthcare services and not for final clinical or payment determinations concerning the type or level of medical care provided, or proposed to be provided, to a patient. They help the reviewers determine whether a patient is appropriate for observation or inpatient admission at the time a decision to admit the patient is being made. All efforts are made to apply the pertinent payor criteria (MCG or [employer]) as well as the clinical judgements based on the information reviewed at the time of the referral.\" Nothing in this document may be used to limit, alter, or affect clinical services provided to the patient named below. Victor Manuel Alejo MD MPH FACP   Cell: [phone]  Physician [address]       Cell  [phone]        [phone]    .

## 2019-09-11 NOTE — ADVANCED PRACTICE NURSE
[facility] [address] [place] [address] Drive, [address] [place] [address],[address]   Latesha Larsen   [phone]  9/11/2019.  12:52 PM    Still heavily volume overloaded. Bumex increased to 2 mg IV TID. Still with very little UOP and weight unchanged. D/w Dr. Bob Mcclure, echo reflects EF 30-35%, global hypokinesis and apical dyskinesis. Plan for Milrinone gtt starting at 0.2. Will need to transfer to Telemetry unit to watch for NSVT while on inotrope.     Muna Silva PA-C

## 2019-09-11 NOTE — PROGRESS NOTES
Assessment/Plan/Discussion:Cardiology Attending:     Patient seen on the day of progress note and examined  and agree with Advance Practice Provider (MARIO, NP,PA)  assessment and plans. Ellen Lopez is a 60 y.o. female   Still markedly edematous, its severe  Not much diuresis so far today so we have moved to CVSU and started milrinone  The echo was read as EF 30-35% with Definity and the MUGA at 49%  I have reviewed echo images, I certainly see why called that # but looking at foreshortened 4 chamber and the biplane quant was 48%  Prev echo showed ef 40-45 and previous nuke for stress EF was 47  So I think looking overall the EF is 45% and not changed  Added aldactone  Also note TSH 45, pt is on amiodarone , clinically appears hypothyroid also  --milrinone, move up tomorrow if K and Mag are ok, hx of VT  --temporary decrease amio to 100 mg daily  --increase Synthroid to 150 mcg daily  -- if we dont diurese more with 24 hours of milrinone will further ramp up diuretics  ---look for secondary cause, ZULEYMA with pulmonary HTN, needs OP HST, will start CPAP  TSH being addressed, will check SPEP, UA for protein spilling, copper, iron and CEA  Appears downcast, outlook guarded, bad sign she is not Ash Lam MD                                                                                    Cardiology Progress Note            Admit Date: 9/9/2019  Admit Diagnosis: Lymphedema of both lower extremities [I89.0]  Date: 9/11/2019     Time: 11:51 AM    Subjective:  Feeling about the same. Still SOB, edematous and painful. Assessment and Plan     1. Volume overload              - admitted for volume control              - IV Bumex q 8 hours at 2 mg -               - Check daily BMP              - Daily weights and intake/output              - PureWick, as she get too SOB with standing  2.  DAVID on CKD              - Cr. 1.73, baseline 1.4              - MUGA with LVEF of 49%   09/09/19   ECHO ADULT FOLLOW-UP OR LIMITED 09/10/2019 9/10/2019    Narrative · Left Ventricle: Normal cavity size and wall thickness. Moderate systolic   dysfunction. Estimated left ventricular ejection fraction is 31 - 35%. Biplane method used to measure ejection fraction. Abnormal left   ventricular wall motion. Global hypokinesis with apical dyskinesis. · Left Atrium: Dilated left atrium. · Right Ventricle: Not well visualized. Signed by: Trenton Banuelos MD               - daily BMP              - Hold Lisinopril for DAVID on CKD              - Hold Aldactone while diuresing with Bumex  3. Lymphedema              - Venous insufficiency              - Bumex IV q 8 hours at 2 mg  4. Systolic HF              - acute on chronic              - NYHA class IV              - EF 40-45% on last echo 3/2019 - MUGA with EF 49%. EF on echo 30-35%              - Coreg resumed              - Lisinopril held for DAVID on CKD              - Aldactone held with IV bumex and DAVID - K 4.6              - Daily weights              - proBNP 2700+              - S/p BiV ICD  5. Afib              - PPM              - [address] with Coumadin - Pharmacy consulted for dosing              - Amio 200 mg daily  6. HTN              - well controlled on Coreg  7. Hypothyroidism              - Synthroid PTA 75 mcg              - TSH 45   - T3 1.4   - Synthroid increased to 100 mcg  8. Hypercoagulable state              - INR 4.1              - Pharmacy consulted to dose  9. Hyponatremic              - Na 131              - daily BMP  10. H/o CHB              - s/p PPM - changed to BiV ICD with HF  11. DDD              - chronic pain and on chronic opiates              - will resume home meds  12. Body mass index is 49.32 kg/m². - Morbid obesity - unable to exercise with increased SOB.    Very volume overloaded. Significant edema of legs and abdomen. IV Bumex continues, but with very little affect.   MUGA EF 49%, echo EF 30-35% with noted Global hypokinesis with apical dyskinesis. Will d/w Shanae the imaging paradox concerning EF. May need to stop Bumex and switch to IV Lasix for possible resistance. Past Medical History:   Diagnosis Date    AICD (automatic cardioverter/defibrillator) present     AICD (automatic cardioverter/defibrillator) present     Atrial fibrillation (HCC)     Chronic anticoagulation     was on NOAC before. ?had clot on noac. now on warfarin. Dr. Juan Noriega monitors INR    DDD (degenerative disc disease), cervical 1/30/2018    DDD (degenerative disc disease), thoracic 1/30/2018    Depression     no h/o hospitalization    Essential hypertension 1/8/2018    Incidental pulmonary nodule 2mm, RLL- no follow up needed 2/16/2018    Noted on abdomen/pelvis CT Feb 2018. No smoking history. 1. No renal, ureteral, or bladder calculi. 2. No acute findings. 3. 2 mm pulmonary nodule right lower lobe. No follow-up needed if the patient is low risk. If the patient is high-risk, CT could be considered in 1 year.      Insomnia     Kidney infarction ([address] 2017    d/t clot per pt. recalls being on blood thinner.      Lymphedema 8/23/2019    Lymphedema of left arm     developed after infection of pacemaker site    Obesity, morbid ([address] 1/30/2018    Stomach flu 2015    hospital admit X2    Systolic CHF, chronic (HCC)     EF 45% normal caths and stress tests      Social History     Tobacco Use    Smoking status: Never Smoker    Smokeless tobacco: Never Used   Substance Use Topics    Alcohol use: No    Drug use: No         ROS:     GENERAL   Recent weight loss - no   Fever -----------------   no   Chills -----------------   no     EYES, VISION   Visual Changes - no     EARS, NOSE, THROAT   Hearing loss ----------- no   Swallowing difficulties - no     CARDIOVASCULAR   Chest pain/pressure ---- no   Arrhythmia/palpitations - no       RESPIRATORY   Cough ------------------ no   Shortness of breath - no   Wheezing -------------- no GASTROINTESTINAL   Abdominal pain - no   Heartburn -------- no   Bloody stool ----- no     GENITOURINARY   Frequent urination - no   Urgency -------------- no     MUSCULOSKELETAL   Joint pain/swelling ---- no   Musculoskeletal pain - no     SKIN & INTEGUMENTARY   Rashes - no   Sores --- no         NEUROLOGICAL   Numbness/tingling - no   Sensation loss ------ no     PSYCHIATRIC   Nervousness/anxiety - no   Depression -------------- no     ENDOCRINE   Heat/cold intolerance - no   Excessive thirst -------- no     HEMATOLOGIC/LYMPHATIC   Abnormal bleeding - no     ALL/IMMUN   Allergic reaction ------ no   Recurrent infections - no     Objective:     Physical Exam:                Visit Vitals  /59   Pulse 60   Temp 97.8 °F (36.6 °C)   Resp 16   Ht 5' 5\" (1.651 m)   Wt 313 lb 3.2 oz (142.1 kg)   SpO2 98%   BMI 52.12 kg/m²        General Appearance:   Well developed, well nourished,alert and oriented x 3, and   individual in no acute distress. Ears/Nose/Mouth/Throat:    Hearing grossly normal.         Neck:  Supple. Chest:    Lungs diminished, crackles in bases to auscultation bilaterally. Cardiovascular:   Regular rate and rhythm, S1, S2 normal, no murmur. Abdomen:    Soft, non-tender, bowel sounds are active. Extremities:  4+ edema bilaterally. Skin:  Warm and dry.      Telemetry: normal sinus rhythm          Data Review:    Labs:    Recent Results (from the past 24 hour(s))   NUCLEAR CARDIAC BLOOD POOL MUGA REST/STRS    Collection Time: 09/10/19 11:49 AM   Result Value Ref Range    Target  bpm   ECHO ADULT FOLLOW-UP OR LIMITED    Collection Time: 09/10/19  2:49 PM   Result Value Ref Range    LV ED Vol A2C 142.2 mL    LV ES Vol A4C 75.1 mL    LV ES Vol BP 70.5 (A) 19 - 49 mL    BP EF 48.5 (A) 55 - 100 %    LV Ejection Fraction MOD 4C 39 %    LV Ejection Fraction MOD 2C 53 %    LV ES Vol A2C 66.8 mL    LVES Vol Index BP 29.5 mL/m2    LV ED Vol A4C 123.1 mL    LVED Vol Index BP 57.2 mL/m2    LV ED Vol .8 (A) 56 - 104 ml    LVED Vol Index A4C 51.5 mL/m2    LVED Vol Index A2C 59.4 mL/m2    LVES Vol Index A4C 31.4 mL/m2    LVES Vol Index A2C 27.9 mL/m2   POC INR    Collection Time: 09/10/19  2:56 PM   Result Value Ref Range    INR (POC) 5.1 (HH) <1.2            Radiology:        Current Facility-Administered Medications   Medication Dose Route Frequency    miconazole (MICOTIN) 2 % powder   Topical Q12H    balsam peru-castor oil (VENELEX) ointment   Topical Q8H    bumetanide (BUMEX) injection 2 mg  2 mg IntraVENous Q8H    spironolactone (ALDACTONE) tablet 50 mg  50 mg Oral DAILY    sodium chloride (NS) flush 5-40 mL  5-40 mL IntraVENous Q8H    sodium chloride (NS) flush 5-40 mL  5-40 mL IntraVENous PRN    acetaminophen (TYLENOL) tablet 650 mg  650 mg Oral Q4H PRN    amiodarone (CORDARONE) tablet 200 mg  200 mg Oral DAILY    carvedilol (COREG) tablet 3.125 mg  3.125 mg Oral BID WITH MEALS    zolpidem (AMBIEN) tablet 5 mg  5 mg Oral QHS PRN    methocarbamol (ROBAXIN) tablet 500 mg  500 mg Oral TID    pravastatin (PRAVACHOL) tablet 80 mg  80 mg Oral QHS    famotidine (PEPCID) tablet 20 mg  20 mg Oral QHS    potassium chloride SR (KLOR-CON 10) tablet 40 mEq  40 mEq Oral DAILY    sertraline (ZOLOFT) tablet 100 mg  100 mg Oral DAILY    HYDROcodone-acetaminophen (NORCO) 7.5-325 mg per tablet 1 Tab  1 Tab Oral Q6H PRN    warfarin - pharmacy to dose   Other Rx Dosing/Monitoring    levothyroxine (SYNTHROID) tablet 100 mcg  100 mcg Oral ACB       Neil Kramer Both Yolanda Verde M.D.      [facility] [address] [place] [address] 13, [address] [facility],[address]   Justin Larsen   [phone]

## 2019-09-11 NOTE — ROUTINE PROCESS
TRANSFER - OUT REPORT:    Verbal report given to Sharon(name) on Sherita Mota  being transferred to (unit) for routine progression of care       Report consisted of patients Situation, Background, Assessment and   Recommendations(SBAR). Information from the following report(s) SBAR was reviewed with the receiving nurse. Lines:   Peripheral IV 09/10/19 Right Antecubital (Active)   Site Assessment Clean, dry, & intact 9/11/2019  8:15 AM   Phlebitis Assessment 0 9/11/2019  8:15 AM   Infiltration Assessment 0 9/11/2019  8:15 AM   Dressing Status Clean, dry, & intact 9/11/2019  8:15 AM   Dressing Type Tape 9/11/2019  8:15 AM   Hub Color/Line Status Blue 9/11/2019  8:15 AM   Action Taken Open ports on tubing capped 9/11/2019  8:15 AM   Alcohol Cap Used Yes 9/11/2019  8:15 AM       Peripheral IV 09/10/19 Posterior;Right Hand (Active)   Site Assessment Clean, dry, & intact 9/11/2019  8:15 AM   Phlebitis Assessment 0 9/11/2019  8:15 AM   Infiltration Assessment 0 9/11/2019  8:15 AM   Dressing Status Clean, dry, & intact 9/11/2019  8:15 AM   Dressing Type Transparent 9/11/2019  8:15 AM   Hub Color/Line Status Yellow 9/11/2019  8:15 AM   Action Taken Open ports on tubing capped 9/11/2019  8:15 AM   Alcohol Cap Used Yes 9/11/2019  8:15 AM        Opportunity for questions and clarification was provided.       Patient transported with:   O2 @ 3 liters

## 2019-09-11 NOTE — PROGRESS NOTES
Spiritual Care Partner Volunteer visited patient in room 528/01 on 9.11.19. Documented by: : Rev. Jackie Donald.  Angella Jones; [facility], to contact [phone] [address] Sandoval call: [phone]

## 2019-09-11 NOTE — PROGRESS NOTES
1545:  TRANSFER - IN REPORT:    Verbal report received from TINY Lara(name) on Sherita Mota  being received from 5W(unit) for routine progression of care      Report consisted of patients Situation, Background, Assessment and   Recommendations(SBAR). Information from the following report(s) SBAR, Kardex, Procedure Summary, Intake/Output, MAR and Recent Results was reviewed with the receiving nurse. Opportunity for questions and clarification was provided. Assessment completed upon patients arrival to unit and care assumed. 1640:  Patient arrived to unit, placed on tele and confirmed with monitor, vitals and assessment complete. Patient refused to be turned. 1700: Milrinone initiated. 1930:  Bedside shift change report given to TINY Virgen (oncoming nurse) by Caitlyn Smith RN (offgoing nurse). Report included the following information SBAR, Kardex, Procedure Summary, Intake/Output, MAR and Recent Results.

## 2019-09-12 NOTE — PROGRESS NOTES
Reason for Admission:   Lymphedema of both lower extremities                  RRAT Score:     15             Do you (patient/family) have any concerns for transition/discharge? Patient's  concerned about patient's decline with ambulation. Patient's  inquired about hospital bed and bedside commode. CM advised patient's  of pending patient's clinical progress, we will address these possible needs closer to discharge. Plan for utilizing home health:   TBD    Current Advanced Directive/Advance Care Plan:  Not on file. Transition of Care Plan:        CM met with patient's  at bedside while patient was resting to introduce self and discuss role. Patient's  states the following for initial assessment:    1. ADLs, self-care, orientation baseline - Patient limited for ambulation. Needs human and mechanical assistance for ADLs. Patient has walker, cane, and wheelchair. Patient has been predominately using wheelchair for a few weeks prior to admission to [facility]. Patient requires human assistance for self-care. Patient is alert and oriented at baseline. 2.  Oxygen - Patient's  states that patient uses oxygen 24/7. Oxygen supply company is [employer]. Patient on 3L of oxygen at home. 3.  Social - Patient resides with her  at address on file. [facility] [address]. Ground floor apartment that is ADA compliant. Zero entry steps and handrails have been installed. Patient does not drive. Patient and  have one son that lives nearby in the area. 4.  Disposition - Patient's  states the patient will refuse any form of rehab placement. Patient would possibly be agreeable to home health for therapy. 5.  Medications - Patient uses [facility] on [address] [place] [address]. No concerns or challenges for affording medications. CM will continue to follow.     Rachna Berry, MPH

## 2019-09-12 NOTE — PROGRESS NOTES
0730: Bedside shift change report given to Cristela RN (oncoming nurse) by Promise RN (offgoing nurse). Report included the following information SBAR, Kardex, Intake/Output, MAR and Accordion. 1100: US at bedside for renal US. 1230: Bladder scan shows 40 ml PVR    1600: Orders to hold coumadin tonight for paracentesis tomorrow. 1930: Bedside shift change report given to [address] [place] [address] (oncoming nurse) by Naya Abdi RN (offgoing nurse). Report included the following information SBAR, Kardex, Procedure Summary, Intake/Output, MAR, Accordion and Recent Results. Problem: Heart Failure: Day 1  Goal: Activity/Safety  Outcome: Progressing Towards Goal  Goal: Diagnostic Test/Procedures  Outcome: Progressing Towards Goal  Goal: Nutrition/Diet  Outcome: Progressing Towards Goal  Goal: Medications  Outcome: Progressing Towards Goal     Problem: Pressure Injury - Risk of  Goal: *Prevention of pressure injury  Description  Document Feliberto Scale and appropriate interventions in the flowsheet.   Outcome: Progressing Towards Goal  Note:   Pressure Injury Interventions:       Moisture Interventions: Internal/External urinary devices, Maintain skin hydration (lotion/cream), Apply protective barrier, creams and emollients    Activity Interventions: Chair cushion, Increase time out of bed    Mobility Interventions: HOB 30 degrees or less, Pressure redistribution bed/mattress (bed type)    Nutrition Interventions: Document food/fluid/supplement intake, Discuss nutritional consult with provider    Friction and Shear Interventions: HOB 30 degrees or less

## 2019-09-12 NOTE — CDMP QUERY
Patient admitted with acute on chronic systolic CHF, noted to have DAVID on CKD. If possible, please document in progress notes and d/c summary if you are evaluating and/or treating any of the following:            =>  Acute kidney injury on CKD (stage)        =>  Acute kidney insufficiency on CKD (stage)  =>  Other Explanation of clinical findings  =>  Clinically Undetermined (no explanation for clinical findings)    The medical record reflects the following:       Risk Factors: Baseline Cr 1.48 w/GFR 38, CHF, HTN       Clinical Indicators: Cr 1.90 w/GFR 27 on admission.          Treatment: Holding Lisinopril and Aldactone while diuresing with Bumex, monitor renal function every day     Reference  Stage of CKD:    Stage I: GFR > 90  Stage II: GFR 60-89  Stage III: GFR 30-59  Stage IV: GFR 15-29  Stage V: GFR < 15    Thank you,     Carmina Judd, RN, MSN, [facility], CDMP  [phone]

## 2019-09-12 NOTE — PROGRESS NOTES
Pharmacist Note - Warfarin Dosing  Consult provided for this 60 y. o.female to manage warfarin for Atrial Fibrillation    INR Goal: 2 - 3    Home regimen/ tablet size: 1.5 mg M/Tu/Th/F/Su; 3 mg W/Sa    Drugs that may increase INR: Amiodarone  Drugs that may decrease INR: None  Other current anticoagulants/ drugs that may increase bleeding risk: None  Risk factors: None  Daily INR ordered: YES    Recent Labs     09/12/19  0325 09/11/19  1003 09/10/19  1456 09/09/19  1005   HGB  --   --   --  10.9*   INR 2.6* 3.4* 5.1* 4.1*     Date               INR                  Dose  9/9  4.1  hold   9/10  5.1  hold   9/11  3.4  hold   9/12  2.6   1 mg                                                                                Assessment/ Plan: Will order warfarin 1 mg PO x 1 dose. Pharmacy will continue to monitor daily and adjust therapy as indicated.

## 2019-09-12 NOTE — PROGRESS NOTES
Spiritual Care Assessment/Progress Note  [facility]      NAME: Romelia Anthony      MRN: [id number]  AGE: 60 y.o. SEX: female  Congregational Affiliation: Ashleigh   Language: English     9/12/2019     Total Time (in minutes): 15     Spiritual Assessment begun in [facility] CV SERVICES UNIT through conversation with:         [x]Patient        [x] Family    [] Friend(s)        Reason for Consult: Initial/Spiritual assessment, patient floor     Spiritual beliefs: (Please include comment if needed)     [x] Identifies with a tomás tradition:         [x] Supported by a tomás community:            [] Claims no spiritual orientation:           [] Seeking spiritual identity:                [] Adheres to an individual form of spirituality:           [] Not able to assess:                           Identified resources for coping:      [x] Prayer                               [] Music                  [] Guided Imagery     [x] Family/friends                 [] Pet visits     [] Devotional reading                         [] Unknown     [] Other:                                            Interventions offered during this visit: (See comments for more details)    Patient Interventions: Affirmation of tomás, Affirmation of emotions/emotional suffering, Coping skills reviewed/reinforced, Iconic (affirming the presence of God/Higher Power), Initial/Spiritual assessment, patient floor, Normalization of emotional/spiritual concerns, Prayer (assurance of), Congregational beliefs/image of God discussed     Family/Friend(s):  Affirmation of tomás, Iconic (affirming the presence of God/Higher Power), Normalization of emotional/spiritual concerns, Prayer (assurance of), Congregational beliefs/image of God discussed     Plan of Care:     [x] Support spiritual and/or cultural needs    [] Support AMD and/or advance care planning process      [] Support grieving process   [] Coordinate Rites and/or Rituals    [] Coordination with community clergy   [] No spiritual needs identified at this time   [] Detailed Plan of Care below (See Comments)  [] Make referral to Music Therapy  [] Make referral to Pet Therapy     [] Make referral to Addiction services  [] Make referral to [facility]  [] Make referral to Spiritual Care Partner  [] No future visits requested        [x] Follow up visits as needed     Comments: visited pt in room 456. , son and son's girlfriend in room at time of visit. When  entered the room, family members and pt was laughing. Family relies on their tomás, prayer and laughter to cope with difficult situations.  listened as pt shared strong, family dynamics. Family is a close knit family that love and support each other. Pt asked  to visit again later.  assured pt of continued prayer and spiritual support.  will follow up as requested or as needed.     Елена Guo  Intern, MDiv  [phone]

## 2019-09-13 NOTE — PROGRESS NOTES
0730: Bedside and Verbal shift change report given [facility] and Geronimo Byrne RN (oncoming nurse) by TINY Virgen (offgoing nurse). Report included the following information SBAR, Kardex, Intake/Output, MAR, Accordion, Recent Results and Cardiac Rhythm paced. 0900: ultrasound at bedside for paracentesis     1930: Bedside and Verbal shift change report given to Eunice Gill RN (oncoming nurse) by TINY Gee (offgoing nurse). Report included the following information SBAR, Kardex, Intake/Output, MAR, Accordion, Recent Results and Cardiac Rhythm paced.

## 2019-09-13 NOTE — PROGRESS NOTES
Pharmacist Note - Warfarin Dosing  Consult provided for this 60 y. o.female to manage warfarin for Atrial Fibrillation    INR Goal: 2 - 3    Home regimen/ tablet size: 1.5 mg M/Tu/Th/F/Su; 3 mg W/Sa    Drugs that may increase INR: Amiodarone  Drugs that may decrease INR: None  Other current anticoagulants/ drugs that may increase bleeding risk: None  Risk factors: None  Daily INR ordered: YES    Recent Labs     09/13/19  0543 09/12/19  0325 09/11/19  1003   INR 1.9* 2.6* 3.4*     Date               INR                  Dose  9/9  4.1  hold   9/10  5.1  hold   9/11  3.4  hold   9/12  2.6   1 mg  9/13  1.9  2.5 mg                                                                                 Assessment/ Plan: Will order warfarin 2.5 mg PO x 1 dose. Pharmacy will continue to monitor daily and adjust therapy as indicated.

## 2019-09-13 NOTE — WOUND CARE
Wound Care Note:     Follow-up visit for right ankle, sacrum and pannus    Chart shows:  Admitted for lymphedema of both lower extremities    Past Medical History:   Diagnosis Date    AICD (automatic cardioverter/defibrillator) present     AICD (automatic cardioverter/defibrillator) present     Atrial fibrillation (HCC)     Chronic anticoagulation     was on NOAC before. ?had clot on noac. now on warfarin. Dr. Jo monitors INR    DDD (degenerative disc disease), cervical 1/30/2018    DDD (degenerative disc disease), thoracic 1/30/2018    Depression     no h/o hospitalization    Essential hypertension 1/8/2018    Incidental pulmonary nodule 2mm, RLL- no follow up needed 2/16/2018    Noted on abdomen/pelvis CT Feb 2018. No smoking history. 1. No renal, ureteral, or bladder calculi. 2. No acute findings. 3. 2 mm pulmonary nodule right lower lobe. No follow-up needed if the patient is low risk. If the patient is high-risk, CT could be considered in 1 year.      Insomnia     Kidney infarction ([address] 2017    d/t clot per pt. recalls being on blood thinner.  Lymphedema 8/23/2019    Lymphedema of left arm     developed after infection of pacemaker site    Obesity, morbid ([address] 1/30/2018    Stomach flu 2015    hospital admit X2    Systolic CHF, chronic (HCC)     EF 45% normal caths and stress tests     WBC = 9.3 on 9/9/19  Admitted from home    Assessment:   Patient is A&O x 4, communicative, incontinent with moderate assistance needed in repositioning. Bed: [facility]  Patient has a Pure [place] Chin in place. Diet: Cardiac regular with nutritional supplements    Bilateral heels skin intact with slight erythema on left heel that is blanchable. 1. POA right anterior ankle still has a linear line of intact serous blisters, no drainage, chiqui-wound intact with edema, wound edges are closed.   Wound is open to air which is preferred by patient; stated dressing was irratating. Left open to air. 2.  POA Maculopapular erythematous rash with satellite lesions consistent with yeast to pannus is improving. Antifungal powder applied. 3.  POA sacrum was unable to be assessed at this time, paracentesis recently placed and patient does not want to turn. Notified nurse sacral skin not assessed. Patient left in place. Heels offloaded on pillows. Recommendations:    Sacrum and bilateral heels- Every 8 hours liberally apply Venelex ointment.     Pannus- Every 12 hours cleanse with soap and water, blot dry, sprinkle with Nystatin powder and gently rub in.     Right anterior ankle- Every other day cleanse with normal saline, wipe wound bed clean and dry, apply a piece of Xeroform gauze that has been folded in half, cover with Mepilex border. NOTE:  Can be left open to air if patient does not want dressing. Skin Care & Pressure Prevention:  Minimize layers of linen/pads under patient to optimize support surface. Turn/reposition approximately every 2 hours and offload heels.   Manage incontinence / promote continence     Discussed above plan with patient & Yolande Potter RN    Transition of Care: Plan to follow as needed while admitted to hospital.    REYES Rowell, RN, [facility], [facility].  office [phone]  pager [phone] or call  to page

## 2019-09-13 NOTE — PROGRESS NOTES
Assessment/Plan/Discussion:Cardiology Attending:     Patient seen on the day of progress note and examined  and agree with Advance Practice Provider (MARIO, NP,PA)  assessment and plans. Snow Mcgowan is a 60 y.o. female   Had paracentesis this morning, some relief in symptoms but still lot of leg and generalized edema  Have changed to lasix for different diruetic, this evening some increae in urine flow  Has EF 45% or so, no CAD with moderate CKD  On amio for VT and has marked hypothyroidism, we have increased synthroid  More than CHF going on here    Intake/Output Summary (Last 24 hours) at 9/13/2019 1710  Last data filed at 9/13/2019 1502  Gross per 24 hour   Intake 1128.3 ml   Output 38280 ml   Net -9731.7 ml      Wt Readings from Last 3 Encounters:   09/13/19 315 lb 14.7 oz (143.3 kg)   08/23/19 296 lb 6.4 oz (134.4 kg)   06/06/19 282 lb (127.9 kg)      Pearl Cuba MD                                                                                  Cardiology Progress Note            Admit Date: 9/9/2019  Admit Diagnosis: Lymphedema of both lower extremities [I89.0]  Lymphedema [I89.0]  Date: 9/13/2019     Time: 11:51 AM    Subjective:  Really no change in volume, SOB or edema. Getting prepped in room for US guided paracentesis. Assessment and Plan     1. Volume overload              - admitted for volume control              - IV Bumex q 8 hours at 2 mg - Changed to Lasix 40 mg IV 8 hour, ? Bumex resistence              - Check daily BMP              - Daily weights and intake/output              - PureWick, as she get too SOB with standing  2. DAVID on CKD              - Cr. 1.72, baseline 1.4  Results from Hospital Encounter encounter on 09/09/19   US RETROPERITONEUM COMP    Narrative RENAL AND BLADDER ULTRASOUND    INDICATION: elevated creatinine, low urine output. COMPARISON: None. TECHNIQUE:  Sonography of the kidneys, retroperitoneum, and bladder was performed.     FINDINGS:    RIGHT KIDNEY: measures 9.3 cm in length, although difficult to visualize due to  large body habitus and massive ascites. Echogenicity is abnormally increased. No  hydronephrosis, large shadowing calculus, or obvious solid contour-deforming  renal mass. LEFT KIDNEY: measures 8.2 cm in length, small and very difficult to visualize  due to large body habitus and massive ascites. Renal echogenicity is abnormally  increased. No hydronephrosis, large shadowing calculus, or obvious solid  contour-deforming renal mass. AORTA: Normal caliber in its visualized portions. COMMON ILIAC ARTERIES: Not seen. IVC: Normal caliber in its visualized portions. BLADDER: Incompletely distended and not assessed. Incidentally noted massive ascites and moderate bilateral pleural effusions. IMPRESSION:   1. Poorly visualized kidneys showing increased echogenicity consistent with  medical renal parenchymal disease but no hydronephrosis. 2. Urinary bladder not assessed. 3. Large ascites. 4. Moderate bilateral pleural effusions. - daily BMP              - Hold Lisinopril for DAVID on CKD              - Hold Aldactone while diuresing with Bumex  3. Lymphedema              - Venous insufficiency              - Bumex IV q 8 hours at 2 mg - changed to Lasix as above  4. Systolic HF              - acute on chronic              - NYHA class IV              - EF 40-45% on last echo 3/2019 - MUGA with EF 49%. EF on echo 30-35%              - MUGA with LVEF of 49%  09/09/19   ECHO ADULT FOLLOW-UP OR LIMITED 09/10/2019 9/10/2019    Narrative · Left Ventricle: Normal cavity size and wall thickness. Moderate systolic   dysfunction. Estimated left ventricular ejection fraction is 31 - 35%. Biplane method used to measure ejection fraction. Abnormal left   ventricular wall motion. Global hypokinesis with apical dyskinesis. · Left Atrium: Dilated left atrium. · Right Ventricle: Not well visualized.         Signed by: Jacob Monique MD ANALI               - Coreg resumed              - Lisinopril held for DAVID on CKD              - Aldactone held with IV bumex and DAVID - K 4.2              - Daily weights, unchanged. - proBNP 2700+              - S/p BiV ICD  5. Afib              - PPM              - [address] with Coumadin - Pharmacy consulted for dosing   - Held today for paracentesis              - Amio 200 mg daily  6. HTN              - well controlled on Coreg  7. Hypothyroidism              - Synthroid PTA 75 mcg              - TSH 45   - T3 1.4   - Synthroid increased to 150 mcg  8. Hypercoagulable state              - INR 1.7 - held for paracentesis today. - Pharmacy consulted to dose  9. Hyponatremic              - Na 131              - daily BMP  10. H/o CHB              - s/p PPM - changed to BiV ICD with HF  11. DDD              - chronic pain and on chronic opiates              - will resume home meds  12. Body mass index is 49.32 kg/m². - Morbid obesity - unable to exercise with increased SOB.     Still very little UOP on Bumex TID. Changed to IV Lasix. Bladder scans with low residuals. US of kidneys c/w medical renal parenchymal disease. Paracentesis this am, fluid to be sent. Will resume [address] later today. Seeming less of a picture HF as lone contributor to volume overload. Following up on studies. Past Medical History:   Diagnosis Date    AICD (automatic cardioverter/defibrillator) present     AICD (automatic cardioverter/defibrillator) present     Atrial fibrillation (HCC)     Chronic anticoagulation     was on NOAC before. ?had clot on noac. now on warfarin. Dr. Velazquez Fees monitors INR    DDD (degenerative disc disease), cervical 1/30/2018    DDD (degenerative disc disease), thoracic 1/30/2018    Depression     no h/o hospitalization    Essential hypertension 1/8/2018    Incidental pulmonary nodule 2mm, RLL- no follow up needed 2/16/2018    Noted on abdomen/pelvis CT Feb 2018.  No smoking history. 1. No renal, ureteral, or bladder calculi. 2. No acute findings. 3. 2 mm pulmonary nodule right lower lobe. No follow-up needed if the patient is low risk. If the patient is high-risk, CT could be considered in 1 year.      Insomnia     [facility] 2017    d/t clot per pt. recalls being on blood thinner.      Lymphedema 8/23/2019    Lymphedema of left arm     developed after infection of pacemaker site    Obesity, morbid ([facility] 1/30/2018    Stomach flu 2015    hospital admit X2    Systolic CHF, chronic (HCC)     EF 45% normal caths and stress tests      Social History     Tobacco Use    Smoking status: Never Smoker    Smokeless tobacco: Never Used   Substance Use Topics    Alcohol use: No    Drug use: No         ROS:     GENERAL   Recent weight loss - no   Fever -----------------   no   Chills -----------------   no     EYES, VISION   Visual Changes - no     EARS, NOSE, THROAT   Hearing loss ----------- no   Swallowing difficulties - no     CARDIOVASCULAR   Chest pain/pressure ---- no   Arrhythmia/palpitations - no       RESPIRATORY   Cough ------------------ no   Shortness of breath - yes   Wheezing -------------- no   GASTROINTESTINAL   Abdominal pain - no   Heartburn -------- no   Bloody stool ----- no     GENITOURINARY   Frequent urination - no   Urgency -------------- no     MUSCULOSKELETAL   Joint pain/swelling ---- yes   Musculoskeletal pain - yes     SKIN & INTEGUMENTARY   Rashes - no   Sores --- no         NEUROLOGICAL   Numbness/tingling - no   Sensation loss ------ no     PSYCHIATRIC   Nervousness/anxiety - no   Depression -------------- no     ENDOCRINE   Heat/cold intolerance - no   Excessive thirst -------- no     HEMATOLOGIC/LYMPHATIC   Abnormal bleeding - no     ALL/IMMUN   Allergic reaction ------ no   Recurrent infections - no     Objective:     Physical Exam:                Visit Vitals  BP (!) 117/35 (BP 1 Location: Right arm, BP Patient Position: At rest) Pulse 60   Temp 98 °F (36.7 °C)   Resp 18   Ht 5' 5\" (1.651 m)   Wt 315 lb 14.7 oz (143.3 kg)   SpO2 98%   BMI 52.57 kg/m²        General Appearance:   Well developed, well nourished,alert and oriented x 3, and   individual in no acute distress. Looks miserable   Ears/Nose/Mouth/Throat:    Hearing grossly normal.         Neck:  Supple. Chest:    Lungs diminished, crackles in bases to auscultation bilaterally. Cardiovascular:   Regular rate and rhythm, S1, S2 normal, no murmur. Abdomen:    Soft, non-tender, bowel sounds are active. Extremities:  4+ edema bilaterally. Skin:  Warm and dry. Telemetry: normal sinus rhythm          Data Review:    Labs:    Recent Results (from the past 24 hour(s))   HEPATIC FUNCTION PANEL    Collection Time: 09/12/19 12:45 PM   Result Value Ref Range    Protein, total 6.8 6.4 - 8.2 g/dL    Albumin 2.8 (L) 3.5 - 5.0 g/dL    Globulin 4.0 2.0 - 4.0 g/dL    A-G Ratio 0.7 (L) 1.1 - 2.2      Bilirubin, total 0.8 0.2 - 1.0 MG/DL    Bilirubin, direct 0.2 0.0 - 0.2 MG/DL    Alk.  phosphatase 76 45 - 117 U/L    AST (SGOT) 14 (L) 15 - 37 U/L    ALT (SGPT) 12 12 - 78 U/L   PROTHROMBIN TIME + INR    Collection Time: 09/13/19  5:43 AM   Result Value Ref Range    INR 1.9 (H) 0.9 - 1.1      Prothrombin time 18.8 (H) 9.0 - 47.6 sec   METABOLIC PANEL, BASIC    Collection Time: 09/13/19  5:43 AM   Result Value Ref Range    Sodium 132 (L) 136 - 145 mmol/L    Potassium 4.2 3.5 - 5.1 mmol/L    Chloride 99 97 - 108 mmol/L    CO2 25 21 - 32 mmol/L    Anion gap 8 5 - 15 mmol/L    Glucose 105 (H) 65 - 100 mg/dL    BUN 28 (H) 6 - 20 MG/DL    Creatinine 1.72 (H) 0.55 - 1.02 MG/DL    BUN/Creatinine ratio 16 12 - 20      GFR est AA 37 (L) >60 ml/min/1.73m2    GFR est non-AA 30 (L) >60 ml/min/1.73m2    Calcium 8.7 8.5 - 10.1 MG/DL          Radiology:        Current Facility-Administered Medications   Medication Dose Route Frequency    furosemide (LASIX) injection 40 mg  40 mg IntraVENous Q8H    milrinone (PRIMACOR) 20 MG/100 ML D5W infusion  0.2 mcg/kg/min IntraVENous CONTINUOUS    levothyroxine (SYNTHROID) tablet 150 mcg  150 mcg Oral ACB    amiodarone (CORDARONE) tablet 100 mg  100 mg Oral DAILY    miconazole (MICOTIN) 2 % powder   Topical Q12H    balsam peru-castor oil (VENELEX) ointment   Topical Q8H    spironolactone (ALDACTONE) tablet 50 mg  50 mg Oral DAILY    sodium chloride (NS) flush 5-40 mL  5-40 mL IntraVENous Q8H    sodium chloride (NS) flush 5-40 mL  5-40 mL IntraVENous PRN    acetaminophen (TYLENOL) tablet 650 mg  650 mg Oral Q4H PRN    carvedilol (COREG) tablet 3.125 mg  3.125 mg Oral BID WITH MEALS    zolpidem (AMBIEN) tablet 5 mg  5 mg Oral QHS PRN    methocarbamol (ROBAXIN) tablet 500 mg  500 mg Oral TID    pravastatin (PRAVACHOL) tablet 80 mg  80 mg Oral QHS    famotidine (PEPCID) tablet 20 mg  20 mg Oral QHS    potassium chloride SR (KLOR-CON 10) tablet 40 mEq  40 mEq Oral DAILY    sertraline (ZOLOFT) tablet 100 mg  100 mg Oral DAILY    HYDROcodone-acetaminophen (NORCO) 7.5-325 mg per tablet 1 Tab  1 Tab Oral Q6H PRN    warfarin - pharmacy to dose   Other Rx Dosing/Monitoring       Neil Rhodes M.D.      [facility] [address] [place] [address] 13 [address] [facility] [address]   Latesha Larsen   [phone]

## 2019-09-13 NOTE — PROGRESS NOTES
0800: Bedside and Verbal shift change report given to Mine Ackerman (oncoming nurse) by [facility] (offgoing nurse). Report included the following information SBAR, Kardex, Intake/Output, MAR, Recent Results, Med Rec Status and Cardiac Rhythm Paced.

## 2019-09-14 NOTE — PROGRESS NOTES
Pharmacist Note - Warfarin Dosing  Consult provided for this 60 y. o.female to manage warfarin for Atrial Fibrillation    INR Goal: 2 - 3    Home regimen/ tablet size: 1.5 mg M/Tu/Th/F/Su; 3 mg W/Sa    Drugs that may increase INR: Amiodarone  Drugs that may decrease INR: None  Other current anticoagulants/ drugs that may increase bleeding risk: None  Risk factors: HF  Daily INR ordered: YES    Recent Labs     09/14/19  0357 09/13/19  0543 09/12/19  0325   INR 1.6* 1.9* 2.6*     Date               INR                  Dose  9/9  4.1  hold   9/10  5.1  hold   9/11  3.4  hold   9/12  2.6   1 mg  9/13  1.9  2.5 mg    9/14  1.6  2.5 mg                                                                                Assessment/ Plan: Will order warfarin 2.5 mg PO x 1 dose. Pharmacy will continue to monitor daily and adjust therapy as indicated.

## 2019-09-14 NOTE — PROGRESS NOTES
Cardiology Progress Note  9/14/2019 11:41 AM    Admit Date: 9/9/2019    Admit Diagnosis: Lymphedema of both lower extremities [I89.0]; Lymphedema [I89.0]      Assessment:     Active Problems:    Lymphedema (8/23/2019)      Lymphedema of both lower extremities (9/9/2019)        Plan:     Heart Failure improved    Continue current meds. Negative > 11 liters, creatinine lower, IV lasix tid. Be Soto MD  [phone]  Cell        Subjective:     Buzzy Blew admits to dyspnea, abdominal pain. She reports symptoms are improved since yesterday. ROS: negative except as noted above.     Objective:       Visit Vitals  BP (!) 100/22 (BP 1 Location: Right arm, BP Patient Position: At rest)   Pulse 60   Temp 97.5 °F (36.4 °C)   Resp 18   Ht 5' 5\" (1.651 m)   Wt 132.1 kg (291 lb 3.6 oz)   SpO2 95%   BMI 48.46 kg/m²       Current Facility-Administered Medications   Medication Dose Route Frequency    furosemide (LASIX) injection 40 mg  40 mg IntraVENous Q8H    milrinone (PRIMACOR) 20 MG/100 ML D5W infusion  0.2 mcg/kg/min IntraVENous CONTINUOUS    levothyroxine (SYNTHROID) tablet 150 mcg  150 mcg Oral ACB    amiodarone (CORDARONE) tablet 100 mg  100 mg Oral DAILY    miconazole (MICOTIN) 2 % powder   Topical Q12H    balsam peru-castor oil (VENELEX) ointment   Topical Q8H    spironolactone (ALDACTONE) tablet 50 mg  50 mg Oral DAILY    sodium chloride (NS) flush 5-40 mL  5-40 mL IntraVENous Q8H    sodium chloride (NS) flush 5-40 mL  5-40 mL IntraVENous PRN    acetaminophen (TYLENOL) tablet 650 mg  650 mg Oral Q4H PRN    carvedilol (COREG) tablet 3.125 mg  3.125 mg Oral BID WITH MEALS    zolpidem (AMBIEN) tablet 5 mg  5 mg Oral QHS PRN    methocarbamol (ROBAXIN) tablet 500 mg  500 mg Oral TID    pravastatin (PRAVACHOL) tablet 80 mg  80 mg Oral QHS    famotidine (PEPCID) tablet 20 mg  20 mg Oral QHS    potassium chloride SR (KLOR-CON 10) tablet 40 mEq  40 mEq Oral DAILY    sertraline (ZOLOFT) tablet 100 mg  100 mg Oral DAILY    HYDROcodone-acetaminophen (NORCO) 7.5-325 mg per tablet 1 Tab  1 Tab Oral Q6H PRN    warfarin - pharmacy to dose   Other Rx Dosing/Monitoring         Physical Exam:  Visit Vitals  BP (!) 100/22 (BP 1 Location: Right arm, BP Patient Position: At rest)   Pulse 60   Temp 97.5 °F (36.4 °C)   Resp 18   Ht 5' 5\" (1.651 m)   Wt 132.1 kg (291 lb 3.6 oz)   SpO2 95%   BMI 48.46 kg/m²     General Appearance:  Well developed, well nourished,alert and oriented x 3, and individual in no acute distress. Ears/Nose/Mouth/Throat:   Hearing grossly normal.         Neck: Supple. Chest:   Lungs clear to auscultation bilaterally. Cardiovascular:  Regular rate and rhythm, S1, S2 normal, no murmur. Abdomen:   Distended and tender, bowel sounds are active. Extremities: Severe edema bilaterally. Skin: Warm and dry.                Telemetry: paced    Data Review:     Labs:    Recent Results (from the past 24 hour(s))   PROTHROMBIN TIME + INR    Collection Time: 09/14/19  3:57 AM   Result Value Ref Range    INR 1.6 (H) 0.9 - 1.1      Prothrombin time 16.1 (H) 9.0 - 48.9 sec   METABOLIC PANEL, BASIC    Collection Time: 09/14/19  3:57 AM   Result Value Ref Range    Sodium 133 (L) 136 - 145 mmol/L    Potassium 4.2 3.5 - 5.1 mmol/L    Chloride 98 97 - 108 mmol/L    CO2 26 21 - 32 mmol/L    Anion gap 9 5 - 15 mmol/L    Glucose 119 (H) 65 - 100 mg/dL    BUN 29 (H) 6 - 20 MG/DL    Creatinine 1.66 (H) 0.55 - 1.02 MG/DL    BUN/Creatinine ratio 17 12 - 20      GFR est AA 38 (L) >60 ml/min/1.73m2    GFR est non-AA 32 (L) >60 ml/min/1.73m2    Calcium 8.7 8.5 - 10.1 MG/DL       Juanita Turner MD

## 2019-09-14 NOTE — PROGRESS NOTES
0730: Bedside and Verbal shift change report given to Gerard, [facility] (oncoming nurse) by Sienna Emerson RN (offgoing nurse). Report included the following information SBAR, Kardex, Intake/Output, MAR, Accordion, Recent Results and Cardiac Rhythm paced. Uneventful shift    1930: Bedside and Verbal shift change report given to Sienna Emerson RN (oncoming nurse) by TINY Gee (offgoing nurse). Report included the following information SBAR, Kardex, Intake/Output, MAR, Accordion, Recent Results and Cardiac Rhythm paced.

## 2019-09-14 NOTE — PROGRESS NOTES
1930 Bedside shift change report given to Judy Rajput RN (oncoming nurse) by TINY Gee (offgoing nurse). Report included the following information SBAR, Kardex, Intake/Output, MAR and Recent Results. Uneventful shift. 0730 Bedside shift change report given to Gerard [facility] (oncoming nurse) by Judy Rajput RN (offgoing nurse). Report included the following information SBAR, Kardex, Intake/Output, MAR and Recent Results.

## 2019-09-15 NOTE — PROGRESS NOTES
Renal Dosing/Monitoring  Medication: famotidine   Current regimen:  20mg every 24 hr  Recent Labs     09/15/19  0108 09/14/19  0357 09/13/19  0543   CREA 1.57* 1.66* 1.72*   BUN 27* 29* 28*     Estimated CrCl:  50-55 ml/min  Plan: Change to famotidine 20mg BID for improved renal function. Of note, this is a formulary interchange from home regimen of ranitidine 150mg q12h.     Cas Gage

## 2019-09-15 NOTE — TELEPHONE ENCOUNTER
----- Message from Fadi Banda MD sent at 9/15/2019  2:39 PM EDT -----  Can you look over this chart if you have a chance  Obese lady with mild CHF with marked anasarca, ascites, edema  Has VT so on amio and TSH is 39  Appears docile, very uncomfortable with edema  gaind 30-40 pounds  Free T4 ok  Free T3 is low  Suspect TSH is due to [facility] but she seems clinically hypothyroid and I wonder if that is why she has worsened her edema so much  Is synthroid enough or do we do anything else? Steroids? IV T4 or IV T3?

## 2019-09-15 NOTE — PROGRESS NOTES
0730 Bedside shift change report given to ednis figueredo rn (oncoming nurse) by Ovi Serrato rn (offgoing nurse). Report included the following information SBAR.       1930 Bedside shift change report given to fatoumata ozuna rn (oncoming nurse) by Chloe Gomez rn (offgoing nurse). Report included the following information SBAR, Kardex, ED Summary, MAR and Accordion. Problem: Heart Failure: Day 4  Goal: Activity/Safety  Outcome: Progressing Towards Goal  Goal: Diagnostic Test/Procedures  Outcome: Progressing Towards Goal  Goal: Nutrition/Diet  Outcome: Progressing Towards Goal     Problem: Falls - Risk of  Goal: *Absence of Falls  Description  Document Patrick Montalvo Fall Risk and appropriate interventions in the flowsheet. Outcome: Progressing Towards Goal  Note:   Fall Risk Interventions:  Mobility Interventions: Communicate number of staff needed for ambulation/transfer, PT Consult for mobility concerns, PT Consult for assist device competence, Strengthening exercises (ROM-active/passive), Utilize walker, cane, or other assistive device         Medication Interventions: Evaluate medications/consider consulting pharmacy, Patient to call before getting OOB, Teach patient to arise slowly    Elimination Interventions: Call light in reach, Patient to call for help with toileting needs    History of Falls Interventions: Evaluate medications/consider consulting pharmacy, Room close to nurse's station, Utilize gait belt for transfer/ambulation, Consult care management for discharge planning         Problem: Pressure Injury - Risk of  Goal: *Prevention of pressure injury  Description  Document Feliberto Scale and appropriate interventions in the flowsheet.        Outcome: Progressing Towards Goal  Note:   Pressure Injury Interventions:       Moisture Interventions: Absorbent underpads, Internal/External urinary devices, Minimize layers    Activity Interventions: Chair cushion, Increase time out of bed, Pressure redistribution bed/mattress(bed type), Assess need for specialty bed    Mobility Interventions: Chair cushion, Pressure redistribution bed/mattress (bed type), Assess need for specialty bed    Nutrition Interventions: Document food/fluid/supplement intake, Discuss nutritional consult with provider    Friction and Shear Interventions: Apply protective barrier, creams and emollients, Lift sheet, Lift team/patient mobility team, Minimize layers

## 2019-09-15 NOTE — PROGRESS NOTES
1930 Bedside shift change report given to Tita Finley RN (oncoming nurse) by TINY Gee (offgoing nurse). Report included the following information SBAR, Kardex, Intake/Output, MAR and Recent Results. 0730 Bedside shift change report given to Marilin Pimentel RN (oncoming nurse) by Jamil Bass (offgoing nurse). Report included the following information SBAR, Kardex, Intake/Output, MAR and Recent Results.

## 2019-09-15 NOTE — PROGRESS NOTES
Cardiology Progress Note  9/15/2019 11:41 AM    Admit Date: 9/9/2019    Admit Diagnosis: Lymphedema of both lower extremities [I89.0]; Lymphedema [I89.0]      Assessment:     Active Problems:    Lymphedema (8/23/2019)      Lymphedema of both lower extremities (9/9/2019)        Plan:     Heart Failure improved    Continue current meds. Negative > 11 liters, creatinine lower, IV lasix tid. Virginia Oakley MD  [phone]  Cell        Subjective:     Kenrick Reyes admits to dyspnea, abdominal pain. She reports symptoms are improved since yesterday. She still has significant ascites despite catheter drainage, also LE edema. Albumin only 2.8 but urinalysis negative for protein. Difficult situation, has BiV pacer, 100% paced at low HR. Continue with IV diuretic for now, on low dose amiodarone and higher dose Synthroid. ROS: negative except as noted above.     Objective:       Visit Vitals  /41 (BP 1 Location: Right arm, BP Patient Position: Sitting)   Pulse 60   Temp 97.5 °F (36.4 °C)   Resp 19   Ht 5' 5\" (1.651 m)   Wt 129.4 kg (285 lb 4.4 oz)   SpO2 98%   BMI 47.47 kg/m²       Current Facility-Administered Medications   Medication Dose Route Frequency    warfarin (COUMADIN) tablet 3 mg  3 mg Oral ONCE    famotidine (PEPCID) tablet 20 mg  20 mg Oral BID    furosemide (LASIX) injection 40 mg  40 mg IntraVENous Q8H    milrinone (PRIMACOR) 20 MG/100 ML D5W infusion  0.2 mcg/kg/min IntraVENous CONTINUOUS    levothyroxine (SYNTHROID) tablet 150 mcg  150 mcg Oral ACB    amiodarone (CORDARONE) tablet 100 mg  100 mg Oral DAILY    miconazole (MICOTIN) 2 % powder   Topical Q12H    balsam peru-castor oil (VENELEX) ointment   Topical Q8H    spironolactone (ALDACTONE) tablet 50 mg  50 mg Oral DAILY    sodium chloride (NS) flush 5-40 mL  5-40 mL IntraVENous Q8H    sodium chloride (NS) flush 5-40 mL  5-40 mL IntraVENous PRN    acetaminophen (TYLENOL) tablet 650 mg  650 mg Oral Q4H PRN    carvedilol (COREG) tablet 3.125 mg  3.125 mg Oral BID WITH MEALS    zolpidem (AMBIEN) tablet 5 mg  5 mg Oral QHS PRN    methocarbamol (ROBAXIN) tablet 500 mg  500 mg Oral TID    pravastatin (PRAVACHOL) tablet 80 mg  80 mg Oral QHS    potassium chloride SR (KLOR-CON 10) tablet 40 mEq  40 mEq Oral DAILY    sertraline (ZOLOFT) tablet 100 mg  100 mg Oral DAILY    HYDROcodone-acetaminophen (NORCO) 7.5-325 mg per tablet 1 Tab  1 Tab Oral Q6H PRN    warfarin - pharmacy to dose   Other Rx Dosing/Monitoring         Physical Exam:  Visit Vitals  /41 (BP 1 Location: Right arm, BP Patient Position: Sitting)   Pulse 60   Temp 97.5 °F (36.4 °C)   Resp 19   Ht 5' 5\" (1.651 m)   Wt 129.4 kg (285 lb 4.4 oz)   SpO2 98%   BMI 47.47 kg/m²     General Appearance:  Well developed, well nourished,alert and oriented x 3, and individual in no acute distress. Ears/Nose/Mouth/Throat:   Hearing grossly normal.         Neck: Supple. Chest:   Lungs clear to auscultation bilaterally. Cardiovascular:  Regular rate and rhythm, S1, S2 normal, no murmur. Abdomen:   Distended and tender, bowel sounds are active. Extremities: Severe edema bilaterally. Skin: Warm and dry.                Telemetry: paced    Data Review:     Labs:    Recent Results (from the past 24 hour(s))   PROTHROMBIN TIME + INR    Collection Time: 09/15/19  1:08 AM   Result Value Ref Range    INR 1.6 (H) 0.9 - 1.1      Prothrombin time 15.6 (H) 9.0 - 97.7 sec   METABOLIC PANEL, BASIC    Collection Time: 09/15/19  1:08 AM   Result Value Ref Range    Sodium 134 (L) 136 - 145 mmol/L    Potassium 4.0 3.5 - 5.1 mmol/L    Chloride 98 97 - 108 mmol/L    CO2 28 21 - 32 mmol/L    Anion gap 8 5 - 15 mmol/L    Glucose 129 (H) 65 - 100 mg/dL    BUN 27 (H) 6 - 20 MG/DL    Creatinine 1.57 (H) 0.55 - 1.02 MG/DL    BUN/Creatinine ratio 17 12 - 20      GFR est AA 41 (L) >60 ml/min/1.73m2    GFR est non-AA 34 (L) >60 ml/min/1.73m2    Calcium 8.5 8.5 - 10.1 MG/DL       Anaid Singh Farzad Mcdonald MD

## 2019-09-15 NOTE — TELEPHONE ENCOUNTER
Shmuel,    I reviewed her chart and it appears she had a TSH of 4.8 on 3/6/19 during an admission when she was started on amiodarone so she likely has underlying hypothyroidism that was not addressed initially and the longer she was on amiodarone, this likely worsened her hypothyroidism. From your office note in 8/19, it appears she was started on levothyroxine 75 mcg daily by her PCP, Dr. Chas Cardona, and the she continued to have worsening volume overload, so she was admitted on 9/9/19 and her TSH was high at 45 and free T4 was at the lower part of the normal range at 0.9 and free T3 was low at 1.4 suggesting that her hypothyroidism has indeed worsened over the past few months and is likely contributing to her current clinical picture. I saw that you have already increased her levothyroxine to 150 mcg daily which I think is a reasonable plan. She appears to weigh 129 kg and most patients need up to 1.7 mcg/kg/d as a max dose = 219 mcg so she may still need a higher dose. I don't necessarily think she needs steroids nor IV T4 or IV T3 and would plan on repeating her TSH and free T4 in 2 weeks to make sure her TSH is coming down and free T4 is coming up. Please let me know if you need any more help with her.     Thanks,  Jani Conley

## 2019-09-16 NOTE — PROGRESS NOTES
Pharmacist Note - Warfarin Dosing  Consult provided for this 60 y. o.female to manage warfarin for Atrial Fibrillation    INR Goal: 2 - 3    Home regimen/ tablet size: 1.5 mg M/Tu/Th/F/Su; 3 mg W/Sa    Drugs that may increase INR: Amiodarone  Drugs that may decrease INR: None  Other current anticoagulants/ drugs that may increase bleeding risk: None  Risk factors: HF  Daily INR ordered: YES    Recent Labs     09/16/19  0500 09/15/19  0108 09/14/19  0357   INR 1.7* 1.6* 1.6*     Date               INR                  Dose  9/9  4.1  hold   9/10  5.1  hold   9/11  3.4  hold   9/12  2.6   1 mg  9/13  1.9  2.5 mg    9/14  1.6  2.5 mg   9/15  1.6  3 mg  9/16  1.7  3 mg                                                                                Assessment/ Plan: Will order warfarin 3 mg PO x 1 dose. Pharmacy will continue to monitor daily and adjust therapy as indicated.

## 2019-09-16 NOTE — PROGRESS NOTES
Assessment/Plan/Discussion:Cardiology Attending:     Patient seen on the day of progress note and examined  and agree with Advance Practice Provider (MARIO, NP,PA)  assessment and plans. Sai Villarreal is a 60 y.o. female   Over weekend finally some diuresis  Big change 315# to 287# she thinks her baseline is 250-260# but looking at chart may be more liek 270-275# (see prev OV notes)  Change in diuretic and paracentesis  GI has reviewed fluid studies and no cirrhosis or GI issue  Has mild mid line abdominal soreness, PTA  Wants a chair walker from home, cant fit comfortably in chair in room  TSH still high at 41, will discuss options to stop amio but concern for VT in past  Now that she is diuresing that is more of an option  CEA, SPEP other \"Zebra\" studies thus far are negative  Continue diuresis, and drain  Creat DAVID on CKD stable    Torie Vazquez MD                                                                                  Cardiology Progress Note            Admit Date: 9/9/2019  Admit Diagnosis: Lymphedema of both lower extremities [I89.0]  Lymphedema [I89.0]  Date: 9/16/2019     Time: 11:51 AM    Subjective:  Feeling a little better. Abdomen \"shrunk\" with paracentesis. Feet still very swollen. .     Assessment and Plan     1. Volume overload              - admitted for volume control              - Lasix 40 mg IV 8 hour              - Check daily BMP              - Daily weights and intake/output              - PureWick, as she get too SOB with standing  2. DAVID on CKD              - Cr. 1.47, baseline 1.4  Results from Hospital Encounter encounter on 09/09/19   US RETROPERITONEUM COMP    Narrative RENAL AND BLADDER ULTRASOUND    INDICATION: elevated creatinine, low urine output. COMPARISON: None. TECHNIQUE:  Sonography of the kidneys, retroperitoneum, and bladder was performed.     FINDINGS:    RIGHT KIDNEY: measures 9.3 cm in length, although difficult to visualize due to  large body habitus and massive ascites. Echogenicity is abnormally increased. No  hydronephrosis, large shadowing calculus, or obvious solid contour-deforming  renal mass. LEFT KIDNEY: measures 8.2 cm in length, small and very difficult to visualize  due to large body habitus and massive ascites. Renal echogenicity is abnormally  increased. No hydronephrosis, large shadowing calculus, or obvious solid  contour-deforming renal mass. AORTA: Normal caliber in its visualized portions. COMMON ILIAC ARTERIES: Not seen. IVC: Normal caliber in its visualized portions. BLADDER: Incompletely distended and not assessed. Incidentally noted massive ascites and moderate bilateral pleural effusions. IMPRESSION:   1. Poorly visualized kidneys showing increased echogenicity consistent with  medical renal parenchymal disease but no hydronephrosis. 2. Urinary bladder not assessed. 3. Large ascites. 4. Moderate bilateral pleural effusions. - daily BMP              - Hold Lisinopril for DAVID on CKD              - Hold Aldactone while diuresing with Bumex  3. Lymphedema              - Venous insufficiency              - Lasix as above  4. Systolic HF              - acute on chronic              - NYHA class IV              - EF 40-45% on last echo 3/2019 - MUGA with EF 49%. EF on echo 30-35%              - MUGA with LVEF of 49%  09/09/19   ECHO ADULT FOLLOW-UP OR LIMITED 09/10/2019 9/10/2019    Narrative · Left Ventricle: Normal cavity size and wall thickness. Moderate systolic   dysfunction. Estimated left ventricular ejection fraction is 31 - 35%. Biplane method used to measure ejection fraction. Abnormal left   ventricular wall motion. Global hypokinesis with apical dyskinesis. · Left Atrium: Dilated left atrium. · Right Ventricle: Not well visualized.         Signed by: Stefania Espinosa MD               - Coreg resumed              - Lisinopril held for DAVID on CKD              - Aldactone 50 mg              - Daily weights   Last 3 Recorded Weights in this Encounter    09/14/19 0554 09/15/19 0652 09/16/19 0426   Weight: 291 lb 3.6 oz (132.1 kg) 285 lb 4.4 oz (129.4 kg) 283 lb 4.7 oz (128.5 kg)                 - proBNP 2700+              - S/p BiV ICD  5. Afib              - PPM              - [address] with Coumadin - Pharmacy consulted for dosing   - Held today for paracentesis              - Amio 200 mg daily  6. HTN              - well controlled on Coreg  7. Hypothyroidism              - Synthroid PTA 75 mcg              - TSH 45   - T3 1.4   - Synthroid increased to 150 mcg  8. Hypercoagulable state              - INR 1.7 - held for paracentesis today. - Pharmacy consulted to dose  9. Hyponatremic              - Na 131              - daily BMP  10. H/o CHB              - s/p PPM - changed to BiV ICD with HF  11. DDD              - chronic pain and on chronic opiates              - will resume home meds  12. Body mass index is 49.32 kg/m². - Morbid obesity - unable to exercise with increased SOB.     UOP about 1400+ daily. 15,000+ with paracentesis. Weight down, but mostly due to paracentesis. Will have PT seen for simple mobility. ? If TSH/Amiodarone as cause. Dr. Jackelyn Scott to review. MIKE to chair today. Past Medical History:   Diagnosis Date    AICD (automatic cardioverter/defibrillator) present     AICD (automatic cardioverter/defibrillator) present     Atrial fibrillation (HCC)     Chronic anticoagulation     was on NOAC before. ?had clot on noac. now on warfarin. Dr. Debora Church monitors INR    DDD (degenerative disc disease), cervical 1/30/2018    DDD (degenerative disc disease), thoracic 1/30/2018    Depression     no h/o hospitalization    Essential hypertension 1/8/2018    Incidental pulmonary nodule 2mm, RLL- no follow up needed 2/16/2018    Noted on abdomen/pelvis CT Feb 2018. No smoking history. 1. No renal, ureteral, or bladder calculi. 2. No acute findings.  3. 2 mm pulmonary nodule right lower lobe. No follow-up needed if the patient is low risk. If the patient is high-risk, CT could be considered in 1 year.      Insomnia     Kidney infarction ([facility] [address] 2017    d/t clot per pt. recalls being on blood thinner.      Lymphedema 8/23/2019    Lymphedema of left arm     developed after infection of pacemaker site    Obesity, morbid ([facility] [address] 1/30/2018    Stomach flu 2015    hospital admit X2    Systolic CHF, chronic (HCC)     EF 45% normal caths and stress tests      Social History     Tobacco Use    Smoking status: Never Smoker    Smokeless tobacco: Never Used   Substance Use Topics    Alcohol use: No    Drug use: No         ROS:     GENERAL   Recent weight loss - no   Fever -----------------   no   Chills -----------------   no     EYES, VISION   Visual Changes - no     EARS, NOSE, THROAT   Hearing loss ----------- no   Swallowing difficulties - no     CARDIOVASCULAR   Chest pain/pressure ---- no   Arrhythmia/palpitations - no       RESPIRATORY   Cough ------------------ no   Shortness of breath - yes   Wheezing -------------- no   GASTROINTESTINAL   Abdominal pain - no   Heartburn -------- no   Bloody stool ----- no     GENITOURINARY   Frequent urination - no   Urgency -------------- no     MUSCULOSKELETAL   Joint pain/swelling ---- yes   Musculoskeletal pain - yes     SKIN & INTEGUMENTARY   Rashes - no   Sores --- no         NEUROLOGICAL   Numbness/tingling - no   Sensation loss ------ no     PSYCHIATRIC   Nervousness/anxiety - no   Depression -------------- no     ENDOCRINE   Heat/cold intolerance - no   Excessive thirst -------- no     HEMATOLOGIC/LYMPHATIC   Abnormal bleeding - no     ALL/IMMUN   Allergic reaction ------ no   Recurrent infections - no     Objective:     Physical Exam:                Visit Vitals  BP (!) 104/30 (BP 1 Location: Right arm, BP Patient Position: At rest)   Pulse 60   Temp 97.6 °F (36.4 °C)   Resp 18   Ht 5' 5\" (1.651 m)   Wt 283 lb 4.7 oz (128.5 kg)   SpO2 98%   BMI 47.14 kg/m²        General Appearance:   Well developed, well nourished,alert and oriented x 3, and   individual in no acute distress. Looks tired   Ears/Nose/Mouth/Throat:    Hearing grossly normal.         Neck:  Supple. Chest:    Lungs diminished, crackles in bases to auscultation bilaterally. Cardiovascular:   Regular rate and rhythm, S1, S2 normal, no murmur. Abdomen:    Softer, non-tender, bowel sounds are active. Extremities:  3-4+ edema bilaterally. Skin:  Warm and dry.      Telemetry: normal sinus rhythm          Data Review:    Labs:    Recent Results (from the past 24 hour(s))   PROTHROMBIN TIME + INR    Collection Time: 09/16/19  5:00 AM   Result Value Ref Range    INR 1.7 (H) 0.9 - 1.1      Prothrombin time 16.4 (H) 9.0 - 22.4 sec   METABOLIC PANEL, BASIC    Collection Time: 09/16/19  5:00 AM   Result Value Ref Range    Sodium 132 (L) 136 - 145 mmol/L    Potassium 4.1 3.5 - 5.1 mmol/L    Chloride 97 97 - 108 mmol/L    CO2 26 21 - 32 mmol/L    Anion gap 9 5 - 15 mmol/L    Glucose 182 (H) 65 - 100 mg/dL    BUN 25 (H) 6 - 20 MG/DL    Creatinine 1.47 (H) 0.55 - 1.02 MG/DL    BUN/Creatinine ratio 17 12 - 20      GFR est AA 44 (L) >60 ml/min/1.73m2    GFR est non-AA 36 (L) >60 ml/min/1.73m2    Calcium 8.6 8.5 - 10.1 MG/DL   TSH 3RD GENERATION    Collection Time: 09/16/19  5:00 AM   Result Value Ref Range    TSH 41.20 (H) 0.36 - 3.74 uIU/mL          Radiology:        Current Facility-Administered Medications   Medication Dose Route Frequency    famotidine (PEPCID) tablet 20 mg  20 mg Oral BID    carvedilol (COREG) tablet 6.25 mg  6.25 mg Oral BID WITH MEALS    furosemide (LASIX) injection 40 mg  40 mg IntraVENous Q8H    milrinone (PRIMACOR) 20 MG/100 ML D5W infusion  0.2 mcg/kg/min IntraVENous CONTINUOUS    levothyroxine (SYNTHROID) tablet 150 mcg  150 mcg Oral ACB    amiodarone (CORDARONE) tablet 100 mg  100 mg Oral DAILY    miconazole (MICOTIN) 2 % powder   Topical Q12H  balsam peru-castor oil (VENELEX) ointment   Topical Q8H    spironolactone (ALDACTONE) tablet 50 mg  50 mg Oral DAILY    sodium chloride (NS) flush 5-40 mL  5-40 mL IntraVENous Q8H    sodium chloride (NS) flush 5-40 mL  5-40 mL IntraVENous PRN    acetaminophen (TYLENOL) tablet 650 mg  650 mg Oral Q4H PRN    zolpidem (AMBIEN) tablet 5 mg  5 mg Oral QHS PRN    methocarbamol (ROBAXIN) tablet 500 mg  500 mg Oral TID    pravastatin (PRAVACHOL) tablet 80 mg  80 mg Oral QHS    potassium chloride SR (KLOR-CON 10) tablet 40 mEq  40 mEq Oral DAILY    sertraline (ZOLOFT) tablet 100 mg  100 mg Oral DAILY    HYDROcodone-acetaminophen (NORCO) 7.5-325 mg per tablet 1 Tab  1 Tab Oral Q6H PRN    warfarin - pharmacy to dose   Other Rx Dosing/Monitoring       Neil Johnson M.D.      [facility] [address] [place] [address] [facility] [facility] [address]   [address]   [phone]

## 2019-09-16 NOTE — PROGRESS NOTES
1930: Bedside and Verbal shift change report given to Mariel Enamorado RN (oncoming nurse) by Tonia Contreras RN (offgoing nurse). Report included the following information SBAR, Kardex, Procedure Summary, Intake/Output, MAR, Recent Results and Cardiac Rhythm Paced. 1935: RN informed by PCT that patient's BP: 89/25.     1951: RN rechecked patient's BP: 88/29.      1955: Paged on-call physician regarding patient's low BPs.     2015: Re-paged physician on-call. 2030: Spoke with Dr. Jenniffer Cuellar. Orders received to hold evening dose of lasix. Hold subsequent remaining doses of lasix if SBP < 80. Orders also to page physician if patient becomes symptomatic or SBP < 60.

## 2019-09-16 NOTE — PROGRESS NOTES
1200: Bedside shift change report given to Cristela FRANKS (oncoming nurse) by [facility] (offgoing nurse). Report included the following information SBAR, Kardex, Procedure Summary, Intake/Output, MAR and Recent Results. 1930: Bedside shift change report given to 116[address] Danyel Coronado (oncoming nurse) by Geoffrey Bence RN (offgoing nurse). Report included the following information SBAR, Kardex, Procedure Summary, Intake/Output, MAR and Recent Results.

## 2019-09-16 NOTE — PROGRESS NOTES
Problem: Heart Failure: Day 3  Goal: Activity/Safety  Outcome: Progressing Towards Goal  Goal: Diagnostic Test/Procedures  Outcome: Progressing Towards Goal  Goal: Nutrition/Diet  Outcome: Progressing Towards Goal  Goal: Medications  Outcome: Progressing Towards Goal  Goal: Respiratory  Outcome: Progressing Towards Goal  Goal: Treatments/Interventions/Procedures  Outcome: Progressing Towards Goal  Goal: Psychosocial  Outcome: Progressing Towards Goal  Goal: *Oxygen saturation within defined limits  Outcome: Progressing Towards Goal  Goal: *Hemodynamically stable  Outcome: Progressing Towards Goal  Goal: *Optimal pain control at patient's stated goal  Outcome: Progressing Towards Goal  Goal: *Anxiety reduced or absent  Outcome: Progressing Towards Goal  Goal: *Demonstrates progressive activity  Outcome: Progressing Towards Goal     Problem: Falls - Risk of  Goal: *Absence of Falls  Description  [facility] and appropriate interventions in the flowsheet. Outcome: Progressing Towards Goal  Note:   Fall Risk Interventions:  Mobility Interventions: Communicate number of staff needed for ambulation/transfer, OT consult for ADLs, Patient to call before getting OOB, PT Consult for mobility concerns, PT Consult for assist device competence, Utilize walker, cane, or other assistive device, Utilize gait belt for transfers/ambulation         Medication Interventions: Evaluate medications/consider consulting pharmacy    Elimination Interventions: Call light in reach, Patient to call for help with toileting needs    History of Falls Interventions: Evaluate medications/consider consulting pharmacy         Problem: Pressure Injury - Risk of  Goal: *Prevention of pressure injury  Description  Document Feliberto Scale and appropriate interventions in the flowsheet.        Outcome: Progressing Towards Goal  Note:   Pressure Injury Interventions:       Moisture Interventions: Absorbent underpads    Activity Interventions: Increase time out of bed, Pressure redistribution bed/mattress(bed type), PT/OT evaluation    Mobility Interventions: HOB 30 degrees or less, Pressure redistribution bed/mattress (bed type), PT/OT evaluation    Nutrition Interventions: Document food/fluid/supplement intake    Friction and Shear Interventions: Lift sheet, HOB 30 degrees or less

## 2019-09-16 NOTE — CONSULTS
[address] [place] [address] 7 [id number]        GASTROENTEROLOGY CONSULTATION NOTE  Will Andrae Console  [phone] office  [phone] NP/PA in-hospital cell phone M-F until 4:30PM  After 5PM or on weekends, please call  for physician on call        NAME:  Ellen Lopez   :   1960   MRN:   [id number]       Referring Physician: Dr. Jacqueline Verduzco Date: 2019 11:11 AM    Chief Complaint: fluid overload     History of Present Illness:  Patient is a 60 y.o. who is seen in consultation at the request of Dr. Sara Saavedra for ascites fluid large, source of ascites? .  Patient has a past medical history significant for hypertension, atrial fibrillation, congestive heart failure, status post AICD, lymphedema, and morbid obesity. She presented to the ED with complaint of weight gain. Patient was admitted to the hospital on 19 for volume overload, acute kidney injury on chronic kidney disease, lymphedema, and acute on chronic systolic congestive heart failure. Patient reports associated generalized abdominal bloating and discomfort with fluid accumulation. She reports intermittent nausea and vomiting. No reflux. She reports a history of intermittent bright red blood per rectum. No melena. Patient has approximately 3-4 bowel movements daily that vary in consistency. No fevers. No known liver disease. No NSAID use. Patient is on Coumadin (for atrial fibrillation). No alcohol or tobacco use. History of EGD approximately 15 years ago in [place]. History of colonoscopy approximately 3 years ago in [place] that was reportedly normal.  No known family history of colon cancer. I have reviewed the emergency room note, hospital admission note, notes by all other clinicians who have seen the patient during this hospitalization to date. I have reviewed the problem list and the reason for this hospitalization.  I have reviewed the allergies and the medications the patient was taking at home prior to this hospitalization. PMH:  Past Medical History:   Diagnosis Date    AICD (automatic cardioverter/defibrillator) present     AICD (automatic cardioverter/defibrillator) present     Atrial fibrillation (HCC)     Chronic anticoagulation     was on NOAC before. ?had clot on noac. now on warfarin. Dr. Edi Alcantar monitors INR    DDD (degenerative disc disease), cervical 1/30/2018    DDD (degenerative disc disease), thoracic 1/30/2018    Depression     no h/o hospitalization    Essential hypertension 1/8/2018    Incidental pulmonary nodule 2mm, RLL- no follow up needed 2/16/2018    Noted on abdomen/pelvis CT Feb 2018. No smoking history. 1. No renal, ureteral, or bladder calculi. 2. No acute findings. 3. 2 mm pulmonary nodule right lower lobe. No follow-up needed if the patient is low risk. If the patient is high-risk, CT could be considered in 1 year.      Insomnia     Kidney infarction ([address] 2017    d/t clot per pt. recalls being on blood thinner.  Lymphedema 8/23/2019    Lymphedema of left arm     developed after infection of pacemaker site    Obesity, morbid ([address] 1/30/2018    Stomach flu 2015    hospital admit X2    Systolic CHF, chronic (HCC)     EF 45% normal caths and stress tests       PSH:  Past Surgical History:   Procedure Laterality Date    HX BREAST BIOPSY Right 2010    Benign    HX CHOLECYSTECTOMY  1984    HX HEART CATHETERIZATION  2006    d/t abnl stress test, per pat, test was normal    HX HEART CATHETERIZATION  ~2009, 2012    per pt normal.     HX HYSTERECTOMY  2012    d/t heavy bleeding. ?endometriosis. +fibroid. per pt, no cancer.  HX PACEMAKER  2010, 2011, 2012, 3/29/2018    first 2010 (infected), replacement 2011 (lead not placed correctly), 2nd replacement 2012- pacemaker, defibrillator, 3rd replacement 2018       Allergies:   Allergies   Allergen Reactions    Augmentin [Amoxicillin-Pot Clavulanate] Nausea and Vomiting    Neosporin [Neomycin-Bacitracin-Polymyxin] Rash       Home Medications:  Prior to Admission Medications   Prescriptions Last Dose Informant Patient Reported? Taking? HYDROcodone-acetaminophen (NORCO) 7.5-325 mg per tablet 2019 at Unknown time  No Yes   Si Tab, EVERY 8 HOURS AS NEEDED FOR PAIN   amiodarone (CORDARONE) 200 mg tablet 2019 at Unknown time  No Yes   Sig: Take 1 Tab by mouth daily. bumetanide (BUMEX) 1 mg tablet 2019 at Unknown time  No Yes   Sig: Take 3 Tabs by mouth daily. carvedilol (COREG) 3.125 mg tablet 2019 at Unknown time  Yes Yes   Sig: Take 3.125 mg by mouth two (2) times daily (with meals). eszopiclone (LUNESTA) 3 mg tablet 2019 at Unknown time  No Yes   Sig: Take 1 Tab by mouth nightly as needed for Sleep. Max Daily Amount: 3 mg.   levothyroxine (SYNTHROID) 75 mcg tablet 2019 at Unknown time  No Yes   Sig: Take 1 Tab by mouth Daily (before breakfast). lisinopril (PRINIVIL, ZESTRIL) 10 mg tablet 2019 at Unknown time  No Yes   Sig: Take 1 Tab by mouth daily. methocarbamol (ROBAXIN) 500 mg tablet 2019 at Unknown time  No Yes   Sig: TAKE 1 TABLET BY MOUTH THREE TIMES DAILY   potassium chloride (K-DUR, KLOR-CON) 20 mEq tablet 2019 at Unknown time  Yes Yes   Sig: Take 20 mEq by mouth daily. pravastatin (PRAVACHOL) 80 mg tablet 2019 at Unknown time  Yes Yes   Sig: Take 80 mg by mouth nightly. raNITIdine hcl 150 mg capsule 2019 at Unknown time  Yes Yes   Sig: Take 150 mg by mouth two (2) times a day. sertraline (ZOLOFT) 100 mg tablet 2019 at Unknown time  Yes Yes   Sig: Take 100 mg by mouth daily. spironolactone (ALDACTONE) 50 mg tablet 2019 at Unknown time  No Yes   Sig: Take 1 Tab by mouth daily. warfarin (COUMADIN) 3 mg tablet 2019  Yes Yes   Sig: Take 3 mg by mouth two (2) times a week on Wednesday and Saturday.    warfarin (COUMADIN) 3 mg tablet 2019  Yes Yes   Sig: Take 1.5 mg by mouth five (5) days a week. 5 x a week on Monday, Tuesday, Thursday, Friday, AND Sunday.  3 mg on WED AND SATURDAY      Facility-Administered Medications: None       Hospital Medications:  Current Facility-Administered Medications   Medication Dose Route Frequency    warfarin (COUMADIN) tablet 3 mg  3 mg Oral ONCE    famotidine (PEPCID) tablet 20 mg  20 mg Oral BID    carvedilol (COREG) tablet 6.25 mg  6.25 mg Oral BID WITH MEALS    furosemide (LASIX) injection 40 mg  40 mg IntraVENous Q8H    milrinone (PRIMACOR) 20 MG/100 ML D5W infusion  0.2 mcg/kg/min IntraVENous CONTINUOUS    levothyroxine (SYNTHROID) tablet 150 mcg  150 mcg Oral ACB    amiodarone (CORDARONE) tablet 100 mg  100 mg Oral DAILY    miconazole (MICOTIN) 2 % powder   Topical Q12H    balsam peru-castor oil (VENELEX) ointment   Topical Q8H    spironolactone (ALDACTONE) tablet 50 mg  50 mg Oral DAILY    sodium chloride (NS) flush 5-40 mL  5-40 mL IntraVENous Q8H    sodium chloride (NS) flush 5-40 mL  5-40 mL IntraVENous PRN    acetaminophen (TYLENOL) tablet 650 mg  650 mg Oral Q4H PRN    zolpidem (AMBIEN) tablet 5 mg  5 mg Oral QHS PRN    methocarbamol (ROBAXIN) tablet 500 mg  500 mg Oral TID    pravastatin (PRAVACHOL) tablet 80 mg  80 mg Oral QHS    potassium chloride SR (KLOR-CON 10) tablet 40 mEq  40 mEq Oral DAILY    sertraline (ZOLOFT) tablet 100 mg  100 mg Oral DAILY    HYDROcodone-acetaminophen (NORCO) 7.5-325 mg per tablet 1 Tab  1 Tab Oral Q6H PRN    warfarin - pharmacy to dose   Other Rx Dosing/Monitoring       Social History:  Social History     Tobacco Use    Smoking status: Never Smoker    Smokeless tobacco: Never Used   Substance Use Topics    Alcohol use: No       Family History:  Family History   Problem Relation Age of Onset    Breast Cancer Maternal Aunt         late 35s    Uterine Cancer Maternal Aunt     Heart Disease Mother         sudden cardiac arrest    Heart Disease Father     Coronary Artery Disease Father     Colon Cancer Father         76s    Diabetes Father     COPD Sister     Cancer Brother 43        pancreatic    Uterine Cancer Maternal Grandmother     Other Maternal Grandfather         mva    Heart Disease Paternal Grandmother     Heart Disease Paternal Grandfather     Heart Disease Other     Diabetes Other     Other Sister         sudden cardiac arrest    Heart Attack Sister     No Known Problems Sister     Heart Disease Brother     Kidney Disease Brother     Other Brother         mva    No Known Problems Brother     Heart Attack Paternal Uncle 36    Other Paternal Aunt         aneurysm    Celiac Disease Son     Seizures Daughter     Other Daughter 22        MVA       Review of Systems:  Constitutional: negative fever, negative chills, negative weight loss  Eyes:   negative visual changes  ENT:   negative sore throat, tongue or lip swelling  Respiratory:  + cough, + dyspnea  Cards:  negative for chest pain, negative palpitations, + lower extremity edema  GI:   See HPI  :  negative for frequency, dysuria  Integument:  negative for rash and pruritus  Heme:  + for easy bruising, negative gum/nose bleeding  Musculoskeletal:negative for myalgias, back pain and muscle weakness  Neuro:    + for headaches, negative dizziness  Psych: negative for feelings of anxiety, depression     Objective:     Patient Vitals for the past 8 hrs:   BP Temp Pulse Resp SpO2 Weight   09/16/19 0756 (!) 104/30 97.6 °F (36.4 °C) 60 18 98 %    09/16/19 0426 (!) 132/37 97.7 °F (36.5 °C) 61 18 99 % 128.5 kg (283 lb 4.7 oz)     09/16 0701 - 09/16 1900  In: 278.8 [P.O.:240; I.V.:38.8]  Out: 850 [Urine:750; Drains:100]  09/14 1901 - 09/16 0700  In: 1364.8 [P.O.:1020;  I.V.:344.8]  Out: 6615 [Urine:2850; Drains:3765]    EXAM:     CONST:  Pleasant female sitting in the chair, no acute distress   NEURO:  Alert and oriented x 3   HEENT: EOMI, no scleral icterus   LUNGS: Diminished bilaterally anteriorly   CARD:  S1 S2   ABD:  Soft, obese, no tenderness, no rebound, no guarding. + Bowel sounds. EXT:  Severe edema bilaterally   PSYCH: Not anxious or agitated     Data Review     No results for input(s): WBC, HGB, HCT, PLT, HGBEXT, HCTEXT, PLTEXT in the last 72 hours. Recent Labs     09/16/19  0500 09/15/19  0108   * 134*   K 4.1 4.0   CL 97 98   CO2 26 28   BUN 25* 27*   CREA 1.47* 1.57*   * 129*   CA 8.6 8.5     No results for input(s): SGOT, GPT, AP, TBIL, TP, ALB, GLOB, GGT, AML, LPSE in the last 72 hours. No lab exists for component: AMYP, HLPSE  Recent Labs     09/16/19  0500 09/15/19  0108   INR 1.7* 1.6*   PTP 16.4* 15.6*       Assessment:   · Ascites: status post paracentesis with drain placement (9/13/19). Fluid gram stain: no WBCs, no organisms seen; culture: no growth 3 days. SAAG < 1.1, elevated protein on fluid analysis. Normal LFTs on 9/12. Drain with 100 cc overnight (total 15,740). · Volume overload  · Lymphedema  · Acute on chronic systolic heart failure  · Acute kidney injury on chronic kidney disease  · Atrial fibrillation: on Coumadin. INR 1.7 today. · Hypothyroidism  · Morbid obesity     Patient Active Problem List   Diagnosis Code    Essential hypertension I10    Obesity, morbid ([address] E66.01    Pacemaker Z95.0    Atrial fibrillation ([address] I48.91    Depression F32.9    Insomnia G47.00    Chronic anticoagulation Z79.01    DDD (degenerative disc disease), cervical M50.30    DDD (degenerative disc disease), thoracic M51.34    Long term prescription opiate use Z79.891    Incidental pulmonary nodule- 2mm RLL- repeat CT due in Feb 2019 R91.1    Acute hypokalemia E87.6    AICD discharge Z45.02    AICD (automatic cardioverter/defibrillator) present G15.426    Systolic CHF, chronic (HCC) I50.22    Lymphedema I89.0    Lymphedema of both lower extremities I89.0     Plan:     · Diuresis per cardiology  · Etiology of ascites is not consistent with SBP or liver disease. Recommend fluid cytology.   · Patient was discussed with and will be seen by Dr. Callie Viveros  · Thank you for allowing me to participate in care of [facility]     Signed By: Itzel Moore     9/16/2019  11:11 AM       Gastroenterology Attending Physician attestation statement and comments. This patient was seen and examined by me in a face-to-face visit today. I reviewed the medical record including lab work, imaging and other provider notes. I confirmed the history as described above. I spoke to the patient, reviewed the medical record including lab work, imaging and other provider notes. I discussed this case in detail with Irish NUNEZ. I formulated an  assessment of this patient and developed a treatment plan. I agree with the above consultation note. I agree with the history, exam and assessment and plan as outlined in the note. I would like to add the following:     Abd: anasarca, distended, mild generalized tenderness, no rebound/guarding. Ascites: SAAG < 1.1; TP > 2.5 - not consistent with portal hypertension. Ascites culture negative. Agree with sending for fluid cytology. Lower suspicion mycobacteria related. See if resolution after adequate diuresis. Will sign off. Please call with any questions.     Dr. Callie Viveros

## 2019-09-16 NOTE — PROGRESS NOTES
Bedside and Verbal shift change report given to Kitty (oncoming nurse) by [id number] [place] [address] (offgoing nurse). Report included the following information SBAR, Kardex, Procedure Summary, Intake/Output, MAR, Recent Results and Cardiac Rhythm PACED. Bedside and Verbal shift change report given to Cristela FRANKS (oncoming nurse) by Kitty (offgoing nurse). Report included the following information SBAR, Kardex, Procedure Summary, Intake/Output, MAR, Recent Results and Cardiac Rhythm PACED.

## 2019-09-17 NOTE — PROGRESS NOTES
1930: Bedside shift change report received by Gal Orozco (offgoing nurse). Report included the following information SBAR, Kardex, Procedure Summary, Intake/Output, MAR, Recent Results and Cardiac Rhythm Paced . When doing bedside report, pt noted to be crying. Provided pt with emotional support. Offered to call pastoral care but pt states \"not tonight\". Pt verbalized concerns about being there for her grandchildren. 2100: Pt's  at bedside. Pt's mood appears to be improved. 0730: Bedside shift change report given to [facility] mona (oncoming nurse). Report included the following information SBAR, Kardex, Procedure Summary, Intake/Output, MAR, Recent Results and Cardiac Rhythm paced. ----------------  Care Plan [id number]  Problem: Heart Failure: Day 5  Goal: Activity/Safety  Outcome: Progressing Towards Goal  Goal: Discharge Planning  Outcome: Progressing Towards Goal  Goal: Medications  Outcome: Progressing Towards Goal  Note:   See MAR.    Goal: Respiratory  Outcome: Progressing Towards Goal  Goal: Treatments/Interventions/Procedures  Outcome: Progressing Towards Goal  Goal: Psychosocial  Outcome: Progressing Towards Goal  Note:   See note

## 2019-09-17 NOTE — PROGRESS NOTES
Asked by nursing to evaluate pt- Pt c/o pain in abdomen in umbilical region. Abdomen obese, soft, point tenderness just lateral to umbilicus, right side. No gaurding, no distension. No nausea currently. Re-examined pt after pt got up and had BM. Pt reports pain is a little better. Exam unchanged. Paracentesis drain is in place and patent/draining. Will monitor. Nursing had already notified GI Dr. Rosette Hernandez.

## 2019-09-17 NOTE — PROGRESS NOTES
Pharmacist Note - Warfarin Dosing  Consult provided for this 60 y. o.female to manage warfarin for Atrial Fibrillation    INR Goal: 2 - 3    Home regimen/ tablet size: 1.5 mg M/Tu/Th/F/Su; 3 mg W/Sa    Drugs that may increase INR: Amiodarone  Drugs that may decrease INR: None  Other current anticoagulants/ drugs that may increase bleeding risk: None  Risk factors: HF  Daily INR ordered: YES    Recent Labs     09/17/19  0143 09/16/19  0500 09/15/19  0108   HGB 10.2*  --   --    INR 1.8* 1.7* 1.6*     Date               INR                  Dose  9/9  4.1  hold   9/10  5.1  hold   9/11  3.4  hold   9/12  2.6   1 mg  9/13  1.9  2.5 mg    9/14  1.6  2.5 mg   9/15  1.6  3 mg  9/16  1.7  3 mg   9/17  1.8  3 mg                                                                               Assessment/ Plan: Will order warfarin 3 mg PO x 1 dose. Pharmacy will continue to monitor daily and adjust therapy as indicated.

## 2019-09-17 NOTE — PROGRESS NOTES
9287: diastolic bp remains low (26). shakira NUNEZ hold lasix dose now. Pt remains asymptomatic. No new orders. 1500: pt with c/o abd pain umbilical area. Medicated with pain meds, paged SANTI NUNEZ.  3288: GLEN Zhong NP to bedside to assess abdominal pain. 1910: pt transported to CT with nursing. 1930: Bedside and Verbal shift change report given to gio becerril rn (oncoming nurse) by kash miller rn (offgoing nurse). Report included the following information SBAR, Kardex, ED Summary, Procedure Summary, Intake/Output, MAR and Recent Results.      Problem: Heart Failure: Day 5  Goal: Activity/Safety  Outcome: Progressing Towards Goal  Note:   Pt/ot eval  Goal: Diagnostic Test/Procedures  Outcome: Progressing Towards Goal  Note:   Remains on primacor  Goal: Nutrition/Diet  Outcome: Progressing Towards Goal  Goal: Discharge Planning  Outcome: Progressing Towards Goal  Goal: Medications  Outcome: Progressing Towards Goal  Goal: Respiratory  Outcome: Progressing Towards Goal  Goal: Treatments/Interventions/Procedures  Outcome: Progressing Towards Goal  Goal: Psychosocial  Outcome: Progressing Towards Goal

## 2019-09-17 NOTE — PROGRESS NOTES
Occupational Therapy Screening:  Services maybe indicated at this time. [facility] based on results obtained during the nursing admission assessment. The patients chart was reviewed . Please order a consult for occupational therapy if patient has had a decline in function from baseline and you would like an evaluation to be completed. Thank you.       PLOF: Prior Level of Function/Home Situation: Ian with RW for short distances with quick fatigue; +fall hx  Personal factors and/or comorbidities impacting plan of care: supportive spouse     Home Situation  Home Environment: Private residence  One/Two Story Residence: Two story  Living Alone: No  Support Systems: Spouse/Significant Other/Partner  Patient Expects to be Discharged to[de-identified] Private residence  Current DME Used/Available at Home: [address] [place]

## 2019-09-17 NOTE — PROGRESS NOTES
09/17/19 1102   Vital Signs   Temp 98 °F (36.7 °C)   Temp Source Oral   Pulse (Heart Rate) 60   Heart Rate Source Monitor   Cardiac Rhythm Paced   Resp Rate 16   O2 Sat (%) 97 %   Level of Consciousness Alert   BP (!) 105/21   MAP (Calculated) (!) 49   BP 1 Method Automatic   BP 1 Location Right arm   BP Patient Position At rest   MEWS Score 1   Pain 1   Pain Scale 1 Numeric (0 - 10)   Pain Intensity 1 3   Patient Stated Pain Goal 0   Oxygen Therapy   O2 Device Nasal cannula   O2 Flow Rate (L/min) 3 l/min   Patient Observation   Patient Turned Turns self   Observations vitals assess   s/w T Ladarius NUNEZ regarding BP. Pt asymptomatic. No new orders.

## 2019-09-17 NOTE — PROGRESS NOTES
Eladia Rojo [address] M.D.  [phone]               GASTROENTEROLOGY PROGRESS NOTE        NAME: Bull Gallardo   :  1960   MRN:  [id number]       Subjective:   Asked to see patient due to significant periumbilical/suprapubic pain. Better after BM. Drain still draining. Objective:   VITALS:   Last 24hrs VS reviewed. Most recent are:  Visit Vitals  BP (!) 105/26 (BP 1 Location: Right arm, BP Patient Position: At rest)   Pulse 60   Temp 97.5 °F (36.4 °C)   Resp 16   Ht 5' 5\" (1.651 m)   Wt 127 kg (279 lb 15.8 oz)   SpO2 98%   BMI 46.59 kg/m²       Intake/Output Summary (Last 24 hours) at 2019 1739  Last data filed at 2019 1607  Gross per 24 hour   Intake 1676.58 ml   Output 2250 ml   Net -573.42 ml       PHYSICAL EXAM:  General: Alert, in no acute distress    Lungs:            CTA Bilaterally  Heart:  Normal S1, S2    Abdomen: Soft, distended, tender in the RLQ and suprapubic area. Normoactive bowel sounds, no rebound/guarding  Psych:   Good insight. Not anxious nor agitated. Lab Data Reviewed:   Recent Labs     19   HGB 10.2*     Recent Labs     19  0500   * 132*   K 4.1 4.1   CL 97 97   CO2 30 26   BUN 23* 25*   CREA 1.42* 1.47*   * 182*   CA 8.5 8.6     No results for input(s): SGOT, GPT, AP, TBIL, TP, ALB, GLOB, GGT, AML, LPSE in the last 72 hours. No lab exists for component: AMYP, HLPSE  Recent Labs     19  0500   INR 1.8* 1.7*   PTP 17.6* 16.4*      No results for input(s): FE, TIBC, PSAT, FERR in the last 72 hours. No results for input(s): CPK, CKMB in the last 72 hours. No lab exists for component: TOPONINI    See Electronic Medical Record for all procedure/radiology reports and details which were not copied into this note but were reviewed prior to the creation of the Plan. Assessment:   · New suprapubic and RLQ pain of unclear etiology. Possible due to constipation.  Will evaluate for other possibilities. · Ascites: not portal HTN related     Patient Active Problem List   Diagnosis Code    Essential hypertension I10    Obesity, morbid ([facility] E66.01    Pacemaker Z95.0    Atrial fibrillation ([facility] I48.91    Depression F32.9    Insomnia G47.00    Chronic anticoagulation Z79.01    DDD (degenerative disc disease), cervical M50.30    DDD (degenerative disc disease), thoracic M51.34    Long term prescription opiate use Z79.891    Incidental pulmonary nodule- 2mm RLL- repeat CT due in Feb 2019 R91.1    Acute hypokalemia E87.6    AICD discharge Z45.02    AICD (automatic cardioverter/defibrillator) present A32.098    Systolic CHF, chronic (HCC) I50.22    Lymphedema I89.0    Lymphedema of both lower extremities I89.0       Plan:   · CT scan A/P without IV contrast  · Continue H2 blocker       Signed by:  Candelaria Skaggs MD         9/17/2019  5:39 PM

## 2019-09-17 NOTE — PROGRESS NOTES
Assessment/Plan/Discussion:Cardiology Attending:     Patient seen on the day of progress note and examined  and agree with Advance Practice Provider (MARIO, NP,PA)  assessment and plans. Sai Villarreal is a 60 y.o. female   bp drop to 85 at 11 am, back up and holding lasix  Will stop milrinone, she ready to get home  Some abdominal pain, mild  Will have Dr Edilberto Ang see in am to schedule ablation, and can we stop amiodarone for now? TSH 45 and 41   Start po meds diuretics in am  Home Thursday unless able to get ablation  Torie Vazquez MD                                                                                 Cardiology Progress Note            Admit Date: 9/9/2019  Admit Diagnosis: Lymphedema of both lower extremities [I89.0]  Lymphedema [I89.0]  Date: 9/17/2019     Time: 11:51 AM    Subjective: Today she is noticing a difference in her breathing and edema. Better. UOP is improving. Assessment and Plan     1. Volume overload              - admitted for volume control              - Lasix 40 mg IV 8 hour              - Check daily BMP              - Daily weights and intake/output              - PureWick, as she get too SOB with standing  2. DAVID on CKD              - Cr. 1.42, baseline 1.4  Results from Hospital Encounter encounter on 09/09/19   US RETROPERITONEUM COMP    Narrative RENAL AND BLADDER ULTRASOUND    INDICATION: elevated creatinine, low urine output. COMPARISON: None. TECHNIQUE:  Sonography of the kidneys, retroperitoneum, and bladder was performed. FINDINGS:    RIGHT KIDNEY: measures 9.3 cm in length, although difficult to visualize due to  large body habitus and massive ascites. Echogenicity is abnormally increased. No  hydronephrosis, large shadowing calculus, or obvious solid contour-deforming  renal mass. LEFT KIDNEY: measures 8.2 cm in length, small and very difficult to visualize  due to large body habitus and massive ascites. Renal echogenicity is abnormally  increased.  No hydronephrosis, large shadowing calculus, or obvious solid  contour-deforming renal mass. AORTA: Normal caliber in its visualized portions. COMMON ILIAC ARTERIES: Not seen. IVC: Normal caliber in its visualized portions. BLADDER: Incompletely distended and not assessed. Incidentally noted massive ascites and moderate bilateral pleural effusions. IMPRESSION:   1. Poorly visualized kidneys showing increased echogenicity consistent with  medical renal parenchymal disease but no hydronephrosis. 2. Urinary bladder not assessed. 3. Large ascites. 4. Moderate bilateral pleural effusions. - daily BMP              - Hold Lisinopril for DAVID on CKD              - Hold Aldactone restarted at 50 mg  3. Lymphedema              - Venous insufficiency              - Lasix as above  4. Systolic HF              - acute on chronic              - NYHA class IV              - EF 40-45% on last echo 3/2019 - MUGA with EF 49%. EF on echo 30-35%              - MUGA with LVEF of 49%  09/09/19   ECHO ADULT FOLLOW-UP OR LIMITED 09/10/2019 9/10/2019    Narrative · Left Ventricle: Normal cavity size and wall thickness. Moderate systolic   dysfunction. Estimated left ventricular ejection fraction is 31 - 35%. Biplane method used to measure ejection fraction. Abnormal left   ventricular wall motion. Global hypokinesis with apical dyskinesis. · Left Atrium: Dilated left atrium. · Right Ventricle: Not well visualized. Signed by: Laurie Garcia MD               - Coreg resumed              - Lisinopril held for DAVID on CKD              - Aldactone 50 mg              - Daily weights   Last 3 Recorded Weights in this Encounter    09/15/19 0652 09/16/19 0426 09/17/19 0129   Weight: 285 lb 4.4 oz (129.4 kg) 283 lb 4.7 oz (128.5 kg) 279 lb 15.8 oz (127 kg)                 - proBNP now 1417              - S/p BiV ICD  5.  Afib              - PPM              - [address] with Coumadin - Pharmacy consulted for dosing   - Held today for paracentesis              - Amio 200 mg daily  6. HTN              - well controlled on Coreg  7. Hypothyroidism              - Synthroid PTA 75 mcg              - TSH 41.20   - T3 1.4   - Synthroid increased to 150 mcg  8. Hypercoagulable state              - INR 1.7 - held for paracentesis today. - Pharmacy consulted to dose  9. Hyponatremic              - Na 131              - daily BMP  10. H/o CHB              - s/p PPM - changed to BiV ICD with HF  11. DDD              - chronic pain and on chronic opiates              - will resume home meds  12. Body mass index is 49.32 kg/m². - Morbid obesity - unable to exercise with increased SOB.     UOP increased to 2650 in last 24 hours. She notices the difference. With regards to her past h/o VT and use of Amiodarone, if we are unable to adequately replace her TSH, she would need VT ablation. She would like to d/w Dr. Jacky Cook about this option. She is improving and ablation is becoming an available option. Cytology on ascitic fluid pending. PT/OT ordered for debility and decline from her baseline. Continue current therapies, as she is improving on her current plan. Past Medical History:   Diagnosis Date    AICD (automatic cardioverter/defibrillator) present     AICD (automatic cardioverter/defibrillator) present     Atrial fibrillation (HCC)     Chronic anticoagulation     was on NOAC before. ?had clot on noac. now on warfarin. Dr. Kun Key monitors INR    DDD (degenerative disc disease), cervical 1/30/2018    DDD (degenerative disc disease), thoracic 1/30/2018    Depression     no h/o hospitalization    Essential hypertension 1/8/2018    Incidental pulmonary nodule 2mm, RLL- no follow up needed 2/16/2018    Noted on abdomen/pelvis CT Feb 2018. No smoking history. 1. No renal, ureteral, or bladder calculi. 2. No acute findings. 3. 2 mm pulmonary nodule right lower lobe.  No follow-up needed if the patient is low risk. If the patient is high-risk, CT could be considered in 1 year.      Insomnia     Kidney infarction ([facility] [address] 2017    d/t clot per pt. recalls being on blood thinner.      Lymphedema 8/23/2019    Lymphedema of left arm     developed after infection of pacemaker site    Obesity, morbid ([facility] 1/30/2018    Stomach flu 2015    hospital admit X2    Systolic CHF, chronic (HCC)     EF 45% normal caths and stress tests      Social History     Tobacco Use    Smoking status: Never Smoker    Smokeless tobacco: Never Used   Substance Use Topics    Alcohol use: No    Drug use: No         ROS:     GENERAL   Recent weight loss - no   Fever -----------------   no   Chills -----------------   no     EYES, VISION   Visual Changes - no     EARS, NOSE, THROAT   Hearing loss ----------- no   Swallowing difficulties - no     CARDIOVASCULAR   Chest pain/pressure ---- no   Arrhythmia/palpitations - no       RESPIRATORY   Cough ------------------ no   Shortness of breath - yes   Wheezing -------------- no   GASTROINTESTINAL   Abdominal pain - no   Heartburn -------- no   Bloody stool ----- no     GENITOURINARY   Frequent urination - no   Urgency -------------- no     MUSCULOSKELETAL   Joint pain/swelling ---- yes   Musculoskeletal pain - yes     SKIN & INTEGUMENTARY   Rashes - no   Sores --- no         NEUROLOGICAL   Numbness/tingling - no   Sensation loss ------ no     PSYCHIATRIC   Nervousness/anxiety - no   Depression -------------- no     ENDOCRINE   Heat/cold intolerance - no   Excessive thirst -------- no     HEMATOLOGIC/LYMPHATIC   Abnormal bleeding - no     ALL/IMMUN   Allergic reaction ------ no   Recurrent infections - no     Objective:     Physical Exam:                Visit Vitals  BP (!) 109/24 (BP 1 Location: Right arm, BP Patient Position: Sitting) Comment: nurse notified   Pulse 60   Temp 98 °F (36.7 °C)   Resp 18   Ht 5' 5\" (1.651 m)   Wt 279 lb 15.8 oz (127 kg)   SpO2 99%   BMI 46.59 kg/m² General Appearance:   Well developed, well nourished,alert and oriented x 3, and   individual in no acute distress. Looks tired   Ears/Nose/Mouth/Throat:    Hearing grossly normal.         Neck:  Supple. Chest:    Lungs diminished,  No crackles in bases to auscultation bilaterally. Cardiovascular:   Regular rate and rhythm, S1, S2 normal, no murmur. Abdomen:    Softer, non-tender, bowel sounds are active. Extremities:  3+ edema bilaterally. Significant pedal edema   Skin:  Warm and dry.      Telemetry: normal sinus rhythm          Data Review:    Labs:    Recent Results (from the past 24 hour(s))   PROTHROMBIN TIME + INR    Collection Time: 09/17/19  1:43 AM   Result Value Ref Range    INR 1.8 (H) 0.9 - 1.1      Prothrombin time 17.6 (H) 9.0 - 19.1 sec   METABOLIC PANEL, BASIC    Collection Time: 09/17/19  1:43 AM   Result Value Ref Range    Sodium 133 (L) 136 - 145 mmol/L    Potassium 4.1 3.5 - 5.1 mmol/L    Chloride 97 97 - 108 mmol/L    CO2 30 21 - 32 mmol/L    Anion gap 6 5 - 15 mmol/L    Glucose 174 (H) 65 - 100 mg/dL    BUN 23 (H) 6 - 20 MG/DL    Creatinine 1.42 (H) 0.55 - 1.02 MG/DL    BUN/Creatinine ratio 16 12 - 20      GFR est AA 46 (L) >60 ml/min/1.73m2    GFR est non-AA 38 (L) >60 ml/min/1.73m2    Calcium 8.5 8.5 - 10.1 MG/DL   HEMOGLOBIN    Collection Time: 09/17/19  1:43 AM   Result Value Ref Range    HGB 10.2 (L) 11.5 - 16.0 g/dL   NT-PRO BNP    Collection Time: 09/17/19  1:43 AM   Result Value Ref Range    NT pro-BNP 1,417 (H) <125 PG/ML          Radiology:        Current Facility-Administered Medications   Medication Dose Route Frequency    warfarin (COUMADIN) tablet 3 mg  3 mg Oral ONCE    famotidine (PEPCID) tablet 20 mg  20 mg Oral BID    furosemide (LASIX) injection 40 mg  40 mg IntraVENous Q8H    milrinone (PRIMACOR) 20 MG/100 ML D5W infusion  0.2 mcg/kg/min IntraVENous CONTINUOUS    levothyroxine (SYNTHROID) tablet 150 mcg  150 mcg Oral ACB    amiodarone (CORDARONE) tablet 100 mg 100 mg Oral DAILY    miconazole (MICOTIN) 2 % powder   Topical Q12H    balsam peru-castor oil (VENELEX) ointment   Topical Q8H    spironolactone (ALDACTONE) tablet 50 mg  50 mg Oral DAILY    sodium chloride (NS) flush 5-40 mL  5-40 mL IntraVENous Q8H    sodium chloride (NS) flush 5-40 mL  5-40 mL IntraVENous PRN    acetaminophen (TYLENOL) tablet 650 mg  650 mg Oral Q4H PRN    zolpidem (AMBIEN) tablet 5 mg  5 mg Oral QHS PRN    methocarbamol (ROBAXIN) tablet 500 mg  500 mg Oral TID    pravastatin (PRAVACHOL) tablet 80 mg  80 mg Oral QHS    potassium chloride SR (KLOR-CON 10) tablet 40 mEq  40 mEq Oral DAILY    sertraline (ZOLOFT) tablet 100 mg  100 mg Oral DAILY    HYDROcodone-acetaminophen (NORCO) 7.5-325 mg per tablet 1 Tab  1 Tab Oral Q6H PRN    warfarin - pharmacy to dose   Other Rx Dosing/Monitoring       Neil Pringle M.D.      [facility] [address] [place] [address] 13, [address] [facility] [address]   Latesha Larsen   [phone]

## 2019-09-17 NOTE — WOUND CARE
Wound Care Note:     Follow-up visit for right ankle, sacrum and pannus    Chart shows:  Admitted for lymphedema of both lower extremities with a history of AICD, afib, chronic anticoagulation, DDD cervical and thoracic, depression, essential HTN, incidental pulmonary nodule 2 mm RLL, insomnia, kidney infarction, lymphedema, chronic systolic CHF. No new WBC lab  Admitted from home    Assessment:   Patient is A&O x 4, communicative, incontinent with some assistance needed in repositioning. Bed: [facility]  Patient has a [employer] in place. Diet: Cardiac regular with nutritional supplements    Bilateral buttocks skin intact without erythema. Bilateral heels skin intact and with blanchable erythema. 1. POA right anterior ankle still has a linear line of intact serous blisters, no drainage, chiqui-wound intact with edema, wound edges are closed. Patient prefers this be left open to air. 2.  POA maculopapular erythematous rash with satellite lesions consistent with yeast to pannus is almost resolved, will continue using antifungal powder. 3.  POA sacrum with small open wound, wound bed pink, no drainage, chiqui-wound with hyperpigmentation, wound edges are open. Venelex ointment applied. Patient repositioned on left side. Heels offloaded on pillows. Recommendations:    Continue with current wound care except leave right ankle open to air. Sacrum and bilateral heels- Every 8 hours liberally apply Venelex ointment.     Pannus- Every 12 hours cleanse with soap and water, blot dry, sprinkle with Nystatin powder and gently rub in.     Skin Care & Pressure Prevention:  Minimize layers of linen/pads under patient to optimize support surface. Turn/reposition approximately every 2 hours and offload heels.   Manage incontinence / promote continence     Discussed above plan with patient & [address] [place] RN    Transition of Care: Plan to follow as needed while admitted to hospital.    FARSHAD FrancoN, RN, [facility], [facility].  office [phone]  pager [phone] or call  to page

## 2019-09-17 NOTE — PROGRESS NOTES
Problem: Mobility Impaired (Adult and Pediatric)  Goal: *Acute Goals and Plan of Care (Insert Text)  Description  FUNCTIONAL STATUS PRIOR TO ADMISSION: Patient used rollator for short distance gait in her home and endorses \"falls\" (could not state how many). Stated since March she has had a steady decline in mobility and needs wheelchair transport for any out of the house appointments.  and second family member assist with bathing and dressing and sit>stand transfers lately but prior to March she was ~modified independent with rollator. Uses 3L O2 at baseline. Has tub/shower and has to step over tub to sit on shower chair and use handheld shower head for bathing. HOME SUPPORT PRIOR TO ADMISSION: The patient lived with her  to provide assist.    Physical Therapy Goals  Initiated 9/17/2019  1. Patient will move from supine to sit and sit to supine , scoot up and down and roll side to side in bed with modified independence within 7 day(s). 2.  Patient will transfer from bed to chair and chair to bed with supervision/set-up using the least restrictive device within 7 day(s). 3.  Patient will perform sit to stand with supervision/set-up within 7 day(s). 4.  Patient will ambulate with supervision/set-up for 40 feet with the least restrictive device and without seated rest within 7 day(s). 5.  Patient will improve Tinetti score by 4-5 points within 7 days. Outcome: Progressing Towards Goal   PHYSICAL THERAPY EVALUATION  Patient: Esdras Verdugo (56 y.o. female)  Date: 9/17/2019  Primary Diagnosis: Lymphedema of both lower extremities [I89.0]  Lymphedema [I89.0]  Precautions:   Fall(3L O2 at baseline)      ASSESSMENT  Based on the objective data described below, the patient presents with impaired balance, greatly decreased endurance and activity tolerance, generalized weakness and history of falls.  Currently supervision-CGA for mobility when using rollator, however only able to ambulate 15 ft at a time due to SOB and fatigue. Seated rest between all trials. Discussed importance of mobilizing frequently throughout the day and being up to a chair for meals. Current Level of Function Impacting Discharge (mobility/balance): CGA for mobility, limited gait x15 ft    Functional Outcome Measure: The patient scored 14/28 on the Tinetti outcome measure which is indicative of high fall risk. Other factors to consider for discharge: long steady decline since March, history of falls     Patient will benefit from skilled therapy intervention to address the above noted impairments. PLAN :  Recommendations and Planned Interventions: bed mobility training, transfer training, gait training, therapeutic exercises, neuromuscular re-education, edema management/control and patient and family training/education      Frequency/Duration: Patient will be followed by physical therapy:  5 times a week to address goals. Recommendation for discharge: (in order for the patient to meet his/her long term goals)  Therapy up to 5 days/week in SNF setting    This discharge recommendation:  Has not yet been discussed the attending provider and/or case management    Equipment recommendations for successful discharge (if) home: to be determined by rehab facility         SUBJECTIVE:   Patient stated I've been getting sick and weaker since March.     OBJECTIVE DATA SUMMARY:   HISTORY:    Past Medical History:   Diagnosis Date    AICD (automatic cardioverter/defibrillator) present     AICD (automatic cardioverter/defibrillator) present     Atrial fibrillation (HCC)     Chronic anticoagulation     was on NOAC before. ?had clot on noac. now on warfarin.  Dr. Gretchen Hernandez monitors INR    DDD (degenerative disc disease), cervical 1/30/2018    DDD (degenerative disc disease), thoracic 1/30/2018    Depression     no h/o hospitalization    Essential hypertension 1/8/2018    Incidental pulmonary nodule 2mm, RLL- no follow up needed 2/16/2018    Noted on abdomen/pelvis CT Feb 2018. No smoking history. 1. No renal, ureteral, or bladder calculi. 2. No acute findings. 3. 2 mm pulmonary nodule right lower lobe. No follow-up needed if the patient is low risk. If the patient is high-risk, CT could be considered in 1 year. Insomnia     Kidney infarction ([address] 2017    d/t clot per pt. recalls being on blood thinner. Lymphedema 8/23/2019    Lymphedema of left arm     developed after infection of pacemaker site    Obesity, morbid ([address] 1/30/2018    Stomach flu 2015    hospital admit X2    Systolic CHF, chronic (HCC)     EF 45% normal caths and stress tests     Past Surgical History:   Procedure Laterality Date    HX BREAST BIOPSY Right 2010    Benign    HX CHOLECYSTECTOMY  1984    HX HEART CATHETERIZATION  2006    d/t abnl stress test, per pat, test was normal    HX HEART CATHETERIZATION  ~2009, 2012    per pt normal.     HX HYSTERECTOMY  2012    d/t heavy bleeding. ?endometriosis. +fibroid. per pt, no cancer. HX PACEMAKER  2010, 2011, 2012, 3/29/2018    first 2010 (infected), replacement 2011 (lead not placed correctly), 2nd replacement 2012- pacemaker, defibrillator, 3rd replacement 2018       Personal factors and/or comorbidities impacting plan of care: PMH    Home Situation  Home Environment: Private residence  # Steps to Enter: 0  One/Two Story Residence: One story  Living Alone: No  Support Systems: Spouse/Significant Other/Partner, Family member(s)  Patient Expects to be Discharged to[de-identified] Private residence  Current DME Used/Available at Home: Shower chair, Oxygen, portable, Lift chair, Walker, rollator, Wheelchair    EXAMINATION/PRESENTATION/DECISION MAKING:   Hearing:   Auditory  Auditory Impairment: None  Skin:  sacral redness (RN applied Venelex ointment)  Edema: 1+ pitting edema (significant) throughout LEs  Range Of Motion:  AROM: Generally decreased, functional  Strength:    Strength: Generally decreased, functional  Tone & Sensation:   Tone: Normal  Sensation: Impaired(numbness/tingling LEs due to swelling)   Coordination:  Coordination: Generally decreased, functional  Functional Mobility:  Bed Mobility  Supine to Sit: Supervision  Scooting: Supervision  Transfers:  Sit to Stand: Stand-by assistance  Stand to Sit: Stand-by assistance  Balance:   Sitting: Intact  Standing: Intact; With support  Ambulation/Gait Training:  Distance (ft): 15 Feet (ft)(x3)  Assistive Device: Walker, rollator;Gait belt; Other (comment)(3L)  Ambulation - Level of Assistance: Contact guard assistance  Gait Abnormalities: Decreased step clearance;Shuffling gait;Trunk sway increased  Base of Support: Widened  Speed/Ita: Slow;Shuffled  Step Length: Right shortened;Left shortened    Functional Measure:  Tinetti test:    Sitting Balance: 1  Arises: 1  Attempts to Rise: 1  Immediate Standing Balance: 1  Standing Balance: 1  Nudged: 0  Eyes Closed: 0  Turn 360 Degrees - Continuous/Discontinuous: 0  Turn 360 Degrees - Steady/Unsteady: 0  Sitting Down: 1  Balance Score: 6 Balance total score  Indication of Gait: 1  R Step Length/Height: 1  L Step Length/Height: 1  R Foot Clearance: 1  L Foot Clearance: 1  Step Symmetry: 1  Step Continuity: 1  Path: 1  Trunk: 0  Walking Time: 0  Gait Score: 8 Gait total score  Total Score: 14/28 Overall total score         Tinetti Tool Score Risk of Falls  <19 = High Fall Risk  19-24 = Moderate Fall Risk  25-28 = Low Fall Risk  Tinetti ME. Performance-Oriented Assessment of Mobility Problems in Elderly Patients. [address]; [id number].  (Scoring Description: PT Bulletin Feb. 10, 1993)    Older adults: Nkechi Khoury et al, 2009; n = 1000 [facility] elderly evaluated with ABC, HARPREET, ADL, and IADL)  · Mean HARPREET score for males aged 69-68 years = 26.21(3.40)  · Mean HARPREET score for females age 69-68 years = 25.16(4.30)  · Mean HARPREET score for males over 80 years = 23.29(6.02)  · Mean HARPREET score for females over 80 years = 17.20(8.32)            Physical Therapy Evaluation Charge Determination   History Examination Presentation Decision-Making   HIGH Complexity :3+ comorbidities / personal factors will impact the outcome/ POC  HIGH Complexity : 4+ Standardized tests and measures addressing body structure, function, activity limitation and / or participation in recreation  LOW Complexity : Stable, uncomplicated  Other outcome measures Tinetti 16/28  MEDIUM      Based on the above components, the patient evaluation is determined to be of the following complexity level: HIGH     Pain Rating:  Denied pain    Activity Tolerance:   Fair, requires frequent rest breaks and observed SOB with activity  Please refer to the flowsheet for vital signs taken during this treatment. After treatment patient left in no apparent distress:   Sitting in chair, Call bell within reach and Caregiver / family present    COMMUNICATION/EDUCATION:   The patients plan of care was discussed with: Registered Nurse. Fall prevention education was provided and the patient/caregiver indicated understanding., Patient/family have participated as able in goal setting and plan of care. and Patient/family agree to work toward stated goals and plan of care.     Thank you for this referral.  Chau Gaitan, PT, DPT   Time Calculation: 24 mins

## 2019-09-18 NOTE — PROGRESS NOTES
Assessment/Plan/Discussion:Cardiology Attending:     Patient seen on the day of progress note and examined  and agree with Advance Practice Provider (MARIO, NP,PA)  assessment and plans. Lola Wesley is a 60 y.o. female   Belly hurts but mild and CT with stool  Diarrhea today , trying to move bowels  Sub Q air but not at pain site, decide on ascites drain? Still lots of output  bp variable  Renal fx stable  Danielle Lyon MD                                                                                 Cardiology Progress Note            Admit Date: 9/9/2019  Admit Diagnosis: Lymphedema of both lower extremities [I89.0]  Lymphedema [I89.0]  Date: 9/18/2019     Time: 11:51 AM    Subjective:  Mild belly pain. Improved with BM. Ambulating short distances. No CP. BOWENS. Assessment and Plan     1. Volume overload              - admitted for volume control              - Lasix 40 mg PO BID              - Check daily BMP              - Daily weights and intake/output              - PureWick, as she get too SOB with standing  2. DAVID on CKD              - Cr. 1.38, baseline 1.4  Results from Hospital Encounter encounter on 09/09/19   US RETROPERITONEUM COMP    Narrative RENAL AND BLADDER ULTRASOUND    INDICATION: elevated creatinine, low urine output. COMPARISON: None. TECHNIQUE:  Sonography of the kidneys, retroperitoneum, and bladder was performed. FINDINGS:    RIGHT KIDNEY: measures 9.3 cm in length, although difficult to visualize due to  large body habitus and massive ascites. Echogenicity is abnormally increased. No  hydronephrosis, large shadowing calculus, or obvious solid contour-deforming  renal mass. LEFT KIDNEY: measures 8.2 cm in length, small and very difficult to visualize  due to large body habitus and massive ascites. Renal echogenicity is abnormally  increased. No hydronephrosis, large shadowing calculus, or obvious solid  contour-deforming renal mass.   AORTA: Normal caliber in its visualized portions. COMMON ILIAC ARTERIES: Not seen. IVC: Normal caliber in its visualized portions. BLADDER: Incompletely distended and not assessed. Incidentally noted massive ascites and moderate bilateral pleural effusions. IMPRESSION:   1. Poorly visualized kidneys showing increased echogenicity consistent with  medical renal parenchymal disease but no hydronephrosis. 2. Urinary bladder not assessed. 3. Large ascites. 4. Moderate bilateral pleural effusions. - daily BMP              - Hold Lisinopril for DAVID on CKD              - Hold Aldactone restarted at 50 mg  3. Lymphedema              - Venous insufficiency              - Lasix as above  4. Systolic HF              - acute on chronic              - NYHA class III              - EF 40-45% on last echo 3/2019 - MUGA with EF 49%. EF on echo 30-35%              - MUGA with LVEF of 49%  09/09/19   ECHO ADULT FOLLOW-UP OR LIMITED 09/10/2019 9/10/2019    Narrative · Left Ventricle: Normal cavity size and wall thickness. Moderate systolic   dysfunction. Estimated left ventricular ejection fraction is 31 - 35%. Biplane method used to measure ejection fraction. Abnormal left   ventricular wall motion. Global hypokinesis with apical dyskinesis. · Left Atrium: Dilated left atrium. · Right Ventricle: Not well visualized. Signed by: Juan Cullen MD               - Coreg resumed              - Lisinopril held for DAVID on CKD              - Aldactone 50 mg              - Daily weights   Last 3 Recorded Weights in this Encounter    09/16/19 0426 09/17/19 0129 09/18/19 0555   Weight: 283 lb 4.7 oz (128.5 kg) 279 lb 15.8 oz (127 kg) 276 lb 14.4 oz (125.6 kg)                 - proBNP now 1417              - S/p BiV ICD  5. Afib              - PPM              - [address] with Coumadin - Pharmacy consulted for dosing              - Amio 200 mg daily  6. HTN              - well controlled on Coreg  7.  Hypothyroidism - Synthroid PTA 75 mcg              - TSH 41.20   - T3 1.4   - Synthroid increased to 150 mcg  8. Hypercoagulable state              - INR 1.9              - Pharmacy consulted to dose  9. Hyponatremic              - Na 133              - daily BMP  10. H/o CHB              - s/p PPM - changed to BiV ICD with HF  11. DDD              - chronic pain and on chronic opiates              - will resume home meds  12. Body mass index is 49.32 kg/m². - Morbid obesity - unable to exercise with increased SOB.     Continues to diuresis slowly, but steady. Abdominal pigtail with [id number] out daily. D/w Dr. Gibson Jerry, he notes she is not a candidate at this time for VT ablation with volume overload/acute CHF. Too high risk for VT during procedure that she won't convert out of. D/c planning for Thursday. Lasix to PO. Continue with PT/OT. They do not want to go to SNF. Will need HH, PT/OT at home. Past Medical History:   Diagnosis Date    AICD (automatic cardioverter/defibrillator) present     AICD (automatic cardioverter/defibrillator) present     Atrial fibrillation (HCC)     Chronic anticoagulation     was on NOAC before. ?had clot on noac. now on warfarin. Dr. Inez Conroy monitors INR    DDD (degenerative disc disease), cervical 1/30/2018    DDD (degenerative disc disease), thoracic 1/30/2018    Depression     no h/o hospitalization    Essential hypertension 1/8/2018    Incidental pulmonary nodule 2mm, RLL- no follow up needed 2/16/2018    Noted on abdomen/pelvis CT Feb 2018. No smoking history. 1. No renal, ureteral, or bladder calculi. 2. No acute findings. 3. 2 mm pulmonary nodule right lower lobe. No follow-up needed if the patient is low risk. If the patient is high-risk, CT could be considered in 1 year.      Insomnia     Kidney infarction ([address] 2017    d/t clot per pt. recalls being on blood thinner.      Lymphedema 8/23/2019    Lymphedema of left arm     developed after infection of pacemaker site    Obesity, morbid ([facility] 1/30/2018    Stomach flu 2015    hospital admit X2    Systolic CHF, chronic (HCC)     EF 45% normal caths and stress tests      Social History     Tobacco Use    Smoking status: Never Smoker    Smokeless tobacco: Never Used   Substance Use Topics    Alcohol use: No    Drug use: No         ROS:     GENERAL   Recent weight loss - no   Fever -----------------   no   Chills -----------------   no     EYES, VISION   Visual Changes - no     EARS, NOSE, THROAT   Hearing loss ----------- no   Swallowing difficulties - no     CARDIOVASCULAR   Chest pain/pressure ---- no   Arrhythmia/palpitations - no       RESPIRATORY   Cough ------------------ no   Shortness of breath - yes   Wheezing -------------- no   GASTROINTESTINAL   Abdominal pain - no   Heartburn -------- no   Bloody stool ----- no     GENITOURINARY   Frequent urination - no   Urgency -------------- no     MUSCULOSKELETAL   Joint pain/swelling ---- yes   Musculoskeletal pain - yes     SKIN & INTEGUMENTARY   Rashes - no   Sores --- no         NEUROLOGICAL   Numbness/tingling - no   Sensation loss ------ no     PSYCHIATRIC   Nervousness/anxiety - no   Depression -------------- no     ENDOCRINE   Heat/cold intolerance - no   Excessive thirst -------- no     HEMATOLOGIC/LYMPHATIC   Abnormal bleeding - no     ALL/IMMUN   Allergic reaction ------ no   Recurrent infections - no     Objective:     Physical Exam:                Visit Vitals  BP (!) 125/37 (BP 1 Location: Right arm, BP Patient Position: At rest)   Pulse 60   Temp 97.6 °F (36.4 °C)   Resp 18   Ht 5' 5\" (1.651 m)   Wt 276 lb 14.4 oz (125.6 kg)   SpO2 100%   BMI 46.08 kg/m²        General Appearance:   Well developed, well nourished,alert and oriented x 3, and   individual in no acute distress. Looks tired   Ears/Nose/Mouth/Throat:    Hearing grossly normal.         Neck:  Supple. Chest:    Lungs diminished,  No crackles in bases to auscultation bilaterally. Cardiovascular:   Regular rate and rhythm, S1, S2 normal, no murmur. Abdomen:    Softer, non-tender, bowel sounds are active. Extremities:  3+ edema bilaterally. improved pedal edema   Skin:  Warm and dry.      Telemetry: normal sinus rhythm          Data Review:    Labs:    Recent Results (from the past 24 hour(s))   PROTHROMBIN TIME + INR    Collection Time: 09/18/19  2:52 AM   Result Value Ref Range    INR 1.9 (H) 0.9 - 1.1      Prothrombin time 18.9 (H) 9.0 - 66.1 sec   METABOLIC PANEL, BASIC    Collection Time: 09/18/19  2:52 AM   Result Value Ref Range    Sodium 133 (L) 136 - 145 mmol/L    Potassium 4.7 3.5 - 5.1 mmol/L    Chloride 100 97 - 108 mmol/L    CO2 29 21 - 32 mmol/L    Anion gap 4 (L) 5 - 15 mmol/L    Glucose 107 (H) 65 - 100 mg/dL    BUN 20 6 - 20 MG/DL    Creatinine 1.38 (H) 0.55 - 1.02 MG/DL    BUN/Creatinine ratio 14 12 - 20      GFR est AA 47 (L) >60 ml/min/1.73m2    GFR est non-AA 39 (L) >60 ml/min/1.73m2    Calcium 8.5 8.5 - 10.1 MG/DL          Radiology:        Current Facility-Administered Medications   Medication Dose Route Frequency    warfarin (COUMADIN) tablet 3 mg  3 mg Oral ONCE    furosemide (LASIX) tablet 40 mg  40 mg Oral BID    famotidine (PEPCID) tablet 20 mg  20 mg Oral BID    levothyroxine (SYNTHROID) tablet 150 mcg  150 mcg Oral ACB    miconazole (MICOTIN) 2 % powder   Topical Q12H    balsam peru-castor oil (VENELEX) ointment   Topical Q8H    spironolactone (ALDACTONE) tablet 50 mg  50 mg Oral DAILY    sodium chloride (NS) flush 5-40 mL  5-40 mL IntraVENous Q8H    sodium chloride (NS) flush 5-40 mL  5-40 mL IntraVENous PRN    acetaminophen (TYLENOL) tablet 650 mg  650 mg Oral Q4H PRN    zolpidem (AMBIEN) tablet 5 mg  5 mg Oral QHS PRN    methocarbamol (ROBAXIN) tablet 500 mg  500 mg Oral TID    pravastatin (PRAVACHOL) tablet 80 mg  80 mg Oral QHS    potassium chloride SR (KLOR-CON 10) tablet 40 mEq  40 mEq Oral DAILY    sertraline (ZOLOFT) tablet 100 mg  100 mg Oral DAILY    HYDROcodone-acetaminophen (NORCO) 7.5-325 mg per tablet 1 Tab  1 Tab Oral Q6H PRN    warfarin - pharmacy to dose   Other Rx Dosing/Monitoring       Neil Hargrove M.D.      [facility][note 3] [facility] [facility] [address]   [address]   [phone]

## 2019-09-18 NOTE — PROGRESS NOTES
19:30: Bedside and Verbal shift change report given to Fredis (oncoming nurse) by Ara (offgoing nurse). Report included the following information SBAR, Kardex, Intake/Output, MAR and Cardiac Rhythm paced. 07:30: Bedside and Verbal shift change report given to Ara (oncoming nurse) by Fredis (offgoing nurse). Report included the following information SBAR, Kardex, Intake/Output, MAR and Cardiac Rhythm paced.

## 2019-09-18 NOTE — PROGRESS NOTES
Problem: Self Care Deficits Care Plan (Adult)  Goal: *Acute Goals and Plan of Care (Insert Text)  Description    FUNCTIONAL STATUS PRIOR TO ADMISSION: Patient required moderate assistance for basic ADL's and reports inability to perform cook anymore, /family assisted with LE dressing, bathing, toileting prn, rollator for mobility, reports fall ~5 weeks ago when turning corner too quickly    HOME SUPPORT: The patient lived with her  and reports he and her sons fiance assist her. Occupational Therapy Goals  Initiated 9/18/2019    1. Patient will perform grooming and sponge bathing standing and sitting at the sink prn with modified independence and maintain oxygen sats > or = 92% on 1 liter within 7 day(s). 2.  Patient will perform upper body dressing with modified independence and maintain oxygen sats > or = 92% on 1 liter within 7 day(s). 3.  Patient will perform simple home management with modified independence  and maintain oxygen sats > or = 92% on 2 liters within 7 day(s). 4.  Patient will perform toilet transfers with modified independence into and out of bathroom and maintain oxygen sats > or = 92% on 1 liter within 7 day(s). 5.  Patient will perform all aspects of toileting with minimal assistance within 7 day(s). 6.  Patient will participate in upper extremity therapeutic exercise/activities with independence for 8 minutes with < or = 2 rest breaks and maintain oxygen sats > or = 92% on 1 liter within 7 day(s). 7.  Patient will demonstrate pursed lip breathing during functional tasks without cues within 7 day(s).        Outcome: Progressing Towards Goal    OCCUPATIONAL THERAPY EVALUATION  Patient: Sai Villarreal (56 y.o. female)  Date: 9/18/2019  Primary Diagnosis: Lymphedema of both lower extremities [I89.0]  Lymphedema [I89.0]        Precautions:   Fall    ASSESSMENT  Based on the objective data described below, the patient presents with increased activity tolerance, decreased oxygen to 1 liter as she was % on 2.5 to 2 liters. Patient has assist at home and needs to increase performance of IADL's per her report. At this time both patient and her  adamant against SNF for rehab due to having her Dad in SNF, feel she has enough support at home to return home with home health. Current Level of Function Impacting Discharge (ADLs/self-care): mod assist toileting, max assist LE ADL's, supervision to SBA ADL mobility with rolling walker    Functional Outcome Measure: The patient scored 50/100 on the Barthel Index outcome measure   Other factors to consider for discharge: has support of  and family, patient wondering if she could benefit from brace for her back and abdomen     Patient will benefit from skilled therapy intervention to address the above noted impairments. PLAN :  Recommendations and Planned Interventions: self care training, functional mobility training, therapeutic exercise, balance training, therapeutic activities, endurance activities, patient education, home safety training and family training/education    Frequency/Duration: Patient will be followed by occupational therapy 2 times a week to address goals. Recommendation for discharge: (in order for the patient to meet his/her long term goals)  Occupational therapy at least 2 days/week in the home     This discharge recommendation:  Has not yet been discussed the attending provider and/or case management    Equipment recommendations for successful discharge (if) home: none       SUBJECTIVE:   Patient stated I can't cook.  stated when asked what she couldn't do now that she was doing prior    OBJECTIVE DATA SUMMARY:   HISTORY:   Past Medical History:   Diagnosis Date    AICD (automatic cardioverter/defibrillator) present     AICD (automatic cardioverter/defibrillator) present     Atrial fibrillation (HCC)     Chronic anticoagulation     was on NOAC before. ?had clot on noac. now on warfarin.   Deisy Julieth monitors INR    DDD (degenerative disc disease), cervical 1/30/2018    DDD (degenerative disc disease), thoracic 1/30/2018    Depression     no h/o hospitalization    Essential hypertension 1/8/2018    Incidental pulmonary nodule 2mm, RLL- no follow up needed 2/16/2018    Noted on abdomen/pelvis CT Feb 2018. No smoking history. 1. No renal, ureteral, or bladder calculi. 2. No acute findings. 3. 2 mm pulmonary nodule right lower lobe. No follow-up needed if the patient is low risk. If the patient is high-risk, CT could be considered in 1 year. Insomnia     Kidney infarction ([address] 2017    d/t clot per pt. recalls being on blood thinner. Lymphedema 8/23/2019    Lymphedema of left arm     developed after infection of pacemaker site    Obesity, morbid ([address] 1/30/2018    Stomach flu 2015    hospital admit X2    Systolic CHF, chronic (HCC)     EF 45% normal caths and stress tests     Past Surgical History:   Procedure Laterality Date    HX BREAST BIOPSY Right 2010    Benign    HX CHOLECYSTECTOMY  1984    HX HEART CATHETERIZATION  2006    d/t abnl stress test, per pat, test was normal    HX HEART CATHETERIZATION  ~2009, 2012    per pt normal.     HX HYSTERECTOMY  2012    d/t heavy bleeding. ?endometriosis. +fibroid. per pt, no cancer.      HX PACEMAKER  2010, 2011, 2012, 3/29/2018    first 2010 (infected), replacement 2011 (lead not placed correctly), 2nd replacement 2012- pacemaker, defibrillator, 3rd replacement 2018       Expanded or extensive additional review of patient history:     Home Situation  Home Environment: Private residence  # Steps to Enter: 0  One/Two Story Residence: One story  Living Alone: No  Support Systems: Spouse/Significant Other/Partner, Family member(s)  Patient Expects to be Discharged to[de-identified] Private residence  Current DME Used/Available at Home: Shower chair, Oxygen, portable, Lift chair, Walker, rollator, Wheelchair    Hand dominance: Right    Gregorio [address]98 DEFICITS:  Cognitive/Behavioral Status:  Neurologic State: Alert  Orientation Level: Oriented X4  Cognition: Follows commands; Appropriate safety awareness; Appropriate for age attention/concentration; Appropriate decision making  Perception: Appears intact  Perseveration: No perseveration noted  Safety/Judgement: Awareness of environment; Fall prevention;Home safety; Insight into deficits    Skin: noted redness sacrum    Edema: bilateral LE's, hx of lymphedema    Hearing: Auditory  Auditory Impairment: None    Vision/Perceptual:                 Corrective Lenses: Glasses    Range of Motion:  AROM: Within functional limits(limited by body habitus)        Strength:  Strength: Generally decreased, functional        Coordination:  Coordination: Within functional limits  Fine Motor Skills-Upper: Left Intact; Right Intact    Gross Motor Skills-Upper: Left Intact; Right Intact    Tone & Sensation:  Tone: Normal  Sensation: Impaired(LE's)        Balance:  Sitting: Intact; Without support  Standing: Intact; With support    Functional Mobility and Transfers for ADLs:  Bed Mobility:       Transfers:  Sit to Stand: Stand-by assistance  Stand to Sit: Stand-by assistance  Bed to Chair: Supervision;Stand-by assistance; Adaptive equipment;Assist x1;Additional time  Bathroom Mobility: Supervision/set up;Stand-by assistance  Toilet Transfer : Stand-by assistance;Supervision; Adaptive equipment;Assist x1;Additional time    ADL Assessment:  Feeding: Independent    Oral Facial Hygiene/Grooming: Setup(seated)       Upper Body Dressing: Setup    Lower Body Dressing: Maximum assistance(assist prior from family)    Toileting:  Moderate assistance(assist sacral chiqui-care)        ADL Intervention and task modifications:        Educated on role of OT, difference between rehab and home health and patient and her  adamant on not going to SNF as they said \"we went through that with her Dad, we're not [facility] [facility] do that\"    Instructed on pursed lip breathing, need for home pulse oximeter, weaned to 1 liter and maintained > or = 98% at rest, on 2 liters %. Educated on energy conservation and techniques to increase activity at home through pacing strategies. Patient instructed and indicated understanding the benefits of maintaining activity tolerance, functional mobility, and independence with self care tasks during acute stay  to ensure safe return home and to baseline. Encouraged patient to increase frequency and duration OOB, be out of bed for all meals, perform daily ADLs (as approved by RN/MD regarding bathing etc), and performing functional mobility to/from bathroom. Cognitive Retraining  Safety/Judgement: Awareness of environment; Fall prevention;Home safety; Insight into deficits    Therapeutic Exercise: Instructed to sit in chair without reclining and perform UE and LE AROM exercises during commercial breaks, instructed to increase time unsupported to increase demand on core muscles, educated on benefit of movement for edema control versus just elevation   Functional Measure:  Barthel Index:    Bathin  Bladder: 10  Bowels: 10  Groomin  Dressin  Feeding: 10  Mobility: 0  Stairs: 0  Toilet Use: 5  Transfer (Bed to Chair and Back): 10  Total: 50/100        The Barthel ADL Index: Guidelines  1. The index should be used as a record of what a patient does, not as a record of what a patient could do. 2. The main aim is to establish degree of independence from any help, physical or verbal, however minor and for whatever reason. 3. The need for supervision renders the patient not independent. 4. A patient's performance should be established using the best available evidence. Asking the patient, friends/relatives and nurses are the usual sources, but direct observation and common sense are also important. However direct testing is not needed.   5. Usually the patient's performance over the preceding 24-48 hours is important, but occasionally longer periods will be relevant. 6. Middle categories imply that the patient supplies over 50 per cent of the effort. 7. Use of aids to be independent is allowed. Lawrence Niño, Barthel, DLethaW. ([id number]). Functional evaluation: the Barthel Index. [address] [facility] (14)2. ALEXANDER Avila Gwen Rome., Carly Jordan., Caryl, [address] (1999). Measuring the change indisability after inpatient rehabilitation; comparison of the responsiveness of the Barthel Index and Functional [place] Measure. Journal of Neurology, Neurosurgery, and Psychiatry, 66(4), 545-776. Stacey Flor, N.J.A, MAN Fowler, & Kate Estrada MMELVI. (2004.) Assessment of post-stroke quality of life in cost-effectiveness studies: The usefulness of the Barthel Index and the EuroQoL-5D. Quality of Life Research, 15, 218-88         Occupational Therapy Evaluation Charge Determination   History Examination Decision-Making   LOW Complexity : Brief history review  MEDIUM Complexity : 3-5 performance deficits relating to physical, cognitive , or psychosocial skils that result in activity limitations and / or participation restrictions MEDIUM Complexity : Patient may present with comorbidities that affect occupational performnce. Miniml to moderate modification of tasks or assistance (eg, physical or verbal ) with assesment(s) is necessary to enable patient to complete evaluation       Based on the above components, the patient evaluation is determined to be of the following complexity level: LOW   Pain Rating:  Complaint of low back pain and feels brace would help    Activity Tolerance:   Fair, requires rest breaks and no shortness of breath with toilet transfer and toileting   Please refer to the flowsheet for vital signs taken during this treatment.     After treatment patient left in no apparent distress:    Sitting in chair, Call bell within reach and Caregiver / family present    COMMUNICATION/EDUCATION:   The patients plan of care was discussed with: Registered Nurse. Home safety education was provided and the patient/caregiver indicated understanding. and Patient/family have participated as able in goal setting and plan of care. This patients plan of care is appropriate for delegation to [facility].     Thank you for this referral.  Andrez Claros, OTR/L  Time Calculation: 25 mins

## 2019-09-18 NOTE — PROGRESS NOTES
NUTRITION COMPLETE ASSESSMENT    RECOMMENDATIONS:   1. Nausea mgmt - consider alternating zofran/comapzine if continues to be an issue  2. Continue current diet/shakes  3. Daily weights     Interventions/Plan:   Food/Nutrient Delivery:  Modify diet/texture/consistency/nutrients(Cardiac) Commercial supplement(Glucerna Shake)          Assessment:   Reason for Assessment: [x] Reassessment    Diet: Cardiac  Supplements: Glucerna Shakes BID   Nutritionally Significant Medications: [x] Reviewed & Includes: pepcid, lasix, synthroid, KCl, zoloft, spironolactone, coumadin; PRN: compazine q6hr  Meal Intake:   Patient Vitals for the past 100 hrs:   % Diet Eaten   09/18/19 1430 0 %   09/18/19 1226 0 %   09/18/19 0857 5 %   09/18/19 0020 0 %   09/17/19 2020 0 %   09/17/19 1253 90 %   09/16/19 0900 100 %   09/15/19 1118 100 %   09/15/19 0830 100 %   09/14/19 1927 100 %   09/14/19 1208 100 %     Subjective: \"I have nausea in the morning sometimes but never this bad. \"    Objective: PMHx: depression, afib, HTN, lymphedema, CHF. Ascites this admit with paracentesis drain in place. Admit LE Lymphedema; Na+ restricted diet diuresis this admit with wt loss. Abd pain over past couple of days, Vomiting x 1 today. GI noting possibly related to constipation however per documentation having multiple stools per day (diarrhea PTA). CT showing right nephrolithiasis, ?causing abd pain. Pt reports having nausea in the morning at home on a somewhat regular basis. Appetite down over past 24hrs d/t abd pain. Eating very well prior (see above). Glucerna in place BID (440kcal, 20g protein) - will continue for now. Drinks protein shakes at home. Pt feeling very poorly during visit.  with questions about diet for home but will defer education to tomorrow when pt feeling better. # (BMI 39). Wt gain likely d/t fluid status and previous hypothyroidism. 3+ edema.      Will continue to follow for PO intake (predict when GI symtoms improve intake will also improve), wt/fluid trends, education needs. Estimated Nutrition Needs:   Kcals/day: 1760 Kcals/day  Protein: 110 g(~2 gm/kg IBW)  Fluid: 1800 ml(~1mL/kcal)  Based On: Ciaran Waters(MSJ x 1.2)  Weight Used: Adjusted wt with edema    Nutrition Diagnosis:   1. Inadequate oral intake related to N/V/abd pain as evidenced by eating well prior to today with vomiting/pain    Goals:     Consumption of at least 75% meals and 2 supplements/day in 5-7 days     Monitoring & Evaluation:    - Total energy intake, Liquid meal replacement   - Weight/weight change, GI     Previous Nutrition Goals Met: Progressing  Previous Recommendations:   Yes     Education & Discharge Needs:   [] Identified and addressed: reinforced low Na+    [x] Participated in care plan, discharge planning, and/or interdisciplinary rounds        Cultural, Quaker and ethnic food preferences identified:  None    Skin Integrity: [x]Intact  Edema: [x] other: 3+ BLLE  Last BM:9/18  Food Allergies: [x]NKFA    Anthropometrics:    Weight Loss Metrics 9/18/2019 9/9/2019 8/23/2019 6/6/2019 5/24/2019 5/7/2019 4/23/2019   Today's Wt 276 lb 14.4 oz - 296 lb 6.4 oz 282 lb 288 lb 3.2 oz 289 lb 285 lb   BMI - 46.08 kg/m2 49.32 kg/m2 46.93 kg/m2 47.96 kg/m2 48.09 kg/m2 47.43 kg/m2      Last 3 Recorded Weights in this Encounter    09/16/19 0426 09/17/19 0129 09/18/19 0555   Weight: 128.5 kg (283 lb 4.7 oz) 127 kg (279 lb 15.8 oz) 125.6 kg (276 lb 14.4 oz)      Weight Source: Standing scale (comment)  Height: 5' 5\" (165.1 cm),    Body mass index is 46.08 kg/m².      IBW : 56.7 kg (125 lb),    Usual Body Weight: 108.9 kg (240 lb),      Labs:    Lab Results   Component Value Date/Time    Sodium 133 (L) 09/18/2019 02:52 AM    Potassium 4.7 09/18/2019 02:52 AM    Chloride 100 09/18/2019 02:52 AM    CO2 29 09/18/2019 02:52 AM    Glucose 107 (H) 09/18/2019 02:52 AM    BUN 20 09/18/2019 02:52 AM    Creatinine 1.38 (H) 09/18/2019 02:52 AM    Calcium 8.5 09/18/2019 02:52 AM    Magnesium 2.5 (H) 09/09/2019 10:05 AM    Phosphorus 3.3 03/08/2019 02:54 AM    Albumin 2.8 (L) 09/12/2019 12:45 PM     Shelly Arevalo [address] [place] , Pager #[phone] or [phone]

## 2019-09-18 NOTE — PROGRESS NOTES
1212: pt vomited x 1. Awaiting order for antiemetic. 1216: compazine administered. 1930:Bedside and Verbal shift change report given to gio becerril rn (oncoming nurse) by kash miller rn (offgoing nurse). Report included the following information SBAR, Kardex, ED Summary, Intake/Output, MAR and Recent Results. Problem: Heart Failure: Day 5  Goal: Off Pathway (Use only if patient is Off Pathway)  Note:    DAY 7     Problem: Heart Failure: Day 5  Goal: Activity/Safety  Outcome: Progressing Towards Goal  Note:   UP X 1 WITH WALKER  Goal: Diagnostic Test/Procedures  Outcome: Progressing Towards Goal  Goal: Nutrition/Diet  Outcome: Progressing Towards Goal  Goal: Discharge Planning  Outcome: Progressing Towards Goal  Note:   REC SNF, MAY BE  ABLE TO GO HOME WITH HH  Goal: Medications  Outcome: Progressing Towards Goal  Goal: Respiratory  Outcome: Progressing Towards Goal  Note:   HOME O2 DOSE  Goal: Treatments/Interventions/Procedures  Outcome: Progressing Towards Goal  Goal: Psychosocial  Outcome: Progressing Towards Goal     Problem: Falls - Risk of  Goal: *Absence of Falls  Description  Document Staci Fall Risk and appropriate interventions in the flowsheet.   Outcome: Progressing Towards Goal  Note:   Fall Risk Interventions:  Mobility Interventions: Communicate number of staff needed for ambulation/transfer, Assess mobility with egress test         Medication Interventions: Evaluate medications/consider consulting pharmacy    Elimination Interventions: Call light in reach, Elevated toilet seat, Toilet paper/wipes in reach, Toileting schedule/hourly rounds, Patient to call for help with toileting needs, Stay With Me (per policy)    History of Falls Interventions: Evaluate medications/consider consulting pharmacy         Problem: Patient Education: Go to Patient Education Activity  Goal: Patient/Family Education  Outcome: Progressing Towards Goal     Problem: Pressure Injury - Risk of  Goal: *Prevention of pressure injury  Description  Document Feliberto Scale and appropriate interventions in the flowsheet.        Outcome: Progressing Towards Goal  Note:   Pressure Injury Interventions:       Moisture Interventions: Check for incontinence Q2 hours and as needed, Assess need for specialty bed    Activity Interventions: Increase time out of bed, Pressure redistribution bed/mattress(bed type), PT/OT evaluation    Mobility Interventions: HOB 30 degrees or less, Pressure redistribution bed/mattress (bed type), PT/OT evaluation    Nutrition Interventions: Document food/fluid/supplement intake, Discuss nutritional consult with provider, Offer support with meals,snacks and hydration    Friction and Shear Interventions: Lift sheet, HOB 30 degrees or less, Feet elevated on foot rest, Apply protective barrier, creams and emollients                Problem: Patient Education: Go to Patient Education Activity  Goal: Patient/Family Education  Outcome: Progressing Towards Goal

## 2019-09-18 NOTE — PROGRESS NOTES
Faculty or Preceptor Review of Student Work    9/17-9/18/2019  - Shift times - 1930 to 0800    The student documentation of patient care for [facility] by SN Dejan has been reviewed and approved. All medications have been administered under the direct supervision of the faculty or preceptor.     Rina Hall RN

## 2019-09-18 NOTE — CONSULTS
Cardiac Electrophysiology Hospital Consultation Note     Subjective:      Kamari Henry is a 60 y.o. patient who is seen for management of ventricular tachycardia. She was admitted on 09/09/2019 with increasing lower extremity & facial edema, ascites, acute on chronic NYHA IV systolic heart failure. Diuresed extensively, had paracentesis on 09/13/2019, still with drain in place. Output via drain >17 L since time of insertion, is down 36 lbs since date of admission. Subsequently had some relief of symptoms, but still with lower extremity & generalized edema. On milrinone, now weaning. Significantly hypothyroid, TSH 41.2 & T3 1.4. She reports levothyroxine x 4 weeks, was increased during current admission. Biventricular pacing with occasional PVCs noted on telemetry. No VT noted during current admission upon review of telemetry strips. BP stable, 125/37. Currently on low dose amiodarone, initial loading started in 03/2019, has been decreased over time. Anticoagulated with warfarin, no bleeding issues. Echo (09/10/2019): LVEF 31-35%, abnormal LV wall motion, global hypokinesis with apical dyskinesis. RV not well visualized. Dilated LA. MUGA (09/10/2019): LVEF 49%. Previous:  Nuclear stress test (03/07/2019): Hypertensive response to stress.  LVEF 47%.  No infarct visible.     Echo (03/07/2019): LVEF 41-45%, LV global hypokinesis.  LA mod dilated.  Mod MR.  Trace TR.  Severely elevated CVP (>15 mmHg).    Admit 03/2018 after VT, syncope, ICD shock.  Started loading dose amiodarone 400 mg po tid.  Prescribed mexiletine, which she discontinued after 14 days due to cost and then nausea     [employer] biventricular ICD implant by Dr. Noa Cuenca at [facility] 03/2018.  Initial device previously placed for complete AVB, then last 3 devices have been ICD, biventricular ICD.  Left chest pacer was infected & removed.     History of lymphedema.     Family history of cardiomyopathy & sudden death.    Problem List  Date Reviewed: 6/9/2019          Codes Class Noted    Lymphedema of both lower extremities ICD-10-CM: I89.0  ICD-9-CM: 457.1  9/9/2019        Lymphedema ICD-10-CM: I89.0  ICD-9-CM: 457.1  8/23/2019        AICD (automatic cardioverter/defibrillator) present ICD-10-CM: Z95.810  ICD-9-CM: V45.02  Unknown        Systolic CHF, chronic (HCC) ICD-10-CM: I50.22  ICD-9-CM: 428.22, 428.0  Unknown    Overview Signed 4/22/2019  4:19 PM by Corrinne Key, MD     EF 45% normal caths and stress tests             Acute hypokalemia ICD-10-CM: E87.6  ICD-9-CM: 276.8  3/6/2019        AICD discharge ICD-10-CM: Z45.02  ICD-9-CM: V71.89  3/6/2019        Incidental pulmonary nodule- 2mm RLL- repeat CT due in Feb 2019 ICD-10-CM: R91.1  ICD-9-CM: 793.11  2/16/2018    Overview Addendum 2/19/2018  2:42 PM by Bahman Loomis MD     Noted on abdomen/pelvis CT Feb 2018. 5 year smoking history. 5-10 pack year history when younger. 1. No renal, ureteral, or bladder calculi. 2. No acute findings. 3. 2 mm pulmonary nodule right lower lobe. No follow-up needed if the patient is  low risk.  If the patient is high-risk, CT could be considered in 1 year.                Obesity, morbid ([address] ICD-10-CM: E66.01  ICD-9-CM: 278.01  1/30/2018        Pacemaker ICD-10-CM: Z95.0  ICD-9-CM: V45.01  Unknown        Atrial fibrillation ([address] ICD-10-CM: I48.91  ICD-9-CM: 427.31  Unknown        Depression ICD-10-CM: F32.9  ICD-9-CM: 311  Unknown        Insomnia ICD-10-CM: G47.00  ICD-9-CM: 780.52  Unknown        Chronic anticoagulation ICD-10-CM: Z79.01  ICD-9-CM: V58.61  Unknown    Overview Signed 1/30/2018 10:56 AM by Bahman Loomis MD     was on NOAC before. now on warfarin             DDD (degenerative disc disease), cervical ICD-10-CM: M50.30  ICD-9-CM: 722.4  1/30/2018        DDD (degenerative disc disease), thoracic ICD-10-CM: M51.34  ICD-9-CM: 722.51  1/30/2018        Long term prescription opiate use ICD-10-CM: N74.746  ICD-9-CM: V58.69  1/30/2018        Essential hypertension ICD-10-CM: I10  ICD-9-CM: 401.9  1/8/2018              Current Facility-Administered Medications   Medication Dose Route Frequency Provider Last Rate Last Dose    warfarin (COUMADIN) tablet 3 mg  3 mg Oral ONCE Neil Durand PA-C        furosemide (LASIX) tablet 40 mg  40 mg Oral BID Shmuel Jolly MD   40 mg at 09/18/19 0842    famotidine (PEPCID) tablet 20 mg  20 mg Oral BID Shmuel Jolly MD   20 mg at 09/18/19 0841    levothyroxine (SYNTHROID) tablet 150 mcg  150 mcg Oral ACB Shmuel Jolly MD   150 mcg at 09/18/19 0643    miconazole (MICOTIN) 2 % powder   Topical Q12H Leatha CHAPARRO PA-C        balsam peru-castor oil (VENELEX) ointment   Topical Q8H [place] FRANKLIN HightowerC        spironolactone (ALDACTONE) tablet 50 mg  50 mg Oral DAILY Agustina FRANKLIN HightowerC   50 mg at 09/18/19 0841    sodium chloride (NS) flush 5-40 mL  5-40 mL IntraVENous Q8H [place] Losebruce, PA-C   10 mL at 09/18/19 0553    sodium chloride (NS) flush 5-40 mL  5-40 mL IntraVENous PRN Agustina Loser PA-C        acetaminophen (TYLENOL) tablet 650 mg  650 mg Oral Q4H PRN Agustina Loser, PA-C   650 mg at 09/15/19 2059    zolpidem (AMBIEN) tablet 5 mg  5 mg Oral QHS PRN [place] Loser, PA-C   5 mg at 09/14/19 2151    methocarbamol (ROBAXIN) tablet 500 mg  500 mg Oral TID Agustina Losebruce, PA-C   500 mg at 09/18/19 0841    pravastatin (PRAVACHOL) tablet 80 mg  80 mg Oral QHS [place] Loser, PA-C   80 mg at 09/17/19 2201    potassium chloride SR (KLOR-CON 10) tablet 40 mEq  40 mEq Oral DAILY [place] LoseENRIQUE chaparro-C   40 mEq at 09/18/19 4159    sertraline (ZOLOFT) tablet 100 mg  100 mg Oral DAILY Agustina Hightower PA-C   100 mg at 09/18/19 5820    HYDROcodone-acetaminophen (NORCO) 7.5-325 mg per tablet 1 Tab  1 Tab Oral Q6H PRN Agustina Hightower PA-C   1 Tab at 09/18/19 2586    warfarin - pharmacy to dose   Other Rx Dosing/Monitoring Agustina Hightower PA-C Allergies   Allergen Reactions    Augmentin [Amoxicillin-Pot Clavulanate] Nausea and Vomiting    Neosporin [Neomycin-Bacitracin-Polymyxin] Rash     Past Medical History:   Diagnosis Date    AICD (automatic cardioverter/defibrillator) present     AICD (automatic cardioverter/defibrillator) present     Atrial fibrillation (HCC)     Chronic anticoagulation     was on NOAC before. ?had clot on noac. now on warfarin. Dr. Kathie Herring monitors INR    DDD (degenerative disc disease), cervical 1/30/2018    DDD (degenerative disc disease), thoracic 1/30/2018    Depression     no h/o hospitalization    Essential hypertension 1/8/2018    Incidental pulmonary nodule 2mm, RLL- no follow up needed 2/16/2018    Noted on abdomen/pelvis CT Feb 2018. No smoking history. 1. No renal, ureteral, or bladder calculi. 2. No acute findings. 3. 2 mm pulmonary nodule right lower lobe. No follow-up needed if the patient is low risk. If the patient is high-risk, CT could be considered in 1 year.      Insomnia     Kidney infarction ([address] 2017    d/t clot per pt. recalls being on blood thinner.  Lymphedema 8/23/2019    Lymphedema of left arm     developed after infection of pacemaker site    Obesity, morbid ([address] 1/30/2018    Stomach flu 2015    hospital admit X2    Systolic CHF, chronic (HCC)     EF 45% normal caths and stress tests     Past Surgical History:   Procedure Laterality Date    HX BREAST BIOPSY Right 2010    Benign    HX CHOLECYSTECTOMY  1984    HX HEART CATHETERIZATION  2006    d/t abnl stress test, per pat, test was normal    HX HEART CATHETERIZATION  ~2009, 2012    per pt normal.     HX HYSTERECTOMY  2012    d/t heavy bleeding. ?endometriosis. +fibroid. per pt, no cancer.      HX PACEMAKER  2010, 2011, 2012, 3/29/2018    first 2010 (infected), replacement 2011 (lead not placed correctly), 2nd replacement 2012- pacemaker, defibrillator, 3rd replacement 2018     Family History   Problem Relation Age of Onset    Breast Cancer Maternal Aunt         late 35s    Uterine Cancer Maternal Aunt     Heart Disease Mother         sudden cardiac arrest    Heart Disease Father     Coronary Artery Disease Father     Colon Cancer Father         76s    Diabetes Father     COPD Sister     Cancer Brother 43        pancreatic    Uterine Cancer Maternal Grandmother     Other Maternal Grandfather         mva    Heart Disease Paternal Grandmother     Heart Disease Paternal Grandfather     Heart Disease Other     Diabetes Other     Other Sister         sudden cardiac arrest    Heart Attack Sister     No Known Problems Sister     Heart Disease Brother     Kidney Disease Brother     Other Brother         mva    No Known Problems Brother     Heart Attack Paternal Uncle 36    Other Paternal Aunt         aneurysm    Celiac Disease Son     Seizures Daughter     Other Daughter 22        MVA     Social History     Tobacco Use    Smoking status: Never Smoker    Smokeless tobacco: Never Used   Substance Use Topics    Alcohol use: No        Review of Systems:   Constitutional: Negative for fever, chills, weight loss, malaise/fatigue. HEENT: Negative for nosebleeds, vision changes. Respiratory: Negative for cough, hemoptysis  Cardiovascular: Negative for chest pain, palpitations, + orthopnea, no claudication, + leg swelling, ascites, generalized edema, no syncope. Denies recent ICD shock.  + dyspnea with minimal exertion. Gastrointestinal: Negative for nausea, vomiting, diarrhea, blood in stool and melena. Genitourinary: Negative for dysuria, and hematuria. Musculoskeletal: Negative for myalgias, arthralgia. Skin: Negative for rash. Heme: Does not bleed or bruise easily.    Neurological: Negative for speech change and focal weakness     Objective:     Visit Vitals  BP (!) 125/37 (BP 1 Location: Right arm, BP Patient Position: At rest)   Pulse 60   Temp 97.6 °F (36.4 °C)   Resp 18   Ht 5' 5\" (1.651 m)   Wt 276 lb 14.4 oz (125.6 kg)   SpO2 100%   BMI 46.08 kg/m²      Physical Exam:   Constitutional: well-developed and well-nourished. No respiratory distress. Head: Normocephalic and atraumatic. Eyes: Pupils are equal, round  ENT: Hearing grossly normal  Neck: Supple. Cardiovascular: Normal rate, regular rhythm. Exam reveals no gallop and no friction rub. No murmur heard. Pulmonary/Chest: Effort normal at rest while sitting upright. Decreased bilaterally. Abdominal: Obese, + ascites. Musculoskeletal: 3+ bilateral lower extremity edema. Neurological: alert,oriented. Skin: Skin is warm and dry. Psychiatric: Normal mood and affect. Behavior is normal. Judgment and thought content normal.        Assessment/Plan:     Ms. Monae Suarez has acute on chronic NYHA IV systolic heart failure. While she has been diuresed significantly & has improved somewhat, she is still to unstable to consider EP study & possible ablation of ventricular tachycardia. Induction of VT would be very high risk, potentially difficult to get her out of it. LVEF via MUGA during current admission 49%. Continue management of heart failure by Dr. Sara Saavedra.    No VT noted during current admission despite milrinone. Possible that hypothyroidism is contributing to suppression. She is biventricular pacing. Significantly hypothyroid. With amiodarone 100 mg po daily  Discussed with patient, & she wants to do everything she can to avoid ICD shock, which she has experienced previously. Intolerant of mexiletine. Recommend continuing amiodarone 100 mg po daily to continue  Recommend follow up with her endocrinologist.  Duration of levothyroxine has not been long enough to see true response, but she may have poor absorption, would potentially need to consider other forms of replacement. Continue anticoagulation with warfarin for embolic CVA prophylaxis. Follow up in EP clinic in 1 month.     Future Appointments   Date Time Provider Mariel Becerra   10/7/2019  3:00 PM BREMO FT CHAIR 2 [facility]   10/22/2019 11:20 AM Jane Austin  [address]   10/28/2019  3:00 PM BREMO FT CHAIR 2 [facility]   12/16/2019 11:45 AM REMOTE1, [address]   12/18/2019  1:40 PM Hardik Slaughter  [address]   3/25/2020  9:00 AM REMOTE1, [facility]ONE[facility] SCHED   7/2/2020  9:45 AM PACEMAKER3, [address]   7/2/2020 10:00 AM Jane Austin MD [address] [place] [address]  09/18/19    Addendum from EP attending:   I have seen, examined patient, and discussed with nurse practitioner, registered nurse, reviewed, updated note and agree with the assessment and plan    I have talked to her this am. Her  in the room  She said her weight is down but she was very edematous  She said she is on synthroid for 4 weeks and has been on low dose amiodarone for months  Vital signs are stable  Exam shows regular rhythm   Morbidly obese with edema 4+ in lower extremities  Assessment and Plan:  Continue to diurese. With this sotalol is high risk. She did not like mexiletine GI side effects  Amiodarone is down to lowest dose so I think hypothyroidism is not likely only due to amiodarone side effects. She is seeing Dr Macario Baker for hypothyroid  She would like to continue with amiodarone low dose since she has not had recurrent ICD shock  She is high risk for VT ablation at this time with acute CHF  Will discuss with her and reevaluate for this in a month  She agrees with the plan. I have discussed with her risks and benefits of VT ablation    Thank you for involving me in this patient's care and please call with further concerns or questions. Melita Campos M.D.   Electrophysiology/Cardiology  [facility] [place]  [address], [place] [address], [address]  [address] [facility], [address] [place] [address] Walt Ocampo [address]  [phone]                                        [phone]

## 2019-09-18 NOTE — PROGRESS NOTES
[address] Rico Roberts M.D.  [phone]               GASTROENTEROLOGY PROGRESS NOTE        NAME: Eneida Torrez   :  1960   MRN:  [id number]       S: drain is draining less. No significant abdominal pain. Episode of vomiting. Anti-emetics given. Objective:   VITALS:   Last 24hrs VS reviewed. Most recent are:  Visit Vitals  BP (!) 133/39 (BP 1 Location: Right arm, BP Patient Position: At rest)   Pulse 60   Temp 97.9 °F (36.6 °C)   Resp 18   Ht 5' 5\" (1.651 m)   Wt 125.6 kg (276 lb 14.4 oz)   SpO2 97%   BMI 46.08 kg/m²       Intake/Output Summary (Last 24 hours) at 2019  Last data filed at 2019 1813  Gross per 24 hour   Intake 903.04 ml   Output 1050 ml   Net -146.96 ml       PHYSICAL EXAM:  General: Alert, in no acute distress    Lungs:            CTA Bilaterally  Heart:  Normal S1, S2    Abdomen: Soft, distended, anasarca,nt . Normoactive bowel sounds, no rebound/guarding  Psych:   Good insight. Not anxious nor agitated. Lab Data Reviewed:   Recent Labs     19   HGB 10.2*     Recent Labs     19   * 133*   K 4.7 4.1    97   CO2 29 30   BUN 20 23*   CREA 1.38* 1.42*   * 174*   CA 8.5 8.5     No results for input(s): SGOT, GPT, AP, TBIL, TP, ALB, GLOB, GGT, AML, LPSE in the last 72 hours. No lab exists for component: AMYP, HLPSE  Recent Labs     19   INR 1.9* 1.8*   PTP 18.9* 17.6*      No results for input(s): FE, TIBC, PSAT, FERR in the last 72 hours. No results for input(s): CPK, CKMB in the last 72 hours. No lab exists for component: FREDIS    See Electronic Medical Record for all procedure/radiology reports and details which were not copied into this note but were reviewed prior to the creation of the Plan. Assessment:   · New suprapubic and RLQ pain of unclear etiology. Possible due to constipation. No acute findings on CT scan.   · Ascites: not portal HTN related     Patient Active Problem List   Diagnosis Code    Essential hypertension I10    Obesity, morbid ([facility] E66.01    Pacemaker Z95.0    Atrial fibrillation ([facility] I48.91    Depression F32.9    Insomnia G47.00    Chronic anticoagulation Z79.01    DDD (degenerative disc disease), cervical M50.30    DDD (degenerative disc disease), thoracic M51.34    Long term prescription opiate use Z79.891    Incidental pulmonary nodule- 2mm RLL- repeat CT due in Feb 2019 R91.1    Acute hypokalemia E87.6    AICD discharge Z45.02    AICD (automatic cardioverter/defibrillator) present A08.803    Systolic CHF, chronic (HCC) I50.22    Lymphedema I89.0    Lymphedema of both lower extremities I89.0       Plan:   · Anti-emetics  · Continue H2 blocker  · Drain out tomorrow. See if ascites can be controlled with diuretics per Cardiology  · Will sign off. Please call with any questions. Signed by:  Eugenia Blackwell MD         9/18/2019  5:39 PM

## 2019-09-18 NOTE — PROGRESS NOTES
Pharmacist Note - Warfarin Dosing  Consult provided for this 60 y. o.female to manage warfarin for Atrial Fibrillation    INR Goal: 2 - 3    Home regimen/ tablet size: 1.5 mg M/Tu/Th/F/Su; 3 mg W/Sa    Drugs that may increase INR: Amiodarone  Drugs that may decrease INR: None  Other current anticoagulants/ drugs that may increase bleeding risk: None  Risk factors: HF  Daily INR ordered: YES    Recent Labs     09/18/19  0252 09/17/19  0143 09/16/19  0500   HGB  --  10.2*  --    INR 1.9* 1.8* 1.7*     Date               INR                  Dose  9/9  4.1  hold   9/10  5.1  hold   9/11  3.4  hold   9/12  2.6   1 mg  9/13  1.9  2.5 mg    9/14  1.6  2.5 mg   9/15  1.6  3 mg  9/16  1.7  3 mg   9/17  1.8  3 mg  9/18                 1.9                  3 mg                                                                               Assessment/ Plan: Will order warfarin 3 mg PO x 1 dose. Pharmacy will continue to monitor daily and adjust therapy as indicated.

## 2019-09-19 NOTE — PROGRESS NOTES
Assessment/Plan/Discussion:Cardiology Attending:     Patient seen on the day of progress note and examined  and agree with Advance Practice Provider (MARIO, NP,PA)  assessment and plans. Phuc Hernandez is a 60 y.o. female   Feeling better  Hope to get drain out today and then dc later today if hospital bed can be worked out and [place] and home meds straight  increase back to lasix 80/40 continue aldactone and change amio to 200 mg Jo Parmar MD                                                                                 Cardiology Progress Note            Admit Date: 9/9/2019  Admit Diagnosis: Lymphedema of both lower extremities [I89.0]  Lymphedema [I89.0]  Date: 9/19/2019     Time: 11:51 AM    Subjective:  OOB to chair. Feeling better. Ambulating around room. Abd drain with little output in last 24 h     Assessment and Plan     1. Volume overload - improved              - admitted for volume control              - Lasix 40 mg PO BID              - Check daily BMP              - Daily weights and intake/output              - ambulating to restroom  2. DAVID on CKD              - Cr. 1.33, baseline 1.4  Results from Hospital Encounter encounter on 09/09/19   US RETROPERITONEUM COMP    Narrative RENAL AND BLADDER ULTRASOUND    INDICATION: elevated creatinine, low urine output. COMPARISON: None. TECHNIQUE:  Sonography of the kidneys, retroperitoneum, and bladder was performed. FINDINGS:    RIGHT KIDNEY: measures 9.3 cm in length, although difficult to visualize due to  large body habitus and massive ascites. Echogenicity is abnormally increased. No  hydronephrosis, large shadowing calculus, or obvious solid contour-deforming  renal mass. LEFT KIDNEY: measures 8.2 cm in length, small and very difficult to visualize  due to large body habitus and massive ascites. Renal echogenicity is abnormally  increased.  No hydronephrosis, large shadowing calculus, or obvious solid  contour-deforming renal mass.  AORTA: Normal caliber in its visualized portions. COMMON ILIAC ARTERIES: Not seen. IVC: Normal caliber in its visualized portions. BLADDER: Incompletely distended and not assessed. Incidentally noted massive ascites and moderate bilateral pleural effusions. IMPRESSION:   1. Poorly visualized kidneys showing increased echogenicity consistent with  medical renal parenchymal disease but no hydronephrosis. 2. Urinary bladder not assessed. 3. Large ascites. 4. Moderate bilateral pleural effusions. - daily BMP              - Hold Lisinopril for DAVID on CKD              - Hold Aldactone restarted at 50 mg  3. Lymphedema              - Venous insufficiency              - Lasix as above  4. Systolic HF              - acute on chronic              - NYHA class III              - EF 40-45% on last echo 3/2019 - MUGA with EF 49%. EF on echo 30-35%              - MUGA with LVEF of 49%  09/09/19   ECHO ADULT FOLLOW-UP OR LIMITED 09/10/2019 9/10/2019    Narrative · Left Ventricle: Normal cavity size and wall thickness. Moderate systolic   dysfunction. Estimated left ventricular ejection fraction is 31 - 35%. Biplane method used to measure ejection fraction. Abnormal left   ventricular wall motion. Global hypokinesis with apical dyskinesis. · Left Atrium: Dilated left atrium. · Right Ventricle: Not well visualized. Signed by: Valentino Aran, MD               - Coreg held              - Lisinopril held for DAVID on CKD              - Aldactone 50 mg              - Daily weights   Last 3 Recorded Weights in this Encounter    09/17/19 0129 09/18/19 0555 09/19/19 0203   Weight: 279 lb 15.8 oz (127 kg) 276 lb 14.4 oz (125.6 kg) 275 lb 2.2 oz (124.8 kg)                 - proBNP now 1417              - S/p BiV ICD  5. Afib              - PPM              - [address] with Coumadin - Pharmacy consulted for dosing              - Amio 200 mg daily  6.  HTN              - well controlled on Coreg  7. Hypothyroidism              - Synthroid PTA 75 mcg              - TSH 41.20   - T3 1.4   - Synthroid increased to 150 mcg  8. Chronic [address] for Afib              - INR 2.4              - Pharmacy consulted to dose  9. Hyponatremic              - Na 135              - daily BMP  10. H/o CHB              - s/p PPM - changed to BiV ICD with HF  11. DDD              - chronic pain and on chronic opiates              - will resume home meds  12. Body mass index is Body mass index is 45.78 kg/m². - Morbid obesity - unable to exercise with increased SOB.    Volume status improved. Weight 275 lbs. GI recommending removing abd drain today, and managing ascites with diuretics. Remove drain today. Continue meds. Will add back Coreg at 3.125 mg since BP improved. Confirmed she does not want to go to SNF.  Nursing, PT/OT ordered yesterday. D/c planning for either later this afternoon or tomorrow am.      Past Medical History:   Diagnosis Date    AICD (automatic cardioverter/defibrillator) present     AICD (automatic cardioverter/defibrillator) present     Atrial fibrillation (HCC)     Chronic anticoagulation     was on NOAC before. ?had clot on noac. now on warfarin. Dr. Nathaniel Nielsen monitors INR    DDD (degenerative disc disease), cervical 1/30/2018    DDD (degenerative disc disease), thoracic 1/30/2018    Depression     no h/o hospitalization    Essential hypertension 1/8/2018    Incidental pulmonary nodule 2mm, RLL- no follow up needed 2/16/2018    Noted on abdomen/pelvis CT Feb 2018. No smoking history. 1. No renal, ureteral, or bladder calculi. 2. No acute findings. 3. 2 mm pulmonary nodule right lower lobe. No follow-up needed if the patient is low risk. If the patient is high-risk, CT could be considered in 1 year.      Insomnia     Kidney infarction ([facility] [address] 2017    d/t clot per pt. recalls being on blood thinner.      Lymphedema 8/23/2019    Lymphedema of left arm     developed after infection of pacemaker site    Obesity, morbid ([facility] [address] 1/30/2018    Stomach flu 2015    hospital admit X2    Systolic CHF, chronic (HCC)     EF 45% normal caths and stress tests      Social History     Tobacco Use    Smoking status: Never Smoker    Smokeless tobacco: Never Used   Substance Use Topics    Alcohol use: No    Drug use: No         ROS:     GENERAL   Recent weight loss - no   Fever -----------------   no   Chills -----------------   no     EYES, VISION   Visual Changes - no     EARS, NOSE, THROAT   Hearing loss ----------- no   Swallowing difficulties - no     CARDIOVASCULAR   Chest pain/pressure ---- no   Arrhythmia/palpitations - no       RESPIRATORY   Cough ------------------ no   Shortness of breath - yes   Wheezing -------------- no   GASTROINTESTINAL   Abdominal pain - no   Heartburn -------- no   Bloody stool ----- no     GENITOURINARY   Frequent urination - no   Urgency -------------- no     MUSCULOSKELETAL   Joint pain/swelling ---- yes   Musculoskeletal pain - yes     SKIN & INTEGUMENTARY   Rashes - no   Sores --- no         NEUROLOGICAL   Numbness/tingling - no   Sensation loss ------ no     PSYCHIATRIC   Nervousness/anxiety - no   Depression -------------- no     ENDOCRINE   Heat/cold intolerance - no   Excessive thirst -------- no     HEMATOLOGIC/LYMPHATIC   Abnormal bleeding - no     ALL/IMMUN   Allergic reaction ------ no   Recurrent infections - no     Objective:     Physical Exam:                Visit Vitals  BP (!) 121/35 (BP 1 Location: Right arm, BP Patient Position: At rest)   Pulse 60   Temp 97.5 °F (36.4 °C)   Resp 18   Ht 5' 5\" (1.651 m)   Wt 275 lb 2.2 oz (124.8 kg)   SpO2 100%   BMI 45.78 kg/m²        General Appearance:   Well developed, well nourished,alert and oriented x 3, and   individual in no acute distress. Looks tired   Ears/Nose/Mouth/Throat:    Hearing grossly normal.         Neck:  Supple. Chest:    Lungs diminished,  No crackles in bases to auscultation bilaterally. Cardiovascular:   Regular rate and rhythm, S1, S2 normal, no murmur. Abdomen:    Softer, non-tender, bowel sounds are active. Extremities:  3+ edema bilaterally. improved pedal edema   Skin:  Warm and dry.      Telemetry: normal sinus rhythm          Data Review:    Labs:    Recent Results (from the past 24 hour(s))   PROTHROMBIN TIME + INR    Collection Time: 09/19/19  4:00 AM   Result Value Ref Range    INR 2.4 (H) 0.9 - 1.1      Prothrombin time 22.8 (H) 9.0 - 05.6 sec   METABOLIC PANEL, BASIC    Collection Time: 09/19/19  4:00 AM   Result Value Ref Range    Sodium 135 (L) 136 - 145 mmol/L    Potassium 4.0 3.5 - 5.1 mmol/L    Chloride 101 97 - 108 mmol/L    CO2 28 21 - 32 mmol/L    Anion gap 6 5 - 15 mmol/L    Glucose 112 (H) 65 - 100 mg/dL    BUN 18 6 - 20 MG/DL    Creatinine 1.33 (H) 0.55 - 1.02 MG/DL    BUN/Creatinine ratio 14 12 - 20      GFR est AA 49 (L) >60 ml/min/1.73m2    GFR est non-AA 41 (L) >60 ml/min/1.73m2    Calcium 8.8 8.5 - 10.1 MG/DL          Radiology:        Current Facility-Administered Medications   Medication Dose Route Frequency    warfarin (COUMADIN) tablet 1.5 mg  1.5 mg Oral ONCE    magnesium hydroxide (MILK OF MAGNESIA) 400 mg/5 mL oral suspension 30 mL  30 mL Oral DAILY PRN    prochlorperazine (COMPAZINE) with saline injection 10 mg  10 mg IntraVENous Q6H PRN    furosemide (LASIX) tablet 40 mg  40 mg Oral BID    famotidine (PEPCID) tablet 20 mg  20 mg Oral BID    levothyroxine (SYNTHROID) tablet 150 mcg  150 mcg Oral ACB    miconazole (MICOTIN) 2 % powder   Topical Q12H    balsam peru-castor oil (VENELEX) ointment   Topical Q8H    spironolactone (ALDACTONE) tablet 50 mg  50 mg Oral DAILY    sodium chloride (NS) flush 5-40 mL  5-40 mL IntraVENous Q8H    sodium chloride (NS) flush 5-40 mL  5-40 mL IntraVENous PRN    acetaminophen (TYLENOL) tablet 650 mg  650 mg Oral Q4H PRN    zolpidem (AMBIEN) tablet 5 mg  5 mg Oral QHS PRN    methocarbamol (ROBAXIN) tablet 500 mg 500 mg Oral TID    pravastatin (PRAVACHOL) tablet 80 mg  80 mg Oral QHS    potassium chloride SR (KLOR-CON 10) tablet 40 mEq  40 mEq Oral DAILY    sertraline (ZOLOFT) tablet 100 mg  100 mg Oral DAILY    HYDROcodone-acetaminophen (NORCO) 7.5-325 mg per tablet 1 Tab  1 Tab Oral Q6H PRN    warfarin - pharmacy to dose   Other Rx Dosing/Monitoring       Neil Valadez M.D.      [facility] [address] [place] [address] [address] [facility] ([place], [place]) [address]   [address] [facility]   [phone]

## 2019-09-19 NOTE — DISCHARGE SUMMARY
Cardiology Discharge Summary     Patient ID:  Jagruti Prescott  [id number]  47 y.o.  1960    Admit Date: 9/9/2019  Discharge Date: 09/19/19  Admitting Physician: Homero Barthel, MD   Discharge Physician: Munir Carlton. Devan Pace M.D. Admission Diagnoses:   Lymphedema of both lower extremities [I89.0]  Lymphedema [I89.0]    Discharge Diagnoses: Active Problems:    Lymphedema (8/23/2019)      Lymphedema of both lower extremities (9/9/2019)        Discharge Condition: Good    Cardiology Procedures this Admission:  EchoCardiogram    Consults: GI and EP    Hospital Course:   Admitted for volume overload from lymphedema. Initial weight 312 lbs. Diuresis started with IV Bumex and added Milrinone. Cr was 1.9. Significant anasarca, she also had fluid ascites. UOP very low with Bumex IV, changed to Lasix IV with better UOP. US guided paracentesis completed yielded a total +16,000 cc over 7 days total.  UOP averaged 1400+ cc daily  TSH was measured at 45, and thus Synthroid dose increased to 150 mcg. GI consult for abnormal LFTs/ascites, felt this was not consistent with SBP or liver disease. Recommended ascites control with diuretic as outpatient. EP consulted for opinion of ablation for VT. ? If Amiodarone was causing hypothyroid. Recommended against ablation while in volume overload. Revisit as outpatient, once more compensated. Amiodarone dose reduced to 100 mg. Patient total output for hospital stay was 20,815 cc. Discharge weight was 275 lbs  Improvement in SOB and edema was noted by patient. Close follow ups moving forward with Bi-monthly [facility] visits for IV diuresis. Assessment/Plan/Discussion:Cardiology Attending:     Patient seen on the day of progress note and examined  and agree with Advance Practice Provider (MARIO, NP,PA)  assessment and plans.    Jagruti Prescott is a 60 y.o. female   ovewhelming lymphedema and CHF  With IV diuresis , noted thyroid d/o   Needed paracentesis for best benefit  [facility]    Romana Ellis MD              Visit Vitals  BP (!) 129/29 (BP 1 Location: Right arm, BP Patient Position: At rest)   Pulse 60   Temp 97.4 °F (36.3 °C)   Resp 18   Ht 5' 5\" (1.651 m)   Wt 275 lb 2.2 oz (124.8 kg)   SpO2 98%   BMI 45.78 kg/m²       Physical Exam  Abdomen: soft, non-tender. Bowel sounds normal.   Extremities: 2 LE edema, + PP bilaterally   Heart: regular rate and rhythm, S1, S2 normal, no murmurs, clicks, rubs or gallops  Lungs: clear to auscultation bilaterally  Neck: supple, symmetrical, trachea midline  Neurologic: Grossly normal  Pulses: 2+ and symmetrical    Labs:   Recent Labs     09/17/19  0143   HGB 10.2*     Recent Labs     09/19/19  0400 09/18/19  0252 09/17/19  0143   * 133* 133*   K 4.0 4.7 4.1    100 97   CO2 28 29 30   * 107* 174*   BUN 18 20 23*   CREA 1.33* 1.38* 1.42*   CA 8.8 8.5 8.5   INR 2.4* 1.9* 1.8*       No results for input(s): TROIQ, CPK, CKMB in the last 72 hours. Disposition: home    Patient Instructions:   Current Discharge Medication List      START taking these medications    Details   furosemide (LASIX) 40 mg tablet Take 2 Tabs by mouth Daily (before breakfast) AND 1 Tab daily (after lunch). Qty: 90 Tab, Refills: 3         CONTINUE these medications which have CHANGED    Details   amiodarone (CORDARONE) 200 mg tablet Take 1 Tab by mouth every Monday, Wednesday, Friday, Saturday. Qty: 90 Tab, Refills: 3    Associated Diagnoses: Chronic atrial fibrillation (HCC)      levothyroxine (SYNTHROID) 150 mcg tablet Take 1 Tab by mouth Daily (before breakfast). Qty: 30 Tab, Refills: 1         CONTINUE these medications which have NOT CHANGED    Details   !! warfarin (COUMADIN) 3 mg tablet Take 3 mg by mouth two (2) times a week on Wednesday and Saturday. !! warfarin (COUMADIN) 3 mg tablet Take 1.5 mg by mouth five (5) days a week. 5 x a week on Monday, Tuesday, Thursday, Friday, AND Sunday.  3 mg on WED AND SATURDAY sertraline (ZOLOFT) 100 mg tablet Take 100 mg by mouth daily. spironolactone (ALDACTONE) 50 mg tablet Take 1 Tab by mouth daily. Qty: 90 Tab, Refills: 2    Associated Diagnoses: Essential hypertension; Chronic systolic congestive heart failure (HCC)      methocarbamol (ROBAXIN) 500 mg tablet TAKE 1 TABLET BY MOUTH THREE TIMES DAILY  Qty: 90 Tab, Refills: 1    Comments: Please consider 90 day supplies to promote better adherence  Associated Diagnoses: DDD (degenerative disc disease), thoracic; DDD (degenerative disc disease), cervical      HYDROcodone-acetaminophen (NORCO) 7.5-325 mg per tablet 1 Tab, EVERY 8 HOURS AS NEEDED FOR PAIN  Qty: 90 Tab, Refills: 0    Associated Diagnoses: DDD (degenerative disc disease), thoracic; DDD (degenerative disc disease), cervical      eszopiclone (LUNESTA) 3 mg tablet Take 1 Tab by mouth nightly as needed for Sleep. Max Daily Amount: 3 mg. Qty: 30 Tab, Refills: 5    Associated Diagnoses: Insomnia, unspecified type      potassium chloride (K-DUR, KLOR-CON) 20 mEq tablet Take 20 mEq by mouth daily. pravastatin (PRAVACHOL) 80 mg tablet Take 80 mg by mouth nightly. raNITIdine hcl 150 mg capsule Take 150 mg by mouth two (2) times a day. carvedilol (COREG) 3.125 mg tablet Take 3.125 mg by mouth two (2) times daily (with meals). !! - Potential duplicate medications found. Please discuss with provider. STOP taking these medications       lisinopril (PRINIVIL, ZESTRIL) 10 mg tablet Comments:   Reason for Stopping:         bumetanide (BUMEX) 1 mg tablet Comments:   Reason for Stopping:               Reference discharge instructions provided by nursing for diet and activity.     Follow-up with   Future Appointments   Date Time Provider Mariel Becerra   9/23/2019  3:00 PM Jesús Collins [address]   10/7/2019  3:00 PM LAURY  CHAIR 2 [facility]   10/22/2019 11:20 AM Marco Antonio Fang  [address]   10/28/2019  3:00 PM LAURY FT CHAIR 2 [place] [facility]   12/16/2019 11:45 AM REMOTE1, [address]   12/18/2019  1:40 PM Ahsan Patel  [address]   3/25/2020  9:00 AM REMOTE1, PHIL WALLACE   7/2/2020  9:45 AM PACEMAKER3, [address]   7/2/2020 10:00 AM Marco Antonio Fang  [address]       Signed:  River Newsome PA-C

## 2019-09-19 NOTE — PROGRESS NOTES
Problem: Heart Failure: Day 5  Goal: Off Pathway (Use only if patient is Off Pathway)  Outcome: Progressing Towards Goal     Problem: Heart Failure: Discharge Outcomes  Goal: *Demonstrates ability to perform prescribed activity without shortness of breath or discomfort  Outcome: Progressing Towards Goal  Goal: *Left ventricular function assessment completed prior to or during stay, or planned for post-discharge  Outcome: Progressing Towards Goal  Goal: *ACEI prescribed if LVEF less than 40% and no contraindications or ARB prescribed  Outcome: Progressing Towards Goal  Goal: *Verbalizes understanding and describes prescribed diet  Outcome: Progressing Towards Goal  Goal: *Verbalizes understanding/describes prescribed medications  Outcome: Progressing Towards Goal  Goal: *Describes available resources and support systems  Description  (eg: Home Health, Palliative Care, Advanced Medical Directive)  Outcome: Progressing Towards Goal  Goal: *Describes smoking cessation resources  Outcome: Progressing Towards Goal  Goal: *Understands and describes signs and symptoms to report to providers(Stroke Metric)  Outcome: Progressing Towards Goal  Goal: *Describes/verbalizes understanding of follow-up/return appt  Description  (eg: to physicians, diabetes treatment coordinator, and other resources  Outcome: Progressing Towards Goal  Goal: *Describes importance of continuing daily weights and changes to report to physician  Outcome: Progressing Towards Goal     Problem: Impaired Skin Integrity/Pressure Injury Treatment  Goal: *Improvement of Existing Pressure Injury  Outcome: Progressing Towards Goal  Goal: *Prevention of pressure injury  Description  Document Feliberto Scale and appropriate interventions in the flowsheet.   Outcome: Progressing Towards Goal  Note:   Pressure Injury Interventions:       Moisture Interventions: Absorbent underpads, Maintain skin hydration (lotion/cream), Minimize layers    Activity Interventions: Increase time out of bed, Pressure redistribution bed/mattress(bed type), PT/OT evaluation    Mobility Interventions: PT/OT evaluation, Pressure redistribution bed/mattress (bed type)    Nutrition Interventions: Document food/fluid/supplement intake    Friction and Shear Interventions: Lift sheet, Minimize layers                Problem: Patient Education: Go to Patient Education Activity  Goal: Patient/Family Education  Outcome: Progressing Towards Goal     Problem: Falls - Risk of  Goal: *Absence of Falls  Description  Document Norbert Anthony Fall Risk and appropriate interventions in the flowsheet. Outcome: Progressing Towards Goal  Note:   Fall Risk Interventions:  Mobility Interventions: Communicate number of staff needed for ambulation/transfer, Patient to call before getting OOB         Medication Interventions: Evaluate medications/consider consulting pharmacy, Patient to call before getting OOB    Elimination Interventions: Call light in reach, Patient to call for help with toileting needs    History of Falls Interventions: Door open when patient unattended, Evaluate medications/consider consulting pharmacy, Room close to nurse's station         Problem: Patient Education: Go to Patient Education Activity  Goal: Patient/Family Education  Outcome: Progressing Towards Goal     Problem: Pressure Injury - Risk of  Goal: *Prevention of pressure injury  Description  Document Feliberto Scale and appropriate interventions in the flowsheet.        Outcome: Progressing Towards Goal  Note:   Pressure Injury Interventions:       Moisture Interventions: Absorbent underpads, Maintain skin hydration (lotion/cream), Minimize layers    Activity Interventions: Increase time out of bed, Pressure redistribution bed/mattress(bed type), PT/OT evaluation    Mobility Interventions: PT/OT evaluation, Pressure redistribution bed/mattress (bed type)    Nutrition Interventions: Document food/fluid/supplement intake    Friction and Shear Interventions: Lift sheet, Minimize layers                Problem: Patient Education: Go to Patient Education Activity  Goal: Patient/Family Education  Outcome: Progressing Towards Goal     Problem: Patient Education: Go to Patient Education Activity  Goal: Patient/Family Education  Outcome: Progressing Towards Goal     Problem: Patient Education: Go to Patient Education Activity  Goal: Patient/Family Education  Outcome: Progressing Towards Goal

## 2019-09-19 NOTE — PROGRESS NOTES
Pharmacist Note - Warfarin Dosing  Consult provided for this 60 y. o.female to manage warfarin for Atrial Fibrillation    INR Goal: 2 - 3    Home regimen/ tablet size: 1.5 mg M/Tu/Th/F/Su; 3 mg W/Sa    Drugs that may increase INR: None  Drugs that may decrease INR: None  Other current anticoagulants/ drugs that may increase bleeding risk: None  Risk factors: HF  Daily INR ordered: YES    Recent Labs     09/19/19  0400 09/18/19  0252 09/17/19  0143   HGB  --   --  10.2*   INR 2.4* 1.9* 1.8*     Date               INR                  Dose  9/9  4.1  hold   9/10  5.1  hold   9/11  3.4  hold   9/12  2.6   1 mg  9/13  1.9  2.5 mg    9/14  1.6  2.5 mg   9/15  1.6  3 mg  9/16  1.7  3 mg   9/17  1.8  3 mg  9/18                 1.9                  3 mg  9/19                 2.4                  1.5 mg                                                                               Assessment/ Plan: Will order warfarin 1.5 mg PO x 1 dose. Pharmacy will continue to monitor daily and adjust therapy as indicated.

## 2019-09-19 NOTE — PROGRESS NOTES
CLARE Plan:  1) Discharge: 1-2 Days  2) Disposition:  Home with Home Health (PT, SN) - [facility] Accepted  3) Transportation:  , private Car    CM spoke with patient this morning at bedside to discuss discharge planning and home health. Patient agreeable to [address] for skilled nursing, PT, OT. Freedom of Choice obtained. Patient has concerns regarding cost of [facility] Medicare). CM to inquire about cost with [facility] and update patient re same. 1125 - CM received update from [facility] that patient has $0 copay for home health. [facility] liaison requested to have PT evaluate at home for OT needs. OT order of [facility] to be discontinued. 1315 - CM spoke with patient regarding Medicare guidelines for hospital bed. Patient currently sitting in chair at bedside. Patient does not meet Medicare criteria for a hospital bed (pt. Would require assistance for transfers for positioning, 30% head elevation majority of time due to chronic condition, inability to perform sit to stand without mechanical lift and/or human assistance). CM provided patient option to rent hospital bed for approximately $150/mo. Patient unable to afford. Patient acknowledged she will not receive hospital bed after discharge. 1507 - CM updated by PA that patient is discharging today. CM updated [facility] of same. CM to continue to follow. Gibson Eddy, MPH    Care Management Interventions  PCP Verified by CM: Yes  Palliative Care Criteria Met (RRAT>21 & CHF Dx)?: No  Mode of Transport at Discharge:  Other (see comment)  Physical Therapy Consult: No  Occupational Therapy Consult: No

## 2019-09-19 NOTE — ROUTINE PROCESS
0730: Bedside and Verbal shift change report given to Obdulia Domínguez RN (oncoming nurse) by TINY Gee (offgoing nurse). Report included the following information SBAR, Kardex, Intake/Output, MAR, Accordion, Recent Results and Cardiac Rhythm paced. 1400: RN at bedside for paracentesis drain removal. Drain removed without difficulty, site covered with sterile gauze and tegaderm

## 2019-09-19 NOTE — PROGRESS NOTES
1700 I have reviewed discharge instructions with the patient and spouse. The patient and spouse verbalized understanding. Discharge medications reviewed with patient and spouse and appropriate educational materials and side effects teaching were provided.

## 2019-09-19 NOTE — PROGRESS NOTES
1930: Bedside and Verbal shift change report given to Max Sanders RN (oncoming nurse) by Joey Degroot RN (offgoing nurse). Report included the following information SBAR, Kardex, Intake/Output, MAR, Recent Results and Cardiac Rhythm Paced. 0730: Bedside and Verbal shift change report given to [place] (oncoming nurse) by Max Sanders RN (offgoing nurse). Report included the following information SBAR, Kardex, Intake/Output, MAR, Recent Results and Cardiac Rhythm Paced.

## 2019-09-23 NOTE — PROGRESS NOTES
Cardiology Office Progress Note            Patient: Snow Mcgowan  Diagnosis:     ICD-10-CM ICD-9-CM    1. Systolic CHF, chronic (HCC) I50.22 428.22      428.0    2. Obesity, morbid ([address] E66.01 278.01    3. Lymphedema of both lower extremities I89.0 457.1    4. Chronic atrial fibrillation (HCC) I48.2 427.31    5. Chronic anticoagulation Z79.01 V58.61    6. DDD (degenerative disc disease), thoracic M51.34 722.51    7. Essential hypertension I10 401.9    8. CKD (chronic kidney disease) stage 3, GFR 30-59 ml/min ([facility]) N18.3 585. 3      Date: 9/25/2019     Time: 1:24 PM    Primary Cardiologist: Harmony Celeste. Mihai Merritt M.D. Assessment and Plan     1. Systolic HF              - Chronic              - NYHA class III              - EF - MUGA with EF 49%. EF on echo 30-35%       09/09/19   ECHO ADULT FOLLOW-UP OR LIMITED 09/10/2019 9/10/2019     Narrative · Left Ventricle: Normal cavity size and wall thickness. Moderate systolic   dysfunction. Estimated left ventricular ejection fraction is 31 - 35%. Biplane method used to measure ejection fraction. Abnormal left   ventricular wall motion. Global hypokinesis with apical dyskinesis. · Left Atrium: Dilated left atrium. · Right Ventricle: Not well visualized.          Signed by: Rodrigo Burnham MD               - Coreg 3.125 mg BID              - Lisinopril held for CKD              - Aldactone 50 mg              - Daily weights - office weight 275     2.  CKD              - Cr. 1.33 at discharge       Results from Hospital Encounter encounter on 09/09/19   US RETROPERITONEUM COMP     Narrative RENAL AND BLADDER ULTRASOUND     INDICATION: elevated creatinine, low urine output.     COMPARISON: None.     TECHNIQUE:  Sonography of the kidneys, retroperitoneum, and bladder was performed.     FINDINGS:     RIGHT KIDNEY: measures 9.3 cm in length, although difficult to visualize due to  large body habitus and massive ascites. Echogenicity is abnormally increased. No  hydronephrosis, large shadowing calculus, or obvious solid contour-deforming  renal mass. LEFT KIDNEY: measures 8.2 cm in length, small and very difficult to visualize  due to large body habitus and massive ascites. Renal echogenicity is abnormally  increased. No hydronephrosis, large shadowing calculus, or obvious solid  contour-deforming renal mass. AORTA: Normal caliber in its visualized portions. COMMON ILIAC ARTERIES: Not seen. IVC: Normal caliber in its visualized portions. BLADDER: Incompletely distended and not assessed. Incidentally noted massive ascites and moderate bilateral pleural effusions.          IMPRESSION:   1. Poorly visualized kidneys showing increased echogenicity consistent with  medical renal parenchymal disease but no hydronephrosis. 2. Urinary bladder not assessed. 3. Large ascites. 4. Moderate bilateral pleural effusions.                  - Hold Lisinopril for CKD  3.  Lymphedema              - Venous insufficiency              - Lasix    - compression stockings if able   - Will check on [facility] for her  4. Afib              - PPM              - [address] [place] [address] Road with Coumadin - Pharmacy consulted for dosing              - Amio 200 mg 4 times per week  5. HTN              - well controlled on Coreg  6. Hypothyroidism              - TSH 41.20 on discharge              - T3 1.4              - Synthroid 150 mcg  7. Chronic OAC use   - Coumadin for Afib  8. H/o CHB              - s/p PPM - changed to BiV ICD with HF  9. DDD              - chronic pain and on chronic opiates  10. Body mass index is Body mass index is 45.76 kg/m². . - Morbid obesity - unable to exercise with increased SOB.     Continues about the same since discharge. Weight unchanged. Edema about the same. PT/OT/HH coming to house tomorrow. Will check on [facility] for her. Will need close follow up for HF, edema, TSH and Cr.   To see Dr. Bee Pringle on 10/7.       Future Appointments   Date Time Provider Mariel Becerra   9/25/2019  9:40 AM COUMADIN PHIL HDZ DONELL SCHED   9/26/2019 To Be Determined [facility]   9/27/2019 To Be Determined Makenzie Sena RN [place] [address] [address] [facility]   9/30/2019 To Be Determined Niharika Currie, RN [address]   10/2/2019 To Be Determined Niharika Currie RN MINERAL [address]   10/4/2019 To Be Determined Niharika Currie, RN [address]   10/7/2019 To Be Determined Niharika Currie, RN [address]   10/7/2019  9:00 AM PHIL SEVERINO WILDER DONELL SCHED   10/7/2019  9:20 AM Sara Henry  [address]   10/7/2019  3:00 PM [facility] CHAIR 2 [facility]   10/8/2019 To Be Determined [facility] [place] [facility]   10/10/2019 To Be Determined [facility] [place] [facility]   10/10/2019 To Be Determined Niharika Currie RN [address]   10/14/2019 To Be Determined Niharika Currie RN [address]   10/15/2019 To Be Determined [facility] [place] AREA [address]   10/17/2019 To Be Determined [facility] [facility]   10/17/2019 To Be Determined Niharika Currie RN [place] [address]   10/21/2019 To Be Determined Niharika Currie RN [place] [address]   10/22/2019 To Be Determined Robin Vazquez [facility]   10/22/2019 11:20 AM Rosalva Kim  [address]   10/24/2019 To Be Determined Ronal Mata PTA [facility]   10/28/2019 To Be Determined Niharika Currie, RN [address]   10/28/2019  3:00 PM LAURY  CHAIR 2 [facility]   10/29/2019 To Be Determined Millie Knee [facility]   10/31/2019 To Be Determined Robin Vazquez [facility]   11/4/2019 To Be Determined Niharika Currie RN [facility]   11/18/2019 To Be Determined Niharika uCrrie  [place] [address]   12/16/2019 11:45 AM REMOTE1, Lizette Smith SCHED   12/18/2019  1:40 PM Donna Rodriguez  [address]   3/25/2020  9:00 AM REMOTE1PHIL SCHED   7/2/2020  9:45 AM PACEMAKER3, [address]   7/2/2020 10:00 AM May Gibson  [address]              Patient Active Problem List   Diagnosis Code    Essential hypertension I10    Obesity, morbid ([address] E66.01    Pacemaker Z95.0    Atrial fibrillation ([address] I48.91    Depression F32.9    Insomnia G47.00    Chronic anticoagulation Z79.01    DDD (degenerative disc disease), cervical M50.30    DDD (degenerative disc disease), thoracic M51.34    Long term prescription opiate use Z79.891    Incidental pulmonary nodule- 2mm RLL- repeat CT due in Feb 2019 R91.1    Acute hypokalemia E87.6    AICD discharge Z45.02    AICD (automatic cardioverter/defibrillator) present C69.429    Systolic CHF, chronic (HCC) I50.22    Lymphedema I89.0    Lymphedema of both lower extremities I89.0        Subjective:   Sherita Mota reports lower extremity edema, dyspnea on exertion. ROS:  A comprehensive review of systems was negative except for that written in the HPI. Objective:      Physical Exam:                Visit Vitals  /62   Pulse 64   Ht 5' 5\" (1.651 m)   Wt 275 lb (124.7 kg)   BMI 45.76 kg/m²        General Appearance:   Well developed, well nourished,alert and oriented x 3, and   individual in no acute distress. Ears/Nose/Mouth/Throat:    Hearing grossly normal.         Neck:  Supple. Chest:    Lungs clear to auscultation bilaterally. Cardiovascular:   Irregular rate and rhythm, S1, S2 normal, no murmur. Abdomen:    Soft, non-tender, bowel sounds are active. Extremities:  3+ edema bilaterally. Skin:  Warm and dry.              Data Review:    Labs:    Recent Results (from the past 24 hour(s))   AMB POC PT/INR    Collection Time: 09/25/19  9:00 AM   Result Value Ref Range    VALID INTERNAL CONTROL POC Yes     Prothrombin time (POC) INR POC 1.9           Radiology:        Current Outpatient Medications   Medication Sig    amiodarone (CORDARONE) 200 mg tablet Take 1 Tab by mouth every Monday, Wednesday, Friday, Saturday.  levothyroxine (SYNTHROID) 150 mcg tablet Take 1 Tab by mouth Daily (before breakfast).  furosemide (LASIX) 40 mg tablet Take 2 Tabs by mouth Daily (before breakfast) AND 1 Tab daily (after lunch).  warfarin (COUMADIN) 3 mg tablet Take 3 mg by mouth two (2) times a week on Wednesday and Saturday.  warfarin (COUMADIN) 3 mg tablet Take 1.5 mg by mouth five (5) days a week. 5 x a week on Monday, Tuesday, Thursday, Friday, AND Sunday. 3 mg on WED AND SATURDAY    sertraline (ZOLOFT) 100 mg tablet Take 100 mg by mouth daily.  spironolactone (ALDACTONE) 50 mg tablet Take 1 Tab by mouth daily.  methocarbamol (ROBAXIN) 500 mg tablet TAKE 1 TABLET BY MOUTH THREE TIMES DAILY    HYDROcodone-acetaminophen (NORCO) 7.5-325 mg per tablet 1 Tab, EVERY 8 HOURS AS NEEDED FOR PAIN (Patient taking differently: Take 1 Tab by mouth every eight (8) hours as needed for Pain. 1 Tab, EVERY 8 HOURS AS NEEDED FOR PAIN)    eszopiclone (LUNESTA) 3 mg tablet Take 1 Tab by mouth nightly as needed for Sleep. Max Daily Amount: 3 mg.  potassium chloride (K-DUR, KLOR-CON) 20 mEq tablet Take 20 mEq by mouth daily.  pravastatin (PRAVACHOL) 80 mg tablet Take 80 mg by mouth nightly.  raNITIdine hcl 150 mg capsule Take 150 mg by mouth two (2) times a day.  carvedilol (COREG) 3.125 mg tablet Take 3.125 mg by mouth two (2) times daily (with meals). No current facility-administered medications for this visit.            Anton Gloria PA-C     [facility] [address] [place] [address], [address] [place] [address] [address]   Justin Larsen   [phone]

## 2019-09-25 NOTE — PROGRESS NOTES
A full discussion of the nature of anticoagulants has been carried out. A benefit risk analysis has been presented to the patient, so that they understand the justification for choosing anticoagulation at this time. The need for frequent and regular monitoring, precise dosage adjustment and compliance is stressed. Side effects of potential bleeding are discussed. The patient should avoid any OTC items containing aspirin or ibuprofen, and should avoid great swings in general diet. Avoid alcohol consumption. Call if any signs of abnormal bleeding. Next PT/INR test in 1 week. Patient was recently hospitalized for 11 days. Discharged on Thursday. Reports restarting old regimen after discharge. Has HH now visiting. Will have [facility] obtain INRs and call into clinic. Instructed to continue same dosing regimen and recheck levels next week. Anticoagulation Summary  As of 2019    INR goal:   2.0-3.0   TTR:   31.7 % (3.3 mo)   INR used for dosin. 9! (2019)   Warfarin maintenance plan:   3 mg (3 mg x 1) every Wed, Sat; 1.5 mg (3 mg x 0.5) all other days   Weekly warfarin total:   13.5 mg   Plan last modified:   Timothy Pardo RN (9/3/2019)   Next INR check:   10/2/2019   Target end date:       Indications    Atrial fibrillation ([facility] [I48.91]  Chronic anticoagulation [Z79.01]             Anticoagulation Episode Summary     INR check location:       Preferred lab:       Send INR reminders to:       Comments:         Anticoagulation Care Providers     Provider Role Specialty Phone number    Anupam Hendrickson MD Responsible Cardiology [phone]          Future Appointments   Date Time Provider Mariel Becerra   2019  9:40 AM COUMPHIL FRANZ SCHED   2019 To Be Determined [facility] [place] [facility]   2019 To Be Determined Natalee Whiting RN [address] [facility]   2019 To Be Determined Malu Brown  [facility]   10/2/2019 To Be Determined Cale Garcia  [place] [address] [address] [facility]   10/4/2019 To Be Determined Cale Garcia  [place] [address] [address] [facility]   10/7/2019 To Be Determined Cale Garcia RN [address] [facility]   10/7/2019  9:00 AM COUMADIN, [place]AR[place] WILDER [place] SCHED   10/7/2019  9:20 AM Duglas Araya  [address]   10/7/2019  3:00 PM BREMO FT CHAIR 2 [facility]. [facility]HEL[facility]    10/8/2019 To Be Determined Tod Laity [facility]   10/10/2019 [facility]   10/10/2019 To Be Determined Cale Garcia RN [address]   10/14/2019 To Be Determined Cale Garcia RN [facility]   10/15/2019 To Be Determined Tod Laity [address]   10/17/2019 To Be Determined Tod Laity [facility]   10/17/2019 To Be Determined Cale Garcia RN [address]   10/21/2019 To Be Determined Cale Garcia RN [address]   10/22/2019 To Be Determined Katelyn Cardenas [facility]   10/22/2019 11:20 AM Edgardo Christianson  [address]   10/24/2019 To Be Determined Sofia Parish PTA [facility]   10/28/2019 To Be Determined Cale Garcia RN [address]   10/28/2019  3:00 PM BREMO FT CHAIR 2 [facility]. [facility]    10/29/2019 To Be Determined Tod Laity [facility]   10/31/2019 To Be Determined Nirmal Garcia   11/4/2019 To Be Determined Cale Garcia RN [facility]   11/18/2019 To Be Determined Cale Garcia RN [facility] [place] [address]   12/16/2019 11:45 AM REMOTE1, [address]   12/18/2019  1:40 PM Duglas Araya  [address]   3/25/2020  9:00 AM REMOTE1, PHIL WALLACE   7/2/2020  9:45 AM PACEMAKER3, [address] [facility]   7/2/2020 10:00 AM Edgardo Christianson  [address]

## 2019-10-04 NOTE — PROGRESS NOTES
Received call from [place] with [facility] to report patients INR of 2.2. Confirmed current regimen. Instructed to continue same regimen and recheck levels in 2 weeks. Verbalized understanding.

## 2019-10-04 NOTE — TELEPHONE ENCOUNTER
Ke Gibson, Pharmacist with [employer], would like to know if it is okay for the patient to have an injection of Bumex on 10/7.      Phone: [phone]

## 2019-10-04 NOTE — TELEPHONE ENCOUNTER
Verified patient with two types of identifiers. Spoke to [place] and gave him Dr. Adeel Chavira orders of 80mg of IV lasix x2 three hours apart every 2 weeks at [facility]. Will fax signed order on Monday.

## 2019-10-04 NOTE — TELEPHONE ENCOUNTER
Niurka Salvador with [address] [facility] is calling to get the orders for her to draw the patient's blood to check the patient's INR. She stated that she is with the patient now. Please advise.     Phone #: [phone]  Thanks

## 2019-10-04 NOTE — TELEPHONE ENCOUNTER
Spoke with Richy Aguero from [facility] who reports she is with patient now and recheck INR and call office with results

## 2019-10-07 NOTE — TELEPHONE ENCOUNTER
Refaxed order to [phone]. Received confirmation. Verified patient with two types of identifiers. Spoke to [place], she has the order and is bringing the patient back now.

## 2019-10-07 NOTE — Clinical Note
10/7/19 Patient: Derian Nielsen YOB: 1960 Date of Visit: 10/7/2019 Simeon Fowler MD 
[facility] [address] [facility] [address] [address] VIA Facsimile: [phone] Dear Simeon Fowler MD, Thank you for referring Ms. Sherita Mota to [facility] for evaluation. My notes for this consultation are attached. If you have questions, please do not hesitate to call me. I look forward to following your patient along with you.  
 
 
Sincerely, 
 
Janine Tracy MD

## 2019-10-07 NOTE — PROGRESS NOTES
Outpatient Infusion Center Progress Note    1150 Pt admit to [facility] for Lasix and labs in her wheelchair in stable condition. Assessment completed. Patient states she continues to have some shortness of breath, and continues to have pitting edema to bilateral lower extremities. Patient unable to walk far, limited to standing and pivoting at this time. MD started patient on her regimen to start today. Peripheral IV established, labs sent and patient educated about her 2 doses of lasix to be given in [facility] today. Visit Vitals  /63 (BP 1 Location: Right arm, BP Patient Position: At rest)   Pulse 60   Temp 98.5 °F (36.9 °C)   Resp 16   SpO2 93%   Breastfeeding? No       Medications Administered     furosemide (LASIX) injection 80 mg     Admin Date  10/07/2019 Action  Given Dose  80 mg Route  IntraVENous Administered By  Steve Craft Date  10/07/2019 Action  Given Dose  80 mg Route  IntraVENous Administered By  Ann Nielsen              1530 Pt tolerated treatment well. Peripheral IV removed. D/c home in no increased distress.     Andrew Bradford RN

## 2019-10-07 NOTE — PROGRESS NOTES
Sherita Nakul     1960       Vipal K. Ardyce Severance MD, [facility]  Date of Visit-10/7/2019   PCP is Virginia Rizzo MD   [facility] [place]  [facility]  HPI:  Rhonda Peck is a 60 y.o. female     Pt is here today for a 2 week follow up. Admitted for volume overload from lymphedema. Initial weight 312 lbs. Diuresis started with IV Bumex and added Milrinone. Cr was 1.9. Significant anasarca, she also had fluid ascites. UOP very low with Bumex IV, changed to Lasix IV with better UOP. US guided paracentesis completed yielded a total +16,000 cc over 7 days total.  UOP averaged 1400+ cc daily  TSH was measured at 45, and thus Synthroid dose increased to 150 mcg. GI consult for abnormal LFTs/ascites, felt this was not consistent with SBP or liver disease. Recommended ascites control with diuretic as outpatient. EP consulted for opinion of ablation for VT. ? If Amiodarone was causing hypothyroid. Recommended against ablation while in volume overload. Revisit as outpatient, once more compensated. Amiodarone dose reduced to 100 mg. Patient total output for hospital stay was 20,815 cc. Discharge weight was 275 lbs  Improvement in SOB and edema was noted by patient. Close follow ups moving forward with Bi-monthly [facility] visits for IV diuresis. Pt saw ENRIQUE Cordova on 9/23, weight was unchanged, we continue [facility]. Today weight is up 13 lbs from last office visit. Pt will start infusion center every two weeks starting today. Today the patient reports that she has SOB but oxygen helps. Denies CP. Notes increase in weight. Is on opioid pain medications due to chronic pain.      The patient presents to the office in a wheelchair.   + dizzy + BOWENS + lower extremity edema    Denies chest pain,syncope Has no tachycardia , palpitations or sense of arrythmia    09/09/19   ECHO ADULT FOLLOW-UP OR LIMITED 09/10/2019 9/10/2019    Narrative · Left Ventricle: Normal cavity size and wall thickness. Moderate systolic   dysfunction. Estimated left ventricular ejection fraction is 31 - 35%. Biplane method used to measure ejection fraction. Abnormal left   ventricular wall motion. Global hypokinesis with apical dyskinesis. · Left Atrium: Dilated left atrium. · Right Ventricle: Not well visualized. Signed by: Oumar Casanova MD      Assessment/Plan:     1. Systolic CHF, acute on chronic (HCC)  NYHA 3, 13 lbs up, high risk for re-admission. We will send to [facility] weekly and may need to adjust PO meds if she does not respond. Last EF was 30-35%. 2. Lymphedema of both lower extremities  Has been long-standing and predates the heart failure. She will enroll in clinic and be getting wraps. 3. Chronic atrial fibrillation  Rate-controlled, continue warfarin. 4. Ventricular fibrillation (HCC)  On amiodarone. Prior use of mexilitene as a bridge while K was low  At risk for side effects  Lab Results   Component Value Date/Time    TSH 41.20 (H) 09/16/2019 05:00 AM    high TSH noted, and suspect some of CV dysfx and fatigue maybe hypothyroidism  Now on 150 mcg    5. AICD discharge  Continue device check. 6. Essential hypertension  beta blocker and aldactone  Stable. BP Readings from Last 6 Encounters:   10/11/19 122/70   10/08/19 122/70   10/08/19 122/70   10/07/19 105/63   10/07/19 128/64   10/04/19 130/68       7. CKD (chronic kidney disease) stage 3, GFR 30-59 ml/min ([facility])  Stable. Lab Results   Component Value Date/Time    Creatinine (POC) 1.6 (H) 04/15/2019 03:12 AM    Creatinine 1.33 (H) 10/07/2019 12:29 PM      8. Hypokalemia  Lab Results   Component Value Date/Time    Potassium 4.3 10/07/2019 12:29 PM    Potassium (POC) 2.2 (LL) 04/15/2019 03:12 AM      The patient will follow up in 3 and 6 weeks with PA. Two months with me, and weekly with [facility]. If weight does not decrease we will increase diuretic.       Patient Instructions   You will be scheduled for follow ups with ENRIQUE Arellano. You will be scheduled for weekly IV lasix treatments at [facility]. We will see you back in 2 months with Dr. Juan Antonio Mims.      [facility] CAD CHF Meds             amiodarone (CORDARONE) 200 mg tablet Take 1 Tab by mouth every Monday, Wednesday, Friday, Saturday. furosemide (LASIX) 40 mg tablet Take 2 Tabs by mouth Daily (before breakfast) AND 1 Tab daily (after lunch). warfarin (COUMADIN) 3 mg tablet Take 3 mg by mouth two (2) times a week on Wednesday and Saturday. warfarin (COUMADIN) 3 mg tablet Take 1.5 mg by mouth five (5) days a week. 5 x a week on Monday, Tuesday, Thursday, Friday, AND Sunday. 3 mg on WED AND SATURDAY    spironolactone (ALDACTONE) 50 mg tablet Take 1 Tab by mouth daily. pravastatin (PRAVACHOL) 80 mg tablet Take 80 mg by mouth nightly. carvedilol (COREG) 3.125 mg tablet Take 3.125 mg by mouth two (2) times daily (with meals). Impression:   1. Systolic CHF, acute on chronic (HCC)    2. Lymphedema of both lower extremities    3. Chronic atrial fibrillation    4. Ventricular fibrillation ([address]    5. AICD discharge    6. Essential hypertension    7. CKD (chronic kidney disease) stage 3, GFR 30-59 ml/min ([facility])    8. Hypokalemia       Cardiac History:   No specialty comments available. ROS-except as noted above. . A complete cardiac and respiratory are reviewed and negative except as above ; Resp-denies wheezing  or productive cough,. Const- No unusual weight loss or fever; Neuro-no recent seizure or CVA ; GI- No BRBPR, abdom pain, bloating ; - no  hematuria   ROS- complete multisystem 11 ROS done and reviewed as pertinent  see supplement sheet, initialed and to be scanned by staff  Past Medical History:   Diagnosis Date    AICD (automatic cardioverter/defibrillator) present     AICD (automatic cardioverter/defibrillator) present     Atrial fibrillation ([address]     Chronic anticoagulation     was on NOAC before.  ?had clot on noac. now on warfarin. Dr. Nadeem Conner monitors INR    DDD (degenerative disc disease), cervical 1/30/2018    DDD (degenerative disc disease), thoracic 1/30/2018    Depression     no h/o hospitalization    Essential hypertension 1/8/2018    Incidental pulmonary nodule 2mm, RLL- no follow up needed 2/16/2018    Noted on abdomen/pelvis CT Feb 2018. No smoking history. 1. No renal, ureteral, or bladder calculi. 2. No acute findings. 3. 2 mm pulmonary nodule right lower lobe. No follow-up needed if the patient is low risk. If the patient is high-risk, CT could be considered in 1 year.      Insomnia     Kidney infarction ([address] 2017    d/t clot per pt. recalls being on blood thinner. 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 She has never used smokeless tobacco. She reports that she does not drink alcohol or use drugs. Exam and Labs:    Visit Vitals  /64 (BP 1 Location: Left arm, BP Patient Position: Sitting)   Pulse 61   Resp 17   Ht 5' 5\" (1.651 m)   Wt 288 lb (130.6 kg)   SpO2 99%   BMI 47.93 kg/m²    @Constitutional:  NAD, comfortable  Head: NC,AT. Eyes: No scleral icterus. Neck:  Neck supple. No JVD present. Throat: moist mucous membranes. Chest: Effort normal & normal respiratory excursion . Neurological: alert, conversant and oriented . Skin: Skin is not cold. No obvious systemic rash noted. Not diaphoretic. No erythema. Psychiatric:  Grossly normal mood and affect. Behavior appears normal. Extremities:  no clubbing or cyanosis. Abdomen: non distended    Lungs:breath sounds normal. No stridor. distress, wheezes or  Rales. Heart:    normal rate, regular rhythm, normal S1, S2, no murmurs, rubs, clicks or gallops , PMI non displaced.     Edema: Edema is 4+ to above the knee. Lab Results   Component Value Date/Time    Cholesterol, total 179 01/18/2018 09:04 AM    HDL Cholesterol 42 01/18/2018 09:04 AM    LDL, calculated 101 (H) 01/18/2018 09:04 AM    Triglyceride 179 (H) 01/18/2018 09:04 AM     Lab Results   Component Value Date/Time    Sodium 135 (L) 09/19/2019 04:00 AM    Potassium 4.0 09/19/2019 04:00 AM    Chloride 101 09/19/2019 04:00 AM    CO2 28 09/19/2019 04:00 AM    Anion gap 6 09/19/2019 04:00 AM    Glucose 112 (H) 09/19/2019 04:00 AM    BUN 18 09/19/2019 04:00 AM    Creatinine 1.33 (H) 09/19/2019 04:00 AM    BUN/Creatinine ratio 14 09/19/2019 04:00 AM    GFR est AA 49 (L) 09/19/2019 04:00 AM    GFR est non-AA 41 (L) 09/19/2019 04:00 AM    Calcium 8.8 09/19/2019 04:00 AM      Wt Readings from Last 3 Encounters:   10/07/19 288 lb (130.6 kg)   10/04/19 281 lb (127.5 kg)   09/27/19 281 lb (127.5 kg)      BP Readings from Last 3 Encounters:   10/07/19 128/64   10/04/19 130/68   10/02/19 138/80      Current Outpatient Medications   Medication Sig    amiodarone (CORDARONE) 200 mg tablet Take 1 Tab by mouth every Monday, Wednesday, Friday, Saturday.  levothyroxine (SYNTHROID) 150 mcg tablet Take 1 Tab by mouth Daily (before breakfast).  furosemide (LASIX) 40 mg tablet Take 2 Tabs by mouth Daily (before breakfast) AND 1 Tab daily (after lunch).  warfarin (COUMADIN) 3 mg tablet Take 3 mg by mouth two (2) times a week on Wednesday and Saturday.  warfarin (COUMADIN) 3 mg tablet Take 1.5 mg by mouth five (5) days a week. 5 x a week on Monday, Tuesday, Thursday, Friday, AND Sunday. 3 mg on WED AND SATURDAY    sertraline (ZOLOFT) 100 mg tablet Take 100 mg by mouth daily.  spironolactone (ALDACTONE) 50 mg tablet Take 1 Tab by mouth daily.     methocarbamol (ROBAXIN) 500 mg tablet TAKE 1 TABLET BY MOUTH THREE TIMES DAILY    HYDROcodone-acetaminophen (NORCO) 7.5-325 mg per tablet 1 Tab, EVERY 8 HOURS AS NEEDED FOR PAIN (Patient taking differently: Take 1 Tab by mouth every eight (8) hours as needed for Pain. 1 Tab, EVERY 8 HOURS AS NEEDED FOR PAIN)    eszopiclone (LUNESTA) 3 mg tablet Take 1 Tab by mouth nightly as needed for Sleep. Max Daily Amount: 3 mg.  potassium chloride (K-DUR, KLOR-CON) 20 mEq tablet Take 20 mEq by mouth daily.  pravastatin (PRAVACHOL) 80 mg tablet Take 80 mg by mouth nightly.  raNITIdine hcl 150 mg capsule Take 150 mg by mouth two (2) times a day.  carvedilol (COREG) 3.125 mg tablet Take 3.125 mg by mouth two (2) times daily (with meals). No current facility-administered medications for this visit. Impression see above.        Transcribed by Taras Devries, as dictated by Danielle Eastman MD.

## 2019-10-07 NOTE — PROGRESS NOTES
Chief Complaint   Patient presents with    Shortness of Breath    Dizziness    CHF    Follow-up     1. Have you been to the ER, urgent care clinic since your last visit? Hospitalized since your last visit? No    2. Have you seen or consulted any other health care providers outside of the [address] [place] [address] since your last visit? Include any pap smears or colon screening. No    3) Do you have an Advance Directive on file?  No  Visit Vitals  /64 (BP 1 Location: Left arm, BP Patient Position: Sitting)   Pulse 61   Resp 17   Ht 5' 5\" (1.651 m)   Wt 288 lb (130.6 kg)   SpO2 99%   BMI 47.93 kg/m²

## 2019-10-07 NOTE — PATIENT INSTRUCTIONS
You will be scheduled for follow ups with ENRIQUE Mak. You will be scheduled for weekly IV lasix treatments at [facility].  We will see you back in 2 months with Dr. Tony Garza.

## 2019-10-07 NOTE — TELEPHONE ENCOUNTER
Yony Blakely from [facility] needs orders that you tried to send to them for patient re-faxed to  [phone]. She can be reached at [phone].

## 2019-10-08 NOTE — PROGRESS NOTES
Labs look good  K and Mag are fine  Kidneys stable and only known mild impairment , usual for this situation of CHF  . afu

## 2019-10-08 NOTE — TELEPHONE ENCOUNTER
Patients home health nurse, Caden Ellis, would like to know if Dr Unique Monroe would like for her to do anything to help the patients swelling such as compression socks. Please advise.      Phone: [phone]

## 2019-10-08 NOTE — TELEPHONE ENCOUNTER
Yes that is fine  In past she has had trouble getting them on so I dont want them to spend a lot before we know they would work  They might have a pair already?  If not we can send in for one 40-50 mm pair thigh or knee high

## 2019-10-11 NOTE — TELEPHONE ENCOUNTER
[facility] Requesting to order Tubigrip. It is similar to compression hose and will be low cost to patient.   Will inform MD.

## 2019-10-11 NOTE — TELEPHONE ENCOUNTER
Patients [address] [place] [address] would like to verify that she can order something for the patients swelling. Please advise.      Phone: [phone]

## 2019-10-15 NOTE — TELEPHONE ENCOUNTER
Requested Prescriptions     Signed Prescriptions Disp Refills    warfarin (COUMADIN) 3 mg tablet 60 Tab 0     Sig: Take 1 Tab by mouth daily.  1 tab on Wed/Sat and take 1/2 tab all other days or as directed by coumadin clinic     Authorizing Provider: Chu Azar     Ordering User: Santos Abelardo       Last office visit 10/7/19    Per Dr. Avilez    Date Time Provider Mariel Becerra   10/16/2019  2:00 PM BREMO FT CHAIR 2 [facility]. [facility]    10/17/2019 [facility]   10/17/2019 To Be Determined Vinay Johnston  [facility] [address]   10/21/2019 To Be Determined Vinay Johnston RN [facility] [address]   10/22/2019 To Be Determined Nirmal Garcia 21   10/22/2019 11:20 AM Lacey Fitzpatrick  [address]   10/24/2019 To Be Determined Jeyson Gibbs [facility]   10/25/2019  1:00 PM BREMO FT CHAIR 2 [facility]. [facility]    10/28/2019 To Be Determined Vinay Johnston RN [place] [address] [address] [facility]   10/28/2019  2:40 PM Eladia Durand [address]   10/28/2019  3:00 PM BREMO FT CHAIR 2 [facility]   10/29/2019 To Be Determined [facility] [address]   10/30/2019  9:30 AM Nadira Estes [address]   10/31/2019 To Be Determined Aminta Luis [place] [address] [address] [facility]   11/4/2019 To Be Determined Vinay Johnston RN [place] [address] [address] [facility]   11/18/2019 To Be Determined Vinay Johnston RN [place] [address] [address] [facility]   12/16/2019 11:45 AM DENNISE [address]   12/18/2019  1:40 PM Rosezetta Leyden,  [address]   3/25/2020  9:00 AM REMOTE1, PHIL MARLEY SCHED   7/2/2020  9:45 AM PACEMAKER3, [address]   7/2/2020 10:00 AM Lacey Fitzpatrick  [address]

## 2019-10-16 NOTE — PROGRESS NOTES
[facility]patient [facility]fusion [facility] Progress Note    1400 Pt admit to [facility] for Lasix via wheelchair in stable condition. Assessment completed. Patient states she continues to have shortness of breath, and edema to bilateral lower extremities. Peripheral IV established, 1st dose Lasix given. Visit Vitals  /60   Pulse 60   Temp 97.6 °F (36.4 °C)   Resp 18   SpO2 100%       Medications Administered     furosemide (LASIX) injection 80 mg     Admin Date  10/16/2019 Action  Given Dose  80 mg Route  IntraVENous Administered By  Sima Banda RN           Admin Date  10/16/2019 Action  Given Dose  80 mg Route  IntraVENous Administered By  Sima Banda RN          sodium chloride (NS) flush 5-10 mL     Admin Date  10/16/2019 Action  Given Dose  10 mL Route  IntraVENous Administered By  Sima Banda RN           Admin Date  10/16/2019 Action  Given Dose  10 mL Route  IntraVENous Administered By  Sima Banda RN           Admin Date  10/16/2019 Action  Given Dose  10 mL Route  IntraVENous Administered By  Sima Banda RN           Admin Date  10/16/2019 Action  Given Dose  10 mL Route  IntraVENous Administered By  Sima Banda RN                   5586 Pt tolerated treatment well. Peripheral IV removed. D/c home in no increased distress.   Future Appointments   Date Time Provider Mariel Becerra   10/17/2019 12:00 PM [facility]   10/17/2019  2:30 PM Monse Bains RN [facility]   10/21/2019 To Be Determined Sahra Nagy RN [address]   10/22/2019 To Be Determined Stephanie Mckeon [facility]   10/22/2019 11:20 AM Michelle Connor  [address]   10/24/2019 To Be Determined Chi Stevens [facility]   10/25/2019  1:00 PM BREMO FT CHAIR 2 [facility]   10/28/2019 To Be Determined Sahra Nagy RN [address] [place] [address]   10/28/2019  2:40 PM Eladia Durand [facility]   10/28/2019  3:00 PM BREMO FT CHAIR 2 [facility]   10/29/2019 To Be Determined Tasha Currie [facility] [place] [facility]   10/30/2019  9:30 AM Raynelle Model [address]   10/31/2019 To Be Determined Elsy Bradford [facility] [place] [address]   11/4/2019 To Be Determined Ines Coppola  [place] [address]   11/18/2019 To Be Determined Ines Coppola RN [address] [place] [address]   12/16/2019 11:45 AM REMOTE1, [address]   12/18/2019  1:40 PM Monique Neves  [address]   3/25/2020  9:00 AM Sundeep WILKESENA SCHED   7/2/2020  9:45 AM PACEMAKER3, [address]   7/2/2020 10:00 AM Sol Romano  [address]       **RN did not see order for labs on order form. Will notify MD office tomorrow.

## 2019-10-25 NOTE — PROGRESS NOTES
Outpatient Infusion Center Progress Note    8302 Pt admit to [facility] for IV LASIX ambulatory in stable condition. Assessment completed. No new concerns voiced. PIV placed in right hand with positive blood return, labs drawn and sent for processing. Potassium level noted to be 2.8  I instructed patient to increase her oral dose of potassium tonight with dinner, she refused any potential IV infusion of potassium. Visit Vitals  /56   Pulse 60   Temp 98.2 °F (36.8 °C)   Resp 18     Medications Administered     furosemide (LASIX) injection 80 mg     Admin Date  10/25/2019 Action  Given Dose  80 mg Route  IntraVENous Administered By  Em Duke RN           Admin Date  10/25/2019 Action  Given Dose  80 mg Route  IntraVENous Administered By  Em Duke, TINY                      2390 Pt tolerated treatment well. D/c home ambulatory in no distress.  Pt aware of next appointment scheduled [id number]

## 2019-10-25 NOTE — PROGRESS NOTES
Low K and high Creat  Change [facility] ot 80 mg just once not twice  Get weights from at home  Have her take KCL 20 meq-3 tabs today and then 2 tabs daily  Marilyn Seth will assess on Monday appt for fu labs  Future Appointments  10/28/2019 2:40 PM    CHRISTIE Welsh

## 2019-10-28 NOTE — TELEPHONE ENCOUNTER
Called and spoke with Idalia from [facility]. Inquired about a recent INR check on patient. Last INR check was 10/4/19 and instructions were for recheck 2 weeks later.   Kimmieoft will check patients chart to see if recent charted result otherwise will see patient tomorrow and obtain INR

## 2019-10-28 NOTE — PROGRESS NOTES
Cardiology Office Progress Note            Patient: Samra Edmonds  Diagnosis:     ICD-10-CM ICD-9-CM    1. Systolic CHF, acute on chronic (HCC) J90.67 735.02 METABOLIC PANEL, BASIC     428.0    2. Hypokalemia E87.6 276.8 REFERRAL TO NEPHROLOGY   3. Chronic kidney disease, unspecified CKD stage N18.9 585.9 REFERRAL TO NEPHROLOGY   4. Lymphedema of both lower extremities I89.0 457.1    5. Obesity, morbid ([facility] [address] E66.01 278.01      Date: 10/28/2019     Time: 2:50 PM    Primary Cardiologist: Lupe Lopez MD     Assessment and Plan     Assessment/Plan:     1. Systolic CHF, acute on chronic (HCC)   NYHA 3, 13 lbs up, high risk for re-admission. Last EF was 30-35%. Coreg, Aldactone   No ACE/ARB with CKD  2. Lymphedema of both lower extremities   Has been long-standing and predates the heart failure. Starting lymphedema clinic this week. 3. Chronic atrial fibrillation   Rate-controlled, continue warfarin.    4. Ventricular fibrillation (HCC)   On amiodarone. Prior use of mexilitene as a bridge while K was low   At risk for side effects          Lab Results   Component Value Date/Time     TSH 41.20 (H) 09/16/2019 05:00 AM     high TSH noted, and suspect some of CV dysfx and fatigue maybe hypothyroidism   Now on 150 mcg     5. AICD discharge   Continue device check. 6. Essential hypertension   beta blocker and aldactone  Stable. BP Readings from Last 6 Encounters:   10/11/19 122/70   10/08/19 122/70   10/08/19 122/70   10/07/19 105/63   10/07/19 128/64   10/04/19 130/68       7. CKD (chronic kidney disease) stage 3, GFR 30-59 ml/min ([facility])   Cr 1.73, up   Metolazone stopped. OPIC dosing changed to once   Referral to see Dr. Thalia Santos. She has seen in the past   She is dry intravascularly, but continues with edema/3rd spacing     8. Hypokalemia   - 2.6   K increased.    Lab slip given for BMP to check Cr and K     Still with significant edema, but Cr up and K down. She c/o dry lips, mouth and being very thirsty. Suspect she is dry intravascularly, but continues with edema. Referral back to Dr. Myron Dai, Nephrology. Last saw 3 years prior. Lab slip for BMP. Future Appointments   Date Time Provider Mariel Becerra   10/29/2019 10:00 AM Chelly Garzon RN [address]   10/30/2019  9:30 AM Carolina Force [address]   11/1/2019  3:00 [facility]   11/4/2019 To Be Determined Chelly Garzon RN [place] [address] [facility]   11/18/2019 To Be Determined Chelly Garzon RN [address]   12/16/2019 11:45 AM REMOTE1, [address]   12/18/2019  1:40 PM Katrin Pruitt  [address]   3/25/2020  9:00 AM REMOTE1, PHIL MCCLOUDREY DONELL SCHED   7/2/2020  9:45 AM PACEMAKER3, [address]   7/2/2020 10:00 AM Mathew Alvares  [address]        Patient Active Problem List   Diagnosis Code    Essential hypertension I10    Obesity, morbid ([address] E66.01    Pacemaker Z95.0    Atrial fibrillation ([facility] [address] I48.91    Depression F32.9    Insomnia G47.00    Chronic anticoagulation Z79.01    DDD (degenerative disc disease), cervical M50.30    DDD (degenerative disc disease), thoracic M51.34    Long term prescription opiate use Z79.891    Incidental pulmonary nodule- 2mm RLL- repeat CT due in Feb 2019 R91.1    Acute hypokalemia E87.6    AICD discharge Z45.02    AICD (automatic cardioverter/defibrillator) present Z29.796    Systolic CHF, chronic (HCC) I50.22    Lymphedema I89.0    Lymphedema of both lower extremities I89.0        Subjective:   Sherita Mota admits to dyspnea, lower extremity edema, dyspnea on exertion. ROS:  A comprehensive review of systems was negative except for that written in the HPI.     Objective:      Physical Exam:                Visit Vitals  /70 (BP 1 Location: Left arm, BP Patient Position: Sitting) Pulse 60   Resp 14   Ht 5' 5\" (1.651 m)   SpO2 94%   BMI 48.59 kg/m²        General Appearance:   Well developed, well nourished,alert and oriented x 3, and   individual in no acute distress. Ears/Nose/Mouth/Throat:    Hearing grossly normal.         Neck:  Supple. Chest:    Lungs clear to auscultation bilaterally. Cardiovascular:   Regular rate and rhythm, S1, S2 normal, no murmur. Abdomen:    Soft, non-tender, bowel sounds are active. Extremities:  3+ edema bilaterally. Skin:  Warm and dry. Data Review:    Labs:  No results found for this or any previous visit (from the past 24 hour(s)). Radiology:        Current Outpatient Medications   Medication Sig    HYDROcodone-acetaminophen (NORCO)  mg tablet Take 1 Tab by mouth three (3) times daily. prescribed by Dr. Maria Victoria Carroll    metOLazone (ZAROXOLYN) 5 mg tablet Take 5 mg by mouth two (2) times a day.  warfarin (COUMADIN) 3 mg tablet Take 1 Tab by mouth daily. 1 tab on Wed/Sat and take 1/2 tab all other days or as directed by coumadin clinic    amiodarone (CORDARONE) 200 mg tablet Take 1 Tab by mouth every Monday, Wednesday, Friday, Saturday.  levothyroxine (SYNTHROID) 150 mcg tablet Take 1 Tab by mouth Daily (before breakfast).  furosemide (LASIX) 40 mg tablet Take 2 Tabs by mouth Daily (before breakfast) AND 1 Tab daily (after lunch).  sertraline (ZOLOFT) 100 mg tablet Take 100 mg by mouth daily.  spironolactone (ALDACTONE) 50 mg tablet Take 1 Tab by mouth daily.  methocarbamol (ROBAXIN) 500 mg tablet TAKE 1 TABLET BY MOUTH THREE TIMES DAILY    eszopiclone (LUNESTA) 3 mg tablet Take 1 Tab by mouth nightly as needed for Sleep. Max Daily Amount: 3 mg.  potassium chloride (K-DUR, KLOR-CON) 20 mEq tablet Take 20 mEq by mouth daily.  pravastatin (PRAVACHOL) 80 mg tablet Take 80 mg by mouth nightly.  raNITIdine hcl 150 mg capsule Take 150 mg by mouth two (2) times a day.     carvedilol (COREG) 3.125 mg tablet Take 3.125 mg by mouth two (2) times daily (with meals).  warfarin (COUMADIN) 3 mg tablet Take 3 mg by mouth two (2) times a week on Wednesday and Saturday.  warfarin (COUMADIN) 3 mg tablet Take 1.5 mg by mouth five (5) days a week. 5 x a week on Monday, Tuesday, Thursday, Friday, AND Sunday. 3 mg on WED AND SATURDAY    HYDROcodone-acetaminophen (NORCO) 7.5-325 mg per tablet 1 Tab, EVERY 8 HOURS AS NEEDED FOR PAIN (Patient taking differently: Take 1 Tab by mouth every eight (8) hours as needed for Pain. 1 Tab, EVERY 8 HOURS AS NEEDED FOR PAIN)     No current facility-administered medications for this visit.       Facility-Administered Medications Ordered in Other Visits   Medication Dose Route Frequency    [START ON 11/1/2019] bumetanide (BUMEX) injection 1 mg  1 mg IntraVENous ONCE          Frederick Au PA-C     [facility] [address] [place] [address], [address] [place] [address],[address]   [address] [place] [address]   [phone]

## 2019-10-29 NOTE — PROGRESS NOTES
[facility] INR of 2.1. Confirmed current regimen. Instructed to continue same regimen and recheck levels in 3 weeks. Verbalized understanding.

## 2019-10-30 NOTE — PROGRESS NOTES
[address] [place] [address]      INITIAL EVALUATION    NAME: Sherita Mota AGE: 60 y.o. GENDER: female  DATE: 10/30/2019  REFERRING PHYSICIAN: Cait Chen MD  CC: Carlito August MD  HISTORY AND BACKGROUND:  Patient was referred to clinic for evaluation and treatment of bilateral LE lymphedema. Patient reports long history of LE swelling, which she managed while working with wear of support stockings. Reports episode of L UE lymphedema, no known cause, having received treatment for condition previously. Patient has complex medical history as noted below, arriving to clinic in w/c with O2, spouse propelling w/c. Patient reports working on having home based sleep study. Primary Diagnosis:  · Primary lymphedema (Q82.0)  Other Treatment Diagnoses:   Abnormality of gait ( other abnormalities of gait and mobility R26.89)   Swelling not relieved by elevation ( R60.9 edema)  Morbid Obesity (E66.01)  Date of Onset: LE swelling most of her adult life, wearing \"support stockings\" when working. Discontinued wear of stockings when stopped working, with increase in LE swelling becoming progressively worse. L UE lymphedema treated previously, no known cause. Present Symptoms and Functional Limitations: severe swelling bilateral LE's, more pronounced initially bilateral feet. Medical concerns likely impacting exacerbation of lymphedema. See scanned LLIS. Lymphedema Life Impact Scale: 61/68; 90% impairment  Past Medical History:   Past Medical History:   Diagnosis Date    AICD (automatic cardioverter/defibrillator) present     AICD (automatic cardioverter/defibrillator) present     Atrial fibrillation (HCC)     Chronic anticoagulation     was on NOAC before. ?had clot on noac. now on warfarin.  Dr. Naldo Galvan monitors INR    DDD (degenerative disc disease), cervical 1/30/2018    DDD (degenerative disc disease), thoracic 1/30/2018    Depression     no h/o hospitalization    Essential hypertension 1/8/2018    Incidental pulmonary nodule 2mm, RLL- no follow up needed 2/16/2018    Noted on abdomen/pelvis CT Feb 2018. No smoking history. 1. No renal, ureteral, or bladder calculi. 2. No acute findings. 3. 2 mm pulmonary nodule right lower lobe. No follow-up needed if the patient is low risk. If the patient is high-risk, CT could be considered in 1 year.      Insomnia     Kidney infarction ([address] 2017    d/t clot per pt. recalls being on blood thinner.  Lymphedema 8/23/2019    Lymphedema of left arm     developed after infection of pacemaker site    Obesity, morbid ([address] 1/30/2018    Stomach flu 2015    hospital admit X2    Systolic CHF, chronic (HCC)     EF 45% normal caths and stress tests     Past Surgical History:   Procedure Laterality Date    HX BREAST BIOPSY Right 2010    Benign    HX CHOLECYSTECTOMY  1984    HX HEART CATHETERIZATION  2006    d/t abnl stress test, per pat, test was normal    HX HEART CATHETERIZATION  ~2009, 2012    per pt normal.     HX HYSTERECTOMY  2012    d/t heavy bleeding. ?endometriosis. +fibroid. per pt, no cancer.  HX PACEMAKER  2010, 2011, 2012, 3/29/2018    first 2010 (infected), replacement 2011 (lead not placed correctly), 2nd replacement 2012- pacemaker, defibrillator, 3rd replacement 2018     Current Medications:    Current Outpatient Medications   Medication Sig    HYDROcodone-acetaminophen (NORCO)  mg tablet Take 1 Tab by mouth three (3) times daily. prescribed by Dr. Arline Jacob    metOLazone (ZAROXOLYN) 5 mg tablet Take 5 mg by mouth two (2) times a day.  warfarin (COUMADIN) 3 mg tablet Take 1 Tab by mouth daily. 1 tab on Wed/Sat and take 1/2 tab all other days or as directed by coumadin clinic    amiodarone (CORDARONE) 200 mg tablet Take 1 Tab by mouth every Monday, Wednesday, Friday, Saturday.     levothyroxine (SYNTHROID) 150 mcg tablet Take 1 Tab by mouth Daily (before breakfast).  furosemide (LASIX) 40 mg tablet Take 2 Tabs by mouth Daily (before breakfast) AND 1 Tab daily (after lunch).  warfarin (COUMADIN) 3 mg tablet Take 3 mg by mouth two (2) times a week on Wednesday and Saturday.  warfarin (COUMADIN) 3 mg tablet Take 1.5 mg by mouth five (5) days a week. 5 x a week on Monday, Tuesday, Thursday, Friday, AND Sunday. 3 mg on WED AND SATURDAY    sertraline (ZOLOFT) 100 mg tablet Take 100 mg by mouth daily.  spironolactone (ALDACTONE) 50 mg tablet Take 1 Tab by mouth daily.  methocarbamol (ROBAXIN) 500 mg tablet TAKE 1 TABLET BY MOUTH THREE TIMES DAILY    HYDROcodone-acetaminophen (NORCO) 7.5-325 mg per tablet 1 Tab, EVERY 8 HOURS AS NEEDED FOR PAIN (Patient taking differently: Take 1 Tab by mouth every eight (8) hours as needed for Pain. 1 Tab, EVERY 8 HOURS AS NEEDED FOR PAIN)    eszopiclone (LUNESTA) 3 mg tablet Take 1 Tab by mouth nightly as needed for Sleep. Max Daily Amount: 3 mg.  potassium chloride (K-DUR, KLOR-CON) 20 mEq tablet Take 20 mEq by mouth daily.  pravastatin (PRAVACHOL) 80 mg tablet Take 80 mg by mouth nightly.  raNITIdine hcl 150 mg capsule Take 150 mg by mouth two (2) times a day.  carvedilol (COREG) 3.125 mg tablet Take 3.125 mg by mouth two (2) times daily (with meals). No current facility-administered medications for this encounter. Facility-Administered Medications Ordered in Other Encounters   Medication Dose Route Frequency    bumetanide (BUMEX) injection 1 mg  1 mg IntraVENous ONCE     Allergies:    Allergies   Allergen Reactions    Augmentin [Amoxicillin-Pot Clavulanate] Nausea and Vomiting    Neosporin [Neomycin-Bacitracin-Polymyxin] Rash      Prior Level of Function/Social/Work History/Personal factors and/or comorbidities impacting plan of care: worked as a , standing/sitting long periods of time, with LE swelling managed with daily wear of 15-20 mm/Hg knee high compression stockings. Unable to work with decline in medical status, retired. Living Situation: private residence       [place] Caregiver?: yes, spouse   Self-care/ADLs: requires assistance with bathing, LE dressing, all meal prep. Mobility: assistance for all bed mobility, transfers, ambulating short distances with walker, lift chair. Arrives in w/c propelled by spouse. Sleeping Arrangement:  Recliner. Adaptive Equipment Owned: 2L O2   Other: walker, grab bars with shower seat, assistance to shower. Previous Therapy:  None to address lymphedema bilateral LE; L UE lymphedema treatment prior to move to [place]. Compression/Lymphedema Equipment:  none    SUBJECTIVE:   \"I used to wear support stockings when I worked to reduce swelling in my legs. \"  States she discontinued wear of support stockings when she retired. Reports severe pain/heaviness bilateral LE, impairing her ability to move/walk. Patients goals for therapy: reduce leg swelling. OBJECTIVE DATA SUMMARY:   EXAMINATION/PRESENTATION/DECISION MAKING:   Pain:  Pain Scale 1: Numeric (0 - 10)  Pain Intensity 1: 9  Pain Location 1: Leg   Heavy, burning.     Skin and Tissue Assessment:  Dermal Status:  [x]   Intact [x]  Dry   [x]  Tenuous: skin taut, shiny [x] Flaky   []  Wound/lesion present []  Scars   []  Dermatitis    Texture/Consistency:  [x]  Boggy: dorsal foot to lower abdomen [x]  Pitting Edema: ankle, +2.   []  Brawny []  Combination   [x]  Fibrotic/Woody: lower leg    Pigmentation/Color Change:  []  Normal []  Hemosiderin   []  Red []  Erythematous   [x]  Hyperpigmented: L LE>R LE, medial thigh bilateral []  Hyperlipodermatosclerosis   Anomalies:   []  Lymphorrhea []  Vesicles   []  Petechiae []  Warty Vercusis   []  Bullae []  Papilloma   Circulatory:  []  JENNY []  Varicosities:   [x]  Pulses: dorsal pedal located with doppler. []  Vascular studies ruled out DVT in past   []  DVT History Nails:  []   Normal  [x]   Fungus  Stemmers Sign: positive bilateral   Photos:    Height:   5'5\"  Weight:   294 lbs   BMI:   49  (36 or greater: adversely affecting lymphedema)  Volumetric Measurements:   Right:  10,921.92 mL Left:  11,632.40 mL   % Difference: 6.51% L>R   (See scanned graph)  Range of Motion: bilateral LE WFL, tissue approximation with knee flexion. Strength: bilateral hip m 4-/5; remaining LE strength grossly 4/5 with MMT. Sensation:  Intact to light touch. Mobility:  Bed/Chair Mobility:  Contact guard assistance  Transfers:  Contact guard assistance    Sitting Balance:  good Standing Balance:  fair   Gait:  Minimum assistance with walker, short household distances. Wheelchair Mobility:  Total assistance    Endurance:  Limited to transfers, bed mobility, gait short household distances. Stairs:  (Other) na         Safety:  Patient is alert and oriented:  x4   Safety awareness:  Patient aware of limitations   Fall Risk?:  High, 2 falls in the past 6 months.    Patient given written fall prevention handout: Yes   Precautions:  Standard lymphedema precautions to include avoiding blood pressure readings, injections and IVs or other procedures/acts that could lead to broken skin on affected area, and avoiding excessive heat, resistive activity or altitude without compression garment    Functional Measure:   LLIS      Physical Therapy Evaluation Charge Determination   History Examination Presentation Decision-Making   HIGH Complexity :3+ comorbidities / personal factors will impact the outcome/ POC  HIGH Complexity : 4+ Standardized tests and measures addressing body structure, function, activity limitation and / or participation in recreation  HIGH Complexity : Unstable and unpredictable characteristics  Other outcome measures LLIS  HIGH       Based on the above components, the patient evaluation is determined to be of the following complexity level: HIGH     Evaluation Time: 9:50-10:30 am   40 minutes    TREATMENT PROVIDED:   1. Treatment description: The patient was educated regarding the role and function of the lymphatic system, pathophysiology of primary lymphedema, and instructed in the lymphedema management protocol of complete decongestive therapy (CDT). This includes skin care to prevent breakdown or infection, lymphedema exercises, custom compression therapy options (bandaging, compression garments, compression pump, Zenovia Pleasure, JoViPak, The Sean-Ta, etc. as needed), and decongestion with manual lymphatic drainage as indicated. We discussed the need for consistent compression system for lymphedema management. Diaphragmatic breathing instruction and skin care education was performed,applying low pH lotion to extremity using upward strokes to stimulate lymphatic vessels. Patient and spouse advised in benefit of proceeding with phase I CDT in clinic 2x/week including compression bandaging vs fit of adjustable velcro compression garments to decongest limbs with reduced frequency of visits to clinic required with increased focus on home management. Patient reports she would like to proceed with treatment in clinic 2x/week including compression bandaging. Pt was given information regarding obtaining bandaging supplies. Patient instructed to perform ankle pumps/circumduction/toe flex and extension 10 reps, 2-4x/day. Treatment time:  10:31-11:15 am  Minutes: 44 minutes      ASSESSMENT:   Azucena Kelly is a 60 y.o. female who presents with stage II severe lymphedema bilateral LE, with full leg/bilateral lower quadrant involvement. Patient reports long history of LE swelling which she managed with daily wear of support stockings most of her adult life, with no removal of lymph nodes, age of onset with distal presentations consistent with primary lymphedema. Patient's current medical concerns likely resulting in exacerbation of lymphedema.   Note severe dorsal foot involvement, positive Stemmer's sign bilateral, fibrotic tissue changes as noted above. Due to complex medical history, treatment will be progressed conservatively, implementing unilateral treatment, knee high, progressing as tolerated. Patient will benefit from complete decongestive therapy (CDT) including manual lymphatic drainage (MLD) technique, short-stretch textile bandages/compression system to decongest limb, and kinesiotaping as appropriate. Patient will receive instruction in proper skin care to recognize signs/symptoms of and prevent infection, therapeutic exercise, and self-MLD for independent home program and restorative lymphatic performance. This care is medically necessary due to the infection risk with lymphedema, and to improve functional activities. CDT is necessary to resolve swelling to allow patient to return to wearing normal clothes/footwear, and prevent worsening of symptoms, such as venous stasis ulcerations, infections, or hospitalizations. Patient will be independent with home program strategies to allow improved ADL ability and mobility and to allow patient to return to greatest functional independence. Rehabilitation potential is considered to be Good for goals. Factors which may influence rehabilitation potential include complex medical history potentially impacting tolerance of outpatient treatment. Patient will benefit from 12-16 physical therapy visits over 12 weeks to optimize improvement in these areas.     PLAN OF CARE:   Recommendations and Planned Interventions:  Manual lymph drainage/complete decongestive therapy  Multi-layer compression bandaging (short-stretch)  Compression garment fitting/provision  Lymphedema therapeutic exercise  AROM/PROM/Strength/Coordination  Self-care training  Functional mobility training  Education in skin care and lymphedema precautions  Self-MLD education per home program  Self-bandaging education per home program  Caregiver education as needed  Wound care as needed     GOALS  Short term goals  Time frame: 6 weeks  1. Instruct the patient to be independent with proper skin care to prevent future skin breakdown and decrease the potential risk for infections that are associated with Lymphedema. 2. Patient will be independent with a personal lymphedema exercise program to assist with the lymphatic flow and reduce limb volume by 200-400 mL . 3. Patient will understand the signs and symptoms of acute infection. Long term goals  Time frame: 6-12 weeks  1. Patient will have knowledge of the compression options and acquire a safe and  appropriate daytime and night time compression system to prevent  re-accumulation of fluid. 2.  Patient or family will be able to don/doff garments independently. Garment  system effectiveness will be evaluated prior to discharge for better long-term  management and outcomes. 3.  Improvement in functional outcomes measure by 10% to allow for overall improvement in mobility with reduced pain. 3.   To transition patient to independent restorative phase of CDT. Patient has participated in goal setting and agrees to work toward plan of care. Patient was instructed to call if questions or concerns arise. Thank you for this referral.  Sally Harada, PT, CLT   Time Calculation: 85 mins    TREATMENT PLAN EFFECTIVE DATES:   10/30/2019 TO 1/25/2020  I have read the above plan of care for [facility]. I certify the above prescribed services are required by this patient and are medically necessary.   The above plan of care has been developed in conjunction with the lymphedema/physical therapist.       Physician Signature: ____________________________________Date:______________

## 2019-11-07 NOTE — TELEPHONE ENCOUNTER
Verified patient with two types of identifiers. Spoke to patient's [address]. She states patient fell twice over the weekend but patient denies any symptoms besides weakness. Tiara Barone states the patient went to [facility], will request records. Tiara Barone wants to know if Dr. Merlin Mantis is ok with blood work since patient's K was low a few weeks ago and because she did not have blood work done at her last [facility] appointment on Friday as she missed it.

## 2019-11-08 NOTE — TELEPHONE ENCOUNTER
When was last BMP was one done at [facility]?   If none in past 2 weeks, get one

## 2019-11-08 NOTE — TELEPHONE ENCOUNTER
Verified patient with two types of identifiers. Roman Browne will obtain BMP and Mg per VO by MD and fax us results.

## 2019-11-12 NOTE — TELEPHONE ENCOUNTER
Verified patient with two types of identifiers. Let Sarwat New know that we did not receive labs. She will have them resent. Also she states [facility] refuses to reschedule patient at this time stating they do not have a current order. Refaxed order from 10-29-19 and received confirmation.

## 2019-11-12 NOTE — TELEPHONE ENCOUNTER
Patient's [facility] is returning your call from last week regarding the labs that were faxed. She would like to know if they were received. Please advise.     Phone #: [phone]  Thanks

## 2019-11-19 NOTE — TELEPHONE ENCOUNTER
Requested Prescriptions     Signed Prescriptions Disp Refills    warfarin (COUMADIN) 3 mg tablet 60 Tab 0     Sig: TAKE 1 TABLET BY MOUTH ON WEDNESDAY AND SATURDAY AND TAKE 1/2 TAB ON ALL OTHER DAYS OR AS DIRECTED BY COUMADIN CLINIC     Authorizing Provider: Deep Gil     Ordering User: Viviana Reyna       Last office visit 10/28/19 with ENRIQUE Randall    Per Dr. Paulina Ray   Date Time Provider [facility]   11/19/2019  3:00 PM Juan Miguel Durand RN [address] [place] [address]   11/21/2019 11:40 AM Michael Foley MD [facility] DONELL SCHED   11/25/2019 12:30 PM Lurena Roll [address]   11/27/2019 12:30 PM Lurena Roll [address]   12/2/2019 12:30 PM Lurena Roll [address]   12/5/2019 12:30 PM Lurena Roll [address]   12/10/2019 12:30 PM Lurena Roll [address]   12/12/2019 12:30 PM Lurena Roll [address]   12/16/2019 11:45 AM Mardeja Sathya, [address]   12/17/2019 12:30 PM Lurena Roll [address]   12/18/2019  1:40 PM Camila Sheehan  [address]   3/25/2020  9:00 AM REMOTE1, [address]   7/2/2020  9:45 AM PACEMAKER3, [address]   7/2/2020 10:00 AM Sal Malhotra  [address]

## 2019-12-31 NOTE — TELEPHONE ENCOUNTER
Spoke to TINY Deleon at the hospital. Patient been at [facility] since 12-17-19. Still not sure who turned her ICD therapies OFF and why her mode was changed to DDDR. I discussed this last week with Dr. Alisa Das. He is also not sure why these changes were made and where? Agreed to bring patient back to the office to reset to its original settings.      Celsa Shah will go out to the hospital and reset her device to its original settings ( VVIR @ 60 bpm - Her atrial port is plugged- and  turn ICD therapies ON)

## 2019-12-31 NOTE — TELEPHONE ENCOUNTER
[facility] is calling to advise Dr Morenita Tafoya that the patient would like to have her ICD turning back on.      Phone: [phone]

## 2020-01-01 ENCOUNTER — DOCUMENTATION ONLY (OUTPATIENT)
Dept: CARDIOLOGY CLINIC | Age: 60
End: 2020-01-01

## 2020-01-07 NOTE — Clinical Note
8/23/19 Patient: Clare Alvarado YOB: 1960 Date of Visit: 8/23/2019 Cathryn Cox MD 
[address] [place] [id number] VIA Facsimile: [phone] Dear Cathryn Cox MD, Thank you for referring Ms. Sherita Mota to [facility] for evaluation. My notes for this consultation are attached. If you have questions, please do not hesitate to call me. I look forward to following your patient along with you.  
 
 
Sincerely, 
 
Myriam Yoo MD 
 

Class II - visualization of the soft palate, fauces, and uvula

## 2020-01-08 NOTE — PROGRESS NOTES
Records from [facility] Drs. [facility]  Admission 12/17/2019 and discharge 1/1/2020 patient presented with volume overload and debility. She had previously been admitted to [facility] for 1 month. She had gone to hospice and the patient desired her defibrillator to be deactivated. She was also a DNR and had a UTI at that time. The patient was admitted to [facility] Drs.  at some point her thyroid medicine has been stopped and her TSH was found to be 81. She received IV and oral levothyroxine for myxedema her echo EF was 45% and she had a large volume paracentesis of 16 L of ascites she was debilitated but improved and her ICD was reactivated her INR was 2.6 and she was discharged to [facility].   Review of prior records show that when seen in the office on October 2019 she was on thyroid Synthroid at that time

## 2021-03-09 ENCOUNTER — PATIENT MESSAGE (OUTPATIENT)
Dept: CARDIOLOGY CLINIC | Age: 61
End: 2021-03-09

## 2022-08-12 NOTE — ED PROVIDER NOTES
60 y.o. female with past medical history significant for CHF, CAD, atrial fibrillation, HTN, obesity, kidney infarction, pulmonary nodule, lymphedema, who presents via EMS with  to the ED with cc of chest pain. Pt reports her AICD discharged today while seated at rest, in the setting of CHF with pacemaker dependence. Per , pt \"slumped suddenly in her chair\" and convulsed briefly as consistent with prior episodes of AICD discharge. He states pt's AICD discharged 9 days ago as well, after which [facility]. Pt notes the device was interrogated in the ED with findings described as \"it did its job,\" and she was discharged with medication changes: she was started on Amiodarone and the dosage of her usual Coreg was increased. She states she followed up with Dr. Debo Pruitt 5 days ago with plans for heart catheterization vs echocardiogram.  notes pt has had 4 AICDs in the past for hx of CHF. Pt reports she has had 6-9 months of fatigue, intermittent shortness of breath, unexpected weight gain, and intermittent dizziness. She states she is followed by Cardiology, but notes she is in the process of changing providers. Pt also endorses use of Coumadin for history of kidney infarction, as well as Lasix. Pt denies history of MI. There are no other acute medical concerns at this time. Old Records Review: [employer] report indicates 1 episode of treated VF since 3/1/19, likely today's episode. Social Hx: Never Tobacco use, denies EtOH use, denies Illicit Drug use  PCP: Edda Pride MD  Cardiologist: Rahel Rivero. CONCHA Kathleen,  (former primary), Santos Kern MD (placed AICD) //  Kiah Daily. Lucy Sutherland MD (new/current)    Note written by Mark Shipley, as dictated by Hattie Carson MD 6:37 PM                Past Medical History:   Diagnosis Date    Atrial fibrillation [facility])     Dr. Brenna Barney    CAD (coronary artery disease)     Chronic anticoagulation     was on NOAC before. ?had clot on noac. now on warfarin. Dr. Pradeep Bunch monitors INR    DDD (degenerative disc disease), cervical 1/30/2018    DDD (degenerative disc disease), thoracic 1/30/2018    Depression     no h/o hospitalization    Essential hypertension 1/8/2018    Heart failure ([address]     Incidental pulmonary nodule 2mm, RLL- no follow up needed 2/16/2018    Noted on abdomen/pelvis CT Feb 2018. No smoking history. 1. No renal, ureteral, or bladder calculi. 2. No acute findings. 3. 2 mm pulmonary nodule right lower lobe. No follow-up needed if the patient is low risk. If the patient is high-risk, CT could be considered in 1 year.      Insomnia     Kidney infarction ([address] 2017    d/t clot per pt. recalls being on blood thinner.  Lymphedema of left arm     developed after infection of pacemaker site    Obesity, morbid ([address] 1/30/2018    Pacemaker     and defib. Dr. Jaydon Barrera Stomach flu 2015    hospital admit X2       Past Surgical History:   Procedure Laterality Date    HX BREAST BIOPSY Right 2010    Benign    HX CHOLECYSTECTOMY  1984    HX HEART CATHETERIZATION  2006    d/t abnl stress test, per pat, test was normal    HX HEART CATHETERIZATION  ~2009, 2012    per pt normal.     HX HYSTERECTOMY  2012    d/t heavy bleeding. ?endometriosis. +fibroid. per pt, no cancer.      HX PACEMAKER  2010, 2011, 2012, 3/29/2018    first 2010 (infected), replacement 2011 (lead not placed correctly), 2nd replacement 2012- pacemaker, defibrillator, 3rd replacement 2018         Family History:   Problem Relation Age of Onset    Breast Cancer Maternal Aunt         late 35s    Uterine Cancer Maternal Aunt     Heart Disease Mother         sudden cardiac arrest    Heart Disease Father     Coronary Artery Disease Father     Colon Cancer Father         76s    Diabetes Father     COPD Sister     Cancer Brother 43        pancreatic    Uterine Cancer Maternal Grandmother     Other Maternal Grandfather         mva    Heart Disease Paternal Grandmother     Heart Disease Paternal Grandfather     Heart Disease Other     Diabetes Other     Other Sister         sudden cardiac arrest    Heart Attack Sister     No Known Problems Sister     Heart Disease Brother     Kidney Disease Brother     Other Brother         mva    No Known Problems Brother     Heart Attack Paternal Uncle 36    Other Paternal Aunt         aneurysm    Celiac Disease Son     Seizures Daughter     Other Daughter 22        MVA       Social History     Socioeconomic History    Marital status:      Spouse name: Jose C Quintero    Number of children: 2    Years of education: Not on file    Highest education level: Not on file   Social Needs    Financial resource strain: Not on file    Food insecurity - worry: Not on file    Food insecurity - inability: Not on file   [employer] needs - medical: Not on file   [employer] needs - non-medical: Not on file   Occupational History    Occupation: on disability   Tobacco Use    Smoking status: Never Smoker    Smokeless tobacco: Never Used   Substance and Sexual Activity    Alcohol use: No    Drug use: No    Sexual activity: Yes     Partners: Male     Comment: monogamous with    Other Topics Concern    Not on file   Social History Narrative    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 Lives with , son, dtr in law.         dtr  in [address] ALLERGIES: Augmentin [amoxicillin-pot clavulanate] and Neosporin [neomycin-bacitracin-polymyxin]    Review of Systems   Constitutional: Positive for fatigue (chronic) and unexpected weight change (chronic). Negative for activity change, chills and fever. HENT: Negative for nosebleeds, sore throat, trouble swallowing and voice change. Eyes: Negative for visual disturbance. Respiratory: Positive for shortness of breath (chronic). Cardiovascular: Positive for chest pain.  Negative for palpitations. Gastrointestinal: Negative for abdominal pain, constipation, diarrhea and nausea. Genitourinary: Negative for difficulty urinating, dysuria, hematuria and urgency. Musculoskeletal: Negative for back pain, neck pain and neck stiffness. Skin: Negative for color change. Allergic/Immunologic: Negative for immunocompromised state. Neurological: Positive for dizziness. Negative for seizures, syncope, weakness, light-headedness, numbness and headaches. Facial asymmetry: chronic. Psychiatric/Behavioral: Negative for behavioral problems, confusion, hallucinations, self-injury and suicidal ideas. Vitals:    03/06/19 1846 03/06/19 1900   BP: 119/63 125/52   Pulse: 83 66   Resp: 18 17   Temp: 97.9 °F (36.6 °C)    SpO2: 97% 95%   Weight: 121.4 kg (267 lb 10.2 oz)    Height: 5' 5\" (1.651 m)             Physical Exam   Constitutional: She is oriented to person, place, and time. She appears well-developed and well-nourished. No distress. Obese   HENT:   Head: Normocephalic and atraumatic. Eyes: Pupils are equal, round, and reactive to light. Neck: Normal range of motion. Neck supple. Cardiovascular: Normal rate, regular rhythm and normal heart sounds. Exam reveals no gallop and no friction rub. No murmur heard. Pulmonary/Chest: Effort normal and breath sounds normal. No respiratory distress. She has no wheezes. Abdominal: Soft. Bowel sounds are normal. She exhibits no distension. There is no tenderness. There is no rebound and no guarding. Musculoskeletal: Normal range of motion. Neurological: She is alert and oriented to person, place, and time. Skin: Skin is warm. No rash noted. She is not diaphoretic. Psychiatric: She has a normal mood and affect. Her behavior is normal. Judgment and thought content normal.   Nursing note and vitals reviewed.      Note written by Mark Dudley, as dictated by Loulou Young MD 6:37 PM    MDM     This is a 56-year-old female with past medical history, review of systems, physical exam as above, presenting with complaints of AICD discharge. Per the patient's , the patient was seated at her, which she became unconscious, on regaining consciousness several seconds later. The  states patient had a similar episode last week, and interrogation revealed AICD discharge, patient evaluated by another emergency department, followed with her cardiologist, who made adjustments to her medications including increasing Coreg, and adding amiodarone. She endorses several weeks of worsening shortness of breath, weakness, and weight gain. Physical exam is remarkable for a chronically ill-appearing obese middle-age female, in no acute distress, with clear breath sounds, soft nontender abdomen, regular rate and rhythm, without murmurs gallops or rubs. Suspect appropriate AICD discharge, source unclear. Plan to obtain CMP, CBC, EKG, cardiac enzymes, magnesium, TSH. We will interrogate the AICD, consult cardiology, and make a disposition based the patient's diagnostics and response to therapy. Procedures    ED EKG interpretation: 1831  Ventricular paced rhythm, rate 62. Note written by Mark Phillips, as dictated by Riaz Floyd MD 6:33 PM    CONSULT NOTE:  8:08 PM Riaz Floyd MD spoke with Dr. Brit Lindsey, Consult for Hospitalist.  Discussed available diagnostic tests and clinical findings. Dr. Brit Lindsey will admit. Continue Regimen: Naftin Initiate Treatment: Desitin Detail Level: Zone Render In Strict Bullet Format?: No

## 2025-02-20 NOTE — ROUTINE PROCESS
Assessment per SGNA guidelines  Non labored breathing, skin dry warm and appropriate for race.Abdomen soft     The beginning of Daylight Saving Time occurred at 0200 hrs. Documentation of patient care and medications administered is done with respect to the time change.
